# Patient Record
Sex: MALE | Race: WHITE | NOT HISPANIC OR LATINO | Employment: OTHER | URBAN - METROPOLITAN AREA
[De-identification: names, ages, dates, MRNs, and addresses within clinical notes are randomized per-mention and may not be internally consistent; named-entity substitution may affect disease eponyms.]

---

## 2018-03-26 ENCOUNTER — HISTORICAL (OUTPATIENT)
Dept: ADMINISTRATIVE | Facility: HOSPITAL | Age: 78
End: 2018-03-26

## 2018-04-03 ENCOUNTER — HISTORICAL (OUTPATIENT)
Dept: ADMINISTRATIVE | Facility: HOSPITAL | Age: 78
End: 2018-04-03

## 2018-04-09 ENCOUNTER — HISTORICAL (OUTPATIENT)
Dept: ADMINISTRATIVE | Facility: HOSPITAL | Age: 78
End: 2018-04-09

## 2018-06-01 ENCOUNTER — HISTORICAL (OUTPATIENT)
Dept: ADMINISTRATIVE | Facility: HOSPITAL | Age: 78
End: 2018-06-01

## 2018-07-16 ENCOUNTER — HISTORICAL (OUTPATIENT)
Dept: ADMINISTRATIVE | Facility: HOSPITAL | Age: 78
End: 2018-07-16

## 2018-11-07 ENCOUNTER — HOSPITAL ENCOUNTER (OUTPATIENT)
Dept: MEDSURG UNIT | Facility: HOSPITAL | Age: 78
End: 2018-11-08
Attending: SURGERY | Admitting: SURGERY

## 2018-12-16 ENCOUNTER — HOSPITAL ENCOUNTER (OUTPATIENT)
Dept: NUTRITION | Facility: HOSPITAL | Age: 78
End: 2018-12-18
Attending: HOSPITALIST | Admitting: HOSPITALIST

## 2019-12-27 ENCOUNTER — HISTORICAL (OUTPATIENT)
Dept: ADMINISTRATIVE | Facility: HOSPITAL | Age: 79
End: 2019-12-27

## 2019-12-27 LAB
ALBUMIN SERPL-MCNC: 3.6 GM/DL (ref 3.4–5)
ALP SERPL-CCNC: 97 UNIT/L (ref 50–136)
ALT SERPL-CCNC: 27 UNIT/L (ref 12–78)
AST SERPL-CCNC: 25 UNIT/L (ref 15–37)
BILIRUB SERPL-MCNC: 0.8 MG/DL (ref 0.2–1)
BILIRUBIN DIRECT+TOT PNL SERPL-MCNC: 0.1 MG/DL (ref 0–0.2)
BILIRUBIN DIRECT+TOT PNL SERPL-MCNC: 0.7 MG/DL (ref 0–0.8)
INR PPP: 1.1 (ref 0–1.3)
LIVER PROFILE INTERP: NORMAL
PROT SERPL-MCNC: 6.4 GM/DL (ref 6.4–8.2)
PROTHROMBIN TIME: 14 SECOND(S) (ref 12–14)

## 2021-03-23 ENCOUNTER — HISTORICAL (OUTPATIENT)
Dept: ADMINISTRATIVE | Facility: HOSPITAL | Age: 81
End: 2021-03-23

## 2021-03-23 LAB
ABS NEUT (OLG): 8.9 X10(3)/MCL (ref 2.1–9.2)
BUN SERPL-MCNC: 21 MG/DL (ref 7–18)
CALCIUM SERPL-MCNC: 9.6 MG/DL (ref 8.5–10.1)
CHLORIDE SERPL-SCNC: 104 MMOL/L (ref 98–107)
CO2 SERPL-SCNC: 27 MMOL/L (ref 21–32)
CREAT SERPL-MCNC: 1.26 MG/DL (ref 0.6–1.3)
CREAT/UREA NIT SERPL: 16.7
ERYTHROCYTE [DISTWIDTH] IN BLOOD BY AUTOMATED COUNT: 13.8 % (ref 11.5–17)
GLUCOSE SERPL-MCNC: 93 MG/DL (ref 74–106)
HCT VFR BLD AUTO: 45.1 % (ref 42–52)
HGB BLD-MCNC: 14.5 GM/DL (ref 14–18)
LYMPHOCYTES # BLD AUTO: 3.3 X10(3)/MCL (ref 0.6–3.4)
LYMPHOCYTES NFR BLD AUTO: 24.8 % (ref 13–40)
MCH RBC QN AUTO: 29.6 PG (ref 27–31.2)
MCHC RBC AUTO-ENTMCNC: 32 GM/DL (ref 32–36)
MCV RBC AUTO: 92 FL (ref 80–94)
MONOCYTES # BLD AUTO: 1 X10(3)/MCL (ref 0.1–1.3)
MONOCYTES NFR BLD AUTO: 7.7 % (ref 0.1–24)
NEUTROPHILS NFR BLD AUTO: 67.5 % (ref 47–80)
PLATELET # BLD AUTO: 168 X10(3)/MCL (ref 130–400)
PMV BLD AUTO: 8.6 FL (ref 9.4–12.4)
POTASSIUM SERPL-SCNC: 4.4 MMOL/L (ref 3.5–5.1)
RBC # BLD AUTO: 4.9 X10(6)/MCL (ref 4.7–6.1)
SODIUM SERPL-SCNC: 138 MMOL/L (ref 136–145)
WBC # SPEC AUTO: 13.2 X10(3)/MCL (ref 4.5–11.5)

## 2021-07-15 ENCOUNTER — HISTORICAL (OUTPATIENT)
Dept: ADMINISTRATIVE | Facility: HOSPITAL | Age: 81
End: 2021-07-15

## 2021-07-15 LAB
ABS NEUT (OLG): 5.3 X10(3)/MCL (ref 2.1–9.2)
ALBUMIN SERPL-MCNC: 4.3 GM/DL (ref 3.4–5)
ALBUMIN/GLOB SERPL: 2.05 {RATIO} (ref 1.5–2.5)
ALP SERPL-CCNC: 72 UNIT/L (ref 38–126)
ALT SERPL-CCNC: 14 UNIT/L (ref 7–52)
APPEARANCE, UA: CLEAR
AST SERPL-CCNC: 17 UNIT/L (ref 15–37)
BACTERIA #/AREA URNS AUTO: NORMAL /HPF
BILIRUB SERPL-MCNC: 0.6 MG/DL (ref 0.2–1)
BILIRUB UR QL STRIP: NEGATIVE MG/DL
BILIRUBIN DIRECT+TOT PNL SERPL-MCNC: 0.2 MG/DL (ref 0–0.5)
BILIRUBIN DIRECT+TOT PNL SERPL-MCNC: 0.4 MG/DL
BUN SERPL-MCNC: 21 MG/DL (ref 7–18)
CALCIUM SERPL-MCNC: 9.6 MG/DL (ref 8.5–10.1)
CHLORIDE SERPL-SCNC: 107 MMOL/L (ref 98–107)
CHOLEST SERPL-MCNC: 83 MG/DL (ref 0–200)
CHOLEST/HDLC SERPL: 2.8 {RATIO}
CO2 SERPL-SCNC: 27 MMOL/L (ref 21–32)
COLOR UR: YELLOW
CREAT SERPL-MCNC: 1.33 MG/DL (ref 0.6–1.3)
ERYTHROCYTE [DISTWIDTH] IN BLOOD BY AUTOMATED COUNT: 14.4 % (ref 11.5–17)
GLOBULIN SER-MCNC: 2.1 GM/DL (ref 1.2–3)
GLUCOSE (UA): NEGATIVE MG/DL
GLUCOSE SERPL-MCNC: 96 MG/DL (ref 74–106)
HCT VFR BLD AUTO: 45.2 % (ref 42–52)
HDLC SERPL-MCNC: 30 MG/DL (ref 35–60)
HGB BLD-MCNC: 14.6 GM/DL (ref 14–18)
HGB UR QL STRIP: NEGATIVE UNIT/L
KETONES UR QL STRIP: NORMAL MG/DL
LDLC SERPL CALC-MCNC: 41 MG/DL (ref 0–129)
LEUKOCYTE ESTERASE UR QL STRIP: NEGATIVE UNIT/L
LYMPHOCYTES # BLD AUTO: 3.1 X10(3)/MCL (ref 0.6–3.4)
LYMPHOCYTES NFR BLD AUTO: 33.6 % (ref 13–40)
MCH RBC QN AUTO: 29.3 PG (ref 27–31.2)
MCHC RBC AUTO-ENTMCNC: 32 GM/DL (ref 32–36)
MCV RBC AUTO: 91 FL (ref 80–94)
MONOCYTES # BLD AUTO: 0.9 X10(3)/MCL (ref 0.1–1.3)
MONOCYTES NFR BLD AUTO: 10.1 % (ref 0.1–24)
NEUTROPHILS NFR BLD AUTO: 56.3 % (ref 47–80)
NITRITE UR QL STRIP.AUTO: NEGATIVE
PH UR STRIP: 5.5 [PH]
PLATELET # BLD AUTO: 178 X10(3)/MCL (ref 130–400)
PMV BLD AUTO: 8.3 FL (ref 9.4–12.4)
POTASSIUM SERPL-SCNC: 4.6 MMOL/L (ref 3.5–5.1)
PROT SERPL-MCNC: 6.4 GM/DL (ref 6.4–8.2)
PROT UR QL STRIP: NEGATIVE MG/DL
PSA SERPL-MCNC: 3.02 NG/ML (ref 0–6.5)
RBC # BLD AUTO: 4.98 X10(6)/MCL (ref 4.7–6.1)
RBC #/AREA URNS HPF: NORMAL /HPF
SODIUM SERPL-SCNC: 143 MMOL/L (ref 136–145)
SP GR UR STRIP: 1.02
SQUAMOUS EPITHELIAL, UA: NORMAL /LPF
TRIGL SERPL-MCNC: 101 MG/DL (ref 30–150)
UROBILINOGEN UR STRIP-ACNC: 0.2 MG/DL
VLDLC SERPL CALC-MCNC: 20.2 MG/DL
WBC # SPEC AUTO: 9.3 X10(3)/MCL (ref 4.5–11.5)
WBC #/AREA URNS AUTO: NORMAL /[HPF]

## 2022-01-02 ENCOUNTER — HISTORICAL (OUTPATIENT)
Dept: ADMINISTRATIVE | Facility: HOSPITAL | Age: 82
End: 2022-01-02

## 2022-02-09 ENCOUNTER — HISTORICAL (OUTPATIENT)
Dept: CARDIOLOGY | Facility: HOSPITAL | Age: 82
End: 2022-02-09

## 2022-02-24 ENCOUNTER — HISTORICAL (OUTPATIENT)
Dept: ADMINISTRATIVE | Facility: HOSPITAL | Age: 82
End: 2022-02-24

## 2022-02-24 LAB — D DIMER PPP IA.FEU-MCNC: 0.42 (ref 0–0.5)

## 2022-04-07 ENCOUNTER — HISTORICAL (OUTPATIENT)
Dept: ADMINISTRATIVE | Facility: HOSPITAL | Age: 82
End: 2022-04-07
Payer: MEDICARE

## 2022-04-24 VITALS
DIASTOLIC BLOOD PRESSURE: 63 MMHG | BODY MASS INDEX: 27.03 KG/M2 | SYSTOLIC BLOOD PRESSURE: 104 MMHG | OXYGEN SATURATION: 90 % | HEIGHT: 74 IN | WEIGHT: 210.63 LBS

## 2022-04-28 NOTE — OP NOTE
DATE OF SURGERY:    11/07/2018    SURGEON:  Emigdio Guerrero MD    PREOPERATIVE DIAGNOSIS:  Acute appendicitis.    POSTOPERATIVE DIAGNOSIS:  Acute appendicitis.    PROCEDURE PERFORMED:  Laparoscopic appendectomy.    COMPLICATIONS:  None.    ESTIMATED BLOOD LOSS:  20 mL.    FINDINGS:  Acutely inflamed appendix with fibropurulent exudate.  No gross perforations or abscesses.    INDICATIONS FOR PROCEDURE:  Mr. Sharp is a 78-year-old male who began having abdominal pain yesterday associated with nausea and anorexia.  The pain and symptoms progressed and located mostly in the right lower quadrant.  He came to the emergency department and workup was consistent with acute appendicitis.  Risks and benefits of the procedure were explained to the patient.  He consented to surgery.    PROCEDURE IN DETAIL:  The patient was taken to the operating room, laid supine on the operating table.  Time-out was performed.  All were in agreement.  After appropriate anesthesia was induced, he was prepped and draped in the normal sterile fashion exposing the abdomen.  A supraumbilical incision was made and carried down into the peritoneal cavity.  Upon blunt palpation, there were no adhesions felt.  Zero Vicryl stay stitch was placed in a U fashion in the fascia and a Enrique trocar was inserted and secured and a pneumoperitoneum was established.  5 mm trocars were inserted under direct visualization in the suprapubic and the left lower quadrant region.  The appendix was identified in the right lower quadrant at the base of the cecum and it was noted to be inflamed with somewhat fibropurulent exudate, although there were no perforations or abscesses identified.  After a moderate amount of dissection, the appendix was able to be retracted free from the surrounding tissues.  A window was made at the base of the appendix at the cecum and a blue load Endo-MAURILIO stapling device was used to transect the appendix off the cecum.  Additional  mobilization further freed up the appendiceal mesentery and the mesoappendix was ligated with a white load Endo-MAURILIO stapling device.  The appendix was placed in an EndoCatch bag.  The right lower quadrant was copiously irrigated and suctioned.  The tissues were mildly oozy, but there was no discernible area of bleeding.  Again, it was irrigated free of all particulate matter.  Hemostasis was adequate and the appendix was taken from the umbilical trocar site.  Trocars were removed.  Pneumoperitoneum was evacuated.  The Enrique incision was closed with 0-Vicryl stay stitch.  The incisions were closed at the skin level with subcuticular 4-0 Vicryl.  The patient tolerated the procedure well.  There were no immediate     postoperative complications.  All needle and instrument counts were correct.  I was present and scrubbed for the entire duration of the procedure.        ______________________________  MD KRISTIN Powell IV/FABI  DD:  11/07/2018  Time:  01:46PM  DT:  11/07/2018  Time:  02:01PM  Job #:  [id number]

## 2022-04-28 NOTE — DISCHARGE SUMMARY
Patient:   Helder Sharp            MRN: [id number]            FIN: [id number]               Age:   78 years     Sex:  Male     :  1940   Associated Diagnoses:   None   Author:   Juliet TORRES, Jose Rafael      Discharge Information      Discharge Summary Information   Admit/Discharge Dates   Admit Date: 2018  Discharge Date: 2018   Physicians   Attending Physician - Tyson TORRES, Kali YBARRA  Attending Physician - Juliet TORRES, Jose Rafael    Primary Care Physician - Orlando TORRES, Tony TATE   Discharge Diagnosis   Chronic obstructive pulmonary disease with (acute) exacerbation (J44.1)   Acute bronchitis    Recent sinusitis/bronchitis    Reset laparoscopic appendectomy 18    CKD 2    P. Afib      Procedures   No procedures recorded for this visit.   Immunizations   No immunizations recorded for this visit.   Discharge Medications   Continue  Medrol dose pack  levaquin 750 mg po daily x 5 days   albuterol-ipratropium (COMBIVENT    AER ) 1puff, INH, QID, PRN shortness of breath or wheezing  albuterol-ipratropium (DuoNeb) 3 mL, NEB, q4hr Resp, PRN shortness of breath or wheezing  aspirin (aspirin 81 mg oral tablet) 81 mg, Oral, qPM  atorvastatin (atorvastatin 20 mg oral tablet) 20 mg, Oral, Daily  fluticasone-vilanterol (Breo Ellipta 100 mcg-25 mcg/inh inhalation powder) 1 puff, Daily  sertraline (Zoloft 100 mg oral tablet) 100 mg, Oral, qPM  Discontinue  Influenza Virus Vaccine, Inactivated, IM, Daily  LORAzepam (LORAZEPAM    TAB 0.5MG) 0.5 mg, Oral, qPM, PRN agitation  sertraline (sertraline 100 mg oral tablet) See Instructions      Education   Discharge - 18 7:41:00 CST, Home, Give all scheduled vaccinations prior to discharge.   Discharge Activity - Activity as Tolerated   Discharge Diet - Regular       Followup   Tony Perry, within 1 to 2 weeks  Kong Knox, within 1 to 2 weeks      Hospital Course   Hospital Course     78-year-old  male who has moderate COPD followed by   Abilio who reportedly went to urgent care this morning for worsening shortness of breath, chills, fever, non-productive cough starting this morning and was advised to present to the emergency department.  SaO2 levels were 88% on room air. Denies any pleuritic chest pain.   Of note he was recently admitted about a month ago for an episode of acute appendicitis and underwent lap appendectomy. He states his COPD is well controlled and hasn't had a hospitalization in many years.  The hospitalist group was asked to admit for further treatment of COPD exacerbation. Pt was started on iv steroids and bronchodilators. He also started to cough up brown sputum. Started on iv levaquin. Blood cultures done and was negative. CXR was clear. Pts lactic acid level was elevated and treated with IV fluids. His renal functions was also elevated. Improved with iv fluids.   Pts symptoms gradulally improved. He ambulated and his O2 sats dropped to < 88%. Home O2 has been set up. He was advised to avoid NSAIDs 2/2 CLD 2. During this stay he went into Afib but rate controlled. This lasted for <12 hrs and pts heart rate was back in NSR. He was placed on Metoprolol 25 mg po daily. Pt had mild leukocytosis and levated blood sugars 2/2 steroids.  Pt today is stable and asymptomatic. DC to home today. Explained in detail to patient and family about the discharge plan, medications and F/U visits. They understand agree with the treatment plan.   Diet: Regular  Meds: per dc med rec  F/U with PCP and Pulmonologist on 1 to 2 weeks   For furtehr questions contact hospitalist office  DC 31 mts    .        Physical Examination   General:  Alert and oriented, No acute distress.    Respiratory:  Lungs are clear to auscultation, Breath sounds are equal.    Cardiovascular:  Normal rate, Regular rhythm, No murmur.    Gastrointestinal:  Soft, Non-tender, Non-distended, Normal bowel sounds.    Neurologic:  Alert, Oriented, No focal deficits, Cranial Nerves II-XII  are grossly intact.       Discharge Plan   Education and Follow-up   Counseled: patient, regarding diagnosis, regarding treatment, regarding medications.

## 2022-04-28 NOTE — H&P
Patient:   Helder Sharp            MRN: [id number]            FIN: [id number]               Age:   78 years     Sex:  Male     :  1940   Associated Diagnoses:   None   Author:   Kali Mehta MD      Basic Information   Admit information:  ED admission .    Time Seen:  Date & Time 2018 15:50:00.    Source of history:  Self, Spouse.    Referral source:  Emergency department.    History limitation:  None.    Advance directive:  Full code.    Provider information/ cc:  PCP: Dr. Tony Perry  Pulmonologist: Dr. Knox.       Chief Complaint   Worsening shortness of breath, dyspnea      History of Present Illness   78-year-old  male who has moderate COPD followed by Dr. Knox who reportedly went to urgent care this morning for worsening shortness of breath, chills, fever, non-productive cough starting this morning and was advised to present to the emergency department.  SaO2 levels were 88% on room air.  The patient was seen by his PCP on 2018 and was prescribed a Z-Anderson and given a Celestone IM injection for sinusitis/bronchitis.  Denies any pleuritic chest pain.  States he completed antibiotics and had some relief but symptoms have returned.  Of note he was recently admitted about a month ago for an episode of acute appendicitis which resulted in a lap appy. He states his COPD is well controlled and hasn't had a hospitalization in many years.  He does not require O2 at home. The hospitalist group was asked to admit for further treatment of COPD exacerbation.       Review of Systems   All other systems.  Except as documented, all other systems reviewed and are negative      Health Status   Allergies:    Allergic Reactions (Selected)  Severity Not Documented  Flexeril- Unknown.,    Allergies (1) Active Reaction  Flexeril Unknown   Current medications:  (Selected)   Inpatient Medications  Ordered  DuoNeb: 3 mL, form: Soln, NEB, q4hr PRN for shortness of breath or wheezing, first dose  18 15:03:00 CST  NS (0.9% Sodium Chloride) Infusion 1,000 mL: 1,000 mL, 1,000 mL, IV, 500 mL/hr, start date 18 15:03:00 CST  levofloxacin IVPB ( Levaquin ): 750 mg, form: Infusion, IV Piggyback, Daily, Infuse over: 1.5 hr, first dose 18 15:04:00 CST, STAT  Prescriptions  Prescribed  atorvastatin 20 mg oral tablet: 20 mg = 1 tab(s), Oral, Daily, # 90 tab(s), 3 Refill(s), Pharmacy: [facility]/pharmacy #[id number]  sertraline 100 mg oral tablet: See Instructions, TAKE 1 TABLET BY MOUTH DAILY, # 90 tab(s), 3 Refill(s), Pharmacy: [facility]/pharmacy #[id number]  Documented Medications  Documented  Breo Ellipta 100 mcg-25 mcg/inh inhalation powder: 1 puff, Daily, 996 Refill(s)  COMBIVENT    AER : 1puff, INH, QID, PRN PRN shortness of breath or wheezing  DuoNeb: 3 mL, NEB, q4hr Resp, PRN PRN shortness of breath or wheezing, 0 Refill(s)  Influenza Virus Vaccine, Inactivated: IM, Daily, 0 Refill(s)  LORAZEPAM    TAB 0.5M.5 mg = 1 tab(s), Oral, Daily  aspirin 81 mg oral tablet: 81 mg = 1 tab(s), Oral, Daily, 0 Refill(s)      Histories     Past Medical History: Moderate COPD, anxiety  Past Surgical History: Laparoscopic appendectomy 18, left heart catheterization, vasectomy, tonsillectomy, colonoscopy, hernia repair  Family History: Reviewed and is noncontributory to this hospitalization  Social History:  Denies EtOH use, previous history of cigarette smoking did smoke x 40- 50+ years tobacco, quit smoking 9 years ago, denies  illicit drug use; , lives with his wife    Procedure history.Social History      Physical Examination      Vital Signs (last 24 hrs)_____  Last Charted___________  Temp Oral     37.9 DegC  (DEC 16 14:58)  Heart Rate Peripheral   92 bpm  (DEC 16 16:07)  Resp Rate         22 br/min  (DEC 16 16:07)  SBP      H 144mmHg  (DEC 16 16:)  DBP      61 mmHg  (DEC 16 16:)  SpO2      L 90%  (DEC 16 15:00)   Measurements from flowsheet : Measurements   2018 12:01 CST     Weight Dosing              101 kg                             Weight Measured and Calculated in Lbs     222.66 lb                             Weight Estimated          101 kg                             Height/Length Dosing      188 cm                             Height/Length Estimated   188 cm                             Body Mass Index Estimated 28.58 kg/m2     General:  Alert and oriented, No acute distress.    Cognition and Speech:  Oriented, Speech clear and coherent.    HENT:  Normocephalic, Normal hearing, Oral mucosa is moist.    Eye:  Pupils are equal, round and reactive to light, Normal conjunctiva.    Neck:  Supple, No jugular venous distention.    Respiratory:  Bilateral breath sounds with expiratory wheezing noted to upper lobes and right base anteriorly.    Cardiovascular:  RRR without murmur, S1, S2.    Gastrointestinal:  Soft, Non-tender, Non-distended, Normal bowel sounds.    Integumentary:  Warm, Dry, Intact.    Musculoskeletal:  Normal strength, No tenderness.    Neurologic:  Alert, Oriented, Normal sensory, No focal deficits.    Psychiatric:  Cooperative, Appropriate mood & affect, Normal judgment.       Health Maintenance      Review / Management   Results review   Laboratory Results   Today's Lab Results : PowerNote Discrete Results   12/16/2018 13:11 CST     POC BNP iSTAT             59 pg/mL                             POC Troponin              0.00 ng/mL    12/16/2018 13:03 CST     WBC                       19.1 x10(3)/mcL  HI                             RBC                       5.01 x10(6)/mcL                             Hgb                       15.0 gm/dL                             Hct                       47.5 %                             Platelet                  169 x10(3)/mcL                             MCV                       94.8 fL  HI                             MCH                       29.9 pg                             MCHC                      31.6 gm/dL  LOW                             RDW                        13.7 %                             MPV                       9.3 fL  LOW                             Abs Neut                  16.33 x10(3)/mcL  HI                             Neutro Auto               86 %  NA                             Lymph Auto                8 %  NA                             Mono Auto                 6 %  NA                             Eos Auto                  0 %  NA                             Abs Eos                   0.0 x10(3)/mcL                             Basophil Auto             0 %  NA                             Abs Neutro                16.33 x10(3)/mcL  HI                             Abs Lymph                 1.5 x10(3)/mcL                             Abs Mono                  1.1 x10(3)/mcL                             Abs Baso                  0.0 x10(3)/mcL                             Sodium Lvl                139 mmol/L                             Potassium Lvl             4.1 mmol/L                             Chloride                  108 mmol/L  HI                             CO2                       21.0 mmol/L                             Calcium Lvl               8.9 mg/dL                             Glucose Lvl               133 mg/dL  HI                             BUN                       26.0 mg/dL  HI                             Creatinine                1.39 mg/dL  HI                             eGFR-AA                   >60 mL/min/1.73 m2  NA                             eGFR-MILY                  53 mL/min/1.73 m2  NA                             Bili Total                1.0 mg/dL                             Bili Direct               0.30 mg/dL                             Bili Indirect             0.70 mg/dL                             AST                       17 unit/L                             ALT                       25 unit/L                             Alk Phos                  87 unit/L                             Total Protein              7.7 gm/dL                             Albumin Lvl               4.10 gm/dL                             Globulin                  3.60 gm/dL  HI                             A/G Ratio                 1.1 ratio                             Troponin-I                <0.02 ng/mL        Chest x-ray results   XR Chest 2 Views     REASON FOR STUDY: Chest Pain     Comparison: 11/7/2018     FINDINGS:     Lungs appear clear of active disease. There is no pleural effusion or  pneumothorax identified. There are small granulomas in the left upper  lobe. Heart size is normal. Mediastinum is not widened. No evidence of  pleural effusion.     Impression:     No acute cardiopulmonary findings.               Signature Line  Electronically Signed By: Asher TORRES, Moises YBARRA  Date/Time Signed: 12/16/2018 12:30         I, Terese Ludwig, NP have discussed this case with Dr. Mehta.      Impression and Plan   Impression:  COPD exacerbation with probable bronchitis  -Solu-Medrol IV  -DuoNeb treatments  -Levaquin IVPB daily  -wean O2 as tolerated    Recent sinusitis/bronchitis  -Treated with Z-Anderson and Celestone IM    Reset laparoscopic appendectomy 11/7/18    LUDWIN on CKD  - isotonic crystalloids      Code status: Full  DVT prophylaxis: Lovenox         Professional Services   For this patient encounter, I reviewed the NP/PA/resident documentation, treatment plan, and medical decision making; and I had face-to-face time with this patient.  Assumed patient care at 12/16/18 on 1700.    History:  Reviewed HPI, medical, surgical, family and social history as above    Physical: Agree with documentation above    Treatment Plan:   Admit for treatment of COPD exacerbation.  Empiric antibiotics, steroids and nebs as needed.  Wean O2 as tolerated

## 2022-04-28 NOTE — DISCHARGE SUMMARY
Patient:   Helder Sharp            MRN: [id number]            FIN: [id number]               Age:   78 years     Sex:  Male     :  1940   Associated Diagnoses:   Acute appendicitis   Author:   Alexis Yao      Results Review   General results   Today's results   2018 3:40 CST       WBC                       13.8 x10(3)/mcL  HI                             RBC                       4.65 x10(6)/mcL  LOW                             Hgb                       14.0 gm/dL                             Hct                       45.3 %                             Platelet                  153 x10(3)/mcL                             MCV                       97.4 fL  HI                             MCH                       30.1 pg                             MCHC                      30.9 gm/dL  LOW                             RDW                       13.2 %                             MPV                       9.2 fL  LOW                             Abs Neut                  11.15 x10(3)/mcL  HI                             Neutro Auto               81 %  NA                             Lymph Auto                13 %  NA                             Mono Auto                 5 %  NA                             Basophil Auto             0 %  NA                             Abs Neutro                11.15 x10(3)/mcL  HI                             Abs Lymph                 1.8 x10(3)/mcL                             Abs Mono                  0.7 x10(3)/mcL                             Abs Baso                  0.0 x10(3)/mcL                             Sodium Lvl                143 mmol/L                             Potassium Lvl             4.8 mmol/L                             Chloride                  108 mmol/L  HI                             CO2                       27.0 mmol/L                             Calcium Lvl               8.6 mg/dL                             Glucose Lvl               126  mg/dL  HI                             BUN                       23.0 mg/dL  HI                             Creatinine                1.43 mg/dL  HI                             eGFR-AA                   >60 mL/min/1.73 m2  NA                             eGFR-MILY                  51 mL/min/1.73 m2  NA                             Bili Total                0.7 mg/dL                             Bili Direct               0.30 mg/dL                             Bili Indirect             0.40 mg/dL                             AST                       10 unit/L  LOW                             ALT                       24 unit/L                             Alk Phos                  72 unit/L                             Total Protein             6.4 gm/dL                             Albumin Lvl               3.40 gm/dL                             Globulin                  3.00 gm/dL                             A/G Ratio                 1.1 ratio        Discharge Information      Discharge Summary Information   Admitted  11/7/2018   Discharged  11/8/2018   Admitting diagnosis   Discharge diagnosis     Acute appendicitis ([id number]-OM K35.80).        Physical Examination   General:  Alert and oriented, No acute distress.    Eye:  Pupils are equal, round and reactive to light, Extraocular movements are intact.    Neck:  Supple, Non-tender.    Respiratory:  Lungs are clear to auscultation, Respirations are non-labored, Breath sounds are equal, Symmetrical chest wall expansion.    Cardiovascular:  Normal rate, Regular rhythm, Good pulses equal in all extremities, Normal peripheral perfusion.    Gastrointestinal:  Soft, Non-distended, Normal bowel sounds, aTTP. Lap sites c/d/i. .       Vital Signs (last 24 hrs)_____  Last Charted___________  Temp Oral     36.6 DegC  (NOV 08 07:00)  Heart Rate Peripheral   76 bpm  (NOV 08 07:00)  Resp Rate         18 br/min  (NOV 08 07:00)  SBP      108 mmHg  (NOV 08 07:00)  DBP      L 58mmHg  (NOV 08  07:00)  SpO2      L 93%  (NOV 08 07:00)  Weight      100 kg  (NOV 07 17:19)  Height      187 cm  (NOV 07 17:19)  BMI      28.6  (NOV 07 17:19)   Musculoskeletal:  Normal range of motion, Normal strength, No deformity.    Integumentary:  Warm, Dry.    Neurologic:  Alert, Oriented.    Psychiatric:  Cooperative, Appropriate mood & affect.       Hospital Course   78-year-old white male admitted with acute appendicitis.  Taken to the operating room for uncomplicated laparoscopic appendectomy.  Postoperatively he tolerated a regular diet his pain was well controlled.  This morning he ambulated and had not taking any pain medication at least 8 hours.  He feels well.  His laparoscopic sites are clean dry and intact.  He has appropriate tenderness to palpation over his abdomen.  His wife is at the bedside.  He will be discharged home.  He understands that he should call for any increasing pain or fever.  He is given standard laparoscopic appendectomy instructions.  He will follow-up in our clinic as scheduled in 1-2 weeks.  No lifting over 10 pounds for 4 weeks.  He can have a regular diet.  We will likely able to return to work within the next 7 days.  He will be sent home on pain medication.  No indication for antibiotics.      Discharge Plan   Discharge Summary Plan   Discharge Status: improved.     Discharge instructions given: to patient, to family member spouse.     Discharge disposition: discharge to home (into the care of family member, with home health care, self care).     Prescriptions: continue same medications, reviewed (with patient, with spouse), written and given to patient.     Course   Improving.     Education and Follow-up   Counseled: patient, family.

## 2022-04-28 NOTE — ED PROVIDER NOTES
"   Patient:   Helder Sharp            MRN: [id number]            FIN: [id number]               Age:   78 years     Sex:  Male     :  1940   Associated Diagnoses:   Acute appendicitis   Author:   Michael DE SOUZA MD, Stephane MALIN      Basic Information   Time seen: Date & time 2018 01:00:00.   History source: Patient.   History limitation: None.   Additional information: Chief Complaint from Nursing Triage Note : Chief Complaint   2018 0:54 CST       Chief Complaint           reports w/ abd pain started at 1700 and n/v - poitns to midline lower abd pain, hx hernia sx and COPD, last bm monday night  .      History of Present Illness   The patient presents with   79 y/o C male with sx hx of hernia sx presents to the ED via EMS, c/o abd pain onset 18 at 1700. Pt states he started having suprapubic abd pain at 1700, along with N/V and headache. Pt denies any pain radiation or dysuria. .  The onset was 2018 17:00:00 .  The course/duration of symptoms is constant.  The character of symptoms is   "pain".  The degree at onset was moderate.  The Location of pain at onset was lower and abdominal.  The degree at present is moderate.  The Location of pain at present is lower and abdominal.  Radiating pain: none. The exacerbating factor is none.  The relieving factor is none.  Therapy today: none.  Risk factors consist of none.  Associated symptoms: nausea and vomiting.        Review of Systems   Constitutional symptoms:  Fever, chills, no sweats, no weakness.    Skin symptoms:  No rash,    Eye symptoms:  No recent vision problems,    ENMT symptoms:  No ear pain,    Respiratory symptoms:  No shortness of breath, no orthopnea.    Cardiovascular symptoms:  No chest pain, no palpitations.    Gastrointestinal symptoms:  Abdominal pain, moderate, suprapubic, pain, nausea, vomiting, No diarrhea,    Genitourinary symptoms:  No dysuria, no hematuria.    Musculoskeletal symptoms:  No back pain, no Muscle pain.  "   Psychiatric symptoms:  No anxiety, no depression.    Allergy/immunologic symptoms:  No seasonal allergies, no food allergies.              Additional review of systems information: All other systems reviewed and otherwise negative.      Health Status   Allergies:    Allergic Reactions (Selected)  Severity Not Documented  Flexeril- Unknown..   Medications:  (Selected)   Inpatient Medications  Ordered  IVF Normal Saline NS Infusion 1,000 mL: 1,000 mL, 1,000 mL, IV, 1,000 mL/hr, start date 11/07/18 1:05:00 CST  Toradol 30 mg for IV Push: 30 mg, form: Injection, IV Push, Once, first dose 11/07/18 1:05:00 CST, stop date 11/07/18 1:05:00 CST, STAT  Zofran INJ.  (IV Push / IM)  2 mg/mL: 4 mg, form: Injection, IV Push, Once, first dose 11/07/18 1:05:00 CST, stop date 11/07/18 1:05:00 CST, STAT  Prescriptions  Prescribed  LORazepam 0.5 mg oral tablet: 0.5 mg = 1 tab(s), Oral, Daily, PRN PRN as needed for anxiety, # 12 tab(s), 1 Refill(s), Pharmacy: [facility]/pharmacy #[id number]  atorvastatin 20 mg oral tablet: 20 mg = 1 tab(s), Oral, Daily, # 90 tab(s), 3 Refill(s), Pharmacy: [facility]/pharmacy #[id number]  sertraline 100 mg oral tablet: See Instructions, TAKE 1 TABLET BY MOUTH DAILY, # 90 tab(s), 3 Refill(s), Pharmacy: [facility]/pharmacy #[id number]  Documented Medications  Documented  Breo Ellipta 100 mcg-25 mcg/inh inhalation powder: 1 puff, Daily, 996 Refill(s)  COMBIVENT    AER :   aspirin 81 mg oral tablet: 81 mg = 1 tab(s), Oral, Daily, 0 Refill(s).   Immunizations: Up to date.      Past Medical/ Family/ Social History   Medical history:    Active  COPD (chronic obstructive pulmonary disease) ([id number])  Resolved  Sinus infection ([id number]):  Resolved.  Pneumonia ([id number]):  Resolved.  Comments:  2/13/2012 CST 12:33 Ban Gomez LPN  about a year ago  Emphysema ([id number]):  Resolved.  Comments:  2/13/2012 CST 12:34 Ban Gomez LPN  A[facility] mcbride , sees Dr Knox yearly no inhalers or neb treatments  Colon polyps ([id number]):  Resolved.  Comments:  2/13/2012 CST 12:34 CST - Ban Farmer LPN  removed  Inguinal hernia ([id number]):  Resolved.  Comments:  2/13/2012 CST 12:35 CST - Ban Farmer LPN  right.   Surgical history:    Left Heart Cath w/COR Angio Ventriculography (None) performed by Larry Gomes MD on 1/28/2016 at 75 Years.  Comments:  1/28/2016 07:30 - Ruddy Panchal RN  auto-populated from documented surgical case  Vasectomy (SNOMED CT 09219089).  Tonsillectomy (SNOMED CT 970739451).  Colonoscopy (SNOMED CT 605821719).  Hernia repair (SNOMED CT 63432057)..   Family history: Not significant.   Social history: Alcohol use: Occasionally, Tobacco use: Denies, Drug use: Denies, Occupation: Retired.      Physical Examination               Vital Signs   Vital Signs   11/7/2018 0:54 CST       Temperature Oral          37.3 DegC                             Temperature Oral (calculated)             99.14 DegF                             Peripheral Pulse Rate     83 bpm                             Respiratory Rate          18 br/min                             Systolic Blood Pressure   151 mmHg  HI                             Diastolic Blood Pressure  79 mmHg  .      Vital Signs (last 24 hrs)_____  Last Charted___________  Temp Oral     37.3 DegC  (NOV 07 00:54)  Heart Rate Peripheral   83 bpm  (NOV 07 00:54)  Resp Rate         18 br/min  (NOV 07 00:54)  SBP      H 151mmHg  (NOV 07 00:54)  DBP      79 mmHg  (NOV 07 00:54).               Per nurse's notes.   Measurements   11/7/2018 0:54 CST       Weight Dosing             100 kg                             Weight Measured and Calculated in Lbs     220.46 lb                             Weight Estimated          100 kg                             Height/Length Dosing      187 cm                             Height/Length Estimated   187 cm                              Body Mass Index Estimated 28.6 kg/m2  .   Oxygen saturation.   General:  Alert, no acute distress.    Skin:  Warm, pink, intact, moist, no rash.    Head:  Normocephalic, atraumatic.    Neck:  Supple.   Cardiovascular:  Regular rate and rhythm, No murmur, Normal peripheral perfusion, No edema.    Respiratory:  Lungs are clear to auscultation, respirations are non-labored, breath sounds are equal, Symmetrical chest wall expansion.    Gastrointestinal:  Soft, Non distended, Normal bowel sounds, Tenderness: Moderate, generalized, Guarding: Moderate, Rebound: Positive, right lower quadrant.    Musculoskeletal:  Normal ROM, normal strength.    Neurological:  Alert and oriented to person, place, time, and situation, No focal neurological deficit observed, CN II-XII intact, normal sensory observed, normal motor observed, normal speech observed.    Lymphatics:  No lymphadenopathy.   Psychiatric:  Cooperative, appropriate mood & affect, normal judgment.       Medical Decision Making   Documents reviewed:  Emergency department nurses' notes, emergency department records.    Orders    Laboratory    CBC w/ Auto Diff, Stephane Rodriguez III, MD, 11/07/18, 01:05, Ordered    CMP, Stephane Rodriguez III, MD, 11/07/18, 01:05, Ordered    Urinalysis Complete a reflex to culture, Stephane Rodriguez III, MD, 11/07/18, 01:05, Ordered    Lipase Level, Stephane Rodriguez III, MD, 11/07/18, 01:05, Ordered    Amylase Level, Stephane oRdriguez III, MD, 11/07/18, 01:05, Ordered  Xray    XR Chest 1 View, Stephane Rodriguez III, MD, 11/07/18, 01:05, Ordered .   Results review:  Lab results : Lab View   11/7/2018 1:34 CST       Lactic Acid Lvl           1.0 mmol/L    11/7/2018 1:14 CST       POC Sodium                141 mmol/L                             POC Potassium             4.4 mmol/L                             POC Chloride              103 mmol/L                             POC Ion Calcium           1.18 mmol/L                              POC Glucose               112 mg/dL  HI                             POC BUN                   32.0 mg/dL  HI                             POC Creatinine            1.3 mg/dL                             POC AGAP                  16.0  NA                             POC Hb                    16.0 mg/dL                             POC Hct                   47.0 %                             POC TCO2                  27.0 mmol/L    11/7/2018 1:09 CST       Sodium Lvl                140 mmol/L                             Potassium Lvl             4.4 mmol/L                             Chloride                  106 mmol/L                             CO2                       28.0 mmol/L                             Calcium Lvl               8.8 mg/dL                             Glucose Lvl               112 mg/dL  HI                             BUN                       31.0 mg/dL  HI                             Creatinine                1.41 mg/dL  HI                             eGFR-AA                   >60 mL/min/1.73 m2  NA                             eGFR-MILY                  52 mL/min/1.73 m2  NA                             Amylase Lvl               82 unit/L                             Bili Total                0.5 mg/dL                             Bili Direct               0.20 mg/dL                             Bili Indirect             0.30 mg/dL                             AST                       15 unit/L                             ALT                       31 unit/L                             Alk Phos                  87 unit/L                             Total Protein             7.0 gm/dL                             Albumin Lvl               3.80 gm/dL                             Globulin                  3.20 gm/dL                             A/G Ratio                 1.2  NA                             Lipase Lvl                159 unit/L                             WBC                        16.7 x10(3)/mcL  HI                             RBC                       5.05 x10(6)/mcL                             Hgb                       15.2 gm/dL                             Hct                       48.3 %                             Platelet                  165 x10(3)/mcL                             MCV                       95.6 fL  HI                             MCH                       30.1 pg                             MCHC                      31.5 gm/dL  LOW                             RDW                       13.1 %                             MPV                       9.2 fL  LOW                             Abs Neut                  12.39 x10(3)/mcL  HI                             Neutro Auto               74 %  NA                             Lymph Auto                16 %  NA                             Mono Auto                 8 %  NA                             Eos Auto                  0 %  NA                             Abs Eos                   0.1 x10(3)/mcL                             Basophil Auto             0 %  NA                             Abs Neutro                12.39 x10(3)/mcL  HI                             Abs Lymph                 2.7 x10(3)/mcL                             Abs Mono                  1.3 x10(3)/mcL                             Abs Baso                  0.1 x10(3)/mcL  ,    No qualifying data available.    Radiology results:  Computed tomography, CT of the abdomen for 12.6 mm appendix with periappendiceal laboratory changes.       Reexamination/ Reevaluation   Time: 11/7/2018 02:29:00 .   Interventions: Patient declined narcotic pain medicine will give ofiremv  and Toradol and admit to surgical hospitalist.      Impression and Plan   Diagnosis   Acute appendicitis (YSV80-CQ K35.80)      Calls-Consults   -  herritt-admit.   Plan   Condition: Improved, Stable.    Disposition: Admit time  11/7/2018 02:30:00, Admit to Inpatient Unit.    Counseled:  Patient, Family, Regarding diagnosis, Regarding diagnostic results, Regarding treatment plan, Regarding prescription, Patient indicated understanding of instructions.    Notes: I, Antony Krause, acted solely as a scribe for and in the presence of Dr. Rodriguez who performed the service.,       This scribes note accurately reflects the work done by me I have reviewed the note and personally performed a history and physical and agree with all the documentation and findings.

## 2022-05-01 NOTE — HISTORICAL [facility] CERNER
This is a historical note converted from Justin. Formatting and pictures may have been removed.  Please reference Justin for original formatting and attached multimedia. Chief Complaint  reports w/ abd pain started at 1700 and n/v - poitns to midline lower abd pain, hx hernia sx and COPD, last bm monday night  History of Present Illness  79 yo M w/ PMH of COPD, depression, and hyperlipidemia who presents to the ED with a one day history of sudden onset abdominal pain.? He reports that at approximately 1700 yesterday, he experienced periumbilical and RLQ pain that was unresponsive to over the counter pain medications.? Over the same period of time he developed chills, and nausea/vomiting.? He denies anorexia.? He has never had similar symptoms.? Afebrile on presentation, VSS.? Leukocytosis of 16.7.? CT demonstrates a dilated appendix, some fat stranding visualized in the RLQ.? Patient reports symptomatic improvement following administration of IV antibiotics, and pain medication in the ED.  Review of Systems  Negative unless otherwise specified in HPI  Physical Exam  Vitals & Measurements  T:?37.3? ?C (Oral)? HR:?85(Peripheral)? RR:?16? BP:?105/86? SpO2:?90%? WT:?100?kg? WT:?100?kg?  Gen: NAD, AAOx3, interacting appropriately  Neuro: following commands, no focal deficits  HEENT: no scleral icterus, no JVD, no LAD  CV: RRR, distal pulses palpable  Resp: normal work of breathing, equal/bilateral chest rise  Ext: grossly unremarkable  Assessment/Plan  79 yo w/ abdominal pain, leukocytosis, and a dilated appendix on CT  ?  -surgical hospitalist to admit  -will consent/post for lap appy this AM  -IV zosyn  -NPO  -mIVF  ?   Problem List/Past Medical History  Ongoing  Anxiety  Carotid bruit Lt  COPD (chronic obstructive pulmonary disease)  Ex-smoker  Hyperglycemia  Nodules present- Pulmonary  Plantar fasciitis Lt  Polyp colon  Historical  Colon polyps  Emphysema  Inguinal hernia  Pneumonia  Sinus  infection  Procedure/Surgical History  Left Heart Cath w/COR Angio Ventriculography (None) (01/28/2016)  Colonoscopy  Hernia repair  Tonsillectomy  Vasectomy   Medications  Inpatient  IVF Normal Saline NS Infusion 1,000 mL, 1000 mL, IV  Zosyn 3.375 gm (for IVPB)  Home  aspirin 81 mg oral tablet, 81 mg= 1 tab(s), Oral, Daily  atorvastatin 20 mg oral tablet, 20 mg= 1 tab(s), Oral, Daily, 3 refills  Breo Ellipta 100 mcg-25 mcg/inh inhalation powder, 1 puff, Daily  COMBIVENT AER , 1puff, INH, QID, PRN  sertraline 100 mg oral tablet, See Instructions, 3 refills  Allergies  Flexeril?(Unknown)  Social History  Alcohol - Denies Alcohol Use, 02/13/2012  Current, 1-2 times per month, 02/24/2018  Employment/School  Retired, 02/24/2018  Home/Environment  Lives with Spouse., 02/24/2018  Tobacco  Never smoker, N/A, 11/07/2018  Former smoker, No, 10/29/2018  Past, 02/13/2012  Immunizations  Vaccine Date Status   influenza virus vaccine, inactivated 11/08/2017 Recorded   pneumococcal 13-valent conjugate vaccine 09/29/2014 Recorded   pneumococcal 23-polyvalent vaccine 02/12/2007 Recorded   Lab Results  Labs Last 24 Hours?  ?Chemistry? Hematology/Coagulation? Blood Gases?   Sodium Lvl: 140 mmol/L (11/07/18 01:09:00) WBC:?16.7 x10(3)/mcL?High (11/07/18 01:09:00) POC TCO2: 27 mmol/L (11/07/18 01:14:00)   POC Sodium: 141 mmol/L (11/07/18 01:14:00) RBC: 5.05 x10(6)/mcL (11/07/18 01:09:00)    Potassium Lvl: 4.4 mmol/L (11/07/18 01:09:00) Hgb: 15.2 gm/dL (11/07/18 01:09:00)    POC Potassium: 4.4 mmol/L (11/07/18 01:14:00) POC Hb: 16 mg/dL (11/07/18 01:14:00)    Chloride: 106 mmol/L (11/07/18 01:09:00) Hct: 48.3 % (11/07/18 01:09:00)    POC Chloride: 103 mmol/L (11/07/18 01:14:00) POC Hct: 47 % (11/07/18 01:14:00)    CO2: 28 mmol/L (11/07/18 01:09:00) Platelet: 165 x10(3)/mcL (11/07/18 01:09:00)    Calcium Lvl: 8.8 mg/dL (11/07/18 01:09:00) MCV:?95.6 fL?High (11/07/18 01:09:00)    POC Ion Calcium: 1.18 mmol/L (11/07/18 01:14:00) MCH:  30.1 pg (11/07/18 01:09:00)    Glucose Lvl:?112 mg/dL?High (11/07/18 01:09:00) MCHC:?31.5 gm/dL?Low (11/07/18 01:09:00)    POC Glucose:?112 mg/dL?High (11/07/18 01:14:00) RDW: 13.1 % (11/07/18 01:09:00)    BUN:?31 mg/dL?High (11/07/18 01:09:00) MPV:?9.2 fL?Low (11/07/18 01:09:00)    POC BUN:?32 mg/dL?High (11/07/18 01:14:00) Abs Neut:?12.39 x10(3)/mcL?High (11/07/18 01:09:00)    Creatinine:?1.41 mg/dL?High (11/07/18 01:09:00) Neutro Auto: 74 % (11/07/18 01:09:00)    POC Creatinine: 1.3 mg/dL (11/07/18 01:14:00) Lymph Auto: 16 % (11/07/18 01:09:00)    eGFR-AA: >60 (11/07/18 01:09:00) Mono Auto: 8 % (11/07/18 01:09:00)    eGFR-MILY: 52 mL/min/1.73 m2 (11/07/18 01:09:00) Eos Auto: 0 % (11/07/18 01:09:00)    Amylase Lvl: 82 unit/L (11/07/18 01:09:00) Abs Eos: 0.1 x10(3)/mcL (11/07/18 01:09:00)    Bili Total: 0.5 mg/dL (11/07/18 01:09:00) Basophil Auto: 0 % (11/07/18 01:09:00)    Bili Direct: 0.2 mg/dL (11/07/18 01:09:00) Abs Neutro:?12.39 x10(3)/mcL?High (11/07/18 01:09:00)    Bili Indirect: 0.3 mg/dL (11/07/18 01:09:00) Abs Lymph: 2.7 x10(3)/mcL (11/07/18 01:09:00)    AST: 15 unit/L (11/07/18 01:09:00) Abs Mono: 1.3 x10(3)/mcL (11/07/18 01:09:00)    ALT: 31 unit/L (11/07/18 01:09:00) Abs Baso: 0.1 x10(3)/mcL (11/07/18 01:09:00)    Alk Phos: 87 unit/L (11/07/18 01:09:00)     Total Protein: 7 gm/dL (11/07/18 01:09:00)     Albumin Lvl: 3.8 gm/dL (11/07/18 01:09:00)     Globulin: 3.2 gm/dL (11/07/18 01:09:00)     A/G Ratio: 1.2 (11/07/18 01:09:00)     Lactic Acid Lvl: 1 mmol/L (11/07/18 01:34:00)     Lipase Lvl: 159 unit/L (11/07/18 01:09:00)     POC AGAP: 16 (11/07/18 01:14:00)     ?  ?  Diagnostic Results  As specified in HPI      Abdominal exam: soft, RLQ TTP, +rebound, -Rovsing, -psoas, no guarding, no peritoneal signs   agree with above assessment and plan

## 2022-05-01 NOTE — HISTORICAL [facility] CERNER
This is a historical note converted from Justin. Formatting and pictures may have been removed.  Please reference Justin for original formatting and attached multimedia. Chief Complaint  Hosp follow up SOB  History of Present Illness  The patient is an 80-year-old white male presents?as a hospital?follow-up?for presentation to the ER on March 10, 2021 for?worsening shortness of breath and a productive cough producing yellow and blood-tinged sputum?along with generalized weakness.? He was noted to have?an increased white blood cell count of 16.1, prerenal azotemia with BUN of 26.4 and creatinine of 1.34.? Chest x-ray showed no acute disease.? Patient was admitted to the hospital?and was monitored for COPD exacerbation/possible community-acquired pneumonia.? The antibiotic/Levaquin and steroids were initiated along with duo nebs and inhaler. ?He was discharged secondary to?increased?stability?and resolving acute shortness of breath.? Today he reports that he feels near baseline with no increased shortness of breath above his baseline. ?No fever or chills. ?He has completed both his antibiotics and oral steroids. ?He continues his home?O2?at night at 2 L per nasal cannula.  Review of Systems  Constitutional_no fever chills,?no unintentional weight loss  Eye_  ENMT_  Respiratory_no shortness of breath or cough-as per HPI  Cardiovascular_no chest pain?or shortness of breath  Gastrointestinal_  Genitourinary_  Hema/Lymph_  Endocrine_  Immunologic_  Musculoskeletal_  Integumentary_  Neurologic_  All Other ROS_negative  Physical Exam  Vitals & Measurements  HR:?73(Peripheral)? BP:?134/64? SpO2:?89%?  HT:?190.00?cm? WT:?95.600?kg? BMI:?26.48?  VITAL SIGNS:? Reviewed.? ?  GENERAL:? In?no apparent distress.? Alert and Oriented x3  CHEST:? Chest with clear breath sounds bilaterally.??+wheezes?mild on expiration bilateral, rales, or rhonchi. Good air movement. ?No increased work to breathe. ?No dullness to percussion. ?No  increased vocal fremitus.  CARDIAC:??Regular rate and rhythm.? S1 and S2,?without murmurs, gallops, or rubs.  ABDOMINAL: Normal active BS X all 4 quadrants. Nontender. Nondistended.  NEUROLOGIC EXAM:? Alert and oriented x 3.? No focal sensory or strength deficits.? ?Speech normal.? Follows commands.  MUSCULOSKELATAL: Full range of motion.? 5 out of 5 strength throughout.  SKIN: No rash. ?No lesion.  PSYCHIATRIC:? Mood normal.  ?  ?  Assessment/Plan  1.?Hospital discharge follow-up?Z09  ?-All components of ER visit and admit were reviewed at length including office visit notes and labs  -Patient is stable?within clinic today but ER precautions were given for any new or worsening symptoms  Ordered:  Clinic Follow-up PRN, 03/23/21 11:37:00 CDT, [facility], Future Order  Office/Outpatient Visit Level 3 Established 42039 , Hospital discharge follow-up  COPD (chronic obstructive pulmonary disease), [facility], 03/23/21 11:37:00 CDT  ?  2.?COPD (chronic obstructive pulmonary disease)?J44.9  ?-Continue Trelegy?as prescribed-patient has enough currently  -Continue use of rescue inhaler as needed-patient has enough currently  -Continue follow-up with pulmonology?as scheduled or sooner if clinically indicated  -ER precautions for new or worsening symptoms  -Continue smoking cessation  Ordered:  Clinic Follow-up PRN, 03/23/21 11:37:00 CDT, [facility], Future Order  Office/Outpatient Visit Level 3 Established 88827 , Hospital discharge follow-up  COPD (chronic obstructive pulmonary disease), [facility], 03/23/21 11:37:00 CDT  ?  3.?Leukocytosis?D72.829  ?-Possibly from?acute infection?versus steroid use  -Recheck CBC today  -Clinically stable?with normal exam  Ordered:  Basic Metabolic Panel, Routine collect, 03/23/21 11:47:00 CDT, Blood, Stop date 03/23/21 11:47:00 CDT, Lab Collect, Leukocytosis  Prerenal azotemia, 03/23/21 11:47:00 CDT  ?  4.?Prerenal azotemia?R79.89  ?-Likely due to dehydration  state  -Recheck BMP today  -Increase water intake advised today  Ordered:  Basic Metabolic Panel, Routine collect, 03/23/21 11:47:00 CDT, Blood, Stop date 03/23/21 11:47:00 CDT, Lab Collect, Leukocytosis  Prerenal azotemia, 03/23/21 11:47:00 CDT  ?  Referrals  Clinic Follow-up PRN, 03/23/21 11:37:00 CDT, [facility], Future Order   Problem List/Past Medical History  Ongoing  Acute bronchitis  Anxiety  Carotid bruit Lt  COPD (chronic obstructive pulmonary disease)  COVID-19  Ex-smoker  Hyperlipidemia  Morbid obesity  Nodules present- Pulmonary  Polyp colon  Historical  Colon polyps  Emphysema  Hyperglycemia  Inguinal hernia  Plantar fasciitis Lt  Pneumonia  Sinus infection  Procedure/Surgical History  Introduction of Remdesivir Anti-infective into Peripheral Vein, Percutaneous Approach, [employer] (12/28/2020)  Appendectomy Laparoscopic (None) (11/07/2018)  Laparoscopy, surgical, appendectomy (11/07/2018)  Resection of Appendix, Percutaneous Endoscopic Approach (11/07/2018)  Catheter placement in coronary artery(s) for coronary angiography, including intraprocedural injection(s) for coronary angiography, imaging supervision and interpretation; with left heart catheterization including intraprocedural injection(s) for left adonay (01/28/2016)  Fluoroscopy of Left Heart using Low Osmolar Contrast (01/28/2016)  Fluoroscopy of Multiple Coronary Arteries using Low Osmolar Contrast (01/28/2016)  Left Heart Cath w/COR Angio Ventriculography (None) (01/28/2016)  Measurement of Cardiac Sampling and Pressure, Left Heart, Percutaneous Approach (01/28/2016)  Laparoscopic repair of direct inguinal hernia with graft or prosthesis (02/15/2012)  Laparoscopy, surgical; repair initial inguinal hernia. (02/15/2012)  Colonoscopy (07/22/2011)  Hernia repair  Tonsillectomy  Vasectomy   Medications  atorvastatin 20 mg oral tablet, 20 mg= 1 tab(s), Oral, Daily, 3 refills  ProAir HFA 90 mcg/inh inhalation aerosol with adapter,  180 mcg= 2 puff(s), INH, q6hr, PRN  sertraline 100 mg oral tablet, See Instructions, 3 refills  Trelegy Ellipta 200 mcg-62.5 mcg-25 mcg/inh inhalation powder  Vitamin C 500 mg oral tablet, 500 mg= 1 tab(s), Oral, BID  Vitamin D3 10,000 intl units (250 mcg) oral capsule, 90870 IntUnit= 1 cap(s), Oral, qWeek  Allergies  Flexeril?(Unknown)  Social History  Abuse/Neglect  No, 03/11/2021  No, 01/19/2021  No, 12/28/2020  No, 03/09/2020  No, 03/09/2020  No, 01/22/2020  Alcohol - Denies Alcohol Use, 02/13/2012  Past, 12/27/2018  Past, 12/16/2018  Employment/School  Employed, Previous employment/school: [employer] 12/16/2018  Retired, 02/24/2018  Home/Environment  Lives with Spouse., 02/24/2018  Substance Use  Never, 10/29/2019  Tobacco  Former smoker, quit more than 30 days ago, N/A, 03/11/2021  Former smoker, quit more than 30 days ago, No, 01/19/2021  Former smoker, quit more than 30 days ago, N/A, 12/28/2020  Former smoker, quit more than 30 days ago, No, 03/09/2020  Family History  Cancer: Father.  Immunizations  Vaccine Date Status Comments   COVID-19 MRNA, LNP-S, PF- Pfizer 01/30/2021 Given    COVID-19 MRNA, LNP-S, PF- Pfizer 01/09/2021 Given    influenza virus vaccine, inactivated - Not Given Patient Refuses     influenza virus vaccine, inactivated 11/08/2017 Recorded    pneumococcal 13-valent conjugate vaccine 09/29/2014 Recorded    pneumococcal 23-polyvalent vaccine 02/12/2007 Recorded    Health Maintenance  Health Maintenance  ???Pending?(in the next year)  ??? ??OverDue  ??? ? ? ?ADL Screening due??11/07/19??and every 1??year(s)  ??? ? ? ?Influenza Vaccine due??10/01/20??and every 1??day(s)  ??? ? ? ?Cognitive Screening due??01/02/21??and every 1??year(s)  ??? ? ? ?Fall Risk Assessment due??01/02/21??and every 1??year(s)  ??? ??Due?  ??? ? ? ?COPD Maintenance-Spirometry due??03/23/21??Unknown Frequency  ??? ? ? ?Depression Screening due??03/23/21??Unknown Frequency  ??? ? ? ?Medicare Annual Wellness  Exam due??03/23/21??and every 1??year(s)  ??? ? ? ?Tetanus Vaccine due??03/23/21??and every 10??year(s)  ??? ? ? ?Zoster Vaccine due??03/23/21??Unknown Frequency  ??? ??Due In Future?  ??? ? ? ?Obesity Screening not due until??01/01/22??and every 1??year(s)  ??? ? ? ?Advance Directive not due until??01/02/22??and every 1??year(s)  ??? ? ? ?Functional Assessment not due until??01/02/22??and every 1??year(s)  ??? ? ? ?Hypertension Management-BMP not due until??03/13/22??and every 1??year(s)  ???Satisfied?(in the past 1 year)  ??? ??Satisfied?  ??? ? ? ?Advance Directive on??03/11/21.??Satisfied by Shane German  ??? ? ? ?Blood Pressure Screening on??03/23/21.??Satisfied by Tamika Merlos MA  ??? ? ? ?Body Mass Index Check on??03/23/21.??Satisfied by Tamika Merlos MA  ??? ? ? ?Coronary Artery Disease Maintenance-Lipid Lowering Therapy on??03/12/21.??Satisfied by Jessica Valenzuela  ??? ? ? ?Diabetes Screening on??03/13/21.??Satisfied by Hellen Triana  ??? ? ? ?Fall Risk Assessment on??12/28/20.??Satisfied by Earl Sutton LPN  ??? ? ? ?Functional Assessment on??03/11/21.??Satisfied by Yony Glover RN  ??? ? ? ?Hypertension Management-Blood Pressure on??03/23/21.??Satisfied by Tamika Merlos MA  ??? ? ? ?Influenza Vaccine on??12/28/20.??Satisfied by Misa Hayes  ??? ? ? ?Obesity Screening on??03/23/21.??Satisfied by Tamika Merlos MA  ?

## 2022-05-01 NOTE — HISTORICAL [facility]
This is a historical note converted from Justin. Formatting and pictures may have been removed.  Please reference Justin for original formatting and attached multimedia. S: Patient doing very well since surgery. Eating drinking well. Having BM. He denies having any pain at all. Ambulating. Back to work  ?  O: 97.4 77 177/53  ?  NAD  RR  Nl effort  soft nt nd. incisions c/d/i healing well  well perfused  ?  A: Mr mccarthy is a 77 y/o M who is 2 weeks post op from a laparoscopic appendectomy with inflamed appendix with no rupture.  ?  P:  Steri strips removed in clinic  PRN surgery follow up  No heavy lifting 2 more weeks   I agree with resident documentation. I was physically present, supervised resident, ?and discussed plan of care.  follow up prn

## 2022-06-03 DIAGNOSIS — J44.1 CHRONIC OBSTRUCTIVE PULMONARY DISEASE WITH (ACUTE) EXACERBATION: ICD-10-CM

## 2022-06-03 DIAGNOSIS — J30.9 ALLERGIC RHINITIS, UNSPECIFIED: ICD-10-CM

## 2022-06-03 RX ORDER — MONTELUKAST SODIUM 10 MG/1
TABLET ORAL
Qty: 90 TABLET | Refills: 1 | Status: SHIPPED | OUTPATIENT
Start: 2022-06-03 | End: 2022-06-06

## 2022-06-03 NOTE — TELEPHONE ENCOUNTER
Pt takes Singulair and Flonase daily. He is wondering he these are just seasonal medications or if he needs to continue to take them daily?

## 2022-07-08 ENCOUNTER — OFFICE VISIT (OUTPATIENT)
Dept: URGENT CARE | Facility: CLINIC | Age: 82
End: 2022-07-08
Payer: MEDICARE

## 2022-07-08 VITALS
DIASTOLIC BLOOD PRESSURE: 63 MMHG | TEMPERATURE: 100 F | SYSTOLIC BLOOD PRESSURE: 120 MMHG | BODY MASS INDEX: 26.31 KG/M2 | WEIGHT: 205 LBS | OXYGEN SATURATION: 90 % | HEIGHT: 74 IN | HEART RATE: 87 BPM | RESPIRATION RATE: 17 BRPM

## 2022-07-08 DIAGNOSIS — J44.1 COPD EXACERBATION: Primary | ICD-10-CM

## 2022-07-08 DIAGNOSIS — R05.9 COUGH: ICD-10-CM

## 2022-07-08 LAB
CTP QC/QA: YES
SARS-COV-2 RDRP RESP QL NAA+PROBE: NEGATIVE

## 2022-07-08 PROCEDURE — 99213 OFFICE O/P EST LOW 20 MIN: CPT | Mod: ,,, | Performed by: FAMILY MEDICINE

## 2022-07-08 PROCEDURE — 99213 PR OFFICE/OUTPT VISIT, EST, LEVL III, 20-29 MIN: ICD-10-PCS | Mod: ,,, | Performed by: FAMILY MEDICINE

## 2022-07-08 PROCEDURE — U0002 COVID-19 LAB TEST NON-CDC: HCPCS | Mod: QW,CR,, | Performed by: FAMILY MEDICINE

## 2022-07-08 PROCEDURE — U0002: ICD-10-PCS | Mod: QW,CR,, | Performed by: FAMILY MEDICINE

## 2022-07-08 RX ORDER — AZITHROMYCIN 250 MG/1
TABLET, FILM COATED ORAL
Qty: 6 TABLET | Refills: 0 | Status: SHIPPED | OUTPATIENT
Start: 2022-07-08 | End: 2022-09-27 | Stop reason: ALTCHOICE

## 2022-07-08 RX ORDER — FLUTICASONE PROPIONATE 50 MCG
1 SPRAY, SUSPENSION (ML) NASAL DAILY
Status: ON HOLD | COMMUNITY
End: 2023-01-01 | Stop reason: SDUPTHER

## 2022-07-08 RX ORDER — ROFLUMILAST 500 UG/1
0.5 TABLET ORAL DAILY
Status: ON HOLD | COMMUNITY
Start: 2022-06-23 | End: 2023-01-01 | Stop reason: HOSPADM

## 2022-07-08 RX ORDER — RANOLAZINE 500 MG/1
500 TABLET, EXTENDED RELEASE ORAL 2 TIMES DAILY
Status: ON HOLD | COMMUNITY
End: 2023-01-01 | Stop reason: HOSPADM

## 2022-07-08 RX ORDER — SERTRALINE HYDROCHLORIDE 100 MG/1
100 TABLET, FILM COATED ORAL DAILY
COMMUNITY
End: 2022-09-20

## 2022-07-08 RX ORDER — IPRATROPIUM BROMIDE AND ALBUTEROL SULFATE 2.5; .5 MG/3ML; MG/3ML
3 SOLUTION RESPIRATORY (INHALATION) EVERY 6 HOURS PRN
Status: ON HOLD | COMMUNITY
End: 2023-01-01 | Stop reason: HOSPADM

## 2022-07-08 RX ORDER — ALBUTEROL SULFATE 0.63 MG/3ML
0.63 SOLUTION RESPIRATORY (INHALATION) EVERY 6 HOURS PRN
Status: ON HOLD | COMMUNITY
End: 2023-01-01 | Stop reason: HOSPADM

## 2022-07-08 RX ORDER — FLUTICASONE FUROATE, UMECLIDINIUM BROMIDE AND VILANTEROL TRIFENATATE 200; 62.5; 25 UG/1; UG/1; UG/1
1 POWDER RESPIRATORY (INHALATION) DAILY
COMMUNITY
End: 2022-01-01

## 2022-07-08 RX ORDER — ATORVASTATIN CALCIUM 10 MG/1
10 TABLET, FILM COATED ORAL DAILY
COMMUNITY
End: 2023-01-01

## 2022-07-08 RX ORDER — PREDNISONE 20 MG/1
20 TABLET ORAL 2 TIMES DAILY
Qty: 10 TABLET | Refills: 0 | Status: SHIPPED | OUTPATIENT
Start: 2022-07-08 | End: 2022-07-13

## 2022-07-08 NOTE — PROGRESS NOTES
"Subjective:       Patient ID: Helder Sharp Jr. is a 81 y.o. male.    Vitals:  height is 6' 2" (1.88 m) and weight is 93 kg (205 lb). His temperature is 99.5 °F (37.5 °C). His blood pressure is 120/63 and his pulse is 87. His respiration is 17 and oxygen saturation is 90% (abnormal).     Chief Complaint: Sinus Problem    Cough, headache, weakness x yesterday-   02 in the Low 80s  X yesterday- usually in the 90s     [place]:  Patient is seen and evaluated in an limited status for concern for COVID-19. In order to decrease the risk of exposure to the hospital and health care workers, only a limited exam was done.   81-year-old with known history of COPD, anxiety depression currently on medications.  Follows up with primary MD Dr. Love  Present to clinic with concerns of headache, coughing and congestion associated with feeling weak and tired since yesterday.  No measured fever at home.  No concerns of positive exposure to infections.  Vaccinated for COVID-19.  States in the past prednisone and antibiotic help with aches aspiration.  Accompanied by spouse today.  Who states patient had COVID twice in the past.  O2 sats in the clinic 90% which is normal for patient on home O2 2 L. normally uses home oxygen mainly at bedtime.  States today while in the [employer] for 2-3 hours felt short of breath and need for oxygen.  Defers x-rays today.  No fever.      Provider Precautions  N95 mask  Gloves  Eye protection- Face Shield    Covid Screening  No confirmation close contact  No travel to high risk area  No recent testing done    Review of Systems  Constitutional _No fever, body aches, headache  ENMT_No sore throat, nasal congestion and postnasal drip +  Respiratory_ as per HPI  Cardiovascular_no chest pain, no palpitations  Gastrointestinal:  No nausea vomiting or abdominal pain  Integumentary_negative for rash    Objective:      Physical Exam    General : Alert and Oriented, No apparent distress, afebrile, appears comfortable " sitting on exam table with home O2 with nasal cannula.  Talking, joking, clear speech and appropriate communication  Neck - supple  HENT : Oropharynx no redness or swelling.  Respiratory : Bilateral equal breath sounds, nonlabored respirations, bilateral coarse breath sounds  Cardiovascular : Rate, rhythm regular, normal volume pulse, no murmur  Integumentary : Warm, Dry and no rash    Assessment:       1. COPD exacerbation    2. Cough          Plan:     cool mist vaporizer to keep there was moist and help draining sinuses.  Monitor the symptoms and cough drops as needed.  Discussed in detail on oxygenation.  ER precautions with any acute change in symptoms.  Prednisone for symptom relief as needed.    Antibiotics for worsening symptoms and signs of infection discussed in detail voiced understanding    COPD exacerbation  -     predniSONE (DELTASONE) 20 MG tablet; Take 1 tablet (20 mg total) by mouth 2 (two) times daily. for 5 days  Dispense: 10 tablet; Refill: 0  -     azithromycin (Z-IBAN) 250 MG tablet; Take 2 tablets by mouth on day 1; Take 1 tablet by mouth on days 2-5  Dispense: 6 tablet; Refill: 0    Cough  -     POCT COVID-19 Rapid Screening       COVID-19 test negative

## 2022-09-20 RX ORDER — SERTRALINE HYDROCHLORIDE 100 MG/1
TABLET, FILM COATED ORAL
Qty: 90 TABLET | Refills: 0 | Status: SHIPPED | OUTPATIENT
Start: 2022-09-20 | End: 2022-09-27 | Stop reason: SDUPTHER

## 2022-09-20 NOTE — TELEPHONE ENCOUNTER
This patient has no follow-up scheduled within our office-please reach out to this patient and make sure the schedules the appropriate follow-up either wellness visit or comorbidity visit whichever is due next

## 2022-09-27 PROBLEM — Z87.891 FORMER SMOKER: Status: ACTIVE | Noted: 2022-09-27

## 2022-09-27 PROBLEM — R09.89 CAROTID BRUIT: Status: ACTIVE | Noted: 2022-09-27

## 2022-09-27 PROBLEM — E78.5 HYPERLIPIDEMIA: Status: ACTIVE | Noted: 2022-09-27

## 2022-09-27 PROBLEM — F41.1 GENERALIZED ANXIETY DISORDER: Status: ACTIVE | Noted: 2022-09-27

## 2022-09-27 PROBLEM — M72.2 PLANTAR FASCIITIS: Status: ACTIVE | Noted: 2022-09-27

## 2022-09-27 PROBLEM — J44.9 CHRONIC OBSTRUCTIVE PULMONARY DISEASE: Status: ACTIVE | Noted: 2022-09-27

## 2022-09-27 PROBLEM — K40.90 INGUINAL HERNIA: Status: ACTIVE | Noted: 2022-09-27

## 2022-09-27 PROBLEM — R91.1 PULMONARY NODULE: Status: ACTIVE | Noted: 2022-09-27

## 2022-09-27 PROBLEM — K63.5 POLYP OF COLON: Status: ACTIVE | Noted: 2022-09-27

## 2023-01-01 ENCOUNTER — HOSPITAL ENCOUNTER (INPATIENT)
Facility: HOSPITAL | Age: 83
LOS: 15 days | Discharge: NURSING, REHAB OR LONG-TERM CARE | DRG: 871 | End: 2023-10-13
Attending: EMERGENCY MEDICINE | Admitting: INTERNAL MEDICINE
Payer: MEDICARE

## 2023-01-01 ENCOUNTER — ANESTHESIA EVENT (OUTPATIENT)
Dept: ENDOSCOPY | Facility: HOSPITAL | Age: 83
DRG: 871 | End: 2023-01-01
Payer: MEDICARE

## 2023-01-01 ENCOUNTER — HOSPITAL ENCOUNTER (INPATIENT)
Facility: HOSPITAL | Age: 83
LOS: 13 days | Discharge: HOME HEALTH | DRG: 189 | End: 2023-10-26
Attending: INTERNAL MEDICINE | Admitting: INTERNAL MEDICINE
Payer: MEDICARE

## 2023-01-01 ENCOUNTER — HOSPITAL ENCOUNTER (INPATIENT)
Facility: HOSPITAL | Age: 83
LOS: 7 days | Discharge: HOME HEALTH | DRG: 871 | End: 2023-01-11
Attending: EMERGENCY MEDICINE | Admitting: INTERNAL MEDICINE
Payer: MEDICARE

## 2023-01-01 ENCOUNTER — ANESTHESIA (OUTPATIENT)
Dept: ENDOSCOPY | Facility: HOSPITAL | Age: 83
DRG: 871 | End: 2023-01-01
Payer: MEDICARE

## 2023-01-01 ENCOUNTER — PATIENT OUTREACH (OUTPATIENT)
Dept: ADMINISTRATIVE | Facility: CLINIC | Age: 83
End: 2023-01-01
Payer: MEDICARE

## 2023-01-01 VITALS
SYSTOLIC BLOOD PRESSURE: 99 MMHG | HEART RATE: 86 BPM | TEMPERATURE: 98 F | WEIGHT: 184.13 LBS | BODY MASS INDEX: 23.63 KG/M2 | RESPIRATION RATE: 18 BRPM | HEIGHT: 74 IN | OXYGEN SATURATION: 89 % | DIASTOLIC BLOOD PRESSURE: 63 MMHG

## 2023-01-01 VITALS
HEART RATE: 76 BPM | SYSTOLIC BLOOD PRESSURE: 107 MMHG | DIASTOLIC BLOOD PRESSURE: 52 MMHG | OXYGEN SATURATION: 92 % | RESPIRATION RATE: 19 BRPM

## 2023-01-01 VITALS
HEIGHT: 74 IN | TEMPERATURE: 98 F | SYSTOLIC BLOOD PRESSURE: 128 MMHG | HEART RATE: 71 BPM | OXYGEN SATURATION: 93 % | BODY MASS INDEX: 25.52 KG/M2 | DIASTOLIC BLOOD PRESSURE: 59 MMHG | WEIGHT: 198.88 LBS | RESPIRATION RATE: 18 BRPM

## 2023-01-01 VITALS
WEIGHT: 168 LBS | DIASTOLIC BLOOD PRESSURE: 58 MMHG | TEMPERATURE: 98 F | RESPIRATION RATE: 20 BRPM | SYSTOLIC BLOOD PRESSURE: 100 MMHG | HEART RATE: 81 BPM | OXYGEN SATURATION: 94 % | BODY MASS INDEX: 21.56 KG/M2 | HEIGHT: 74 IN

## 2023-01-01 DIAGNOSIS — J18.9 PNEUMONIA OF BOTH LUNGS DUE TO INFECTIOUS ORGANISM, UNSPECIFIED PART OF LUNG: ICD-10-CM

## 2023-01-01 DIAGNOSIS — R07.9 CHEST PAIN: ICD-10-CM

## 2023-01-01 DIAGNOSIS — R06.02 SOB (SHORTNESS OF BREATH): ICD-10-CM

## 2023-01-01 DIAGNOSIS — J44.1 COPD EXACERBATION: ICD-10-CM

## 2023-01-01 DIAGNOSIS — R06.02 SOB (SHORTNESS OF BREATH): Primary | ICD-10-CM

## 2023-01-01 DIAGNOSIS — Z87.891 FORMER SMOKER: ICD-10-CM

## 2023-01-01 DIAGNOSIS — F41.1 GENERALIZED ANXIETY DISORDER: ICD-10-CM

## 2023-01-01 DIAGNOSIS — J96.90 RESPIRATORY FAILURE: ICD-10-CM

## 2023-01-01 DIAGNOSIS — R06.02 SHORTNESS OF BREATH: Primary | ICD-10-CM

## 2023-01-01 DIAGNOSIS — J44.1 CHRONIC OBSTRUCTIVE PULMONARY DISEASE WITH (ACUTE) EXACERBATION: ICD-10-CM

## 2023-01-01 DIAGNOSIS — J96.11 CHRONIC RESPIRATORY FAILURE WITH HYPOXIA: ICD-10-CM

## 2023-01-01 DIAGNOSIS — R06.03 ACUTE RESPIRATORY DISTRESS: ICD-10-CM

## 2023-01-01 DIAGNOSIS — J30.9 ALLERGIC RHINITIS, UNSPECIFIED: ICD-10-CM

## 2023-01-01 LAB
ABS NEUT (OLG): 28.92 X10(3)/MCL (ref 2.1–9.2)
ALBUMIN SERPL-MCNC: 2.8 G/DL (ref 3.4–4.8)
ALBUMIN SERPL-MCNC: 2.8 G/DL (ref 3.4–4.8)
ALBUMIN SERPL-MCNC: 2.9 G/DL (ref 3.4–4.8)
ALBUMIN SERPL-MCNC: 3 G/DL (ref 3.4–4.8)
ALBUMIN SERPL-MCNC: 3.1 G/DL (ref 3.4–4.8)
ALBUMIN SERPL-MCNC: 3.1 G/DL (ref 3.4–4.8)
ALBUMIN SERPL-MCNC: 3.2 G/DL (ref 3.4–4.8)
ALBUMIN SERPL-MCNC: 3.4 G/DL (ref 3.4–4.8)
ALBUMIN SERPL-MCNC: 4.1 G/DL (ref 3.4–4.8)
ALBUMIN/GLOB SERPL: 0.8 RATIO (ref 1.1–2)
ALBUMIN/GLOB SERPL: 0.8 RATIO (ref 1.1–2)
ALBUMIN/GLOB SERPL: 0.9 RATIO (ref 1.1–2)
ALBUMIN/GLOB SERPL: 1 RATIO (ref 1.1–2)
ALBUMIN/GLOB SERPL: 1.1 RATIO (ref 1.1–2)
ALBUMIN/GLOB SERPL: 1.5 RATIO (ref 1.1–2)
ALBUMIN/GLOB SERPL: 1.5 RATIO (ref 1.1–2)
ALLENS TEST BLOOD GAS (OHS): YES
ALP SERPL-CCNC: 55 UNIT/L (ref 40–150)
ALP SERPL-CCNC: 60 UNIT/L (ref 40–150)
ALP SERPL-CCNC: 61 UNIT/L (ref 40–150)
ALP SERPL-CCNC: 62 UNIT/L (ref 40–150)
ALP SERPL-CCNC: 64 UNIT/L (ref 40–150)
ALP SERPL-CCNC: 65 UNIT/L (ref 40–150)
ALP SERPL-CCNC: 74 UNIT/L (ref 40–150)
ALP SERPL-CCNC: 77 UNIT/L (ref 40–150)
ALP SERPL-CCNC: 88 UNIT/L (ref 40–150)
ALP SERPL-CCNC: 93 UNIT/L (ref 40–150)
ALT SERPL-CCNC: 11 UNIT/L (ref 0–55)
ALT SERPL-CCNC: 12 UNIT/L (ref 0–55)
ALT SERPL-CCNC: 14 UNIT/L (ref 0–55)
ALT SERPL-CCNC: 16 UNIT/L (ref 0–55)
ALT SERPL-CCNC: 16 UNIT/L (ref 0–55)
ALT SERPL-CCNC: 18 UNIT/L (ref 0–55)
ALT SERPL-CCNC: 18 UNIT/L (ref 0–55)
ALT SERPL-CCNC: 19 UNIT/L (ref 0–55)
ALT SERPL-CCNC: 22 UNIT/L (ref 0–55)
ALT SERPL-CCNC: 24 UNIT/L (ref 0–55)
ANION GAP SERPL CALC-SCNC: 10 MEQ/L
ANION GAP SERPL CALC-SCNC: 10 MEQ/L
ANION GAP SERPL CALC-SCNC: 12 MEQ/L
ANION GAP SERPL CALC-SCNC: 7 MEQ/L
ANION GAP SERPL CALC-SCNC: 9 MEQ/L
ANION GAP SERPL CALC-SCNC: 9 MEQ/L
APPEARANCE UR: ABNORMAL
APTT PPP: 29.1 SECONDS (ref 23.2–33.7)
AST SERPL-CCNC: 11 UNIT/L (ref 5–34)
AST SERPL-CCNC: 11 UNIT/L (ref 5–34)
AST SERPL-CCNC: 12 UNIT/L (ref 5–34)
AST SERPL-CCNC: 13 UNIT/L (ref 5–34)
AST SERPL-CCNC: 15 UNIT/L (ref 5–34)
AST SERPL-CCNC: 15 UNIT/L (ref 5–34)
AST SERPL-CCNC: 16 UNIT/L (ref 5–34)
AST SERPL-CCNC: 17 UNIT/L (ref 5–34)
AST SERPL-CCNC: 18 UNIT/L (ref 5–34)
AST SERPL-CCNC: 21 UNIT/L (ref 5–34)
AV INDEX (PROSTH): 0.8
AV MEAN GRADIENT: 2 MMHG
AV PEAK GRADIENT: 4 MMHG
AV VALVE AREA BY VELOCITY RATIO: 2.72 CM²
AV VALVE AREA: 3.05 CM²
AV VELOCITY RATIO: 0.72
B PERT.PT PRMT NPH QL NAA+NON-PROBE: NOT DETECTED
B PERT.PT PRMT NPH QL NAA+NON-PROBE: NOT DETECTED
BACTERIA #/AREA URNS AUTO: ABNORMAL /HPF
BACTERIA BLD CULT: NORMAL
BACTERIA SPEC CULT: ABNORMAL
BACTERIA SPEC CULT: NORMAL
BASE EXCESS BLD CALC-SCNC: -8.3 MMOL/L (ref -2–2)
BASE EXCESS BLD CALC-SCNC: 0.5 MMOL/L (ref -2–2)
BASE EXCESS BLD CALC-SCNC: 3.2 MMOL/L (ref -2–2)
BASE EXCESS BLD CALC-SCNC: 4.8 MMOL/L (ref -2–2)
BASOPHILS # BLD AUTO: 0.01 X10(3)/MCL
BASOPHILS # BLD AUTO: 0.01 X10(3)/MCL (ref 0–0.2)
BASOPHILS # BLD AUTO: 0.01 X10(3)/MCL (ref 0–0.2)
BASOPHILS # BLD AUTO: 0.02 X10(3)/MCL
BASOPHILS # BLD AUTO: 0.03 X10(3)/MCL
BASOPHILS # BLD AUTO: 0.04 X10(3)/MCL
BASOPHILS # BLD AUTO: 0.05 X10(3)/MCL
BASOPHILS # BLD AUTO: 0.07 X10(3)/MCL
BASOPHILS NFR BLD AUTO: 0.1 %
BASOPHILS NFR BLD AUTO: 0.2 %
BASOPHILS NFR BLD AUTO: 0.3 %
BASOPHILS NFR BLD AUTO: 0.4 %
BASOPHILS NFR BLD AUTO: 0.6 %
BASOPHILS NFR BLD AUTO: 0.7 %
BASOPHILS NFR BLD AUTO: 0.7 %
BILIRUB SERPL-MCNC: 0.4 MG/DL
BILIRUB SERPL-MCNC: 0.5 MG/DL
BILIRUB SERPL-MCNC: 0.5 MG/DL
BILIRUB SERPL-MCNC: 0.6 MG/DL
BILIRUB SERPL-MCNC: 0.6 MG/DL
BILIRUB SERPL-MCNC: 0.7 MG/DL
BILIRUB SERPL-MCNC: 0.7 MG/DL
BILIRUB SERPL-MCNC: 0.8 MG/DL
BILIRUB SERPL-MCNC: 0.9 MG/DL
BILIRUB UR QL STRIP.AUTO: ABNORMAL
BILIRUBIN DIRECT+TOT PNL SERPL-MCNC: 1.9 MG/DL
BLOOD GAS SAMPLE TYPE (OHS): ABNORMAL
BNP BLD-MCNC: 22 PG/ML
BNP BLD-MCNC: 41.1 PG/ML
BSA FOR ECHO PROCEDURE: 2.09 M2
BUN SERPL-MCNC: 19.4 MG/DL (ref 8.4–25.7)
BUN SERPL-MCNC: 20.9 MG/DL (ref 8.4–25.7)
BUN SERPL-MCNC: 22.3 MG/DL (ref 8.4–25.7)
BUN SERPL-MCNC: 28.1 MG/DL (ref 8.4–25.7)
BUN SERPL-MCNC: 29.2 MG/DL (ref 8.4–25.7)
BUN SERPL-MCNC: 29.8 MG/DL (ref 8.4–25.7)
BUN SERPL-MCNC: 30.6 MG/DL (ref 8.4–25.7)
BUN SERPL-MCNC: 31.2 MG/DL (ref 8.4–25.7)
BUN SERPL-MCNC: 32.2 MG/DL (ref 8.4–25.7)
BUN SERPL-MCNC: 32.3 MG/DL (ref 8.4–25.7)
BUN SERPL-MCNC: 32.6 MG/DL (ref 8.4–25.7)
BUN SERPL-MCNC: 33.4 MG/DL (ref 8.4–25.7)
BUN SERPL-MCNC: 33.4 MG/DL (ref 8.4–25.7)
BUN SERPL-MCNC: 33.9 MG/DL (ref 8.4–25.7)
BUN SERPL-MCNC: 34.1 MG/DL (ref 8.4–25.7)
BUN SERPL-MCNC: 38.2 MG/DL (ref 8.4–25.7)
BUN SERPL-MCNC: 41.6 MG/DL (ref 8.4–25.7)
BURR CELLS (OLG): ABNORMAL
C PNEUM DNA NPH QL NAA+NON-PROBE: NOT DETECTED
C PNEUM DNA NPH QL NAA+NON-PROBE: NOT DETECTED
CA-I BLD-SCNC: 1.2 MMOL/L (ref 1.12–1.23)
CA-I BLD-SCNC: 1.2 MMOL/L (ref 1.12–1.23)
CA-I BLD-SCNC: 1.23 MMOL/L (ref 1.12–1.23)
CA-I BLD-SCNC: 1.27 MMOL/L (ref 1.12–1.23)
CALCIUM SERPL-MCNC: 10 MG/DL (ref 8.8–10)
CALCIUM SERPL-MCNC: 8.5 MG/DL (ref 8.8–10)
CALCIUM SERPL-MCNC: 8.6 MG/DL (ref 8.8–10)
CALCIUM SERPL-MCNC: 8.8 MG/DL (ref 8.8–10)
CALCIUM SERPL-MCNC: 8.9 MG/DL (ref 8.8–10)
CALCIUM SERPL-MCNC: 8.9 MG/DL (ref 8.8–10)
CALCIUM SERPL-MCNC: 9 MG/DL (ref 8.8–10)
CALCIUM SERPL-MCNC: 9.1 MG/DL (ref 8.8–10)
CALCIUM SERPL-MCNC: 9.2 MG/DL (ref 8.8–10)
CALCIUM SERPL-MCNC: 9.3 MG/DL (ref 8.8–10)
CALCIUM SERPL-MCNC: 9.3 MG/DL (ref 8.8–10)
CALCIUM SERPL-MCNC: 9.5 MG/DL (ref 8.8–10)
CALCIUM SERPL-MCNC: 9.7 MG/DL (ref 8.8–10)
CHLORIDE SERPL-SCNC: 102 MMOL/L (ref 98–107)
CHLORIDE SERPL-SCNC: 102 MMOL/L (ref 98–107)
CHLORIDE SERPL-SCNC: 104 MMOL/L (ref 98–107)
CHLORIDE SERPL-SCNC: 105 MMOL/L (ref 98–107)
CHLORIDE SERPL-SCNC: 106 MMOL/L (ref 98–107)
CHLORIDE SERPL-SCNC: 107 MMOL/L (ref 98–107)
CHLORIDE SERPL-SCNC: 109 MMOL/L (ref 98–107)
CHLORIDE SERPL-SCNC: 109 MMOL/L (ref 98–107)
CHLORIDE SERPL-SCNC: 112 MMOL/L (ref 98–107)
CHLORIDE SERPL-SCNC: 113 MMOL/L (ref 98–107)
CO2 BLDA-SCNC: 18.1 MMOL/L
CO2 BLDA-SCNC: 25.6 MMOL/L
CO2 BLDA-SCNC: 28 MMOL/L
CO2 BLDA-SCNC: 29.2 MMOL/L
CO2 SERPL-SCNC: 17 MMOL/L (ref 23–31)
CO2 SERPL-SCNC: 18 MMOL/L (ref 23–31)
CO2 SERPL-SCNC: 19 MMOL/L (ref 23–31)
CO2 SERPL-SCNC: 20 MMOL/L (ref 23–31)
CO2 SERPL-SCNC: 23 MMOL/L (ref 23–31)
CO2 SERPL-SCNC: 23 MMOL/L (ref 23–31)
CO2 SERPL-SCNC: 25 MMOL/L (ref 23–31)
CO2 SERPL-SCNC: 26 MMOL/L (ref 23–31)
CO2 SERPL-SCNC: 27 MMOL/L (ref 23–31)
CO2 SERPL-SCNC: 27 MMOL/L (ref 23–31)
CO2 SERPL-SCNC: 28 MMOL/L (ref 23–31)
COHGB MFR BLDA: 1.1 % (ref 0.5–1.5)
COHGB MFR BLDA: 1.3 % (ref 0.5–1.5)
COHGB MFR BLDA: 1.4 % (ref 0.5–1.5)
COHGB MFR BLDA: 1.5 % (ref 0.5–1.5)
COLOR UR AUTO: ABNORMAL
CREAT SERPL-MCNC: 0.81 MG/DL (ref 0.73–1.18)
CREAT SERPL-MCNC: 0.84 MG/DL (ref 0.73–1.18)
CREAT SERPL-MCNC: 0.84 MG/DL (ref 0.73–1.18)
CREAT SERPL-MCNC: 0.87 MG/DL (ref 0.73–1.18)
CREAT SERPL-MCNC: 0.88 MG/DL (ref 0.73–1.18)
CREAT SERPL-MCNC: 0.9 MG/DL (ref 0.73–1.18)
CREAT SERPL-MCNC: 0.9 MG/DL (ref 0.73–1.18)
CREAT SERPL-MCNC: 0.91 MG/DL (ref 0.73–1.18)
CREAT SERPL-MCNC: 0.93 MG/DL (ref 0.73–1.18)
CREAT SERPL-MCNC: 0.93 MG/DL (ref 0.73–1.18)
CREAT SERPL-MCNC: 1.02 MG/DL (ref 0.73–1.18)
CREAT SERPL-MCNC: 1.06 MG/DL (ref 0.73–1.18)
CREAT SERPL-MCNC: 1.18 MG/DL (ref 0.73–1.18)
CREAT SERPL-MCNC: 1.22 MG/DL (ref 0.73–1.18)
CREAT SERPL-MCNC: 1.23 MG/DL (ref 0.73–1.18)
CREAT SERPL-MCNC: 1.23 MG/DL (ref 0.73–1.18)
CREAT SERPL-MCNC: 1.33 MG/DL (ref 0.73–1.18)
CREAT UR-MCNC: 30.6 MG/DL (ref 63–166)
CREAT/UREA NIT SERPL: 23
CREAT/UREA NIT SERPL: 28
CREAT/UREA NIT SERPL: 33
CREAT/UREA NIT SERPL: 34
CREAT/UREA NIT SERPL: 35
CREAT/UREA NIT SERPL: 39
CV ECHO LV RWT: 0.45 CM
DOP CALC AO PEAK VEL: 1.06 M/S
DOP CALC AO VTI: 19.7 CM
DOP CALC LVOT AREA: 3.8 CM2
DOP CALC LVOT DIAMETER: 2.2 CM
DOP CALC LVOT PEAK VEL: 0.76 M/S
DOP CALC LVOT STROKE VOLUME: 60.03 CM3
DOP CALC MV VTI: 26.1 CM
DOP CALCLVOT PEAK VEL VTI: 15.8 CM
DRAWN BY BLOOD GAS (OHS): ABNORMAL
E WAVE DECELERATION TIME: 217 MSEC
E/A RATIO: 0.81
E/E' RATIO: 12.55 M/S
ECHO LV POSTERIOR WALL: 1 CM (ref 0.6–1.1)
EOSINOPHIL # BLD AUTO: 0 X10(3)/MCL (ref 0–0.9)
EOSINOPHIL # BLD AUTO: 0.02 X10(3)/MCL (ref 0–0.9)
EOSINOPHIL # BLD AUTO: 0.03 X10(3)/MCL (ref 0–0.9)
EOSINOPHIL # BLD AUTO: 0.05 X10(3)/MCL (ref 0–0.9)
EOSINOPHIL # BLD AUTO: 0.07 X10(3)/MCL (ref 0–0.9)
EOSINOPHIL # BLD AUTO: 0.07 X10(3)/MCL (ref 0–0.9)
EOSINOPHIL # BLD AUTO: 0.08 X10(3)/MCL (ref 0–0.9)
EOSINOPHIL # BLD AUTO: 0.09 X10(3)/MCL (ref 0–0.9)
EOSINOPHIL # BLD AUTO: 0.1 X10(3)/MCL (ref 0–0.9)
EOSINOPHIL # BLD AUTO: 0.1 X10(3)/MCL (ref 0–0.9)
EOSINOPHIL # BLD AUTO: 0.17 X10(3)/MCL (ref 0–0.9)
EOSINOPHIL # BLD AUTO: 0.19 X10(3)/MCL (ref 0–0.9)
EOSINOPHIL NFR BLD AUTO: 0 %
EOSINOPHIL NFR BLD AUTO: 0.1 %
EOSINOPHIL NFR BLD AUTO: 0.2 %
EOSINOPHIL NFR BLD AUTO: 0.4 %
EOSINOPHIL NFR BLD AUTO: 0.5 %
EOSINOPHIL NFR BLD AUTO: 0.5 %
EOSINOPHIL NFR BLD AUTO: 0.7 %
EOSINOPHIL NFR BLD AUTO: 0.9 %
EOSINOPHIL NFR BLD AUTO: 0.9 %
EOSINOPHIL NFR BLD AUTO: 1.5 %
EOSINOPHIL NFR BLD AUTO: 2.3 %
EOSINOPHIL NFR BLD AUTO: 2.4 %
ERYTHROCYTE [DISTWIDTH] IN BLOOD BY AUTOMATED COUNT: 13 % (ref 11.6–14.4)
ERYTHROCYTE [DISTWIDTH] IN BLOOD BY AUTOMATED COUNT: 13.9 % (ref 11.6–14.4)
ERYTHROCYTE [DISTWIDTH] IN BLOOD BY AUTOMATED COUNT: 14.1 % (ref 11.6–14.4)
ERYTHROCYTE [DISTWIDTH] IN BLOOD BY AUTOMATED COUNT: 14.1 % (ref 11.6–14.4)
ERYTHROCYTE [DISTWIDTH] IN BLOOD BY AUTOMATED COUNT: 14.2 % (ref 11.5–17)
ERYTHROCYTE [DISTWIDTH] IN BLOOD BY AUTOMATED COUNT: 14.4 % (ref 11.5–17)
ERYTHROCYTE [DISTWIDTH] IN BLOOD BY AUTOMATED COUNT: 14.5 % (ref 11.5–17)
ERYTHROCYTE [DISTWIDTH] IN BLOOD BY AUTOMATED COUNT: 14.5 % (ref 11.5–17)
ERYTHROCYTE [DISTWIDTH] IN BLOOD BY AUTOMATED COUNT: 14.6 % (ref 11.5–17)
ERYTHROCYTE [DISTWIDTH] IN BLOOD BY AUTOMATED COUNT: 14.8 % (ref 11.5–17)
ERYTHROCYTE [DISTWIDTH] IN BLOOD BY AUTOMATED COUNT: 14.9 % (ref 11.5–17)
ERYTHROCYTE [DISTWIDTH] IN BLOOD BY AUTOMATED COUNT: 15.3 % (ref 11.5–17)
ERYTHROCYTE [DISTWIDTH] IN BLOOD BY AUTOMATED COUNT: 15.4 % (ref 11.5–17)
ERYTHROCYTE [DISTWIDTH] IN BLOOD BY AUTOMATED COUNT: 15.6 % (ref 11.5–17)
ERYTHROCYTE [DISTWIDTH] IN BLOOD BY AUTOMATED COUNT: 15.6 % (ref 11.5–17)
FERRITIN SERPL-MCNC: 207.02 NG/ML (ref 21.81–274.66)
FIO2 BLOOD GAS (OHS): 40 %
FIO2 BLOOD GAS (OHS): 40 %
FIO2 BLOOD GAS (OHS): 50 %
FLUAV AG UPPER RESP QL IA.RAPID: NOT DETECTED
FLUAV AG UPPER RESP QL IA.RAPID: NOT DETECTED
FLUBV AG UPPER RESP QL IA.RAPID: NOT DETECTED
FLUBV AG UPPER RESP QL IA.RAPID: NOT DETECTED
FRACTIONAL SHORTENING: 9 % (ref 28–44)
GFR SERPLBLD CREATININE-BSD FMLA CKD-EPI: 53 MLS/MIN/1.73/M2
GFR SERPLBLD CREATININE-BSD FMLA CKD-EPI: 58 MLS/MIN/1.73/M2
GFR SERPLBLD CREATININE-BSD FMLA CKD-EPI: 59 MLS/MIN/1.73/M2
GFR SERPLBLD CREATININE-BSD FMLA CKD-EPI: 59 MLS/MIN/1.73/M2
GFR SERPLBLD CREATININE-BSD FMLA CKD-EPI: 70 MLS/MIN/1.73/M2
GFR SERPLBLD CREATININE-BSD FMLA CKD-EPI: 82 MLS/MIN/1.73/M2
GFR SERPLBLD CREATININE-BSD FMLA CKD-EPI: >60 MLS/MIN/1.73/M2
GLOBULIN SER-MCNC: 2.1 GM/DL (ref 2.4–3.5)
GLOBULIN SER-MCNC: 2.6 GM/DL (ref 2.4–3.5)
GLOBULIN SER-MCNC: 2.7 GM/DL (ref 2.4–3.5)
GLOBULIN SER-MCNC: 2.7 GM/DL (ref 2.4–3.5)
GLOBULIN SER-MCNC: 2.8 GM/DL (ref 2.4–3.5)
GLOBULIN SER-MCNC: 3.2 GM/DL (ref 2.4–3.5)
GLOBULIN SER-MCNC: 3.4 GM/DL (ref 2.4–3.5)
GLOBULIN SER-MCNC: 3.4 GM/DL (ref 2.4–3.5)
GLOBULIN SER-MCNC: 3.8 GM/DL (ref 2.4–3.5)
GLOBULIN SER-MCNC: 3.9 GM/DL (ref 2.4–3.5)
GLUCOSE SERPL-MCNC: 101 MG/DL (ref 82–115)
GLUCOSE SERPL-MCNC: 103 MG/DL (ref 82–115)
GLUCOSE SERPL-MCNC: 117 MG/DL (ref 82–115)
GLUCOSE SERPL-MCNC: 119 MG/DL (ref 82–115)
GLUCOSE SERPL-MCNC: 119 MG/DL (ref 82–115)
GLUCOSE SERPL-MCNC: 124 MG/DL (ref 82–115)
GLUCOSE SERPL-MCNC: 130 MG/DL (ref 82–115)
GLUCOSE SERPL-MCNC: 133 MG/DL (ref 82–115)
GLUCOSE SERPL-MCNC: 135 MG/DL (ref 82–115)
GLUCOSE SERPL-MCNC: 138 MG/DL (ref 82–115)
GLUCOSE SERPL-MCNC: 149 MG/DL (ref 82–115)
GLUCOSE SERPL-MCNC: 153 MG/DL (ref 82–115)
GLUCOSE SERPL-MCNC: 167 MG/DL (ref 82–115)
GLUCOSE SERPL-MCNC: 177 MG/DL (ref 82–115)
GLUCOSE SERPL-MCNC: 183 MG/DL (ref 82–115)
GLUCOSE SERPL-MCNC: 190 MG/DL (ref 82–115)
GLUCOSE SERPL-MCNC: 257 MG/DL (ref 82–115)
GLUCOSE UR QL STRIP.AUTO: NEGATIVE
GRAM STN SPEC: ABNORMAL
GRAM STN SPEC: NORMAL
HADV DNA NPH QL NAA+NON-PROBE: NOT DETECTED
HADV DNA NPH QL NAA+NON-PROBE: NOT DETECTED
HCO3 BLDA-SCNC: 17.1 MMOL/L (ref 22–26)
HCO3 BLDA-SCNC: 24.5 MMOL/L (ref 22–26)
HCO3 BLDA-SCNC: 26.9 MMOL/L (ref 22–26)
HCO3 BLDA-SCNC: 28.1 MMOL/L (ref 22–26)
HCOV 229E RNA NPH QL NAA+NON-PROBE: NOT DETECTED
HCOV 229E RNA NPH QL NAA+NON-PROBE: NOT DETECTED
HCOV HKU1 RNA NPH QL NAA+NON-PROBE: NOT DETECTED
HCOV HKU1 RNA NPH QL NAA+NON-PROBE: NOT DETECTED
HCOV NL63 RNA NPH QL NAA+NON-PROBE: NOT DETECTED
HCOV NL63 RNA NPH QL NAA+NON-PROBE: NOT DETECTED
HCOV OC43 RNA NPH QL NAA+NON-PROBE: NOT DETECTED
HCOV OC43 RNA NPH QL NAA+NON-PROBE: NOT DETECTED
HCT VFR BLD AUTO: 32.4 % (ref 42–52)
HCT VFR BLD AUTO: 35.6 % (ref 42–52)
HCT VFR BLD AUTO: 37 % (ref 42–52)
HCT VFR BLD AUTO: 37.1 % (ref 42–52)
HCT VFR BLD AUTO: 37.3 % (ref 42–52)
HCT VFR BLD AUTO: 37.8 % (ref 42–52)
HCT VFR BLD AUTO: 38 % (ref 42–52)
HCT VFR BLD AUTO: 38.1 % (ref 42–52)
HCT VFR BLD AUTO: 38.5 % (ref 42–52)
HCT VFR BLD AUTO: 39.3 % (ref 42–52)
HCT VFR BLD AUTO: 39.5 % (ref 42–52)
HCT VFR BLD AUTO: 39.6 % (ref 42–52)
HCT VFR BLD AUTO: 39.7 % (ref 42–52)
HCT VFR BLD AUTO: 39.8 % (ref 42–52)
HCT VFR BLD AUTO: 39.9 % (ref 42–52)
HCT VFR BLD AUTO: 40.1 % (ref 42–52)
HCT VFR BLD AUTO: 44.7 % (ref 42–52)
HCT VFR BLD AUTO: 45.4 % (ref 42–52)
HGB BLD-MCNC: 10.4 G/DL (ref 14–18)
HGB BLD-MCNC: 11.6 G/DL (ref 14–18)
HGB BLD-MCNC: 11.7 G/DL (ref 14–18)
HGB BLD-MCNC: 12 GM/DL (ref 14–18)
HGB BLD-MCNC: 12.1 G/DL (ref 14–18)
HGB BLD-MCNC: 12.4 G/DL (ref 14–18)
HGB BLD-MCNC: 12.4 G/DL (ref 14–18)
HGB BLD-MCNC: 12.5 G/DL (ref 14–18)
HGB BLD-MCNC: 12.5 G/DL (ref 14–18)
HGB BLD-MCNC: 12.7 G/DL (ref 14–18)
HGB BLD-MCNC: 12.9 GM/DL (ref 14–18)
HGB BLD-MCNC: 13 GM/DL (ref 14–18)
HGB BLD-MCNC: 13.1 G/DL (ref 14–18)
HGB BLD-MCNC: 13.3 G/DL (ref 14–18)
HGB BLD-MCNC: 14.4 G/DL (ref 14–18)
HGB BLD-MCNC: 14.7 GM/DL (ref 14–18)
HMPV RNA NPH QL NAA+NON-PROBE: NOT DETECTED
HMPV RNA NPH QL NAA+NON-PROBE: NOT DETECTED
HPIV1 RNA NPH QL NAA+NON-PROBE: NOT DETECTED
HPIV1 RNA NPH QL NAA+NON-PROBE: NOT DETECTED
HPIV2 RNA NPH QL NAA+NON-PROBE: NOT DETECTED
HPIV2 RNA NPH QL NAA+NON-PROBE: NOT DETECTED
HPIV3 RNA NPH QL NAA+NON-PROBE: NOT DETECTED
HPIV3 RNA NPH QL NAA+NON-PROBE: NOT DETECTED
HPIV4 RNA NPH QL NAA+NON-PROBE: NOT DETECTED
HPIV4 RNA NPH QL NAA+NON-PROBE: NOT DETECTED
IMM GRANULOCYTES # BLD AUTO: 0.04 X10(3)/MCL (ref 0–0.04)
IMM GRANULOCYTES # BLD AUTO: 0.05 X10(3)/MCL (ref 0–0.04)
IMM GRANULOCYTES # BLD AUTO: 0.06 X10(3)/MCL (ref 0–0.04)
IMM GRANULOCYTES # BLD AUTO: 0.07 X10(3)/MCL (ref 0–0.04)
IMM GRANULOCYTES # BLD AUTO: 0.08 X10(3)/MCL (ref 0–0.04)
IMM GRANULOCYTES # BLD AUTO: 0.08 X10(3)/MCL (ref 0–0.04)
IMM GRANULOCYTES # BLD AUTO: 0.11 X10(3)/MCL (ref 0–0.04)
IMM GRANULOCYTES # BLD AUTO: 0.12 X10(3)/MCL (ref 0–0.04)
IMM GRANULOCYTES # BLD AUTO: 0.12 X10(3)/MCL (ref 0–0.04)
IMM GRANULOCYTES # BLD AUTO: 0.14 X10(3)/MCL (ref 0–0.04)
IMM GRANULOCYTES # BLD AUTO: 0.15 X10(3)/MCL (ref 0–0.04)
IMM GRANULOCYTES # BLD AUTO: 0.21 X10(3)/MCL (ref 0–0.04)
IMM GRANULOCYTES # BLD AUTO: 0.28 X10(3)/MCL (ref 0–0.04)
IMM GRANULOCYTES # BLD AUTO: 0.48 X10(3)/MCL (ref 0–0.04)
IMM GRANULOCYTES # BLD AUTO: 0.52 X10(3)/MCL (ref 0–0.04)
IMM GRANULOCYTES # BLD AUTO: 0.71 X10(3)/MCL (ref 0–0.04)
IMM GRANULOCYTES NFR BLD AUTO: 0.5 %
IMM GRANULOCYTES NFR BLD AUTO: 0.6 %
IMM GRANULOCYTES NFR BLD AUTO: 0.6 %
IMM GRANULOCYTES NFR BLD AUTO: 0.7 %
IMM GRANULOCYTES NFR BLD AUTO: 0.8 %
IMM GRANULOCYTES NFR BLD AUTO: 0.9 %
IMM GRANULOCYTES NFR BLD AUTO: 1.2 %
IMM GRANULOCYTES NFR BLD AUTO: 1.2 %
IMM GRANULOCYTES NFR BLD AUTO: 1.7 %
IMM GRANULOCYTES NFR BLD AUTO: 2.3 %
IMM GRANULOCYTES NFR BLD AUTO: 3.7 %
IMM GRANULOCYTES NFR BLD AUTO: 4.3 %
INR PPP: 1.1
INR PPP: 1.3
INSTRUMENT WBC (OLG): 31.1 X10(3)/MCL
INTERVENTRICULAR SEPTUM: 0.9 CM (ref 0.6–1.1)
IRON SATN MFR SERPL: 15 % (ref 20–50)
IRON SERPL-MCNC: 31 UG/DL (ref 65–175)
KETONES UR QL STRIP.AUTO: ABNORMAL
LACTATE SERPL-SCNC: 1.9 MMOL/L (ref 0.5–2.2)
LACTATE SERPL-SCNC: 3.6 MMOL/L (ref 0.5–2.2)
LACTATE SERPL-SCNC: 4.3 MMOL/L (ref 0.5–2.2)
LACTATE SERPL-SCNC: 4.5 MMOL/L (ref 0.5–2.2)
LEFT ATRIUM SIZE: 3.5 CM
LEFT INTERNAL DIMENSION IN SYSTOLE: 4 CM (ref 2.1–4)
LEFT VENTRICLE DIASTOLIC VOLUME INDEX: 41.76 ML/M2
LEFT VENTRICLE DIASTOLIC VOLUME: 87.7 ML
LEFT VENTRICLE MASS INDEX: 66 G/M2
LEFT VENTRICLE SYSTOLIC VOLUME INDEX: 33.3 ML/M2
LEFT VENTRICLE SYSTOLIC VOLUME: 70 ML
LEFT VENTRICULAR INTERNAL DIMENSION IN DIASTOLE: 4.4 CM (ref 3.5–6)
LEFT VENTRICULAR MASS: 137.77 G
LEGIONELLA AG UR QL: NEGATIVE
LEUKOCYTE ESTERASE UR QL STRIP.AUTO: ABNORMAL
LPM (OHS): 10
LV LATERAL E/E' RATIO: 9.86 M/S
LV SEPTAL E/E' RATIO: 17.25 M/S
LVOT MG: 1 MMHG
LVOT MV: 0.5 CM/S
LYMPHOCYTES # BLD AUTO: 0.49 X10(3)/MCL (ref 0.6–4.6)
LYMPHOCYTES # BLD AUTO: 0.66 X10(3)/MCL (ref 0.6–4.6)
LYMPHOCYTES # BLD AUTO: 0.86 X10(3)/MCL (ref 0.6–4.6)
LYMPHOCYTES # BLD AUTO: 0.86 X10(3)/MCL (ref 0.6–4.6)
LYMPHOCYTES # BLD AUTO: 1.49 X10(3)/MCL (ref 0.6–4.6)
LYMPHOCYTES # BLD AUTO: 1.68 X10(3)/MCL (ref 0.6–4.6)
LYMPHOCYTES # BLD AUTO: 1.77 X10(3)/MCL (ref 0.6–4.6)
LYMPHOCYTES # BLD AUTO: 1.8 X10(3)/MCL (ref 0.6–4.6)
LYMPHOCYTES # BLD AUTO: 1.94 X10(3)/MCL (ref 0.6–4.6)
LYMPHOCYTES # BLD AUTO: 2.07 X10(3)/MCL (ref 0.6–4.6)
LYMPHOCYTES # BLD AUTO: 2.12 X10(3)/MCL (ref 0.6–4.6)
LYMPHOCYTES # BLD AUTO: 2.2 X10(3)/MCL (ref 0.6–4.6)
LYMPHOCYTES # BLD AUTO: 2.32 X10(3)/MCL (ref 0.6–4.6)
LYMPHOCYTES # BLD AUTO: 2.35 X10(3)/MCL (ref 0.6–4.6)
LYMPHOCYTES # BLD AUTO: 3.13 X10(3)/MCL (ref 0.6–4.6)
LYMPHOCYTES NFR BLD AUTO: 10.1 %
LYMPHOCYTES NFR BLD AUTO: 12.5 %
LYMPHOCYTES NFR BLD AUTO: 14.7 %
LYMPHOCYTES NFR BLD AUTO: 14.8 %
LYMPHOCYTES NFR BLD AUTO: 19.3 %
LYMPHOCYTES NFR BLD AUTO: 20.5 %
LYMPHOCYTES NFR BLD AUTO: 21.2 %
LYMPHOCYTES NFR BLD AUTO: 24.5 %
LYMPHOCYTES NFR BLD AUTO: 28.1 %
LYMPHOCYTES NFR BLD AUTO: 3 %
LYMPHOCYTES NFR BLD AUTO: 30.9 %
LYMPHOCYTES NFR BLD AUTO: 4.9 %
LYMPHOCYTES NFR BLD AUTO: 6.4 %
LYMPHOCYTES NFR BLD AUTO: 6.4 %
LYMPHOCYTES NFR BLD AUTO: 9.9 %
LYMPHOCYTES NFR BLD MANUAL: 1.24 X10(3)/MCL
LYMPHOCYTES NFR BLD MANUAL: 4 %
M PNEUMO DNA NPH QL NAA+NON-PROBE: NOT DETECTED
M PNEUMO DNA NPH QL NAA+NON-PROBE: NOT DETECTED
MAGNESIUM SERPL-MCNC: 2.1 MG/DL (ref 1.6–2.6)
MAGNESIUM SERPL-MCNC: 2.2 MG/DL (ref 1.6–2.6)
MAGNESIUM SERPL-MCNC: 2.3 MG/DL (ref 1.6–2.6)
MAGNESIUM SERPL-MCNC: 2.3 MG/DL (ref 1.6–2.6)
MAGNESIUM SERPL-MCNC: 2.4 MG/DL (ref 1.6–2.6)
MCH RBC QN AUTO: 29.2 PG
MCH RBC QN AUTO: 29.2 PG (ref 27–31)
MCH RBC QN AUTO: 29.5 PG
MCH RBC QN AUTO: 29.6 PG
MCH RBC QN AUTO: 29.6 PG (ref 27–31)
MCH RBC QN AUTO: 29.7 PG
MCH RBC QN AUTO: 29.7 PG (ref 27–31)
MCH RBC QN AUTO: 29.8 PG (ref 27–31)
MCH RBC QN AUTO: 29.9 PG (ref 27–31)
MCH RBC QN AUTO: 29.9 PG (ref 27–31)
MCH RBC QN AUTO: 30.3 PG (ref 27–31)
MCH RBC QN AUTO: 30.5 PG (ref 27–31)
MCHC RBC AUTO-ENTMCNC: 31.4 G/DL (ref 33–36)
MCHC RBC AUTO-ENTMCNC: 31.6 G/DL (ref 33–36)
MCHC RBC AUTO-ENTMCNC: 31.6 G/DL (ref 33–36)
MCHC RBC AUTO-ENTMCNC: 31.7 MG/DL (ref 33–36)
MCHC RBC AUTO-ENTMCNC: 31.8 G/DL (ref 33–36)
MCHC RBC AUTO-ENTMCNC: 31.9 G/DL (ref 33–36)
MCHC RBC AUTO-ENTMCNC: 32.1 G/DL (ref 33–36)
MCHC RBC AUTO-ENTMCNC: 32.2 G/DL (ref 33–36)
MCHC RBC AUTO-ENTMCNC: 32.4 G/DL (ref 33–36)
MCHC RBC AUTO-ENTMCNC: 32.4 MG/DL (ref 33–36)
MCHC RBC AUTO-ENTMCNC: 32.5 G/DL (ref 33–36)
MCHC RBC AUTO-ENTMCNC: 32.5 G/DL (ref 33–36)
MCHC RBC AUTO-ENTMCNC: 32.6 G/DL (ref 33–36)
MCHC RBC AUTO-ENTMCNC: 32.6 MG/DL (ref 33–36)
MCHC RBC AUTO-ENTMCNC: 32.7 MG/DL (ref 33–36)
MCHC RBC AUTO-ENTMCNC: 32.8 G/DL (ref 33–36)
MCHC RBC AUTO-ENTMCNC: 32.9 G/DL (ref 33–36)
MCHC RBC AUTO-ENTMCNC: 33.2 G/DL (ref 33–36)
MCV RBC AUTO: 90.3 FL (ref 80–94)
MCV RBC AUTO: 90.6 FL (ref 80–94)
MCV RBC AUTO: 90.8 FL (ref 80–94)
MCV RBC AUTO: 91.2 FL (ref 80–94)
MCV RBC AUTO: 91.3 FL (ref 80–94)
MCV RBC AUTO: 91.4 FL (ref 80–94)
MCV RBC AUTO: 91.4 FL (ref 80–94)
MCV RBC AUTO: 91.5 FL (ref 80–94)
MCV RBC AUTO: 92 FL (ref 80–94)
MCV RBC AUTO: 92 FL (ref 80–94)
MCV RBC AUTO: 92.1 FL (ref 80–94)
MCV RBC AUTO: 92.5 FL (ref 80–94)
MCV RBC AUTO: 92.7 FL (ref 80–94)
MCV RBC AUTO: 92.8 FL (ref 80–94)
MCV RBC AUTO: 93.1 FL (ref 80–94)
MCV RBC AUTO: 93.6 FL (ref 80–94)
MCV RBC AUTO: 93.6 FL (ref 80–94)
MCV RBC AUTO: 94.6 FL (ref 80–94)
MECH RR (OHS): 20 B/MIN
MECH VT (OHS): 500 ML
METHGB MFR BLDA: 0.2 % (ref 0.4–1.5)
METHGB MFR BLDA: 0.5 % (ref 0.4–1.5)
METHGB MFR BLDA: 0.6 % (ref 0.4–1.5)
METHGB MFR BLDA: 0.7 % (ref 0.4–1.5)
MODE (OHS): AC
MONOCYTES # BLD AUTO: 0.38 X10(3)/MCL (ref 0.1–1.3)
MONOCYTES # BLD AUTO: 0.5 X10(3)/MCL (ref 0.1–1.3)
MONOCYTES # BLD AUTO: 0.56 X10(3)/MCL (ref 0.1–1.3)
MONOCYTES # BLD AUTO: 0.6 X10(3)/MCL (ref 0.1–1.3)
MONOCYTES # BLD AUTO: 0.61 X10(3)/MCL (ref 0.1–1.3)
MONOCYTES # BLD AUTO: 0.61 X10(3)/MCL (ref 0.1–1.3)
MONOCYTES # BLD AUTO: 0.71 X10(3)/MCL (ref 0.1–1.3)
MONOCYTES # BLD AUTO: 0.81 X10(3)/MCL (ref 0.1–1.3)
MONOCYTES # BLD AUTO: 0.89 X10(3)/MCL (ref 0.1–1.3)
MONOCYTES # BLD AUTO: 0.93 X10(3)/MCL (ref 0.1–1.3)
MONOCYTES # BLD AUTO: 0.97 X10(3)/MCL (ref 0.1–1.3)
MONOCYTES # BLD AUTO: 1 X10(3)/MCL (ref 0.1–1.3)
MONOCYTES # BLD AUTO: 1.06 X10(3)/MCL (ref 0.1–1.3)
MONOCYTES # BLD AUTO: 1.14 X10(3)/MCL (ref 0.1–1.3)
MONOCYTES # BLD AUTO: 1.25 X10(3)/MCL (ref 0.1–1.3)
MONOCYTES NFR BLD AUTO: 3.4 %
MONOCYTES NFR BLD AUTO: 3.7 %
MONOCYTES NFR BLD AUTO: 3.7 %
MONOCYTES NFR BLD AUTO: 3.8 %
MONOCYTES NFR BLD AUTO: 5.4 %
MONOCYTES NFR BLD AUTO: 5.5 %
MONOCYTES NFR BLD AUTO: 5.9 %
MONOCYTES NFR BLD AUTO: 7.2 %
MONOCYTES NFR BLD AUTO: 7.5 %
MONOCYTES NFR BLD AUTO: 7.6 %
MONOCYTES NFR BLD AUTO: 8.6 %
MONOCYTES NFR BLD AUTO: 8.9 %
MONOCYTES NFR BLD AUTO: 9 %
MONOCYTES NFR BLD AUTO: 9.4 %
MONOCYTES NFR BLD AUTO: 9.7 %
MONOCYTES NFR BLD MANUAL: 0.93 X10(3)/MCL (ref 0.1–1.3)
MONOCYTES NFR BLD MANUAL: 3 %
MRSA PCR SCRN (OHS): NOT DETECTED
MV MEAN GRADIENT: 2 MMHG
MV PEAK A VEL: 0.85 M/S
MV PEAK E VEL: 0.69 M/S
MV PEAK GRADIENT: 5 MMHG
MV STENOSIS PRESSURE HALF TIME: 60 MS
MV VALVE AREA BY CONTINUITY EQUATION: 2.3 CM2
MV VALVE AREA P 1/2 METHOD: 3.67 CM2
NEUTROPHILS # BLD AUTO: 10.33 X10(3)/MCL (ref 2.1–9.2)
NEUTROPHILS # BLD AUTO: 12.05 X10(3)/MCL (ref 2.1–9.2)
NEUTROPHILS # BLD AUTO: 12.14 X10(3)/MCL (ref 2.1–9.2)
NEUTROPHILS # BLD AUTO: 13.46 X10(3)/MCL (ref 2.1–9.2)
NEUTROPHILS # BLD AUTO: 14.81 X10(3)/MCL (ref 2.1–9.2)
NEUTROPHILS # BLD AUTO: 14.84 X10(3)/MCL (ref 2.1–9.2)
NEUTROPHILS # BLD AUTO: 16.11 X10(3)/MCL (ref 2.1–9.2)
NEUTROPHILS # BLD AUTO: 16.51 X10(3)/MCL (ref 2.1–9.2)
NEUTROPHILS # BLD AUTO: 4.21 X10(3)/MCL (ref 2.1–9.2)
NEUTROPHILS # BLD AUTO: 4.38 X10(3)/MCL (ref 2.1–9.2)
NEUTROPHILS # BLD AUTO: 4.76 X10(3)/MCL (ref 2.1–9.2)
NEUTROPHILS # BLD AUTO: 6.36 X10(3)/MCL (ref 2.1–9.2)
NEUTROPHILS # BLD AUTO: 6.54 X10(3)/MCL (ref 2.1–9.2)
NEUTROPHILS # BLD AUTO: 7.14 X10(3)/MCL (ref 2.1–9.2)
NEUTROPHILS # BLD AUTO: 9.09 X10(3)/MCL (ref 2.1–9.2)
NEUTROPHILS NFR BLD AUTO: 55.9 %
NEUTROPHILS NFR BLD AUTO: 60.8 %
NEUTROPHILS NFR BLD AUTO: 63.8 %
NEUTROPHILS NFR BLD AUTO: 64.4 %
NEUTROPHILS NFR BLD AUTO: 69.1 %
NEUTROPHILS NFR BLD AUTO: 69.2 %
NEUTROPHILS NFR BLD AUTO: 73.4 %
NEUTROPHILS NFR BLD AUTO: 78.2 %
NEUTROPHILS NFR BLD AUTO: 79.6 %
NEUTROPHILS NFR BLD AUTO: 81.1 %
NEUTROPHILS NFR BLD AUTO: 82.9 %
NEUTROPHILS NFR BLD AUTO: 88.6 %
NEUTROPHILS NFR BLD AUTO: 89.2 %
NEUTROPHILS NFR BLD AUTO: 90.8 %
NEUTROPHILS NFR BLD AUTO: 92.3 %
NEUTROPHILS NFR BLD MANUAL: 93 %
NITRITE UR QL STRIP.AUTO: NEGATIVE
NRBC BLD AUTO-RTO: 0 %
NRBC BLD AUTO-RTO: 0 % (ref 0–1)
NRBC BLD AUTO-RTO: 0.2 %
O2 HB BLOOD GAS (OHS): 89.8 % (ref 94–97)
O2 HB BLOOD GAS (OHS): 93.2 % (ref 94–97)
O2 HB BLOOD GAS (OHS): 93.2 % (ref 94–97)
O2 HB BLOOD GAS (OHS): 93.8 % (ref 94–97)
OHS LV EJECTION FRACTION SIMPSONS BIPLANE MOD: 60 %
OSMOLALITY UR: 292 MOSM/KG (ref 300–1300)
OXYGEN DEVICE BLOOD GAS (OHS): ABNORMAL
OXYHGB MFR BLDA: 10.5 G/DL (ref 12–16)
OXYHGB MFR BLDA: 11.3 G/DL (ref 12–16)
OXYHGB MFR BLDA: 11.7 G/DL (ref 12–16)
OXYHGB MFR BLDA: 12 G/DL (ref 12–16)
PCO2 BLDA: 34 MMHG (ref 35–45)
PCO2 BLDA: 36 MMHG (ref 35–45)
PCO2 BLDA: 36 MMHG (ref 35–45)
PCO2 BLDA: 37 MMHG (ref 35–45)
PEEP (OHS): 5 CMH2O
PH BLDA: 7.31 [PH] (ref 7.35–7.45)
PH BLDA: 7.44 [PH] (ref 7.35–7.45)
PH BLDA: 7.47 [PH] (ref 7.35–7.45)
PH BLDA: 7.5 [PH] (ref 7.35–7.45)
PH UR STRIP.AUTO: 5 [PH]
PHOSPHATE SERPL-MCNC: 2.5 MG/DL (ref 2.3–4.7)
PHOSPHATE SERPL-MCNC: 2.8 MG/DL (ref 2.3–4.7)
PHOSPHATE SERPL-MCNC: 2.8 MG/DL (ref 2.3–4.7)
PHOSPHATE SERPL-MCNC: 2.9 MG/DL (ref 2.3–4.7)
PHOSPHATE SERPL-MCNC: 3.3 MG/DL (ref 2.3–4.7)
PISA TR MAX VEL: 2.12 M/S
PLATELET # BLD AUTO: 110 X10(3)/MCL (ref 130–400)
PLATELET # BLD AUTO: 135 X10(3)/MCL (ref 130–400)
PLATELET # BLD AUTO: 168 X10(3)/MCL (ref 130–400)
PLATELET # BLD AUTO: 173 X10(3)/MCL (ref 140–371)
PLATELET # BLD AUTO: 182 X10(3)/MCL (ref 140–371)
PLATELET # BLD AUTO: 183 X10(3)/MCL (ref 130–400)
PLATELET # BLD AUTO: 184 X10(3)/MCL (ref 130–400)
PLATELET # BLD AUTO: 194 X10(3)/MCL (ref 140–371)
PLATELET # BLD AUTO: 195 X10(3)/MCL (ref 130–400)
PLATELET # BLD AUTO: 206 X10(3)/MCL (ref 140–371)
PLATELET # BLD AUTO: 207 X10(3)/MCL (ref 130–400)
PLATELET # BLD AUTO: 219 X10(3)/MCL (ref 130–400)
PLATELET # BLD AUTO: 226 X10(3)/MCL (ref 130–400)
PLATELET # BLD AUTO: 227 X10(3)/MCL (ref 130–400)
PLATELET # BLD AUTO: 263 X10(3)/MCL (ref 130–400)
PLATELET # BLD AUTO: 279 X10(3)/MCL (ref 130–400)
PLATELET # BLD AUTO: 289 X10(3)/MCL (ref 130–400)
PLATELET # BLD AUTO: 303 X10(3)/MCL (ref 130–400)
PLATELET # BLD EST: NORMAL 10*3/UL
PMV BLD AUTO: 8.7 FL (ref 7.4–10.4)
PMV BLD AUTO: 8.7 FL (ref 7.4–10.4)
PMV BLD AUTO: 8.8 FL (ref 7.4–10.4)
PMV BLD AUTO: 9 FL (ref 7.4–10.4)
PMV BLD AUTO: 9 FL (ref 7.4–10.4)
PMV BLD AUTO: 9.1 FL (ref 7.4–10.4)
PMV BLD AUTO: 9.2 FL (ref 9.4–12.4)
PMV BLD AUTO: 9.4 FL (ref 7.4–10.4)
PMV BLD AUTO: 9.4 FL (ref 7.4–10.4)
PMV BLD AUTO: 9.4 FL (ref 9.4–12.4)
PMV BLD AUTO: 9.5 FL (ref 7.4–10.4)
PMV BLD AUTO: 9.5 FL (ref 9.4–12.4)
PMV BLD AUTO: 9.6 FL (ref 7.4–10.4)
PMV BLD AUTO: 9.6 FL (ref 7.4–10.4)
PMV BLD AUTO: 9.7 FL (ref 7.4–10.4)
PMV BLD AUTO: 9.8 FL (ref 7.4–10.4)
PMV BLD AUTO: 9.8 FL (ref 7.4–10.4)
PMV BLD AUTO: 9.8 FL (ref 9.4–12.4)
PO2 BLDA: 61 MMHG (ref 80–100)
PO2 BLDA: 67 MMHG (ref 80–100)
PO2 BLDA: 73 MMHG (ref 80–100)
PO2 BLDA: 77 MMHG (ref 80–100)
POCT GLUCOSE: 105 MG/DL (ref 70–110)
POCT GLUCOSE: 96 MG/DL (ref 70–110)
POCT GLUCOSE: 96 MG/DL (ref 70–110)
POIKILOCYTOSIS BLD QL SMEAR: ABNORMAL
POTASSIUM BLOOD GAS (OHS): 3.2 MMOL/L (ref 3.5–5)
POTASSIUM BLOOD GAS (OHS): 3.3 MMOL/L (ref 3.5–5)
POTASSIUM BLOOD GAS (OHS): 3.7 MMOL/L (ref 3.5–5)
POTASSIUM BLOOD GAS (OHS): 4.2 MMOL/L (ref 3.5–5)
POTASSIUM SERPL-SCNC: 3.3 MMOL/L (ref 3.5–5.1)
POTASSIUM SERPL-SCNC: 3.5 MMOL/L (ref 3.5–5.1)
POTASSIUM SERPL-SCNC: 3.7 MMOL/L (ref 3.5–5.1)
POTASSIUM SERPL-SCNC: 3.7 MMOL/L (ref 3.5–5.1)
POTASSIUM SERPL-SCNC: 3.8 MMOL/L (ref 3.5–5.1)
POTASSIUM SERPL-SCNC: 3.9 MMOL/L (ref 3.5–5.1)
POTASSIUM SERPL-SCNC: 4 MMOL/L (ref 3.5–5.1)
POTASSIUM SERPL-SCNC: 4 MMOL/L (ref 3.5–5.1)
POTASSIUM SERPL-SCNC: 4.1 MMOL/L (ref 3.5–5.1)
POTASSIUM SERPL-SCNC: 4.2 MMOL/L (ref 3.5–5.1)
POTASSIUM SERPL-SCNC: 4.2 MMOL/L (ref 3.5–5.1)
POTASSIUM SERPL-SCNC: 4.3 MMOL/L (ref 3.5–5.1)
POTASSIUM SERPL-SCNC: 4.3 MMOL/L (ref 3.5–5.1)
POTASSIUM SERPL-SCNC: 4.5 MMOL/L (ref 3.5–5.1)
POTASSIUM SERPL-SCNC: 4.7 MMOL/L (ref 3.5–5.1)
PREALB SERPL-MCNC: 16 MG/DL (ref 16–42)
PREALB SERPL-MCNC: 18.6 MG/DL (ref 16–42)
PREALB SERPL-MCNC: 19.1 MG/DL (ref 16–42)
PROCALCITONIN SERPL-MCNC: 0.32 NG/ML
PROCALCITONIN SERPL-MCNC: 0.88 NG/ML
PROT SERPL-MCNC: 5.3 GM/DL (ref 5.8–7.6)
PROT SERPL-MCNC: 5.5 GM/DL (ref 5.8–7.6)
PROT SERPL-MCNC: 6.3 GM/DL (ref 5.8–7.6)
PROT SERPL-MCNC: 6.6 GM/DL (ref 5.8–7.6)
PROT SERPL-MCNC: 6.8 GM/DL (ref 5.8–7.6)
PROT SERPL-MCNC: 6.8 GM/DL (ref 5.8–7.6)
PROT SERPL-MCNC: 6.9 GM/DL (ref 5.8–7.6)
PROT SERPL-MCNC: 7.1 GM/DL (ref 5.8–7.6)
PROT UR QL STRIP.AUTO: ABNORMAL
PROTHROMBIN TIME: 14.2 SECONDS (ref 12.5–14.5)
PROTHROMBIN TIME: 15.6 SECONDS (ref 12.5–14.5)
PV PEAK GRADIENT: 3 MMHG
PV PEAK VELOCITY: 0.83 M/S
RBC # BLD AUTO: 3.48 X10(6)/MCL (ref 4.7–6.1)
RBC # BLD AUTO: 3.84 X10(6)/MCL (ref 4.7–6.1)
RBC # BLD AUTO: 3.91 X10(6)/MCL (ref 4.7–6.1)
RBC # BLD AUTO: 4.06 X10(6)/MCL (ref 4.7–6.1)
RBC # BLD AUTO: 4.07 X10(6)/MCL (ref 4.7–6.1)
RBC # BLD AUTO: 4.08 X10(6)/MCL (ref 4.7–6.1)
RBC # BLD AUTO: 4.11 X10(6)/MCL (ref 4.7–6.1)
RBC # BLD AUTO: 4.17 X10(6)/MCL (ref 4.7–6.1)
RBC # BLD AUTO: 4.18 X10(6)/MCL (ref 4.7–6.1)
RBC # BLD AUTO: 4.22 X10(6)/MCL (ref 4.7–6.1)
RBC # BLD AUTO: 4.25 X10(6)/MCL (ref 4.7–6.1)
RBC # BLD AUTO: 4.29 X10(6)/MCL (ref 4.7–6.1)
RBC # BLD AUTO: 4.37 X10(6)/MCL (ref 4.7–6.1)
RBC # BLD AUTO: 4.37 X10(6)/MCL (ref 4.7–6.1)
RBC # BLD AUTO: 4.39 X10(6)/MCL (ref 4.7–6.1)
RBC # BLD AUTO: 4.42 X10(6)/MCL (ref 4.7–6.1)
RBC # BLD AUTO: 4.86 X10(6)/MCL (ref 4.7–6.1)
RBC # BLD AUTO: 4.96 X10(6)/MCL (ref 4.7–6.1)
RBC #/AREA URNS AUTO: >100 /HPF
RBC MORPH BLD: ABNORMAL
RBC UR QL AUTO: ABNORMAL
RIGHT VENTRICULAR END-DIASTOLIC DIMENSION: 3.3 CM
RSV A 5' UTR RNA NPH QL NAA+PROBE: NOT DETECTED
RSV RNA NPH QL NAA+NON-PROBE: NOT DETECTED
RSV RNA NPH QL NAA+NON-PROBE: NOT DETECTED
RV+EV RNA NPH QL NAA+NON-PROBE: NOT DETECTED
RV+EV RNA NPH QL NAA+NON-PROBE: NOT DETECTED
SAMPLE SITE BLOOD GAS (OHS): ABNORMAL
SAO2 % BLDA: 93.2 %
SAO2 % BLDA: 93.7 %
SAO2 % BLDA: 93.9 %
SAO2 % BLDA: 95.4 %
SARS-COV-2 RNA RESP QL NAA+PROBE: NOT DETECTED
SARS-COV-2 RNA RESP QL NAA+PROBE: NOT DETECTED
SODIUM BLOOD GAS (OHS): 136 MMOL/L (ref 137–145)
SODIUM BLOOD GAS (OHS): 138 MMOL/L (ref 137–145)
SODIUM BLOOD GAS (OHS): 139 MMOL/L (ref 137–145)
SODIUM BLOOD GAS (OHS): 142 MMOL/L (ref 137–145)
SODIUM SERPL-SCNC: 136 MMOL/L (ref 136–145)
SODIUM SERPL-SCNC: 137 MMOL/L (ref 136–145)
SODIUM SERPL-SCNC: 137 MMOL/L (ref 136–145)
SODIUM SERPL-SCNC: 138 MMOL/L (ref 136–145)
SODIUM SERPL-SCNC: 139 MMOL/L (ref 136–145)
SODIUM SERPL-SCNC: 139 MMOL/L (ref 136–145)
SODIUM SERPL-SCNC: 140 MMOL/L (ref 136–145)
SODIUM SERPL-SCNC: 141 MMOL/L (ref 136–145)
SODIUM SERPL-SCNC: 142 MMOL/L (ref 136–145)
SODIUM SERPL-SCNC: 143 MMOL/L (ref 136–145)
SODIUM SERPL-SCNC: 143 MMOL/L (ref 136–145)
SODIUM UR-SCNC: 29 MMOL/L
SP GR UR STRIP.AUTO: 1.02 (ref 1–1.03)
SQUAMOUS #/AREA URNS AUTO: ABNORMAL /HPF
TDI LATERAL: 0.07 M/S
TDI SEPTAL: 0.04 M/S
TDI: 0.06 M/S
TIBC SERPL-MCNC: 180 UG/DL (ref 69–240)
TIBC SERPL-MCNC: 211 UG/DL (ref 250–450)
TR MAX PG: 18 MMHG
TRANSFERRIN SERPL-MCNC: 177 MG/DL
TRICUSPID ANNULAR PLANE SYSTOLIC EXCURSION: 1.98 CM
TROPONIN I SERPL-MCNC: 0.01 NG/ML (ref 0–0.04)
TROPONIN I SERPL-MCNC: 0.02 NG/ML (ref 0–0.04)
TSH SERPL-ACNC: 1.78 UIU/ML (ref 0.35–4.94)
UROBILINOGEN UR STRIP-ACNC: 0.2
WBC # SPEC AUTO: 10.3 X10(3)/MCL (ref 4.5–11.5)
WBC # SPEC AUTO: 11.09 X10(3)/MCL (ref 4.5–11.5)
WBC # SPEC AUTO: 11.17 X10(3)/MCL (ref 4.5–11.5)
WBC # SPEC AUTO: 13.5 X10(3)/MCL (ref 4.5–11.5)
WBC # SPEC AUTO: 14.07 X10(3)/MCL (ref 4.5–11.5)
WBC # SPEC AUTO: 14.98 X10(3)/MCL (ref 4.5–11.5)
WBC # SPEC AUTO: 16.09 X10(3)/MCL (ref 4.5–11.5)
WBC # SPEC AUTO: 16.91 X10(3)/MCL (ref 4.5–11.5)
WBC # SPEC AUTO: 17.7 X10(3)/MCL (ref 4.5–11.5)
WBC # SPEC AUTO: 17.86 X10(3)/MCL (ref 4.5–11.5)
WBC # SPEC AUTO: 21.13 X10(3)/MCL (ref 4.5–11.5)
WBC # SPEC AUTO: 23.1 X10(3)/MCL (ref 4.5–11.5)
WBC # SPEC AUTO: 31.1 X10(3)/MCL (ref 4.5–11.5)
WBC # SPEC AUTO: 6.86 X10(3)/MCL (ref 4.5–11.5)
WBC # SPEC AUTO: 7.52 X10(3)/MCL (ref 4.5–11.5)
WBC # SPEC AUTO: 7.83 X10(3)/MCL (ref 4.5–11.5)
WBC # SPEC AUTO: 9.19 X10(3)/MCL (ref 4.5–11.5)
WBC # SPEC AUTO: 9.47 X10(3)/MCL (ref 4.5–11.5)
WBC #/AREA URNS AUTO: ABNORMAL /HPF
Z-SCORE OF LEFT VENTRICULAR DIMENSION IN END DIASTOLE: -3.95
Z-SCORE OF LEFT VENTRICULAR DIMENSION IN END SYSTOLE: 0.02

## 2023-01-01 PROCEDURE — 94761 N-INVAS EAR/PLS OXIMETRY MLT: CPT

## 2023-01-01 PROCEDURE — 85027 COMPLETE CBC AUTOMATED: CPT | Performed by: INTERNAL MEDICINE

## 2023-01-01 PROCEDURE — 11800000 HC REHAB PRIVATE ROOM

## 2023-01-01 PROCEDURE — 80053 COMPREHEN METABOLIC PANEL: CPT | Performed by: STUDENT IN AN ORGANIZED HEALTH CARE EDUCATION/TRAINING PROGRAM

## 2023-01-01 PROCEDURE — 99900035 HC TECH TIME PER 15 MIN (STAT)

## 2023-01-01 PROCEDURE — 94640 AIRWAY INHALATION TREATMENT: CPT

## 2023-01-01 PROCEDURE — 25000003 PHARM REV CODE 250: Performed by: INTERNAL MEDICINE

## 2023-01-01 PROCEDURE — 25000003 PHARM REV CODE 250: Performed by: NURSE PRACTITIONER

## 2023-01-01 PROCEDURE — 92526 ORAL FUNCTION THERAPY: CPT

## 2023-01-01 PROCEDURE — 27000221 HC OXYGEN, UP TO 24 HOURS

## 2023-01-01 PROCEDURE — 63600175 PHARM REV CODE 636 W HCPCS: Performed by: INTERNAL MEDICINE

## 2023-01-01 PROCEDURE — 97530 THERAPEUTIC ACTIVITIES: CPT | Mod: CQ

## 2023-01-01 PROCEDURE — 94799 UNLISTED PULMONARY SVC/PX: CPT

## 2023-01-01 PROCEDURE — 25000242 PHARM REV CODE 250 ALT 637 W/ HCPCS: Performed by: NURSE PRACTITIONER

## 2023-01-01 PROCEDURE — 96365 THER/PROPH/DIAG IV INF INIT: CPT

## 2023-01-01 PROCEDURE — 80048 BASIC METABOLIC PNL TOTAL CA: CPT | Performed by: INTERNAL MEDICINE

## 2023-01-01 PROCEDURE — 97535 SELF CARE MNGMENT TRAINING: CPT

## 2023-01-01 PROCEDURE — 11000001 HC ACUTE MED/SURG PRIVATE ROOM

## 2023-01-01 PROCEDURE — 36415 COLL VENOUS BLD VENIPUNCTURE: CPT | Performed by: INTERNAL MEDICINE

## 2023-01-01 PROCEDURE — 27100171 HC OXYGEN HIGH FLOW UP TO 24 HOURS

## 2023-01-01 PROCEDURE — 25500020 PHARM REV CODE 255: Performed by: HOSPITALIST

## 2023-01-01 PROCEDURE — 85025 COMPLETE CBC W/AUTO DIFF WBC: CPT | Performed by: INTERNAL MEDICINE

## 2023-01-01 PROCEDURE — C1751 CATH, INF, PER/CENT/MIDLINE: HCPCS

## 2023-01-01 PROCEDURE — 84484 ASSAY OF TROPONIN QUANT: CPT | Performed by: PHYSICIAN ASSISTANT

## 2023-01-01 PROCEDURE — 63600175 PHARM REV CODE 636 W HCPCS: Performed by: NURSE PRACTITIONER

## 2023-01-01 PROCEDURE — 43246 EGD PLACE GASTROSTOMY TUBE: CPT | Performed by: INTERNAL MEDICINE

## 2023-01-01 PROCEDURE — 97110 THERAPEUTIC EXERCISES: CPT

## 2023-01-01 PROCEDURE — 87899 AGENT NOS ASSAY W/OPTIC: CPT | Performed by: INTERNAL MEDICINE

## 2023-01-01 PROCEDURE — 99233 SBSQ HOSP IP/OBS HIGH 50: CPT | Mod: ,,, | Performed by: NURSE PRACTITIONER

## 2023-01-01 PROCEDURE — 63600175 PHARM REV CODE 636 W HCPCS: Performed by: EMERGENCY MEDICINE

## 2023-01-01 PROCEDURE — 63600175 PHARM REV CODE 636 W HCPCS

## 2023-01-01 PROCEDURE — 80069 RENAL FUNCTION PANEL: CPT | Performed by: INTERNAL MEDICINE

## 2023-01-01 PROCEDURE — 83735 ASSAY OF MAGNESIUM: CPT | Performed by: NURSE PRACTITIONER

## 2023-01-01 PROCEDURE — 97116 GAIT TRAINING THERAPY: CPT | Mod: CQ

## 2023-01-01 PROCEDURE — 80053 COMPREHEN METABOLIC PANEL: CPT

## 2023-01-01 PROCEDURE — 25000242 PHARM REV CODE 250 ALT 637 W/ HCPCS: Performed by: INTERNAL MEDICINE

## 2023-01-01 PROCEDURE — 37000009 HC ANESTHESIA EA ADD 15 MINS: Performed by: INTERNAL MEDICINE

## 2023-01-01 PROCEDURE — 25000242 PHARM REV CODE 250 ALT 637 W/ HCPCS

## 2023-01-01 PROCEDURE — 99900031 HC PATIENT EDUCATION (STAT)

## 2023-01-01 PROCEDURE — 63700000 PHARM REV CODE 250 ALT 637 W/O HCPCS: Performed by: INTERNAL MEDICINE

## 2023-01-01 PROCEDURE — 99223 1ST HOSP IP/OBS HIGH 75: CPT | Mod: ,,, | Performed by: INTERNAL MEDICINE

## 2023-01-01 PROCEDURE — 25000003 PHARM REV CODE 250

## 2023-01-01 PROCEDURE — 84100 ASSAY OF PHOSPHORUS: CPT | Performed by: NURSE PRACTITIONER

## 2023-01-01 PROCEDURE — 84484 ASSAY OF TROPONIN QUANT: CPT | Performed by: EMERGENCY MEDICINE

## 2023-01-01 PROCEDURE — 51702 INSERT TEMP BLADDER CATH: CPT

## 2023-01-01 PROCEDURE — 36415 COLL VENOUS BLD VENIPUNCTURE: CPT | Performed by: HOSPITALIST

## 2023-01-01 PROCEDURE — 25000003 PHARM REV CODE 250: Performed by: HOSPITALIST

## 2023-01-01 PROCEDURE — 93005 ELECTROCARDIOGRAM TRACING: CPT

## 2023-01-01 PROCEDURE — 25000003 PHARM REV CODE 250: Performed by: EMERGENCY MEDICINE

## 2023-01-01 PROCEDURE — A4216 STERILE WATER/SALINE, 10 ML: HCPCS | Performed by: NURSE PRACTITIONER

## 2023-01-01 PROCEDURE — 83605 ASSAY OF LACTIC ACID: CPT

## 2023-01-01 PROCEDURE — 97162 PT EVAL MOD COMPLEX 30 MIN: CPT

## 2023-01-01 PROCEDURE — 84443 ASSAY THYROID STIM HORMONE: CPT | Performed by: EMERGENCY MEDICINE

## 2023-01-01 PROCEDURE — 99285 EMERGENCY DEPT VISIT HI MDM: CPT | Mod: 25

## 2023-01-01 PROCEDURE — 85027 COMPLETE CBC AUTOMATED: CPT | Performed by: PHYSICIAN ASSISTANT

## 2023-01-01 PROCEDURE — 99223 PR INITIAL HOSPITAL CARE,LEVL III: ICD-10-PCS | Mod: ,,, | Performed by: INTERNAL MEDICINE

## 2023-01-01 PROCEDURE — 83935 ASSAY OF URINE OSMOLALITY: CPT | Performed by: INTERNAL MEDICINE

## 2023-01-01 PROCEDURE — 84134 ASSAY OF PREALBUMIN: CPT | Performed by: NURSE PRACTITIONER

## 2023-01-01 PROCEDURE — 92610 EVALUATE SWALLOWING FUNCTION: CPT

## 2023-01-01 PROCEDURE — A9698 NON-RAD CONTRAST MATERIALNOC: HCPCS | Performed by: HOSPITALIST

## 2023-01-01 PROCEDURE — 94644 CONT INHLJ TX 1ST HOUR: CPT

## 2023-01-01 PROCEDURE — 93010 EKG 12-LEAD: ICD-10-PCS | Mod: ,,, | Performed by: INTERNAL MEDICINE

## 2023-01-01 PROCEDURE — 21400001 HC TELEMETRY ROOM

## 2023-01-01 PROCEDURE — C9113 INJ PANTOPRAZOLE SODIUM, VIA: HCPCS | Performed by: NURSE PRACTITIONER

## 2023-01-01 PROCEDURE — 99232 SBSQ HOSP IP/OBS MODERATE 35: CPT | Mod: ,,, | Performed by: INTERNAL MEDICINE

## 2023-01-01 PROCEDURE — 97116 GAIT TRAINING THERAPY: CPT

## 2023-01-01 PROCEDURE — 87070 CULTURE OTHR SPECIMN AEROBIC: CPT | Performed by: EMERGENCY MEDICINE

## 2023-01-01 PROCEDURE — D9220A PRA ANESTHESIA: Mod: CRNA,,,

## 2023-01-01 PROCEDURE — 99233 PR SUBSEQUENT HOSPITAL CARE,LEVL III: ICD-10-PCS | Mod: ,,, | Performed by: NURSE PRACTITIONER

## 2023-01-01 PROCEDURE — 63600175 PHARM REV CODE 636 W HCPCS: Performed by: HOSPITALIST

## 2023-01-01 PROCEDURE — 0202U NFCT DS 22 TRGT SARS-COV-2: CPT | Performed by: INTERNAL MEDICINE

## 2023-01-01 PROCEDURE — 82803 BLOOD GASES ANY COMBINATION: CPT

## 2023-01-01 PROCEDURE — 93010 ELECTROCARDIOGRAM REPORT: CPT | Mod: ,,, | Performed by: INTERNAL MEDICINE

## 2023-01-01 PROCEDURE — 97535 SELF CARE MNGMENT TRAINING: CPT | Mod: CO

## 2023-01-01 PROCEDURE — 97530 THERAPEUTIC ACTIVITIES: CPT

## 2023-01-01 PROCEDURE — 0241U COVID/RSV/FLU A&B PCR: CPT | Performed by: EMERGENCY MEDICINE

## 2023-01-01 PROCEDURE — 92611 MOTION FLUOROSCOPY/SWALLOW: CPT

## 2023-01-01 PROCEDURE — 96375 TX/PRO/DX INJ NEW DRUG ADDON: CPT

## 2023-01-01 PROCEDURE — 27200966 HC CLOSED SUCTION SYSTEM

## 2023-01-01 PROCEDURE — 94003 VENT MGMT INPAT SUBQ DAY: CPT

## 2023-01-01 PROCEDURE — 87641 MR-STAPH DNA AMP PROBE: CPT | Performed by: INTERNAL MEDICINE

## 2023-01-01 PROCEDURE — 63600175 PHARM REV CODE 636 W HCPCS: Performed by: STUDENT IN AN ORGANIZED HEALTH CARE EDUCATION/TRAINING PROGRAM

## 2023-01-01 PROCEDURE — 94660 CPAP INITIATION&MGMT: CPT

## 2023-01-01 PROCEDURE — 20000000 HC ICU ROOM

## 2023-01-01 PROCEDURE — 99900026 HC AIRWAY MAINTENANCE (STAT)

## 2023-01-01 PROCEDURE — 25500020 PHARM REV CODE 255: Performed by: INTERNAL MEDICINE

## 2023-01-01 PROCEDURE — 97168 OT RE-EVAL EST PLAN CARE: CPT

## 2023-01-01 PROCEDURE — 84145 PROCALCITONIN (PCT): CPT | Performed by: INTERNAL MEDICINE

## 2023-01-01 PROCEDURE — 27000190 HC CPAP FULL FACE MASK W/VALVE

## 2023-01-01 PROCEDURE — 80053 COMPREHEN METABOLIC PANEL: CPT | Performed by: NURSE PRACTITIONER

## 2023-01-01 PROCEDURE — 27000249 HC VAPOTHERM CIRCUIT

## 2023-01-01 PROCEDURE — 97110 THERAPEUTIC EXERCISES: CPT | Mod: CQ

## 2023-01-01 PROCEDURE — 36600 WITHDRAWAL OF ARTERIAL BLOOD: CPT

## 2023-01-01 PROCEDURE — 80053 COMPREHEN METABOLIC PANEL: CPT | Performed by: INTERNAL MEDICINE

## 2023-01-01 PROCEDURE — 25000242 PHARM REV CODE 250 ALT 637 W/ HCPCS: Performed by: EMERGENCY MEDICINE

## 2023-01-01 PROCEDURE — 85025 COMPLETE CBC W/AUTO DIFF WBC: CPT | Performed by: HOSPITALIST

## 2023-01-01 PROCEDURE — 99233 PR SUBSEQUENT HOSPITAL CARE,LEVL III: ICD-10-PCS | Mod: ,,, | Performed by: INTERNAL MEDICINE

## 2023-01-01 PROCEDURE — 97166 OT EVAL MOD COMPLEX 45 MIN: CPT

## 2023-01-01 PROCEDURE — 83605 ASSAY OF LACTIC ACID: CPT | Performed by: EMERGENCY MEDICINE

## 2023-01-01 PROCEDURE — 99223 PR INITIAL HOSPITAL CARE,LEVL III: ICD-10-PCS | Mod: ,,, | Performed by: NURSE PRACTITIONER

## 2023-01-01 PROCEDURE — A9698 NON-RAD CONTRAST MATERIALNOC: HCPCS | Performed by: INTERNAL MEDICINE

## 2023-01-01 PROCEDURE — 82728 ASSAY OF FERRITIN: CPT | Performed by: NURSE PRACTITIONER

## 2023-01-01 PROCEDURE — D9220A PRA ANESTHESIA: ICD-10-PCS | Mod: CRNA,,,

## 2023-01-01 PROCEDURE — 80048 BASIC METABOLIC PNL TOTAL CA: CPT | Performed by: HOSPITALIST

## 2023-01-01 PROCEDURE — 83880 ASSAY OF NATRIURETIC PEPTIDE: CPT | Performed by: EMERGENCY MEDICINE

## 2023-01-01 PROCEDURE — 0240U COVID/FLU A&B PCR: CPT | Performed by: PHYSICIAN ASSISTANT

## 2023-01-01 PROCEDURE — 97161 PT EVAL LOW COMPLEX 20 MIN: CPT

## 2023-01-01 PROCEDURE — 36410 VNPNXR 3YR/> PHY/QHP DX/THER: CPT

## 2023-01-01 PROCEDURE — 83880 ASSAY OF NATRIURETIC PEPTIDE: CPT | Performed by: PHYSICIAN ASSISTANT

## 2023-01-01 PROCEDURE — 83540 ASSAY OF IRON: CPT | Performed by: NURSE PRACTITIONER

## 2023-01-01 PROCEDURE — 97530 THERAPEUTIC ACTIVITIES: CPT | Mod: CO

## 2023-01-01 PROCEDURE — 82570 ASSAY OF URINE CREATININE: CPT | Performed by: INTERNAL MEDICINE

## 2023-01-01 PROCEDURE — 87040 BLOOD CULTURE FOR BACTERIA: CPT | Performed by: EMERGENCY MEDICINE

## 2023-01-01 PROCEDURE — 85025 COMPLETE CBC W/AUTO DIFF WBC: CPT

## 2023-01-01 PROCEDURE — 85025 COMPLETE CBC W/AUTO DIFF WBC: CPT | Performed by: EMERGENCY MEDICINE

## 2023-01-01 PROCEDURE — D9220A PRA ANESTHESIA: Mod: ANES,,, | Performed by: ANESTHESIOLOGY

## 2023-01-01 PROCEDURE — 85025 COMPLETE CBC W/AUTO DIFF WBC: CPT | Performed by: NURSE PRACTITIONER

## 2023-01-01 PROCEDURE — D9220A PRA ANESTHESIA: ICD-10-PCS | Mod: ANES,,, | Performed by: ANESTHESIOLOGY

## 2023-01-01 PROCEDURE — 87070 CULTURE OTHR SPECIMN AEROBIC: CPT | Performed by: INTERNAL MEDICINE

## 2023-01-01 PROCEDURE — 99233 SBSQ HOSP IP/OBS HIGH 50: CPT | Mod: ,,, | Performed by: INTERNAL MEDICINE

## 2023-01-01 PROCEDURE — 87798 DETECT AGENT NOS DNA AMP: CPT | Performed by: EMERGENCY MEDICINE

## 2023-01-01 PROCEDURE — 85730 THROMBOPLASTIN TIME PARTIAL: CPT | Performed by: EMERGENCY MEDICINE

## 2023-01-01 PROCEDURE — 97164 PT RE-EVAL EST PLAN CARE: CPT

## 2023-01-01 PROCEDURE — 37000008 HC ANESTHESIA 1ST 15 MINUTES: Performed by: INTERNAL MEDICINE

## 2023-01-01 PROCEDURE — 84100 ASSAY OF PHOSPHORUS: CPT | Performed by: INTERNAL MEDICINE

## 2023-01-01 PROCEDURE — 94002 VENT MGMT INPAT INIT DAY: CPT

## 2023-01-01 PROCEDURE — 97542 WHEELCHAIR MNGMENT TRAINING: CPT

## 2023-01-01 PROCEDURE — 87077 CULTURE AEROBIC IDENTIFY: CPT | Performed by: INTERNAL MEDICINE

## 2023-01-01 PROCEDURE — 83735 ASSAY OF MAGNESIUM: CPT | Performed by: INTERNAL MEDICINE

## 2023-01-01 PROCEDURE — 99223 1ST HOSP IP/OBS HIGH 75: CPT | Mod: ,,, | Performed by: NURSE PRACTITIONER

## 2023-01-01 PROCEDURE — 84300 ASSAY OF URINE SODIUM: CPT | Performed by: INTERNAL MEDICINE

## 2023-01-01 PROCEDURE — 85610 PROTHROMBIN TIME: CPT | Performed by: PHYSICIAN ASSISTANT

## 2023-01-01 PROCEDURE — 31500 INSERT EMERGENCY AIRWAY: CPT

## 2023-01-01 PROCEDURE — 81001 URINALYSIS AUTO W/SCOPE: CPT | Performed by: EMERGENCY MEDICINE

## 2023-01-01 PROCEDURE — 99291 CRITICAL CARE FIRST HOUR: CPT

## 2023-01-01 PROCEDURE — 85025 COMPLETE CBC W/AUTO DIFF WBC: CPT | Performed by: STUDENT IN AN ORGANIZED HEALTH CARE EDUCATION/TRAINING PROGRAM

## 2023-01-01 PROCEDURE — 51798 US URINE CAPACITY MEASURE: CPT

## 2023-01-01 PROCEDURE — 80053 COMPREHEN METABOLIC PANEL: CPT | Performed by: PHYSICIAN ASSISTANT

## 2023-01-01 PROCEDURE — 27100092 HC HIGH FLOW DELIVERY CANNULA

## 2023-01-01 PROCEDURE — 85610 PROTHROMBIN TIME: CPT | Performed by: EMERGENCY MEDICINE

## 2023-01-01 PROCEDURE — 80053 COMPREHEN METABOLIC PANEL: CPT | Performed by: EMERGENCY MEDICINE

## 2023-01-01 PROCEDURE — 84145 PROCALCITONIN (PCT): CPT | Performed by: EMERGENCY MEDICINE

## 2023-01-01 PROCEDURE — 99232 PR SUBSEQUENT HOSPITAL CARE,LEVL II: ICD-10-PCS | Mod: ,,, | Performed by: INTERNAL MEDICINE

## 2023-01-01 RX ORDER — HALOPERIDOL 5 MG/ML
5 INJECTION INTRAMUSCULAR ONCE
Status: COMPLETED | OUTPATIENT
Start: 2023-01-01 | End: 2023-01-01

## 2023-01-01 RX ORDER — ENOXAPARIN SODIUM 100 MG/ML
40 INJECTION SUBCUTANEOUS EVERY 24 HOURS
Status: DISCONTINUED | OUTPATIENT
Start: 2023-01-01 | End: 2023-01-01 | Stop reason: HOSPADM

## 2023-01-01 RX ORDER — ALBUTEROL SULFATE 90 UG/1
2 AEROSOL, METERED RESPIRATORY (INHALATION) EVERY 6 HOURS PRN
Qty: 8.5 G | Status: SHIPPED | OUTPATIENT
Start: 2023-01-01 | End: 2024-01-24

## 2023-01-01 RX ORDER — SERTRALINE HYDROCHLORIDE 50 MG/1
100 TABLET, FILM COATED ORAL DAILY
Status: CANCELLED | OUTPATIENT
Start: 2023-01-01

## 2023-01-01 RX ORDER — SODIUM CHLORIDE 9 MG/ML
INJECTION, SOLUTION INTRAVENOUS CONTINUOUS
Status: DISCONTINUED | OUTPATIENT
Start: 2023-01-01 | End: 2023-01-01

## 2023-01-01 RX ORDER — PROPOFOL 10 MG/ML
VIAL (ML) INTRAVENOUS
Status: DISCONTINUED | OUTPATIENT
Start: 2023-01-01 | End: 2023-01-01

## 2023-01-01 RX ORDER — PROPOFOL 10 MG/ML
INJECTION, EMULSION INTRAVENOUS
Status: DISPENSED
Start: 2023-01-01 | End: 2023-01-01

## 2023-01-01 RX ORDER — SERTRALINE HYDROCHLORIDE 50 MG/1
100 TABLET, FILM COATED ORAL DAILY
Status: DISCONTINUED | OUTPATIENT
Start: 2023-01-01 | End: 2023-01-01 | Stop reason: HOSPADM

## 2023-01-01 RX ORDER — CEFDINIR 300 MG/1
300 CAPSULE ORAL 2 TIMES DAILY
Qty: 8 CAPSULE | Refills: 0 | Status: SHIPPED | OUTPATIENT
Start: 2023-01-01 | End: 2023-01-01 | Stop reason: ALTCHOICE

## 2023-01-01 RX ORDER — IPRATROPIUM BROMIDE AND ALBUTEROL SULFATE 2.5; .5 MG/3ML; MG/3ML
3 SOLUTION RESPIRATORY (INHALATION) EVERY 6 HOURS PRN
Status: CANCELLED | OUTPATIENT
Start: 2023-01-01

## 2023-01-01 RX ORDER — LANOLIN ALCOHOL/MO/W.PET/CERES
1 CREAM (GRAM) TOPICAL DAILY
Status: DISCONTINUED | OUTPATIENT
Start: 2023-01-01 | End: 2023-01-01 | Stop reason: HOSPADM

## 2023-01-01 RX ORDER — BUSPIRONE HYDROCHLORIDE 10 MG/1
10 TABLET ORAL 2 TIMES DAILY
Status: DISCONTINUED | OUTPATIENT
Start: 2023-01-01 | End: 2023-01-01

## 2023-01-01 RX ORDER — ONDANSETRON 4 MG/1
4 TABLET, ORALLY DISINTEGRATING ORAL EVERY 6 HOURS PRN
Status: DISCONTINUED | OUTPATIENT
Start: 2023-01-01 | End: 2023-01-01 | Stop reason: HOSPADM

## 2023-01-01 RX ORDER — DEXMEDETOMIDINE HYDROCHLORIDE 4 UG/ML
0-1.4 INJECTION, SOLUTION INTRAVENOUS CONTINUOUS
Status: DISCONTINUED | OUTPATIENT
Start: 2023-01-01 | End: 2023-01-01

## 2023-01-01 RX ORDER — SUCCINYLCHOLINE CHLORIDE 20 MG/ML
INJECTION INTRAMUSCULAR; INTRAVENOUS CODE/TRAUMA/SEDATION MEDICATION
Status: COMPLETED | OUTPATIENT
Start: 2023-01-01 | End: 2023-01-01

## 2023-01-01 RX ORDER — POLYETHYLENE GLYCOL 3350 17 G/17G
17 POWDER, FOR SOLUTION ORAL DAILY
Status: DISCONTINUED | OUTPATIENT
Start: 2023-01-01 | End: 2023-01-01 | Stop reason: HOSPADM

## 2023-01-01 RX ORDER — ASPIRIN 81 MG/1
81 TABLET ORAL DAILY
Qty: 90 TABLET | Refills: 0 | Status: SHIPPED | OUTPATIENT
Start: 2023-01-01 | End: 2024-01-24

## 2023-01-01 RX ORDER — FLUTICASONE FUROATE, UMECLIDINIUM BROMIDE AND VILANTEROL TRIFENATATE 200; 62.5; 25 UG/1; UG/1; UG/1
1 POWDER RESPIRATORY (INHALATION) DAILY
Qty: 60 EACH | Refills: 1 | Status: SHIPPED | OUTPATIENT
Start: 2023-01-01

## 2023-01-01 RX ORDER — GUAIFENESIN 100 MG/5ML
200 SOLUTION ORAL EVERY 6 HOURS
Status: DISCONTINUED | OUTPATIENT
Start: 2023-01-01 | End: 2023-01-01 | Stop reason: HOSPADM

## 2023-01-01 RX ORDER — QUETIAPINE FUMARATE 25 MG/1
25 TABLET, FILM COATED ORAL NIGHTLY
Status: DISCONTINUED | OUTPATIENT
Start: 2023-01-01 | End: 2023-01-01

## 2023-01-01 RX ORDER — MONTELUKAST SODIUM 10 MG/1
10 TABLET ORAL DAILY
Qty: 90 TABLET | Refills: 0 | Status: SHIPPED | OUTPATIENT
Start: 2023-01-01 | End: 2024-01-24

## 2023-01-01 RX ORDER — PROCHLORPERAZINE EDISYLATE 5 MG/ML
5 INJECTION INTRAMUSCULAR; INTRAVENOUS EVERY 6 HOURS PRN
Status: DISCONTINUED | OUTPATIENT
Start: 2023-01-01 | End: 2023-01-01 | Stop reason: HOSPADM

## 2023-01-01 RX ORDER — MUPIROCIN 20 MG/G
OINTMENT TOPICAL 2 TIMES DAILY
Status: COMPLETED | OUTPATIENT
Start: 2023-01-01 | End: 2023-01-01

## 2023-01-01 RX ORDER — SODIUM CHLORIDE 0.9 % (FLUSH) 0.9 %
10 SYRINGE (ML) INJECTION
Status: DISCONTINUED | OUTPATIENT
Start: 2023-01-01 | End: 2023-01-01 | Stop reason: HOSPADM

## 2023-01-01 RX ORDER — MONTELUKAST SODIUM 10 MG/1
10 TABLET ORAL DAILY
Status: DISCONTINUED | OUTPATIENT
Start: 2023-01-01 | End: 2023-01-01 | Stop reason: HOSPADM

## 2023-01-01 RX ORDER — ZOLPIDEM TARTRATE 5 MG/1
5 TABLET ORAL NIGHTLY
Status: DISCONTINUED | OUTPATIENT
Start: 2023-01-01 | End: 2023-01-01 | Stop reason: HOSPADM

## 2023-01-01 RX ORDER — ROFLUMILAST 250 UG/1
250 TABLET ORAL DAILY
Status: DISCONTINUED | OUTPATIENT
Start: 2023-01-01 | End: 2023-01-01 | Stop reason: HOSPADM

## 2023-01-01 RX ORDER — METHYLPREDNISOLONE SOD SUCC 125 MG
125 VIAL (EA) INJECTION
Status: COMPLETED | OUTPATIENT
Start: 2023-01-01 | End: 2023-01-01

## 2023-01-01 RX ORDER — AZITHROMYCIN 250 MG/1
250 TABLET, FILM COATED ORAL DAILY
Status: COMPLETED | OUTPATIENT
Start: 2023-01-01 | End: 2023-01-01

## 2023-01-01 RX ORDER — FAMOTIDINE 20 MG/1
20 TABLET, FILM COATED ORAL 2 TIMES DAILY PRN
Status: DISCONTINUED | OUTPATIENT
Start: 2023-01-01 | End: 2023-01-01 | Stop reason: HOSPADM

## 2023-01-01 RX ORDER — QUETIAPINE FUMARATE 25 MG/1
25 TABLET, FILM COATED ORAL NIGHTLY
Status: DISCONTINUED | OUTPATIENT
Start: 2023-01-01 | End: 2023-01-01 | Stop reason: HOSPADM

## 2023-01-01 RX ORDER — METOPROLOL TARTRATE 1 MG/ML
10 INJECTION, SOLUTION INTRAVENOUS
Status: DISCONTINUED | OUTPATIENT
Start: 2023-01-01 | End: 2023-01-01 | Stop reason: HOSPADM

## 2023-01-01 RX ORDER — IPRATROPIUM BROMIDE AND ALBUTEROL SULFATE 2.5; .5 MG/3ML; MG/3ML
3 SOLUTION RESPIRATORY (INHALATION) EVERY 6 HOURS
Status: DISCONTINUED | OUTPATIENT
Start: 2023-01-01 | End: 2023-01-01

## 2023-01-01 RX ORDER — ATORVASTATIN CALCIUM 10 MG/1
10 TABLET, FILM COATED ORAL DAILY
Status: DISCONTINUED | OUTPATIENT
Start: 2023-01-01 | End: 2023-01-01 | Stop reason: HOSPADM

## 2023-01-01 RX ORDER — ATORVASTATIN CALCIUM 20 MG/1
20 TABLET, FILM COATED ORAL DAILY
Qty: 90 TABLET | Refills: 0 | Status: SHIPPED | OUTPATIENT
Start: 2023-01-01 | End: 2024-01-24

## 2023-01-01 RX ORDER — BUSPIRONE HYDROCHLORIDE 10 MG/1
10 TABLET ORAL 2 TIMES DAILY
Qty: 60 TABLET | Refills: 11 | Status: ON HOLD | OUTPATIENT
Start: 2023-01-01 | End: 2023-01-01 | Stop reason: HOSPADM

## 2023-01-01 RX ORDER — BUSPIRONE HYDROCHLORIDE 5 MG/1
10 TABLET ORAL 2 TIMES DAILY
Status: CANCELLED | OUTPATIENT
Start: 2023-01-01

## 2023-01-01 RX ORDER — CIPROFLOXACIN 2 MG/ML
400 INJECTION, SOLUTION INTRAVENOUS
Status: DISCONTINUED | OUTPATIENT
Start: 2023-01-01 | End: 2023-01-01

## 2023-01-01 RX ORDER — ZIPRASIDONE MESYLATE 20 MG/ML
20 INJECTION, POWDER, LYOPHILIZED, FOR SOLUTION INTRAMUSCULAR ONCE
Status: COMPLETED | OUTPATIENT
Start: 2023-01-01 | End: 2023-01-01

## 2023-01-01 RX ORDER — HYDROXYZINE PAMOATE 50 MG/1
50 CAPSULE ORAL NIGHTLY
Status: DISCONTINUED | OUTPATIENT
Start: 2023-01-01 | End: 2023-01-01

## 2023-01-01 RX ORDER — SODIUM CHLORIDE, SODIUM GLUCONATE, SODIUM ACETATE, POTASSIUM CHLORIDE AND MAGNESIUM CHLORIDE 30; 37; 368; 526; 502 MG/100ML; MG/100ML; MG/100ML; MG/100ML; MG/100ML
INJECTION, SOLUTION INTRAVENOUS CONTINUOUS
Status: DISCONTINUED | OUTPATIENT
Start: 2023-01-01 | End: 2023-01-01 | Stop reason: HOSPADM

## 2023-01-01 RX ORDER — CEFTRIAXONE 1 G/1
INJECTION, POWDER, FOR SOLUTION INTRAMUSCULAR; INTRAVENOUS
Status: DISPENSED
Start: 2023-01-01 | End: 2023-01-01

## 2023-01-01 RX ORDER — SIMETHICONE 80 MG
1 TABLET,CHEWABLE ORAL 4 TIMES DAILY PRN
Status: DISCONTINUED | OUTPATIENT
Start: 2023-01-01 | End: 2023-01-01 | Stop reason: HOSPADM

## 2023-01-01 RX ORDER — TALC
6 POWDER (GRAM) TOPICAL NIGHTLY
Status: DISCONTINUED | OUTPATIENT
Start: 2023-01-01 | End: 2023-01-01 | Stop reason: HOSPADM

## 2023-01-01 RX ORDER — ETOMIDATE 2 MG/ML
INJECTION INTRAVENOUS CODE/TRAUMA/SEDATION MEDICATION
Status: COMPLETED | OUTPATIENT
Start: 2023-01-01 | End: 2023-01-01

## 2023-01-01 RX ORDER — IPRATROPIUM BROMIDE AND ALBUTEROL SULFATE 2.5; .5 MG/3ML; MG/3ML
3 SOLUTION RESPIRATORY (INHALATION)
Status: CANCELLED | OUTPATIENT
Start: 2023-01-01

## 2023-01-01 RX ORDER — HYDRALAZINE HYDROCHLORIDE 20 MG/ML
10 INJECTION INTRAMUSCULAR; INTRAVENOUS EVERY 4 HOURS PRN
Status: DISCONTINUED | OUTPATIENT
Start: 2023-01-01 | End: 2023-01-01 | Stop reason: HOSPADM

## 2023-01-01 RX ORDER — ALBUTEROL SULFATE 0.83 MG/ML
15 SOLUTION RESPIRATORY (INHALATION)
Status: COMPLETED | OUTPATIENT
Start: 2023-01-01 | End: 2023-01-01

## 2023-01-01 RX ORDER — ASPIRIN 81 MG/1
81 TABLET ORAL DAILY
Status: DISCONTINUED | OUTPATIENT
Start: 2023-01-01 | End: 2023-01-01 | Stop reason: HOSPADM

## 2023-01-01 RX ORDER — MAGNESIUM SULFATE HEPTAHYDRATE 40 MG/ML
2 INJECTION, SOLUTION INTRAVENOUS
Status: COMPLETED | OUTPATIENT
Start: 2023-01-01 | End: 2023-01-01

## 2023-01-01 RX ORDER — DIPHENHYDRAMINE HYDROCHLORIDE 50 MG/ML
25 INJECTION INTRAMUSCULAR; INTRAVENOUS NIGHTLY PRN
Status: DISCONTINUED | OUTPATIENT
Start: 2023-01-01 | End: 2023-01-01 | Stop reason: HOSPADM

## 2023-01-01 RX ORDER — HYDRALAZINE HYDROCHLORIDE 20 MG/ML
10 INJECTION INTRAMUSCULAR; INTRAVENOUS EVERY 6 HOURS PRN
Status: DISCONTINUED | OUTPATIENT
Start: 2023-01-01 | End: 2023-01-01 | Stop reason: HOSPADM

## 2023-01-01 RX ORDER — ONDANSETRON 2 MG/ML
4 INJECTION INTRAMUSCULAR; INTRAVENOUS DAILY PRN
Status: DISCONTINUED | OUTPATIENT
Start: 2023-01-01 | End: 2023-01-01 | Stop reason: HOSPADM

## 2023-01-01 RX ORDER — BISACODYL 10 MG
10 SUPPOSITORY, RECTAL RECTAL ONCE
Status: DISCONTINUED | OUTPATIENT
Start: 2023-01-01 | End: 2023-01-01 | Stop reason: HOSPADM

## 2023-01-01 RX ORDER — ACETAMINOPHEN 650 MG/20.3ML
650 LIQUID ORAL EVERY 6 HOURS PRN
Status: DISCONTINUED | OUTPATIENT
Start: 2023-01-01 | End: 2023-01-01 | Stop reason: HOSPADM

## 2023-01-01 RX ORDER — PANTOPRAZOLE SODIUM 40 MG/10ML
40 INJECTION, POWDER, LYOPHILIZED, FOR SOLUTION INTRAVENOUS DAILY
Status: DISCONTINUED | OUTPATIENT
Start: 2023-01-01 | End: 2023-01-01

## 2023-01-01 RX ORDER — IPRATROPIUM BROMIDE 0.5 MG/2.5ML
1 SOLUTION RESPIRATORY (INHALATION)
Status: COMPLETED | OUTPATIENT
Start: 2023-01-01 | End: 2023-01-01

## 2023-01-01 RX ORDER — SODIUM CHLORIDE 0.9 % (FLUSH) 0.9 %
10 SYRINGE (ML) INJECTION EVERY 6 HOURS
Status: DISCONTINUED | OUTPATIENT
Start: 2023-01-01 | End: 2023-01-01 | Stop reason: HOSPADM

## 2023-01-01 RX ORDER — LABETALOL HCL 20 MG/4 ML
10 SYRINGE (ML) INTRAVENOUS EVERY 4 HOURS PRN
Status: DISCONTINUED | OUTPATIENT
Start: 2023-01-01 | End: 2023-01-01 | Stop reason: HOSPADM

## 2023-01-01 RX ORDER — NITROGLYCERIN 0.4 MG/1
0.4 TABLET SUBLINGUAL EVERY 5 MIN PRN
Status: DISCONTINUED | OUTPATIENT
Start: 2023-01-01 | End: 2023-01-01 | Stop reason: HOSPADM

## 2023-01-01 RX ORDER — ONDANSETRON 2 MG/ML
4 INJECTION INTRAMUSCULAR; INTRAVENOUS EVERY 4 HOURS PRN
Status: DISCONTINUED | OUTPATIENT
Start: 2023-01-01 | End: 2023-01-01 | Stop reason: HOSPADM

## 2023-01-01 RX ORDER — IPRATROPIUM BROMIDE AND ALBUTEROL SULFATE 2.5; .5 MG/3ML; MG/3ML
SOLUTION RESPIRATORY (INHALATION)
Status: COMPLETED
Start: 2023-01-01 | End: 2023-01-01

## 2023-01-01 RX ORDER — FLUTICASONE FUROATE AND VILANTEROL 200; 25 UG/1; UG/1
1 POWDER RESPIRATORY (INHALATION) DAILY
Status: CANCELLED | OUTPATIENT
Start: 2023-01-01

## 2023-01-01 RX ORDER — LIDOCAINE HYDROCHLORIDE 20 MG/ML
INJECTION, SOLUTION EPIDURAL; INFILTRATION; INTRACAUDAL; PERINEURAL
Status: DISCONTINUED | OUTPATIENT
Start: 2023-01-01 | End: 2023-01-01

## 2023-01-01 RX ORDER — PREDNISONE 20 MG/1
40 TABLET ORAL DAILY
Status: COMPLETED | OUTPATIENT
Start: 2023-01-01 | End: 2023-01-01

## 2023-01-01 RX ORDER — FLUTICASONE PROPIONATE 50 MCG
1 SPRAY, SUSPENSION (ML) NASAL DAILY
Qty: 16 G | Refills: 0 | Status: SHIPPED | OUTPATIENT
Start: 2023-01-01 | End: 2023-11-25

## 2023-01-01 RX ORDER — RANOLAZINE 500 MG/1
500 TABLET, EXTENDED RELEASE ORAL 2 TIMES DAILY
Status: DISCONTINUED | OUTPATIENT
Start: 2023-01-01 | End: 2023-01-01 | Stop reason: HOSPADM

## 2023-01-01 RX ORDER — ALBUTEROL SULFATE 0.83 MG/ML
SOLUTION RESPIRATORY (INHALATION)
Status: COMPLETED
Start: 2023-01-01 | End: 2023-01-01

## 2023-01-01 RX ORDER — FLUTICASONE FUROATE AND VILANTEROL 200; 25 UG/1; UG/1
1 POWDER RESPIRATORY (INHALATION) DAILY
Status: DISCONTINUED | OUTPATIENT
Start: 2023-01-01 | End: 2023-01-01 | Stop reason: HOSPADM

## 2023-01-01 RX ORDER — ZIPRASIDONE MESYLATE 20 MG/ML
20 INJECTION, POWDER, LYOPHILIZED, FOR SOLUTION INTRAMUSCULAR ONCE
Status: DISCONTINUED | OUTPATIENT
Start: 2023-01-01 | End: 2023-01-01

## 2023-01-01 RX ORDER — TALC
6 POWDER (GRAM) TOPICAL NIGHTLY PRN
Status: DISCONTINUED | OUTPATIENT
Start: 2023-01-01 | End: 2023-01-01

## 2023-01-01 RX ORDER — IPRATROPIUM BROMIDE AND ALBUTEROL SULFATE 2.5; .5 MG/3ML; MG/3ML
3 SOLUTION RESPIRATORY (INHALATION) EVERY 6 HOURS PRN
Status: DISCONTINUED | OUTPATIENT
Start: 2023-01-01 | End: 2023-01-01 | Stop reason: HOSPADM

## 2023-01-01 RX ORDER — IPRATROPIUM BROMIDE AND ALBUTEROL SULFATE 2.5; .5 MG/3ML; MG/3ML
3 SOLUTION RESPIRATORY (INHALATION) EVERY 4 HOURS
Status: DISCONTINUED | OUTPATIENT
Start: 2023-01-01 | End: 2023-01-01 | Stop reason: HOSPADM

## 2023-01-01 RX ORDER — CEPHRADINE 500 MG
10000 CAPSULE ORAL DAILY
Qty: 30 CAPSULE | Refills: 2 | Status: SHIPPED | OUTPATIENT
Start: 2023-01-01 | End: 2024-01-24

## 2023-01-01 RX ORDER — MIDAZOLAM HYDROCHLORIDE 1 MG/ML
4 INJECTION INTRAMUSCULAR; INTRAVENOUS ONCE
Status: DISCONTINUED | OUTPATIENT
Start: 2023-01-01 | End: 2023-01-01

## 2023-01-01 RX ORDER — IPRATROPIUM BROMIDE AND ALBUTEROL SULFATE 2.5; .5 MG/3ML; MG/3ML
3 SOLUTION RESPIRATORY (INHALATION)
Status: DISCONTINUED | OUTPATIENT
Start: 2023-01-01 | End: 2023-01-01 | Stop reason: HOSPADM

## 2023-01-01 RX ORDER — ACETAMINOPHEN 325 MG/1
650 TABLET ORAL EVERY 4 HOURS PRN
Status: DISCONTINUED | OUTPATIENT
Start: 2023-01-01 | End: 2023-01-01 | Stop reason: HOSPADM

## 2023-01-01 RX ORDER — BUSPIRONE HYDROCHLORIDE 5 MG/1
10 TABLET ORAL 2 TIMES DAILY
Status: DISCONTINUED | OUTPATIENT
Start: 2023-01-01 | End: 2023-01-01 | Stop reason: HOSPADM

## 2023-01-01 RX ORDER — CIPROFLOXACIN 500 MG/1
500 TABLET ORAL 2 TIMES DAILY
Qty: 8 TABLET | Refills: 0 | Status: SHIPPED | OUTPATIENT
Start: 2023-01-01 | End: 2023-01-01 | Stop reason: ALTCHOICE

## 2023-01-01 RX ORDER — POTASSIUM CHLORIDE 7.45 MG/ML
10 INJECTION INTRAVENOUS
Status: COMPLETED | OUTPATIENT
Start: 2023-01-01 | End: 2023-01-01

## 2023-01-01 RX ORDER — POLYETHYLENE GLYCOL 3350 17 G/17G
17 POWDER, FOR SOLUTION ORAL 2 TIMES DAILY PRN
Status: DISCONTINUED | OUTPATIENT
Start: 2023-01-01 | End: 2023-01-01 | Stop reason: HOSPADM

## 2023-01-01 RX ORDER — CIPROFLOXACIN 2 MG/ML
400 INJECTION, SOLUTION INTRAVENOUS
Status: DISCONTINUED | OUTPATIENT
Start: 2023-01-01 | End: 2023-01-01 | Stop reason: HOSPADM

## 2023-01-01 RX ORDER — QUETIAPINE FUMARATE 25 MG/1
25 TABLET, FILM COATED ORAL NIGHTLY
Status: CANCELLED | OUTPATIENT
Start: 2023-01-01

## 2023-01-01 RX ORDER — ZOLPIDEM TARTRATE 5 MG/1
5 TABLET ORAL NIGHTLY
Status: CANCELLED | OUTPATIENT
Start: 2023-01-01

## 2023-01-01 RX ORDER — FLUTICASONE FUROATE AND VILANTEROL 100; 25 UG/1; UG/1
1 POWDER RESPIRATORY (INHALATION) DAILY
Status: DISCONTINUED | OUTPATIENT
Start: 2023-01-01 | End: 2023-01-01 | Stop reason: HOSPADM

## 2023-01-01 RX ORDER — TALC
6 POWDER (GRAM) TOPICAL NIGHTLY PRN
Status: DISCONTINUED | OUTPATIENT
Start: 2023-01-01 | End: 2023-01-01 | Stop reason: HOSPADM

## 2023-01-01 RX ORDER — ALBUTEROL SULFATE 0.83 MG/ML
SOLUTION RESPIRATORY (INHALATION)
Status: DISPENSED
Start: 2023-01-01 | End: 2023-01-01

## 2023-01-01 RX ORDER — QUETIAPINE FUMARATE 25 MG/1
25 TABLET, FILM COATED ORAL NIGHTLY
Qty: 30 TABLET | Refills: 11 | Status: ON HOLD | OUTPATIENT
Start: 2023-01-01 | End: 2023-01-01 | Stop reason: HOSPADM

## 2023-01-01 RX ORDER — FAMOTIDINE 20 MG/1
20 TABLET, FILM COATED ORAL 2 TIMES DAILY
Status: DISCONTINUED | OUTPATIENT
Start: 2023-01-01 | End: 2023-01-01

## 2023-01-01 RX ORDER — MIDAZOLAM HYDROCHLORIDE 1 MG/ML
INJECTION INTRAMUSCULAR; INTRAVENOUS
Status: COMPLETED
Start: 2023-01-01 | End: 2023-01-01

## 2023-01-01 RX ORDER — ZOLPIDEM TARTRATE 5 MG/1
5 TABLET ORAL NIGHTLY
Status: DISCONTINUED | OUTPATIENT
Start: 2023-01-01 | End: 2023-01-01

## 2023-01-01 RX ORDER — HYDROXYZINE PAMOATE 50 MG/1
50 CAPSULE ORAL NIGHTLY
Status: DISCONTINUED | OUTPATIENT
Start: 2023-01-01 | End: 2023-01-01 | Stop reason: HOSPADM

## 2023-01-01 RX ORDER — SODIUM CHLORIDE, SODIUM LACTATE, POTASSIUM CHLORIDE, CALCIUM CHLORIDE 600; 310; 30; 20 MG/100ML; MG/100ML; MG/100ML; MG/100ML
INJECTION, SOLUTION INTRAVENOUS CONTINUOUS
Status: DISCONTINUED | OUTPATIENT
Start: 2023-01-01 | End: 2023-01-01

## 2023-01-01 RX ORDER — IPRATROPIUM BROMIDE AND ALBUTEROL SULFATE 2.5; .5 MG/3ML; MG/3ML
3 SOLUTION RESPIRATORY (INHALATION)
Status: COMPLETED | OUTPATIENT
Start: 2023-01-01 | End: 2023-01-01

## 2023-01-01 RX ORDER — IPRATROPIUM BROMIDE AND ALBUTEROL SULFATE 2.5; .5 MG/3ML; MG/3ML
3 SOLUTION RESPIRATORY (INHALATION) ONCE
Status: DISCONTINUED | OUTPATIENT
Start: 2023-01-01 | End: 2023-01-01 | Stop reason: HOSPADM

## 2023-01-01 RX ORDER — TALC
6 POWDER (GRAM) TOPICAL NIGHTLY
Status: CANCELLED | OUTPATIENT
Start: 2023-01-01

## 2023-01-01 RX ORDER — GUAIFENESIN/DEXTROMETHORPHAN 100-10MG/5
5 SYRUP ORAL EVERY 4 HOURS PRN
Status: DISCONTINUED | OUTPATIENT
Start: 2023-01-01 | End: 2023-01-01 | Stop reason: HOSPADM

## 2023-01-01 RX ORDER — ROFLUMILAST 500 UG/1
500 TABLET ORAL DAILY
Qty: 30 TABLET | Refills: 0 | Status: SHIPPED | OUTPATIENT
Start: 2023-01-01 | End: 2023-11-25

## 2023-01-01 RX ORDER — MORPHINE SULFATE 4 MG/ML
2 INJECTION, SOLUTION INTRAMUSCULAR; INTRAVENOUS EVERY 4 HOURS PRN
Status: DISCONTINUED | OUTPATIENT
Start: 2023-01-01 | End: 2023-01-01 | Stop reason: HOSPADM

## 2023-01-01 RX ORDER — SERTRALINE HYDROCHLORIDE 100 MG/1
100 TABLET, FILM COATED ORAL DAILY
Qty: 30 TABLET | Refills: 3 | Status: SHIPPED | OUTPATIENT
Start: 2023-01-01

## 2023-01-01 RX ORDER — BISACODYL 10 MG
10 SUPPOSITORY, RECTAL RECTAL DAILY PRN
Status: DISCONTINUED | OUTPATIENT
Start: 2023-01-01 | End: 2023-01-01 | Stop reason: HOSPADM

## 2023-01-01 RX ORDER — AMOXICILLIN 250 MG
2 CAPSULE ORAL 2 TIMES DAILY PRN
Status: DISCONTINUED | OUTPATIENT
Start: 2023-01-01 | End: 2023-01-01 | Stop reason: HOSPADM

## 2023-01-01 RX ORDER — PROPOFOL 10 MG/ML
0-50 INJECTION, EMULSION INTRAVENOUS CONTINUOUS
Status: DISCONTINUED | OUTPATIENT
Start: 2023-01-01 | End: 2023-01-01

## 2023-01-01 RX ORDER — ZIPRASIDONE MESYLATE 20 MG/ML
10 INJECTION, POWDER, LYOPHILIZED, FOR SOLUTION INTRAMUSCULAR NIGHTLY PRN
Status: DISCONTINUED | OUTPATIENT
Start: 2023-01-01 | End: 2023-01-01

## 2023-01-01 RX ORDER — BENZONATATE 100 MG/1
200 CAPSULE ORAL 3 TIMES DAILY PRN
Status: DISCONTINUED | OUTPATIENT
Start: 2023-01-01 | End: 2023-01-01 | Stop reason: HOSPADM

## 2023-01-01 RX ORDER — HYDROXYZINE PAMOATE 50 MG/1
50 CAPSULE ORAL NIGHTLY PRN
Status: DISCONTINUED | OUTPATIENT
Start: 2023-01-01 | End: 2023-01-01

## 2023-01-01 RX ORDER — MAG HYDROX/ALUMINUM HYD/SIMETH 200-200-20
30 SUSPENSION, ORAL (FINAL DOSE FORM) ORAL 4 TIMES DAILY PRN
Status: DISCONTINUED | OUTPATIENT
Start: 2023-01-01 | End: 2023-01-01 | Stop reason: HOSPADM

## 2023-01-01 RX ORDER — GUAIFENESIN 600 MG/1
600 TABLET, EXTENDED RELEASE ORAL 2 TIMES DAILY
Status: DISCONTINUED | OUTPATIENT
Start: 2023-01-01 | End: 2023-01-01 | Stop reason: HOSPADM

## 2023-01-01 RX ORDER — BUSPIRONE HYDROCHLORIDE 5 MG/1
10 TABLET ORAL NIGHTLY
Status: DISCONTINUED | OUTPATIENT
Start: 2023-01-01 | End: 2023-01-01

## 2023-01-01 RX ORDER — ACETAMINOPHEN 500 MG
1000 TABLET ORAL EVERY 6 HOURS PRN
Status: DISCONTINUED | OUTPATIENT
Start: 2023-01-01 | End: 2023-01-01 | Stop reason: HOSPADM

## 2023-01-01 RX ORDER — GUAIFENESIN 600 MG/1
1200 TABLET, EXTENDED RELEASE ORAL DAILY
Qty: 60 TABLET | Refills: 0 | Status: SHIPPED | OUTPATIENT
Start: 2023-01-01 | End: 2023-11-25

## 2023-01-01 RX ORDER — BENZONATATE 100 MG/1
100 CAPSULE ORAL 3 TIMES DAILY PRN
Status: DISCONTINUED | OUTPATIENT
Start: 2023-01-01 | End: 2023-01-01 | Stop reason: HOSPADM

## 2023-01-01 RX ORDER — ACETAMINOPHEN 650 MG/20.3ML
650 LIQUID ORAL EVERY 4 HOURS PRN
Status: DISCONTINUED | OUTPATIENT
Start: 2023-01-01 | End: 2023-01-01 | Stop reason: HOSPADM

## 2023-01-01 RX ORDER — CEFAZOLIN SODIUM 1 G/3ML
INJECTION, POWDER, FOR SOLUTION INTRAMUSCULAR; INTRAVENOUS
Status: DISCONTINUED | OUTPATIENT
Start: 2023-01-01 | End: 2023-01-01

## 2023-01-01 RX ADMIN — GUAIFENESIN 200 MG: 200 SOLUTION ORAL at 11:10

## 2023-01-01 RX ADMIN — IPRATROPIUM BROMIDE AND ALBUTEROL SULFATE 3 ML: 2.5; .5 SOLUTION RESPIRATORY (INHALATION) at 01:10

## 2023-01-01 RX ADMIN — FAMOTIDINE 20 MG: 20 TABLET, FILM COATED ORAL at 08:10

## 2023-01-01 RX ADMIN — MONTELUKAST 10 MG: 10 TABLET, FILM COATED ORAL at 09:01

## 2023-01-01 RX ADMIN — MUPIROCIN: 20 OINTMENT TOPICAL at 08:10

## 2023-01-01 RX ADMIN — IPRATROPIUM BROMIDE AND ALBUTEROL SULFATE 3 ML: 2.5; .5 SOLUTION RESPIRATORY (INHALATION) at 08:10

## 2023-01-01 RX ADMIN — GUAIFENESIN 200 MG: 200 SOLUTION ORAL at 12:10

## 2023-01-01 RX ADMIN — IPRATROPIUM BROMIDE AND ALBUTEROL SULFATE 3 ML: 2.5; .5 SOLUTION RESPIRATORY (INHALATION) at 02:10

## 2023-01-01 RX ADMIN — ENOXAPARIN SODIUM 40 MG: 40 INJECTION SUBCUTANEOUS at 05:10

## 2023-01-01 RX ADMIN — ACETAMINOPHEN 650 MG: 650 SOLUTION ORAL at 04:10

## 2023-01-01 RX ADMIN — SERTRALINE HYDROCHLORIDE 100 MG: 50 TABLET ORAL at 10:10

## 2023-01-01 RX ADMIN — METHYLPREDNISOLONE SODIUM SUCCINATE 40 MG: 40 INJECTION, POWDER, FOR SOLUTION INTRAMUSCULAR; INTRAVENOUS at 02:01

## 2023-01-01 RX ADMIN — PIPERACILLIN AND TAZOBACTAM 4.5 G: 4; .5 INJECTION, POWDER, LYOPHILIZED, FOR SOLUTION INTRAVENOUS; PARENTERAL at 08:10

## 2023-01-01 RX ADMIN — LEUCINE, PHENYLALANINE, LYSINE, METHIONINE, ISOLEUCINE, VALINE, HISTIDINE, THREONINE, TRYPTOPHAN, ALANINE, GLYCINE, ARGININE, PROLINE, SERINE, TYROSINE, DEXTROSE: 311; 238; 247; 170; 255; 247; 204; 179; 77; 880; 438; 489; 289; 213; 17; 5 INJECTION INTRAVENOUS at 05:01

## 2023-01-01 RX ADMIN — SERTRALINE 100 MG: 50 TABLET, FILM COATED ORAL at 08:01

## 2023-01-01 RX ADMIN — GUAIFENESIN 200 MG: 200 SOLUTION ORAL at 01:10

## 2023-01-01 RX ADMIN — PROPOFOL 5 MCG/KG/MIN: 10 INJECTION, EMULSION INTRAVENOUS at 07:09

## 2023-01-01 RX ADMIN — GUAIFENESIN 600 MG: 600 TABLET, EXTENDED RELEASE ORAL at 08:01

## 2023-01-01 RX ADMIN — IPRATROPIUM BROMIDE AND ALBUTEROL SULFATE 3 ML: 2.5; .5 SOLUTION RESPIRATORY (INHALATION) at 07:01

## 2023-01-01 RX ADMIN — GUAIFENESIN 200 MG: 200 SOLUTION ORAL at 05:10

## 2023-01-01 RX ADMIN — SODIUM CHLORIDE, PRESERVATIVE FREE 10 ML: 5 INJECTION INTRAVENOUS at 12:01

## 2023-01-01 RX ADMIN — RANOLAZINE 500 MG: 500 TABLET, EXTENDED RELEASE ORAL at 09:01

## 2023-01-01 RX ADMIN — IPRATROPIUM BROMIDE AND ALBUTEROL SULFATE 3 ML: 2.5; .5 SOLUTION RESPIRATORY (INHALATION) at 01:09

## 2023-01-01 RX ADMIN — IPRATROPIUM BROMIDE AND ALBUTEROL SULFATE 3 ML: .5; 3 SOLUTION RESPIRATORY (INHALATION) at 07:10

## 2023-01-01 RX ADMIN — GUAIFENESIN 200 MG: 200 SOLUTION ORAL at 04:10

## 2023-01-01 RX ADMIN — PIPERACILLIN AND TAZOBACTAM 4.5 G: 4; .5 INJECTION, POWDER, LYOPHILIZED, FOR SOLUTION INTRAVENOUS; PARENTERAL at 12:09

## 2023-01-01 RX ADMIN — SODIUM CHLORIDE, PRESERVATIVE FREE 10 ML: 5 INJECTION INTRAVENOUS at 01:01

## 2023-01-01 RX ADMIN — Medication 6 MG: at 09:10

## 2023-01-01 RX ADMIN — GUAIFENESIN 200 MG: 200 SOLUTION ORAL at 03:10

## 2023-01-01 RX ADMIN — CEFTRIAXONE 1 G: 1 INJECTION, POWDER, FOR SOLUTION INTRAMUSCULAR; INTRAVENOUS at 09:01

## 2023-01-01 RX ADMIN — GUAIFENESIN 200 MG: 200 SOLUTION ORAL at 06:10

## 2023-01-01 RX ADMIN — UMECLIDINIUM 62.5 MCG: 62.5 AEROSOL, POWDER ORAL at 09:01

## 2023-01-01 RX ADMIN — ASPIRIN 81 MG: 81 TABLET, COATED ORAL at 08:01

## 2023-01-01 RX ADMIN — IPRATROPIUM BROMIDE AND ALBUTEROL SULFATE 3 ML: 2.5; .5 SOLUTION RESPIRATORY (INHALATION) at 05:01

## 2023-01-01 RX ADMIN — MUPIROCIN: 20 OINTMENT TOPICAL at 09:09

## 2023-01-01 RX ADMIN — RANOLAZINE 500 MG: 500 TABLET, EXTENDED RELEASE ORAL at 02:01

## 2023-01-01 RX ADMIN — ENOXAPARIN SODIUM 40 MG: 40 INJECTION SUBCUTANEOUS at 05:01

## 2023-01-01 RX ADMIN — METHYLPREDNISOLONE SODIUM SUCCINATE 40 MG: 40 INJECTION, POWDER, FOR SOLUTION INTRAMUSCULAR; INTRAVENOUS at 08:01

## 2023-01-01 RX ADMIN — IPRATROPIUM BROMIDE AND ALBUTEROL SULFATE 3 ML: .5; 3 SOLUTION RESPIRATORY (INHALATION) at 02:10

## 2023-01-01 RX ADMIN — ACETAMINOPHEN 650 MG: 160 SOLUTION ORAL at 07:10

## 2023-01-01 RX ADMIN — IPRATROPIUM BROMIDE AND ALBUTEROL SULFATE 3 ML: 2.5; .5 SOLUTION RESPIRATORY (INHALATION) at 03:01

## 2023-01-01 RX ADMIN — IPRATROPIUM BROMIDE AND ALBUTEROL SULFATE 3 ML: .5; 3 SOLUTION RESPIRATORY (INHALATION) at 12:10

## 2023-01-01 RX ADMIN — BUSPIRONE HYDROCHLORIDE 10 MG: 5 TABLET ORAL at 08:10

## 2023-01-01 RX ADMIN — BUSPIRONE HYDROCHLORIDE 10 MG: 5 TABLET ORAL at 09:10

## 2023-01-01 RX ADMIN — POLYETHYLENE GLYCOL 3350 17 G: 17 POWDER, FOR SOLUTION ORAL at 07:10

## 2023-01-01 RX ADMIN — IPRATROPIUM BROMIDE AND ALBUTEROL SULFATE 3 ML: .5; 3 SOLUTION RESPIRATORY (INHALATION) at 04:10

## 2023-01-01 RX ADMIN — FERROUS SULFATE TAB 325 MG (65 MG ELEMENTAL FE) 1 EACH: 325 (65 FE) TAB at 07:10

## 2023-01-01 RX ADMIN — ROFLUMILAST 250 MCG: 250 TABLET ORAL at 09:01

## 2023-01-01 RX ADMIN — FLUTICASONE FUROATE AND VILANTEROL TRIFENATATE 1 PUFF: 200; 25 POWDER RESPIRATORY (INHALATION) at 09:10

## 2023-01-01 RX ADMIN — ACETAMINOPHEN 650 MG: 650 SOLUTION ORAL at 10:10

## 2023-01-01 RX ADMIN — SODIUM CHLORIDE, POTASSIUM CHLORIDE, SODIUM LACTATE AND CALCIUM CHLORIDE 2367 ML: 600; 310; 30; 20 INJECTION, SOLUTION INTRAVENOUS at 08:09

## 2023-01-01 RX ADMIN — CIPROFLOXACIN 400 MG: 2 INJECTION, SOLUTION INTRAVENOUS at 01:01

## 2023-01-01 RX ADMIN — PIPERACILLIN AND TAZOBACTAM 4.5 G: 4; .5 INJECTION, POWDER, LYOPHILIZED, FOR SOLUTION INTRAVENOUS; PARENTERAL at 05:10

## 2023-01-01 RX ADMIN — IPRATROPIUM BROMIDE AND ALBUTEROL SULFATE 3 ML: 2.5; .5 SOLUTION RESPIRATORY (INHALATION) at 11:01

## 2023-01-01 RX ADMIN — ENOXAPARIN SODIUM 40 MG: 40 INJECTION SUBCUTANEOUS at 04:10

## 2023-01-01 RX ADMIN — IPRATROPIUM BROMIDE AND ALBUTEROL SULFATE 3 ML: 2.5; .5 SOLUTION RESPIRATORY (INHALATION) at 07:10

## 2023-01-01 RX ADMIN — ACETAMINOPHEN 650 MG: 160 SOLUTION ORAL at 04:10

## 2023-01-01 RX ADMIN — IPRATROPIUM BROMIDE 1 MG: 0.5 SOLUTION RESPIRATORY (INHALATION) at 06:09

## 2023-01-01 RX ADMIN — SODIUM CHLORIDE, POTASSIUM CHLORIDE, SODIUM LACTATE AND CALCIUM CHLORIDE: 600; 310; 30; 20 INJECTION, SOLUTION INTRAVENOUS at 01:09

## 2023-01-01 RX ADMIN — SODIUM CHLORIDE, POTASSIUM CHLORIDE, SODIUM LACTATE AND CALCIUM CHLORIDE: 600; 310; 30; 20 INJECTION, SOLUTION INTRAVENOUS at 08:10

## 2023-01-01 RX ADMIN — METHYLPREDNISOLONE SODIUM SUCCINATE 40 MG: 40 INJECTION, POWDER, FOR SOLUTION INTRAMUSCULAR; INTRAVENOUS at 05:01

## 2023-01-01 RX ADMIN — FLUTICASONE FUROATE AND VILANTEROL TRIFENATATE 1 PUFF: 200; 25 POWDER RESPIRATORY (INHALATION) at 07:10

## 2023-01-01 RX ADMIN — METHYLPREDNISOLONE SODIUM SUCCINATE 60 MG: 40 INJECTION, POWDER, FOR SOLUTION INTRAMUSCULAR; INTRAVENOUS at 08:09

## 2023-01-01 RX ADMIN — TIOTROPIUM BROMIDE INHALATION SPRAY 2 PUFF: 3.12 SPRAY, METERED RESPIRATORY (INHALATION) at 12:10

## 2023-01-01 RX ADMIN — SODIUM CHLORIDE, PRESERVATIVE FREE 10 ML: 5 INJECTION INTRAVENOUS at 06:01

## 2023-01-01 RX ADMIN — Medication 6 MG: at 08:10

## 2023-01-01 RX ADMIN — FLUTICASONE FUROATE AND VILANTEROL TRIFENATATE 1 PUFF: 100; 25 POWDER RESPIRATORY (INHALATION) at 08:01

## 2023-01-01 RX ADMIN — PIPERACILLIN AND TAZOBACTAM 4.5 G: 4; .5 INJECTION, POWDER, LYOPHILIZED, FOR SOLUTION INTRAVENOUS; PARENTERAL at 02:10

## 2023-01-01 RX ADMIN — METHYLPREDNISOLONE SODIUM SUCCINATE 40 MG: 40 INJECTION, POWDER, FOR SOLUTION INTRAMUSCULAR; INTRAVENOUS at 09:01

## 2023-01-01 RX ADMIN — ACETAMINOPHEN 650 MG: 650 SOLUTION ORAL at 11:10

## 2023-01-01 RX ADMIN — SODIUM CHLORIDE 1000 ML: 9 INJECTION, SOLUTION INTRAVENOUS at 06:09

## 2023-01-01 RX ADMIN — POTASSIUM CHLORIDE 10 MEQ: 7.46 INJECTION, SOLUTION INTRAVENOUS at 07:09

## 2023-01-01 RX ADMIN — ALBUTEROL SULFATE 15 MG: 2.5 SOLUTION RESPIRATORY (INHALATION) at 06:09

## 2023-01-01 RX ADMIN — DEXMEDETOMIDINE HYDROCHLORIDE 0.2 MCG/KG/HR: 400 INJECTION INTRAVENOUS at 09:10

## 2023-01-01 RX ADMIN — FERROUS SULFATE TAB 325 MG (65 MG ELEMENTAL FE) 1 EACH: 325 (65 FE) TAB at 09:10

## 2023-01-01 RX ADMIN — PIPERACILLIN AND TAZOBACTAM 4.5 G: 4; .5 INJECTION, POWDER, LYOPHILIZED, FOR SOLUTION INTRAVENOUS; PARENTERAL at 04:10

## 2023-01-01 RX ADMIN — PIPERACILLIN AND TAZOBACTAM 4.5 G: 4; .5 INJECTION, POWDER, LYOPHILIZED, FOR SOLUTION INTRAVENOUS; PARENTERAL at 11:10

## 2023-01-01 RX ADMIN — METHYLPREDNISOLONE SODIUM SUCCINATE 40 MG: 40 INJECTION, POWDER, FOR SOLUTION INTRAMUSCULAR; INTRAVENOUS at 01:01

## 2023-01-01 RX ADMIN — METHYLPREDNISOLONE SODIUM SUCCINATE 40 MG: 40 INJECTION, POWDER, FOR SOLUTION INTRAMUSCULAR; INTRAVENOUS at 06:01

## 2023-01-01 RX ADMIN — GUAIFENESIN 600 MG: 600 TABLET, EXTENDED RELEASE ORAL at 09:01

## 2023-01-01 RX ADMIN — GUAIFENESIN 200 MG: 200 SOLUTION ORAL at 12:09

## 2023-01-01 RX ADMIN — FLUTICASONE FUROATE AND VILANTEROL TRIFENATATE 1 PUFF: 200; 25 POWDER RESPIRATORY (INHALATION) at 08:10

## 2023-01-01 RX ADMIN — IPRATROPIUM BROMIDE AND ALBUTEROL SULFATE 3 ML: 2.5; .5 SOLUTION RESPIRATORY (INHALATION) at 04:01

## 2023-01-01 RX ADMIN — PREDNISONE 40 MG: 20 TABLET ORAL at 08:10

## 2023-01-01 RX ADMIN — IPRATROPIUM BROMIDE AND ALBUTEROL SULFATE 3 ML: .5; 3 SOLUTION RESPIRATORY (INHALATION) at 03:10

## 2023-01-01 RX ADMIN — IPRATROPIUM BROMIDE AND ALBUTEROL SULFATE 3 ML: 2.5; .5 SOLUTION RESPIRATORY (INHALATION) at 08:01

## 2023-01-01 RX ADMIN — PIPERACILLIN AND TAZOBACTAM 4.5 G: 4; .5 INJECTION, POWDER, LYOPHILIZED, FOR SOLUTION INTRAVENOUS; PARENTERAL at 04:09

## 2023-01-01 RX ADMIN — METHYLPREDNISOLONE SODIUM SUCCINATE 40 MG: 40 INJECTION, POWDER, FOR SOLUTION INTRAMUSCULAR; INTRAVENOUS at 12:01

## 2023-01-01 RX ADMIN — IPRATROPIUM BROMIDE AND ALBUTEROL SULFATE 3 ML: 2.5; .5 SOLUTION RESPIRATORY (INHALATION) at 12:10

## 2023-01-01 RX ADMIN — PREDNISONE 40 MG: 20 TABLET ORAL at 11:09

## 2023-01-01 RX ADMIN — SODIUM CHLORIDE, PRESERVATIVE FREE 10 ML: 5 INJECTION INTRAVENOUS at 02:01

## 2023-01-01 RX ADMIN — IPRATROPIUM BROMIDE AND ALBUTEROL SULFATE 3 ML: .5; 3 SOLUTION RESPIRATORY (INHALATION) at 11:10

## 2023-01-01 RX ADMIN — ACETAMINOPHEN 650 MG: 650 SOLUTION ORAL at 08:10

## 2023-01-01 RX ADMIN — LEUCINE, PHENYLALANINE, LYSINE, METHIONINE, ISOLEUCINE, VALINE, HISTIDINE, THREONINE, TRYPTOPHAN, ALANINE, GLYCINE, ARGININE, PROLINE, SERINE, TYROSINE, SODIUM ACETATE, DIBASIC POTASSIUM PHOSPHATE, MAGNESIUM CHLORIDE, SODIUM CHLORIDE, CALCIUM CHLORIDE, DEXTROSE
311; 238; 247; 170; 255; 247; 204; 179; 77; 880; 438; 489; 289; 213; 17; 297; 261; 51; 77; 33; 5 INJECTION INTRAVENOUS at 03:01

## 2023-01-01 RX ADMIN — IPRATROPIUM BROMIDE AND ALBUTEROL SULFATE 3 ML: 2.5; .5 SOLUTION RESPIRATORY (INHALATION) at 04:10

## 2023-01-01 RX ADMIN — FERROUS SULFATE TAB 325 MG (65 MG ELEMENTAL FE) 1 EACH: 325 (65 FE) TAB at 08:10

## 2023-01-01 RX ADMIN — SERTRALINE HYDROCHLORIDE 100 MG: 50 TABLET ORAL at 07:10

## 2023-01-01 RX ADMIN — PIPERACILLIN AND TAZOBACTAM 4.5 G: 4; .5 INJECTION, POWDER, LYOPHILIZED, FOR SOLUTION INTRAVENOUS; PARENTERAL at 08:09

## 2023-01-01 RX ADMIN — SODIUM CHLORIDE: 9 INJECTION, SOLUTION INTRAVENOUS at 04:09

## 2023-01-01 RX ADMIN — PIPERACILLIN AND TAZOBACTAM 4.5 G: 4; .5 INJECTION, POWDER, LYOPHILIZED, FOR SOLUTION INTRAVENOUS; PARENTERAL at 12:10

## 2023-01-01 RX ADMIN — ENOXAPARIN SODIUM 40 MG: 40 INJECTION SUBCUTANEOUS at 06:10

## 2023-01-01 RX ADMIN — PROPOFOL 40 MCG/KG/MIN: 10 INJECTION, EMULSION INTRAVENOUS at 04:09

## 2023-01-01 RX ADMIN — LIDOCAINE HYDROCHLORIDE 60 MG: 20 INJECTION, SOLUTION INTRAVENOUS at 12:10

## 2023-01-01 RX ADMIN — AZITHROMYCIN MONOHYDRATE 250 MG: 250 TABLET ORAL at 08:01

## 2023-01-01 RX ADMIN — IPRATROPIUM BROMIDE AND ALBUTEROL SULFATE 3 ML: .5; 3 SOLUTION RESPIRATORY (INHALATION) at 09:10

## 2023-01-01 RX ADMIN — PANTOPRAZOLE SODIUM 40 MG: 40 INJECTION, POWDER, LYOPHILIZED, FOR SOLUTION INTRAVENOUS at 08:10

## 2023-01-01 RX ADMIN — IPRATROPIUM BROMIDE AND ALBUTEROL SULFATE 3 ML: .5; 3 SOLUTION RESPIRATORY (INHALATION) at 09:01

## 2023-01-01 RX ADMIN — CIPROFLOXACIN 400 MG: 2 INJECTION, SOLUTION INTRAVENOUS at 04:01

## 2023-01-01 RX ADMIN — CEFTRIAXONE SODIUM 2 G: 2 INJECTION, POWDER, FOR SOLUTION INTRAMUSCULAR; INTRAVENOUS at 12:10

## 2023-01-01 RX ADMIN — ALBUTEROL SULFATE: 2.5 SOLUTION RESPIRATORY (INHALATION) at 04:01

## 2023-01-01 RX ADMIN — POTASSIUM CHLORIDE 10 MEQ: 7.46 INJECTION, SOLUTION INTRAVENOUS at 06:09

## 2023-01-01 RX ADMIN — CIPROFLOXACIN 400 MG: 2 INJECTION, SOLUTION INTRAVENOUS at 09:01

## 2023-01-01 RX ADMIN — TIOTROPIUM BROMIDE INHALATION SPRAY 2 PUFF: 3.12 SPRAY, METERED RESPIRATORY (INHALATION) at 09:10

## 2023-01-01 RX ADMIN — CEFTRIAXONE SODIUM 2 G: 2 INJECTION, POWDER, FOR SOLUTION INTRAMUSCULAR; INTRAVENOUS at 01:10

## 2023-01-01 RX ADMIN — CEFTRIAXONE 1 G: 1 INJECTION, POWDER, FOR SOLUTION INTRAMUSCULAR; INTRAVENOUS at 08:01

## 2023-01-01 RX ADMIN — IPRATROPIUM BROMIDE AND ALBUTEROL SULFATE 3 ML: 2.5; .5 SOLUTION RESPIRATORY (INHALATION) at 12:01

## 2023-01-01 RX ADMIN — ZIPRASIDONE MESYLATE 10 MG: 20 INJECTION, POWDER, LYOPHILIZED, FOR SOLUTION INTRAMUSCULAR at 01:10

## 2023-01-01 RX ADMIN — BUSPIRONE HYDROCHLORIDE 10 MG: 5 TABLET ORAL at 10:10

## 2023-01-01 RX ADMIN — TIOTROPIUM BROMIDE INHALATION SPRAY 2 PUFF: 3.12 SPRAY, METERED RESPIRATORY (INHALATION) at 08:10

## 2023-01-01 RX ADMIN — IPRATROPIUM BROMIDE AND ALBUTEROL SULFATE 3 ML: 2.5; .5 SOLUTION RESPIRATORY (INHALATION) at 07:09

## 2023-01-01 RX ADMIN — ACETAMINOPHEN 650 MG: 160 SOLUTION ORAL at 08:10

## 2023-01-01 RX ADMIN — SERTRALINE HYDROCHLORIDE 100 MG: 50 TABLET ORAL at 08:10

## 2023-01-01 RX ADMIN — RANOLAZINE 500 MG: 500 TABLET, EXTENDED RELEASE ORAL at 08:01

## 2023-01-01 RX ADMIN — PROPOFOL 50 MG: 10 INJECTION, EMULSION INTRAVENOUS at 12:10

## 2023-01-01 RX ADMIN — ATORVASTATIN CALCIUM 10 MG: 10 TABLET, FILM COATED ORAL at 08:01

## 2023-01-01 RX ADMIN — SERTRALINE HYDROCHLORIDE 100 MG: 50 TABLET ORAL at 09:10

## 2023-01-01 RX ADMIN — SODIUM CHLORIDE, PRESERVATIVE FREE 10 ML: 5 INJECTION INTRAVENOUS at 05:01

## 2023-01-01 RX ADMIN — POLYETHYLENE GLYCOL 3350 17 G: 17 POWDER, FOR SOLUTION ORAL at 09:10

## 2023-01-01 RX ADMIN — ASPIRIN 81 MG: 81 TABLET, COATED ORAL at 09:01

## 2023-01-01 RX ADMIN — IPRATROPIUM BROMIDE AND ALBUTEROL SULFATE 3 ML: .5; 3 SOLUTION RESPIRATORY (INHALATION) at 08:10

## 2023-01-01 RX ADMIN — ENOXAPARIN SODIUM 40 MG: 40 INJECTION SUBCUTANEOUS at 04:09

## 2023-01-01 RX ADMIN — ACETAMINOPHEN 650 MG: 160 SOLUTION ORAL at 09:10

## 2023-01-01 RX ADMIN — ETOMIDATE 20 MG: 2 INJECTION INTRAVENOUS at 09:09

## 2023-01-01 RX ADMIN — PERFLUTREN 3 ML: 6.52 INJECTION, SUSPENSION INTRAVENOUS at 09:09

## 2023-01-01 RX ADMIN — AZITHROMYCIN MONOHYDRATE 250 MG: 250 TABLET ORAL at 02:01

## 2023-01-01 RX ADMIN — LEUCINE, PHENYLALANINE, LYSINE, METHIONINE, ISOLEUCINE, VALINE, HISTIDINE, THREONINE, TRYPTOPHAN, ALANINE, GLYCINE, ARGININE, PROLINE, SERINE, TYROSINE, SODIUM ACETATE, DIBASIC POTASSIUM PHOSPHATE, MAGNESIUM CHLORIDE, SODIUM CHLORIDE, CALCIUM CHLORIDE, DEXTROSE
311; 238; 247; 170; 255; 247; 204; 179; 77; 880; 438; 489; 289; 213; 17; 297; 261; 51; 77; 33; 5 INJECTION INTRAVENOUS at 05:01

## 2023-01-01 RX ADMIN — GUAIFENESIN 600 MG: 600 TABLET, EXTENDED RELEASE ORAL at 12:01

## 2023-01-01 RX ADMIN — IPRATROPIUM BROMIDE AND ALBUTEROL SULFATE 3 ML: 2.5; .5 SOLUTION RESPIRATORY (INHALATION) at 12:09

## 2023-01-01 RX ADMIN — SODIUM CHLORIDE, PRESERVATIVE FREE 10 ML: 5 INJECTION INTRAVENOUS at 11:01

## 2023-01-01 RX ADMIN — MUPIROCIN: 20 OINTMENT TOPICAL at 11:09

## 2023-01-01 RX ADMIN — GUAIFENESIN 600 MG: 600 TABLET, EXTENDED RELEASE ORAL at 02:01

## 2023-01-01 RX ADMIN — ZIPRASIDONE MESYLATE 20 MG: 20 INJECTION, POWDER, LYOPHILIZED, FOR SOLUTION INTRAMUSCULAR at 11:10

## 2023-01-01 RX ADMIN — METHYLPREDNISOLONE SODIUM SUCCINATE 125 MG: 125 INJECTION, POWDER, FOR SOLUTION INTRAMUSCULAR; INTRAVENOUS at 06:09

## 2023-01-01 RX ADMIN — ATORVASTATIN CALCIUM 10 MG: 10 TABLET, FILM COATED ORAL at 02:01

## 2023-01-01 RX ADMIN — PROPOFOL 40 MCG/KG/MIN: 10 INJECTION, EMULSION INTRAVENOUS at 12:10

## 2023-01-01 RX ADMIN — CIPROFLOXACIN 400 MG: 2 INJECTION, SOLUTION INTRAVENOUS at 10:01

## 2023-01-01 RX ADMIN — MIDAZOLAM HYDROCHLORIDE 4 MG: 1 INJECTION, SOLUTION INTRAMUSCULAR; INTRAVENOUS at 08:09

## 2023-01-01 RX ADMIN — POTASSIUM CHLORIDE 10 MEQ: 7.46 INJECTION, SOLUTION INTRAVENOUS at 04:09

## 2023-01-01 RX ADMIN — SERTRALINE 100 MG: 50 TABLET, FILM COATED ORAL at 09:01

## 2023-01-01 RX ADMIN — PROPOFOL 40 MCG/KG/MIN: 10 INJECTION, EMULSION INTRAVENOUS at 08:09

## 2023-01-01 RX ADMIN — ASPIRIN 81 MG: 81 TABLET, COATED ORAL at 02:01

## 2023-01-01 RX ADMIN — LEUCINE, PHENYLALANINE, LYSINE, METHIONINE, ISOLEUCINE, VALINE, HISTIDINE, THREONINE, TRYPTOPHAN, ALANINE, GLYCINE, ARGININE, PROLINE, SERINE, TYROSINE, DEXTROSE: 311; 238; 247; 170; 255; 247; 204; 179; 77; 880; 438; 489; 289; 213; 17; 5 INJECTION INTRAVENOUS at 06:01

## 2023-01-01 RX ADMIN — HALOPERIDOL LACTATE 5 MG: 5 INJECTION, SOLUTION INTRAMUSCULAR at 03:10

## 2023-01-01 RX ADMIN — ACETAMINOPHEN 650 MG: 160 SOLUTION ORAL at 05:10

## 2023-01-01 RX ADMIN — POLYETHYLENE GLYCOL 3350 17 G: 17 POWDER, FOR SOLUTION ORAL at 08:10

## 2023-01-01 RX ADMIN — AZITHROMYCIN MONOHYDRATE 500 MG: 500 INJECTION, POWDER, LYOPHILIZED, FOR SOLUTION INTRAVENOUS at 10:01

## 2023-01-01 RX ADMIN — ACETAMINOPHEN 650 MG: 160 SOLUTION ORAL at 10:10

## 2023-01-01 RX ADMIN — ACETAMINOPHEN 650 MG: 160 SOLUTION ORAL at 03:10

## 2023-01-01 RX ADMIN — SODIUM CHLORIDE: 9 INJECTION, SOLUTION INTRAVENOUS at 08:10

## 2023-01-01 RX ADMIN — PROPOFOL 40 MCG/KG/MIN: 10 INJECTION, EMULSION INTRAVENOUS at 12:09

## 2023-01-01 RX ADMIN — SODIUM CHLORIDE 2376 ML: 9 INJECTION, SOLUTION INTRAVENOUS at 07:09

## 2023-01-01 RX ADMIN — CEFAZOLIN 1 G: 330 INJECTION, POWDER, FOR SOLUTION INTRAMUSCULAR; INTRAVENOUS at 01:10

## 2023-01-01 RX ADMIN — IPRATROPIUM BROMIDE AND ALBUTEROL SULFATE 3 ML: 2.5; .5 SOLUTION RESPIRATORY (INHALATION) at 02:09

## 2023-01-01 RX ADMIN — GUAIFENESIN 200 MG: 200 SOLUTION ORAL at 05:09

## 2023-01-01 RX ADMIN — ACETAMINOPHEN 650 MG: 650 SOLUTION ORAL at 05:10

## 2023-01-01 RX ADMIN — ACETAMINOPHEN 650 MG: 650 SOLUTION ORAL at 09:10

## 2023-01-01 RX ADMIN — ENOXAPARIN SODIUM 40 MG: 40 INJECTION SUBCUTANEOUS at 04:01

## 2023-01-01 RX ADMIN — PROPOFOL 40 MCG/KG/MIN: 10 INJECTION, EMULSION INTRAVENOUS at 01:09

## 2023-01-01 RX ADMIN — BARIUM SULFATE 10 ML: 0.81 POWDER, FOR SUSPENSION ORAL at 02:10

## 2023-01-01 RX ADMIN — PROPOFOL 40 MCG/KG/MIN: 10 INJECTION, EMULSION INTRAVENOUS at 11:09

## 2023-01-01 RX ADMIN — ROFLUMILAST 250 MCG: 250 TABLET ORAL at 02:01

## 2023-01-01 RX ADMIN — SODIUM CHLORIDE, POTASSIUM CHLORIDE, SODIUM LACTATE AND CALCIUM CHLORIDE: 600; 310; 30; 20 INJECTION, SOLUTION INTRAVENOUS at 10:09

## 2023-01-01 RX ADMIN — ETOMIDATE 20 MG: 2 INJECTION INTRAVENOUS at 07:09

## 2023-01-01 RX ADMIN — PROPOFOL 35 MCG/KG/MIN: 10 INJECTION, EMULSION INTRAVENOUS at 07:09

## 2023-01-01 RX ADMIN — LEUCINE, PHENYLALANINE, LYSINE, METHIONINE, ISOLEUCINE, VALINE, HISTIDINE, THREONINE, TRYPTOPHAN, ALANINE, GLYCINE, ARGININE, PROLINE, SERINE, TYROSINE, DEXTROSE: 311; 238; 247; 170; 255; 247; 204; 179; 77; 880; 438; 489; 289; 213; 17; 5 INJECTION INTRAVENOUS at 02:01

## 2023-01-01 RX ADMIN — AZITHROMYCIN MONOHYDRATE 500 MG: 500 INJECTION, POWDER, LYOPHILIZED, FOR SOLUTION INTRAVENOUS at 08:09

## 2023-01-01 RX ADMIN — IPRATROPIUM BROMIDE AND ALBUTEROL SULFATE 3 ML: 2.5; .5 SOLUTION RESPIRATORY (INHALATION) at 08:09

## 2023-01-01 RX ADMIN — CIPROFLOXACIN 400 MG: 2 INJECTION, SOLUTION INTRAVENOUS at 12:01

## 2023-01-01 RX ADMIN — POLYETHYLENE GLYCOL 3350 17 G: 17 POWDER, FOR SOLUTION ORAL at 10:10

## 2023-01-01 RX ADMIN — MAGNESIUM SULFATE HEPTAHYDRATE 2 G: 40 INJECTION, SOLUTION INTRAVENOUS at 06:09

## 2023-01-01 RX ADMIN — FERROUS SULFATE TAB 325 MG (65 MG ELEMENTAL FE) 1 EACH: 325 (65 FE) TAB at 10:10

## 2023-01-01 RX ADMIN — SERTRALINE 100 MG: 50 TABLET, FILM COATED ORAL at 02:01

## 2023-01-01 RX ADMIN — TIOTROPIUM BROMIDE INHALATION SPRAY 2 PUFF: 3.12 SPRAY, METERED RESPIRATORY (INHALATION) at 11:10

## 2023-01-01 RX ADMIN — ACETAMINOPHEN 650 MG: 650 SOLUTION ORAL at 06:10

## 2023-01-01 RX ADMIN — PIPERACILLIN AND TAZOBACTAM 4.5 G: 4; .5 INJECTION, POWDER, FOR SOLUTION INTRAVENOUS at 08:09

## 2023-01-01 RX ADMIN — BISACODYL 10 MG: 10 SUPPOSITORY RECTAL at 08:10

## 2023-01-01 RX ADMIN — IPRATROPIUM BROMIDE AND ALBUTEROL SULFATE 3 ML: 2.5; .5 SOLUTION RESPIRATORY (INHALATION) at 02:01

## 2023-01-01 RX ADMIN — FLUTICASONE FUROATE AND VILANTEROL TRIFENATATE 1 PUFF: 200; 25 POWDER RESPIRATORY (INHALATION) at 12:10

## 2023-01-01 RX ADMIN — BARIUM SULFATE 10 ML: 0.81 POWDER, FOR SUSPENSION ORAL at 10:01

## 2023-01-01 RX ADMIN — PANTOPRAZOLE SODIUM 40 MG: 40 INJECTION, POWDER, LYOPHILIZED, FOR SOLUTION INTRAVENOUS at 11:09

## 2023-01-01 RX ADMIN — FERROUS SULFATE TAB 325 MG (65 MG ELEMENTAL FE) 1 EACH: 325 (65 FE) TAB at 12:10

## 2023-01-01 RX ADMIN — FLUTICASONE FUROATE AND VILANTEROL TRIFENATATE 1 PUFF: 200; 25 POWDER RESPIRATORY (INHALATION) at 11:10

## 2023-01-01 RX ADMIN — METHYLPREDNISOLONE SODIUM SUCCINATE 40 MG: 40 INJECTION, POWDER, FOR SOLUTION INTRAMUSCULAR; INTRAVENOUS at 03:01

## 2023-01-01 RX ADMIN — SODIUM CHLORIDE, POTASSIUM CHLORIDE, SODIUM LACTATE AND CALCIUM CHLORIDE 1000 ML: 600; 310; 30; 20 INJECTION, SOLUTION INTRAVENOUS at 12:01

## 2023-01-01 RX ADMIN — ZOLPIDEM TARTRATE 5 MG: 5 TABLET ORAL at 08:10

## 2023-01-01 RX ADMIN — CIPROFLOXACIN 400 MG: 2 INJECTION, SOLUTION INTRAVENOUS at 05:01

## 2023-01-01 RX ADMIN — POTASSIUM CHLORIDE 10 MEQ: 7.46 INJECTION, SOLUTION INTRAVENOUS at 05:09

## 2023-01-01 RX ADMIN — ACETAMINOPHEN 650 MG: 650 SOLUTION ORAL at 01:10

## 2023-01-01 RX ADMIN — ATORVASTATIN CALCIUM 10 MG: 10 TABLET, FILM COATED ORAL at 09:01

## 2023-01-01 RX ADMIN — FAMOTIDINE 20 MG: 20 TABLET, FILM COATED ORAL at 09:10

## 2023-01-01 RX ADMIN — PROPOFOL 40 MCG/KG/MIN: 10 INJECTION, EMULSION INTRAVENOUS at 03:10

## 2023-01-01 RX ADMIN — QUETIAPINE FUMARATE 25 MG: 25 TABLET ORAL at 08:10

## 2023-01-01 RX ADMIN — IOPAMIDOL 100 ML: 755 INJECTION, SOLUTION INTRAVENOUS at 12:09

## 2023-01-01 RX ADMIN — CEFTRIAXONE SODIUM 1 G: 1 INJECTION, POWDER, FOR SOLUTION INTRAMUSCULAR; INTRAVENOUS at 11:01

## 2023-01-01 RX ADMIN — PROPOFOL 40 MCG/KG/MIN: 10 INJECTION, EMULSION INTRAVENOUS at 08:10

## 2023-01-01 RX ADMIN — GUAIFENESIN 200 MG: 200 SOLUTION ORAL at 11:09

## 2023-01-01 RX ADMIN — SODIUM CHLORIDE, SODIUM GLUCONATE, SODIUM ACETATE, POTASSIUM CHLORIDE AND MAGNESIUM CHLORIDE: 526; 502; 368; 37; 30 INJECTION, SOLUTION INTRAVENOUS at 12:10

## 2023-01-01 RX ADMIN — ZOLPIDEM TARTRATE 5 MG: 5 TABLET ORAL at 09:10

## 2023-01-01 RX ADMIN — SUCCINYLCHOLINE CHLORIDE 100 MG: 20 INJECTION, SOLUTION INTRAMUSCULAR; INTRAVENOUS at 09:09

## 2023-01-05 PROBLEM — J44.1 COPD EXACERBATION: Status: ACTIVE | Noted: 2022-09-27

## 2023-01-05 NOTE — ED PROVIDER NOTES
Encounter Date: 1/4/2023    SCRIBE #1 NOTE: I, Sunny Greene, am scribing for, and in the presence of,  Dr. Ruelas. I have scribed the following portions of the note - the EKG reading. Other sections scribed: HPI, ROS, Physical Exam, MDM, Attending.     History     Chief Complaint   Patient presents with    Shortness of Breath     Pt c/o shortness of breath started last night.  Pt on 2L only at night - increased to 3L all day.  Hx copd.  Cataract surgery yesterday     81 y/o CM with history of HLD, CAD, COPD presents to ED for SOB onset last night.  Pt had cataract surgery yesterday, and he says the SOB began last night, then continued this afternoon.  He is typically on O2 at night PRN, but he has been requiring it all day today.  Pt also complains of cough with excessive mucusy sputum production, along with leg swelling that is typical for him when he is short of breath.  Pt denies fever, chest pain, nausea or vomiting.  His PCP is Dr. Love, and his pulmonologist is Dr. Root.    The history is provided by the patient and the spouse.   Shortness of Breath  This is a new problem. The problem occurs continuously.Episode onset: last night. The problem has not changed since onset.Associated symptoms include cough, sputum production and leg swelling. Pertinent negatives include no fever, no chest pain and no vomiting. Associated medical issues include COPD, CAD and recent surgery.   Review of patient's allergies indicates:   Allergen Reactions    Flexeril [cyclobenzaprine]      Past Medical History:   Diagnosis Date    Acute bronchitis     Anxiety disorder, unspecified     COPD (chronic obstructive pulmonary disease)     Coronary artery disease     Hyperlipidemia     Left carotid bruit     Pulmonary nodules     Unspecified cataract      Past Surgical History:   Procedure Laterality Date    APPENDECTOMY      CATARACT EXTRACTION Left 01/03/2023    HERNIA REPAIR      PHACOEMULSIFICATION, CATARACT, WITH IOL INSERTION Left  1/3/2023    Procedure: PHACOEMULSIFICATION, CATARACT, WITH IOL INSERTION- OS;  Surgeon: Louis Abdi MD;  Location: [facility];  Service: Ophthalmology;  Laterality: Left;    TONSILLECTOMY       Family History   Problem Relation Age of Onset    Asthma Mother     Cancer Father     No Known Problems Sister     No Known Problems Brother      Social History     Tobacco Use    Smoking status: Former     Types: Cigarettes     Quit date:      Years since quittin.0    Smokeless tobacco: Never   Substance Use Topics    Alcohol use: Not Currently    Drug use: Never     Review of Systems   Constitutional:  Negative for fever.   Respiratory:  Positive for cough, sputum production and shortness of breath.    Cardiovascular:  Positive for leg swelling. Negative for chest pain.   Gastrointestinal:  Negative for nausea and vomiting.     Physical Exam     Initial Vitals [23 1530]   BP Pulse Resp Temp SpO2   (!) 128/58 95 18 98.8 °F (37.1 °C) (!) 88 %      MAP       --         Physical Exam    Nursing note and vitals reviewed.  Constitutional: He appears well-developed and well-nourished. He is not diaphoretic. He appears distressed.   Ill appearing     HENT:   Head: Normocephalic and atraumatic.   Right Ear: External ear normal.   Left Ear: External ear normal.   Eyes: Conjunctivae are normal.   Neck: Neck supple.   Normal range of motion.  Cardiovascular:  Normal rate, regular rhythm and normal heart sounds.           Pulmonary/Chest: He is in respiratory distress (mild). He has rhonchi (bilateral lower lobes).   Tachypneic    Abdominal: He exhibits no distension.   Musculoskeletal:         General: No edema. Normal range of motion.      Cervical back: Normal range of motion and neck supple.     Neurological: He is alert and oriented to person, place, and time. He has normal strength. GCS score is 15. GCS eye subscore is 4. GCS verbal subscore is 5. GCS motor subscore is 6.   Skin: Skin is warm and dry.   Psychiatric:  He has a normal mood and affect.       ED Course   Procedures  Labs Reviewed   COMPREHENSIVE METABOLIC PANEL - Abnormal; Notable for the following components:       Result Value    Carbon Dioxide 20 (*)     Glucose Level 138 (*)     Blood Urea Nitrogen 29.8 (*)     Creatinine 1.33 (*)     Albumin/Globulin Ratio 1.0 (*)     Bilirubin Total 1.9 (*)     All other components within normal limits   CBC WITH DIFFERENTIAL - Abnormal; Notable for the following components:    WBC 31.1 (*)     MCHC 32.4 (*)     MPV 9.2 (*)     IG# 0.71 (*)     All other components within normal limits   MANUAL DIFFERENTIAL - Abnormal; Notable for the following components:    Abs Neut 28.923 (*)     RBC Morph Abnormal (*)     Poik 2+ (*)     [place] Cells 2+ (*)     All other components within normal limits   B-TYPE NATRIURETIC PEPTIDE - Normal   COVID/FLU A&B PCR - Normal    Narrative:     The Xpert Xpress SARS-CoV-2/FLU/RSV plus is a rapid, multiplexed real-time PCR test intended for the simultaneous qualitative detection and differentiation of SARS-CoV-2, Influenza A, Influenza B, and respiratory syncytial virus (RSV) viral RNA in either nasopharyngeal swab or nasal swab specimens.         TROPONIN I - Normal   LACTIC ACID, PLASMA - Normal   BLOOD CULTURE [facility]   BLOOD CULTURE [facility]   RESPIRATORY CULTURE ([facility])   CBC W/ AUTO DIFFERENTIAL    Narrative:     The following orders were created for panel order CBC auto differential.  Procedure                               Abnormality         Status                     ---------                               -----------         ------                     CBC with Differential[[id number]]        Abnormal            Final result               Manual Differential[[id number]]          Abnormal            Final result                 Please view results for these tests on the individual orders.   PROCALCITONIN (PCT)   RESPIRATORY PANEL   CBC WITHOUT DIFFERENTIAL   BASIC METABOLIC PANEL   MAGNESIUM   PHOSPHORUS      EKG Readings: (Independently Interpreted)   Initial Reading: No STEMI. Rhythm: Normal Sinus Rhythm. Heart Rate: 94. Ectopy: PVCs (few). Conduction: RBBB. ST Segments: Normal ST Segments. T Waves: Normal. Axis: Normal.   1532   ECG Results              EKG 12-lead (Final result)  Result time 01/04/23 16:41:31      Final result by Interface, Lab In [facility] (01/04/23 16:41:31)                   Narrative:    Test Reason : R06.02,    Vent. Rate : 094 BPM     Atrial Rate : 094 BPM     P-R Int : 158 ms          QRS Dur : 138 ms      QT Int : 382 ms       P-R-T Axes : -01 178 043 degrees     QTc Int : 477 ms    Sinus rhythm with occasional Premature ventricular complexes  Right bundle branch block  Left posterior fascicular block   Bifascicular block   Abnormal ECG  No previous ECGs available  Confirmed by Kayden Harrington MD ([id number]) on 1/4/2023 4:41:23 PM    Referred By:             Confirmed By:Kayden Harrington MD                                  Imaging Results              CT Chest Without Contrast (Preliminary result)  Result time 01/05/23 01:50:29      Preliminary result by Lm Velez MD (01/05/23 01:50:29)                   Narrative:    START OF REPORT:  Technique: CT Scan of the chest was performed without intravenous contrast with direct axial as well as sagittal and, coronal, reconstruction images.    Dosage Information: Automated Exposure Control was utilized.    Comparison: Comparison is with study [id number] 11:08:49.    Clinical History: Sob, copd exacerbation.    Findings:  Mediastinum: Multiple scattered subcentimeter mediastinal lymph nodes are seen. An enlarged subcarinal lymph node is seen, which measures 16 mm in short axis (series 2 image 73). This is not significantly changed in size as compared with the prior examination.  Heart: The heart size is within normal limits. Severe coronary artery calcification is seen.  Aorta: Moderate aortic calcification is seen in the thoracic  aorta.  Pulmonary Arteries: Unremarkable.  Lungs: Again noted are severe emphysematous changes in the lungs bilaterally. There is interval development of opacities in predominantly dependent bilateral lower lobes, right greater than left. Again noted is a subcentimeter calcified nodule at the left lung apex (series 10 image 19).  Pleura: No effusions or pneumothorax are identified.  Bony Structures: The bones are osteopenic. Mild spondylotic changes are seen in the spine.  Abdomen: The visualized upper abdominal organs appear unremarkable.      Impression:  1. Again noted are severe emphysematous changes in the lungs bilaterally. There is interval development of opacities in predominantly dependent bilateral lower lobes, right greater than left. This likely reflects a superimposed infectious process. Correlate with clinical and laboratory findings as regards additional evaluation and follow-up to full resolution.  2. Details and other findings as discussed above.                          Preliminary result by Interface, Rad Results In (01/05/23 01:50:29)                   Narrative:    START OF REPORT:  Technique: CT Scan of the chest was performed without intravenous contrast with direct axial as well as sagittal and, coronal, reconstruction images.    Dosage Information: Automated Exposure Control was utilized.    Comparison: Comparison is with study [id number] 11:08:49.    Clinical History: Sob, copd exacerbation.    Findings:  Mediastinum: Multiple scattered subcentimeter mediastinal lymph nodes are seen. An enlarged subcarinal lymph node is seen, which measures 16 mm in short axis (series 2 image 73). This is not significantly changed in size as compared with the prior examination.  Heart: The heart size is within normal limits. Severe coronary artery calcification is seen.  Aorta: Moderate aortic calcification is seen in the thoracic aorta.  Pulmonary Arteries: Unremarkable.  Lungs: Again noted are severe  emphysematous changes in the lungs bilaterally. There is interval development of opacities in predominantly dependent bilateral lower lobes, right greater than left. Again noted is a subcentimeter calcified nodule at the left lung apex (series 10 image 19).  Pleura: No effusions or pneumothorax are identified.  Bony Structures: The bones are osteopenic. Mild spondylotic changes are seen in the spine.  Abdomen: The visualized upper abdominal organs appear unremarkable.      Impression:  1. Again noted are severe emphysematous changes in the lungs bilaterally. There is interval development of opacities in predominantly dependent bilateral lower lobes, right greater than left. This likely reflects a superimposed infectious process. Correlate with clinical and laboratory findings as regards additional evaluation and follow-up to full resolution.  2. Details and other findings as discussed above.                                         X-Ray Chest AP Portable (Final result)  Result time 01/04/23 16:23:26      Final result by Ronny Hamilton MD (01/04/23 16:23:26)                   Impression:      No acute cardiopulmonary process identified.      Electronically signed by: Ronny Hamilton  Date:    01/04/2023  Time:    16:23               Narrative:    EXAMINATION:  XR CHEST AP PORTABLE    CLINICAL HISTORY:  sob;    TECHNIQUE:  One view    COMPARISON:  February 25, 2020.    FINDINGS:  Cardiopericardial silhouette is within normal limits.  Emphysematous changes mostly involve the upper lung lobes.  No acute dense focal or segmental consolidation, congestion, pleural effusion or pneumothorax.                                    X-Rays:   Independently Interpreted Readings:   Chest X-Ray: Normal heart size. There is an infiltrate in the RLL and LLL.   Medications      azithromycin 500 mg in dextrose 5 % 250 mL IVPB (ready to mix system) (0 mg Intravenous Stopped 1/4/23 9538)   cefTRIAXone (ROCEPHIN) 1 g in dextrose 5 % in water  (D5W) 5 % 50 mL IVPB (MB+) (0 g Intravenous Stopped 1/4/23 2220)   albuterol-ipratropium 2.5 mg-0.5 mg/3 mL nebulizer solution 3 mL (3 mLs Nebulization Given 1/4/23 2156)     Medical Decision Making:   History:   I obtained history from: someone other than patient.  Old Medical Records: I decided to obtain old medical records.  Old Records Summarized: records from clinic visits.       <> Summary of Records: Reviewed pulmonary office visit from 11/29/2022- patient did not want to be evaluated for hospice.  He is to use noninvasive volume ventilation at night and daytime as needed.  Oxygen as needed  Initial Assessment:   Mild respiratory distress, has rhonchi in both lung bases  Differential Diagnosis:   Pneumonia, COPD exacerbation, viral syndrome, pleural effusion, ACS, pulmonary embolism  Independently Interpreted Test(s):   I have ordered and independently interpreted X-rays - see prior notes.  I have ordered and independently interpreted EKG Reading(s) - see prior notes  Clinical Tests:   Lab Tests: Ordered and Reviewed  The following lab test(s) were unremarkable: Troponin and Lactate  Radiological Study: Ordered and Reviewed  Medical Tests: Ordered and Reviewed  ED Management:  IV fluids, IV azithromycin and IV Rocephin for pneumonia   Spoke with hospitalist for admission  Other:   I have discussed this case with another health care provider.        Scribe Attestation:   Scribe #1: I performed the above scribed service and the documentation accurately describes the services I performed. I attest to the accuracy of the note.    Attending Attestation:           Physician Attestation for Scribe:  Physician Attestation Statement for Scribe #1: I, reviewed documentation, as scribed by Sunny Greene in my presence, and it is both accurate and complete.           ED Course as of 01/05/23 0121   Wed Jan 04, 2023 2032 WBC(!): 31.1 [KM]   2032 BUN(!): 29.8 [KM]   2032 Creatinine(!): 1.33 [KM]   2032 Lab work from today  is notable for a white count of 30, BUN/creatinine ratio is elevated.  Given IV fluids for dehydration.  Obtaining procal, lactic acid and blood cultures.  Starting IV azithromycin and IV Rocephin for pneumonia [KM]      ED Course User Index  [KM] Emely Ruelas MD                 Clinical Impression:   Final diagnoses:  [R06.03] Acute respiratory distress  [J18.9] Pneumonia of both lungs due to infectious organism, unspecified part of lung        ED Disposition Condition    Admit Stable                Emely Ruelas MD  01/05/23 0235

## 2023-01-05 NOTE — H&P
[facility] Hospital Medicine - H&P Note    Patient Name: Helder Sharp Jr.  : 1940  MRN: [id number]  PCP: Kimo Love MD  Admitting Physician: Boubacar Fair MD  Admission Class: IP- Inpatient   Length of Stay: 1  Face-to-Face encounter date: 2023  Code status: Full    Chief Complaint   Shortness of Breath (Pt c/o shortness of breath started last night.  Pt on 2L only at night - increased to 3L all day.  Hx copd.  Cataract surgery yesterday)      History of Present Illness   This is an 82-year-old male with medical history of GOLD stage IV COPD on 2 L nasal cannula, present to the ED with complaint of worsening shortness of breath for 1 day.  Patient underwent cataract surgery on 2023 and report same day when he returned home he started having worsening cough productive of purulent sputum, wheezing and shortness of breath at rest.  Denies chest pain, fever or chills.  Report his oxygen saturation started to drop below 89 requiring him to increase to 3 L.    On arrival to ED, he was afebrile, normotensive and saturating between 88-92% on 3 L nasal cannula.  Labs notable for WBC 31, hemoglobin 14.7, platelets 206, creatinine 1.33, BUN 29, BNP 41, and troponin 0.016, CT chest without contrast show severe emphysematous changes in the lung bilaterally and interval development of opacities predominantly in the bilateral lower lobes right greater than left which may reflect superimposed  infectious process.  COVID-19 and flu negative.    He was given bronchodilators, ceftriaxone and azithromycin and referred to hospital medicine service for further evaluation and management.      ROS   Except as documented, all other systems reviewed and negative     Past Medical History   GOLD stage 4 COPD on 2 L nasal cannula and plan to start Triolgy at night, follow with pulmonology Dr Root  Chronic hypoxemic respiratory failure  Lung nodules  Anxiety  HLD  CKD3A  Nonobstructive  CAD    Past Surgical History     Past Surgical History:   Procedure Laterality Date    APPENDECTOMY      CATARACT EXTRACTION Left 2023    HERNIA REPAIR      PHACOEMULSIFICATION, CATARACT, WITH IOL INSERTION Left 1/3/2023    Procedure: PHACOEMULSIFICATION, CATARACT, WITH IOL INSERTION- OS;  Surgeon: Louis Abdi MD;  Location: [facility];  Service: Ophthalmology;  Laterality: Left;    TONSILLECTOMY         Social History     Social History     Tobacco Use    Smoking status: Former     Types: Cigarettes     Quit date:      Years since quittin.0    Smokeless tobacco: Never   Substance Use Topics    Alcohol use: Not Currently        Family History   Reviewed and negative    Allergies   Flexeril [cyclobenzaprine]    Home Medications     Prior to Admission medications    Medication Sig Start Date End Date Taking? Authorizing Provider   albuterol (ACCUNEB) 0.63 mg/3 mL Nebu Take 0.63 mg by nebulization every 6 (six) hours as needed. Rescue    Historical Provider   albuterol (PROVENTIL/VENTOLIN HFA) 90 mcg/actuation inhaler Inhale 2 puffs into the lungs every 6 (six) hours as needed (wheezing). Rescue 1/3/23   Kimo Love MD   albuterol-ipratropium (DUO-NEB) 2.5 mg-0.5 mg/3 mL nebulizer solution Take 3 mLs by nebulization every 6 (six) hours as needed for Wheezing. Rescue    Historical Provider   ascorbic acid, vitamin C, (VITAMIN C) 500 MG tablet Take 500 mg by mouth 2 (two) times daily.    Historical Provider   aspirin (ECOTRIN) 81 MG EC tablet Take 81 mg by mouth. 22   Historical Provider   atorvastatin (LIPITOR) 10 MG tablet Take 10 mg by mouth once daily.    Historical Provider   DALIRESP 500 mcg Tab Take 0.5 tablets by mouth once daily. 22   Historical Provider   fluticasone propionate (FLONASE) 50 mcg/actuation nasal spray 1 spray by Each Nostril route once daily.    Historical Provider   fluticasone-umeclidin-vilanter (TRELEGY ELLIPTA) 100-62.5-25 mcg DsDv Inhale 1 puff into the lungs as  needed. 2/9/22   Historical Provider   montelukast (SINGULAIR) 10 mg tablet Take 1 tablet (10 mg total) by mouth once daily. 12/12/22   Kimo Love MD   predniSONE (DELTASONE) 20 MG tablet Take 2 tablets by mouth each day for 3 days, then take 1 tablet each day for 4 days, then take 1/2 tablet for 4 days 11/28/22   Kimo Love MD   predniSONE (DELTASONE) 20 MG tablet Take 2 tablets daily for 3 days, then 1 tablet daily for 4 days, then 1/2 tablet for 4 days 12/23/22   Kimo Love MD   ranolazine (RANEXA) 500 MG Tb12 Take 500 mg by mouth 2 (two) times daily.    Historical Provider   sertraline (ZOLOFT) 100 MG tablet Take 1 tablet (100 mg total) by mouth once daily. 12/12/22   Kimo Love MD        Physical Exam   Vital Signs  Temp:  [98.8 °F (37.1 °C)]   Pulse:  [76-95]   Resp:  [18-23]   BP: (105-136)/(44-58)   SpO2:  [88 %-93 %]    General: Appears comfortable on nasal cannula  HEENT: NC/AT  Neck:  No JVD  Chest:  Bilateral rhonchi and expiratory wheezing  CVS: Regular rhythm. Normal S1/S2.  No pedal edema  Abdomen: nondistended, normoactive BS, soft and non-tender.  MSK: No obvious deformity or joint swelling  Skin: Warm and dry  Neuro: AAOx3, no focal neurological deficit  Psych: Cooperative    Labs     Recent Labs     01/04/23  1623   WBC 31.1*   RBC 4.96   HGB 14.7   HCT 45.4   MCV 91.5   MCH 29.6   MCHC 32.4*   RDW 14.1      ABSNEUT 28.923*      Recent Labs     01/04/23  1623      K 4.1   CHLORIDE 105   CO2 20*   BUN 29.8*   CREATININE 1.33*   EGFRNORACEVR 53   GLUCOSE 138*   CALCIUM 9.5   ALBUMIN 3.4   GLOBULIN 3.4   ALKPHOS 88   ALT 18   AST 12   BILITOT 1.9*   BNP 41.1     Recent Labs     01/04/23  2058   LACTIC 1.9     Recent Labs     01/04/23  1623   TROPONINI 0.016        Microbiology Results (last 7 days)       Procedure Component Value Units Date/Time    Respiratory Culture [[id number]]     Order Status: Sent Specimen: Sputum, Expectorated     Blood Culture #1 **CANNOT  BE ORDERED STAT** [[id number]] Collected: 01/04/23 2058    Order Status: Resulted Specimen: Blood Updated: 01/04/23 2127    Blood Culture #2 **CANNOT BE ORDERED STAT** [[id number]] Collected: 01/04/23 2058    Order Status: Resulted Specimen: Blood Updated: 01/04/23 2127           Imaging     X-Ray Chest AP Portable   Final Result      No acute cardiopulmonary process identified.         Electronically signed by: Ronny Hamilton   Date:    01/04/2023   Time:    16:23      CT Chest Without Contrast    (Results Pending)     Assessment & Plan   Acute on chronic hypoxemic respiratory failure  Community-acquired pneumonia  COPD exacerbation    HX HLD, CKD3A, nonobstructive CAD, anxiety    Plan:    Continue supplemental oxygen by nasal cannula for goal SpO2 89-92%  Blood cultures x2, sputum culture, respiratory PCR panel  Continue ceftriaxone/azithromycin  Solu-Medrol 40 mg IV q.8 hours  DuoNeb q.4 hours  Continue LABA/LAMA/ICS inhaler, montelukast and roflumilast  Remaining home meds reviewed and resumed  VTE Prophylaxis:  Enoxaparin 40 mg subQ daily     Critical care time:  35 minutes  Critical care diagnosis:  Acute on chronic hypoxemic respiratory failure    Boubacar Fair MD  Internal Medicine

## 2023-01-06 NOTE — CONSULTS
Inpatient consult to Palliative Care  Consult performed by: Kwasi Phipps MD  Consult ordered by: Buck Guajardo MD    Patient Name: Helder Sharp Jr.   MRN: [id number]   Admission Date: 1/4/2023   Hospital Length of Stay: 2   Attending Provider: Boubacar Fair MD   Consulting Provider: Kwasi Phipps M.D.  Reason for Consult: Goals of Care  Primary Care Physician: Kimo Loev MD     Principal Problem: COPD exacerbation     Patient information was obtained from patient, spouse/SO, past medical records, ER records, and primary team.      Final diagnoses:  [R06.03] Acute respiratory distress  [J18.9] Pneumonia of both lungs due to infectious organism, unspecified part of lung     Assessment/Plan:     I reviewed the patient and family's understanding of the seriousness of the illness and its expected prognosis. We discussed the patient's goals of care and treatment preferences. We discussed the difference between palliative and curative medicine. I explained the differences between home health, palliative and hospice care. I clarified current code status. I identified the surrogate decision maker or health care POA. I explained the difference between a living will (advanced directive) and LaPost. We discussed the patient's chosen code status as listed above and the contents of the LaPOST. I answered all questions and we formulated a plan including recommendations for symptom management and how to best achieve goals of care.     I reviewed the patient's current clinical status with the nurse, speech therapist and hospitalist. I reviewed clinical documentation, labs and imaging.     Symptom management review: He denies dyspnea beyond usual difficulties. He denies pain, nausea, fever, increased anxiety or other symptoms.     Advance Care Planning     Date: 01/06/2023    Power of   I initiated the process of advance care planning today and explained the importance of this process to the patient.  I  introduced the concept of advance directives to the patient, as well. Then the patient received detailed information about the importance of designating a Health Care Power of  (HCPOA). He was also instructed to communicate with this person about their wishes for future healthcare, should he become sick and lose decision-making capacity. The patient has previously appointed a HCPOA.  Legal wife , health care agent:  Maryjo Aron       [facility]  I engaged the patient and family in a conversation about advance care planning and we specifically addressed what the goals of care would be moving forward, in light of the patient's change in clinical status, specifically Advanced COPD, newly diagnosed bilateral pneumonia, likely aspiration type and failed MBS with speech therapy.  We did specifically address the patient's likely prognosis, which is poor.  We explored the patient's values and preferences for future care.  The patient, family, and healthcare power of   endorses that what is most important right now is to focus on  is undecided as of now. Education was provided about options. Patient and family wish to think it over.    Regarding failed MBS, The patient states that he has had no cough related to po intake. I told him that this may indicate that he is having silent aspiration.  I explained to the patient and his wife that there are essentially 3 options to consider.     1) elect placement of PEG for long-term enteral feedings and avoid further po intake.   2) elected a modified diet despite the high risk of recurrent aspiration pneumonia (recommended discharge home with accompanied hospice program and may also have speech therapy).   3. IV or NG nutrition temporarily inpatient and repeat MBS next week (per speech therapy recs).     I mentioned this to his admitting doctor as well. The patient has not yet decided on a course of action. He would prefer a modified diet while remaining inpatient and  "repeat MBS. I told him that speech therapy will not clear him for this as is appropriate. We will await his decision after he talks with speech therapy and the decision of Dr. Guajardo.       I reviewed goals regarding intubation/ mechanical ventilation in the event of respiratory failure. He understands that there is a high risk that he will not be able to wean from a mechanical ventilator. I discussed this risk and that his wishes would be important to ascertain. He and his wife stated that they have previously discussed this topic in a living will. The patient would not wish to live on a ventilator. Therefore, long-term use with tracheostomy would not be his choice. Concerning intubation, however, he is undecided at this time.     I further discussed his wishes in the event of a cardiopulmonary arrest. I discussed the risks of related chest wall trauma with CPR, that of anoxic encephalopathy with survival from such an event and that of likely persistent long-term ventilator need. The patient stated that he was becoming very anxious from this discussion and stated, "my heart rate is 118 due to this discussion." I apologized and told him that he does not have to make any decisions at this time, but that by default, we would provide full resuscitative measures unless he elects a different choice. He will remain a full code for now.    Accordingly, we have decided that the best plan to meet the patient's goals includes continuing with treatment    I did explain the role for hospice care at this stage of the patient's illness, including its ability to help the patient live with the best quality of life possible.  We will not be making a hospice referral as he is not yet ready to accept this plan of care.        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 He failed a MBS with speech therapy. We were consulted to review goals of care with patient and family.      Active Ambulatory Problems     Diagnosis Date Noted    Generalized anxiety disorder 09/27/2022    COPD exacerbation 09/27/2022    Former smoker 09/27/2022    Pulmonary nodule 09/27/2022    Carotid bruit 09/27/2022    Hyperlipidemia 09/27/2022    Inguinal hernia 09/27/2022    Plantar fasciitis 09/27/2022    Polyp of colon 09/27/2022     Resolved Ambulatory Problems     Diagnosis Date Noted    No Resolved Ambulatory Problems     Past Medical History:   Diagnosis Date    Acute bronchitis     Anxiety disorder, unspecified     COPD (chronic obstructive pulmonary disease)     Coronary artery disease     Left carotid bruit     Pulmonary nodules     Unspecified cataract         Past Surgical History:   Procedure Laterality Date    APPENDECTOMY      CATARACT EXTRACTION Left 01/03/2023    HERNIA REPAIR      PHACOEMULSIFICATION, CATARACT, WITH IOL INSERTION Left 1/3/2023    Procedure: PHACOEMULSIFICATION, CATARACT, WITH IOL INSERTION- OS;  Surgeon: Louis Abdi MD;  Location: [facility];  Service: Ophthalmology;  Laterality: Left;    TONSILLECTOMY          Review of patient's allergies indicates:   Allergen Reactions    Flexeril [cyclobenzaprine]           Current Facility-Administered Medications:     acetaminophen tablet 1,000 mg, 1,000 mg, Oral, Q6H PRN, Boubacar Fair MD    acetaminophen tablet 650 mg, 650 mg, Oral, Q4H PRN, Boubacar Fair MD    albuterol-ipratropium 2.5 mg-0.5 mg/3 mL nebulizer solution 3 mL, 3 mL, Nebulization, Q4H, Boubacar Fair MD, 3 mL at 01/06/23 1139    aluminum-magnesium hydroxide-simethicone 200-200-20 mg/5 mL suspension 30 mL, 30 mL, Oral, QID PRN, Boubacar Fair MD    aspirin EC tablet 81 mg, 81 mg, Oral, Daily, Boubacar Fair MD, 81 mg at 01/06/23 0857    atorvastatin tablet 10 mg, 10 mg, Oral, Daily, Boubacar Fair MD, 10 mg at 01/06/23 0858    azithromycin tablet 250 mg, 250 mg, Oral,  Daily, Boubacar Fair MD, 250 mg at 01/06/23 0857    benzonatate capsule 200 mg, 200 mg, Oral, TID PRN, Boubacar Fair MD    cefTRIAXone (ROCEPHIN) 1 g in dextrose 5 % in water (D5W) 5 % 50 mL IVPB (MB+), 1 g, Intravenous, Q24H, Boubacar Fair MD, Stopped at 01/06/23 0932    dextromethorphan-guaiFENesin  mg/5 ml liquid 5 mL, 5 mL, Oral, Q4H PRN, Boubacar Fair MD    enoxaparin injection 40 mg, 40 mg, Subcutaneous, Daily, Boubacar Fair MD    fluticasone furoate-vilanteroL 100-25 mcg/dose diskus inhaler 1 puff, 1 puff, Inhalation, Daily **AND** umeclidinium 62.5 mcg/actuation inhalation capsule 62.5 mcg, 1 capsule, Inhalation, Daily, Boubacar Fair MD    guaiFENesin 12 hr tablet 600 mg, 600 mg, Oral, BID, Boubacar Fair MD, 600 mg at 01/06/23 0857    melatonin tablet 6 mg, 6 mg, Oral, Nightly PRN, Boubacar Fair MD    methylPREDNISolone sodium succinate injection 40 mg, 40 mg, Intravenous, Q8H, Boubacar Fair MD, 40 mg at 01/06/23 0558    montelukast tablet 10 mg, 10 mg, Oral, Daily, Boubacar Fair MD    ondansetron injection 4 mg, 4 mg, Intravenous, Q4H PRN, Boubacar Fair MD    polyethylene glycol packet 17 g, 17 g, Oral, BID PRN, Boubacar Fair MD    prochlorperazine injection Soln 5 mg, 5 mg, Intravenous, Q6H PRN, Boubacar Fair MD    ranolazine 12 hr tablet 500 mg, 500 mg, Oral, BID, Boubacar Fair MD, 500 mg at 01/06/23 0857    roflumilast Tab 250 mcg, 250 mcg, Oral, Daily, Boubacar Fair MD    senna-docusate 8.6-50 mg per tablet 2 tablet, 2 tablet, Oral, BID PRN, Boubacar Fair MD    sertraline tablet 100 mg, 100 mg, Oral, Daily, Boubacar Fair MD, 100 mg at 01/06/23 0857    simethicone chewable tablet 80 mg, 1 tablet, Oral, QID PRN, Boubacar Fair MD    sodium chloride 0.9% flush 10 mL, 10 mL, Intravenous, PRN, Boubacar Fair MD     acetaminophen, acetaminophen, aluminum-magnesium hydroxide-simethicone, benzonatate, dextromethorphan-guaiFENesin  mg/5 ml, melatonin, ondansetron, polyethylene glycol, prochlorperazine, senna-docusate  "8.6-50 mg, simethicone, sodium chloride 0.9%     Family History   Problem Relation Age of Onset    Asthma Mother     Cancer Father     No Known Problems Sister     No Known Problems Brother         Review of Systems   Constitutional:  Negative for unexpected weight change.   HENT:  Positive for sore throat. Negative for trouble swallowing and voice change.    Respiratory:  Positive for cough and shortness of breath.    Gastrointestinal:  Negative for abdominal distention, abdominal pain, constipation and nausea.   Musculoskeletal:  Negative for arthralgias and back pain.          Objective:   BP (!) 125/53   Pulse 108   Temp 97.7 °F (36.5 °C) (Oral)   Resp 18   Ht 6' 2" (1.88 m)   Wt 90.3 kg (199 lb)   SpO2 (!) 94%   BMI 25.55 kg/m²      Physical Exam  Vitals reviewed.   Constitutional:       General: He is not in acute distress.     Appearance: He is normal weight. He is ill-appearing. He is not toxic-appearing.   HENT:      Head: Normocephalic.      Right Ear: External ear normal.      Left Ear: External ear normal.      Nose: Nose normal.      Mouth/Throat:      Mouth: Mucous membranes are moist.      Pharynx: Oropharynx is clear.   Eyes:      Extraocular Movements: Extraocular movements intact.      Conjunctiva/sclera: Conjunctivae normal.      Pupils: Pupils are equal, round, and reactive to light.   Cardiovascular:      Rate and Rhythm: Regular rhythm. Tachycardia present.      Pulses: Normal pulses.      Heart sounds: Normal heart sounds.   Pulmonary:      Breath sounds: Rhonchi and rales present.      Comments: Diminished breath sounds bilateral, no tachypnea  Abdominal:      General: Abdomen is flat. Bowel sounds are normal. There is no distension.      Palpations: Abdomen is soft.      Tenderness: There is no abdominal tenderness.   Musculoskeletal:      Right lower leg: No edema.      Left lower leg: No edema.   Skin:     General: Skin is warm.   Neurological:      General: No focal deficit " present.      Mental Status: He is alert and oriented to person, place, and time.   Psychiatric:         Mood and Affect: Mood normal.         Behavior: Behavior normal.         Thought Content: Thought content normal.         Judgment: Judgment normal.          Review of Symptoms  Review of Symptoms      Symptom Assessment (ESAS 0-10 Scale)  Pain:  0  Dyspnea:  0  Anxiety:  0  Nausea:  0  Depression:  0  Anorexia:  0  Fatigue:  0  Insomnia:  0  Restlessness:  0  Agitation:  0     CAM / Delirium:  Negative  Constipation:  Negative  Diarrhea:  Negative    Constipation:  No constipation    Bowel Management Plan (BMP):  Yes      Pain Assessment:  OME in 24 hours:  0  Location(s):      Modified Jose Scale:  0    Performance Status:  60    Living Arrangements:  Lives in home and Lives with spouse    Psychosocial/Cultural:   See Palliative Psychosocial Note: Yes  Pt lives with his legal wife of 5 years. He is a retired [employer] . He attends pulmonary rehab for 5 months, 3 days per week. He feels that this has increased his stamina. He has 2 children of his own from 2 prior marriages.   **Primary  to Follow**  Palliative Care  Consult: No    Spiritual:  F - Betsey and Belief:  Anand  I - Importance:  Very important to the patient.  C - Community:  Attends services     Time-Based Charting:  Yes    Total Time Spent: 0 minutes    Advance Care Planning   Advance Directives:   Living Will: Yes        Copy on chart: No    LaPOST: No    Do Not Resuscitate Status: No    Medical Power of : Yes    Agent's Name:  Wife-Maryjo Sharp   Agent's Contact Number:  [phone]    Decision Making:  Patient answered questions and Family answered questions  Goals of Care: The patient, family, and healthcare power of   endorses that what is most important right now is to focus on not yet decided.          Caregiver burden formerly assessed: yes        > 50% of 80 min of encounter was spent in  chart review, face to face discussion of goals of care, symptom assessment, coordination of care and emotional support.     Kwasi Phipps M.D.  Palliative Medicine  [facility]

## 2023-01-06 NOTE — PROGRESS NOTES
"Inpatient Nutrition Evaluation    Admit Date: 1/4/2023   Total duration of encounter: 2 days    Nutrition Recommendation/Prescription     Oral diet as per SLP    Nutrition Assessment     Chart Review    Reason Seen: continuous nutrition monitoring    Malnutrition Screening Tool Results   Have you recently lost weight without trying?: Yes: 2-13 lbs  Have you been eating poorly because of a decreased appetite?: No   MST Score: 1     Diagnosis:  Acute on chronic hypoxemic respiratory failure  Community-acquired pneumonia  COPD exacerbation    Relevant Medical History: HLD, CKD3A, nonobstructive CAD, anxiety    Nutrition-Related Medications: azithromycin, rocephin, solumedrol    Nutrition-Related Labs:  1/5: WBC-23.1, H/H-13.0/39.7, Bun-28.1, Crea-1.23, Gluc-183    Diet Order: No diet orders on file  Oral Supplement Order: none  Appetite/Oral Intake: NPO/not applicable  Factors Affecting Nutritional Intake: difficulty/impaired swallowing  Food/Buddhist/Cultural Preferences: none reported  Food Allergies: no known food allergies    Skin Integrity: bruised (ecchymotic)  Wound(s):   noted    Comments    1/6: SLP evaluating pt for possible aspiration pneumonia. Will have MBS today. Await results for further recommendations.     Anthropometrics    Height: 6' 2" (188 cm)    Last Weight: 90.3 kg (199 lb) (01/04/23 1530)    BMI (Calculated): 25.5  BMI Classification: overweight (BMI 25-29.9)        Ideal Body Weight (IBW), Male: 190 lb     % Ideal Body Weight, Male (lb): 104.74 %                          Usual Weight Provided By: EMR weight history    Wt Readings from Last 3 Encounters:   01/04/23 1530 90.3 kg (199 lb)   01/03/23 0607 85.7 kg (189 lb)   12/15/22 1031 89 kg (196 lb 3.4 oz)   09/27/22 1412 89 kg (196 lb 3.2 oz)      Weight Change(s) Since Admission:  Admit Weight: 90.3 kg (199 lb) (01/04/23 1530)  1/6: 90.3kg    Patient Education    Not applicable.    Monitoring & Evaluation     Dietitian will monitor food and " beverage intake.  Nutrition Risk/Follow-Up: low (follow-up in 5-7 days)  Patients assigned 'low nutrition risk' status do not qualify for a full nutritional assessment but will be monitored and re-evaluated in a 5-7 day time period. Please consult if re-evaluation needed sooner.

## 2023-01-06 NOTE — PLAN OF CARE
Problem: SLP  Goal: SLP Goal  Description:   LTG: Tolerate least restrictive PO diet with no clinical signs/sx aspiration  ST.  Tolerate 2oz of ice chips with no clinical signs/sx aspiration  2.  Complete base of tongue and laryngeal strengthening exercises with min cues  3.  Tolerate thermal stimulation to the anterior faucial pillars with 100% effortful swallow responses and delay less than 2 seconds.      Outcome: Ongoing, Progressing

## 2023-01-06 NOTE — PT/OT/SLP PROGRESS
Speech Language Pathology Department  Dysphagia Therapy Progress Note    Patient Name:  Helder Sharp Jr.   MRN:  [id number]  Admitting Diagnosis: COPD exacerbation    Recommendations:     General recommendations:  dysphagia therapy  Diet recommendations:  NPO, Liquid Diet Level: NPO     Barriers to safe discharge:  acuity of illness and severity of impairment    Subjective     Patient awake, alert, and hostile.    Pain/Comfort: Pain Rating 1: 0/10    Spiritual/Cultural/Hoahaoism Beliefs/Practices that affect care: no    Respiratory Status: nasal cannula    Patient Education:     Patient and spouse provided with additional verbal education regarding SLP POC, MBS results/recommendations, swallow function, risks of aspiration/recurrent PNA and quality of life.  Understanding was verbalized, however additional teaching warranted.    Plan:     Will continue to follow and tx as appropriate.    SLP Follow-Up:  Yes   Patient to be seen:  daily   Plan of Care expires:  01/13/23  Plan of Care reviewed with:  patient, spouse       Time Tracking:     SLP Treatment Date:   01/06/23  Speech Start Time:  1400  Speech Stop Time:  1420     Speech Total Time (min):  20 min    Billable minutes:  Self Care/Home Management, 20 minutes        01/06/2023

## 2023-01-06 NOTE — PROGRESS NOTES
[facility]  Hospital Medicine Progress Note        Chief Complaint: Inpatient follow-up on aspiration pneumonia and respiratory failure    HPI:   This is an 82-year-old white male with medical history of GOLD stage IV COPD on on supplemental oxygen at 2 L per minute via nasal cannula, who presented to the ED with complaints of worsening shortness of breath for 1 day.  Patient underwent cataract surgery on 01/03/2023 and reports that the same day when he returned home he started having worsening cough productive of purulent sputum, wheezing, and shortness of breath at rest.  Denies chest pain, fever or chills.  Report his oxygen saturation started to drop below 89% requiring him to increase his supplemental oxygen to 3 liters/minute.     On arrival to ED, he was afebrile, normotensive and saturating between 88-92% on 3 L nasal cannula.  Labs notable for WBC 31, hemoglobin 14.7, platelets 206, creatinine 1.33, BUN 29, BNP 41, and troponin 0.016, CT chest without contrast revealed severe emphysematous changes in the lung bilaterally and interval development of opacities predominantly in the bilateral lower lobes right greater than left which may reflect superimposed infectious process.  COVID-19 and influenza swabs were negative.     He was given nebulized bronchodilators, ceftriaxone and azithromycin and very promptly admitted to the hospital medicine service for further evaluation and management.    Interval Hx:   Patient just returned from having modified barium swallow performed by speech language pathology.  He and his wife were informed of the findings of silent aspiration as well as recommendations for nothing by mouth status.  They both seemed very incredulous and questioning the diagnosis and recommendations.  Despite all this patient reports that he is coughing up dark brown, almost black, sputum.  Patient is currently afebrile and hemodynamically stable.  He is on supplemental  oxygen at 3 liters/minute via nasal cannula.  He is on home oxygen as well as some sort of outpatient pulmonary rehab program.  He is followed by Dr. Richardson Root, a pulmonologist at [facility].    Objective/physical exam:  General:  Elderly white male in mild respiratory distress  HENT: normocephalic, atraumatic, poor dentition  Eye: PERRL, EOMI, clear conjunctiva, corrective lenses  Neck: full ROM, no thyromegaly, no JVD  Respiratory: diminished breath sounds with prolonged expiratory phase  Cardiovascular: regular rate and rhythm  Gastrointestinal: non-distended, positive bowel sounds, non-tender  Musculoskeletal: no gross deformity  Integumentary: warm, dry, intact, no rashes  Neurological: cranial nerves grossly intact, no focal neurological deficit  Psychiatric:  Very anxious      VITAL SIGNS: 24 HRS MIN & MAX LAST   Temp  Min: 97.3 °F (36.3 °C)  Max: 97.9 °F (36.6 °C) 97.7 °F (36.5 °C)   BP  Min: 104/51  Max: 147/76 (!) 125/53     Pulse  Min: 78  Max: 108  108   Resp  Min: 16  Max: 22 18   SpO2  Min: 88 %  Max: 95 % (!) 94 %         Recent Labs   Lab 01/04/23  1623 01/05/23  0417   WBC 31.1* 23.1*   RBC 4.96 4.37*   HGB 14.7 13.0*   HCT 45.4 39.7*   MCV 91.5 90.8   MCH 29.6 29.7   MCHC 32.4* 32.7*   RDW 14.1 14.1    182   MPV 9.2* 9.8       Recent Labs   Lab 01/04/23 1623 01/05/23 0417 01/05/23  0917     --  137   K 4.1  --  3.9   CO2 20*  --  23   BUN 29.8*  --  28.1*   CREATININE 1.33*  --  1.23*   CALCIUM 9.5  --  9.3   MG  --  2.10  --    ALBUMIN 3.4  --   --    ALKPHOS 88  --   --    ALT 18  --   --    AST 12  --   --    BILITOT 1.9*  --   --           Microbiology Results (last 7 days)       Procedure Component Value Units Date/Time    Respiratory Culture [[id number]]  (Abnormal) Collected: 01/05/23 0057    Order Status: Completed Specimen: Sputum, Expectorated Updated: 01/06/23 0805     Respiratory Culture Moderate Gram-negative Rods     Comment: with normal respiratory nasra        GRAM STAIN  Quality 3+      Many Gram positive cocci in pairs and chains      Moderate Gram Negative Rods    Blood Culture #1 **CANNOT BE ORDERED STAT** [[id number]]  (Normal) Collected: 01/04/23 2058    Order Status: Completed Specimen: Blood Updated: 01/05/23 2201     CULTURE, BLOOD ([facility]) No Growth At 24 Hours    Blood Culture #2 **CANNOT BE ORDERED STAT** [[id number]]  (Normal) Collected: 01/04/23 2058    Order Status: Completed Specimen: Blood Updated: 01/05/23 2201     CULTURE, BLOOD ([facility]) No Growth At 24 Hours             See below for Radiology    Scheduled Med:   albuterol-ipratropium  3 mL Nebulization Q4H    aspirin  81 mg Oral Daily    atorvastatin  10 mg Oral Daily    azithromycin  250 mg Oral Daily    cefTRIAXone (ROCEPHIN) IVPB  1 g Intravenous Q24H    enoxaparin  40 mg Subcutaneous Daily    fluticasone furoate-vilanteroL  1 puff Inhalation Daily    And    umeclidinium  1 capsule Inhalation Daily    guaiFENesin  600 mg Oral BID    methylPREDNISolone sodium succinate injection  40 mg Intravenous Q8H    montelukast  10 mg Oral Daily    ranolazine  500 mg Oral BID    roflumilast  250 mcg Oral Daily    sertraline  100 mg Oral Daily        Continuous Infusions:   amino acid 4.25% - dextrose 5% solution 100 mL/hr at 01/06/23 1443        PRN Meds:  acetaminophen, acetaminophen, aluminum-magnesium hydroxide-simethicone, benzonatate, dextromethorphan-guaiFENesin  mg/5 ml, melatonin, ondansetron, polyethylene glycol, prochlorperazine, senna-docusate 8.6-50 mg, simethicone, sodium chloride 0.9%       Assessment/Plan:  Sepsis  Acute on chronic hypoxic respiratory failure  Aspiration pneumonia  End-stage chronic obstructive pulmonary disease with exacerbation  Oropharyngeal dysphagia  Nonobstructive coronary artery disease   Stage IIIA chronic kidney disease   Anxiety disorder      Plan:  Continue supplemental oxygen, nebulized bronchodilators, intravenous corticosteroids, empiric antibiotic therapy and follow-up on  cultures  Case discussed with speech language pathologist who recommends nothing by mouth status  She reports patient with poor airway protection with all consistencies and SILENT aspiration.  Unable to clear vallecular residue which explains why he said he coughs up food particles  Continue daily speech therapy  Initiate Clinimix for hydration and nutritional support   Resume appropriate home medications  Request palliative medicine evaluation  I recommended home hospice to the patient and his wife  Patient and wife are in extreme denial about his severe aspiration and are questioning his diagnosis and treatment.  I will request a second opinion from one of my hospitalist colleagues tomorrow.        Critical Care Diagnosis: Acute hypoxic respiratory failure requiring high flow supplemental oxygen; sepsis requiring the use of broad-spectrum intravenous antibiotic therapy  Critical Care Interventions: Hands-on evaluation, review of labs, radiographs, medical records, and discussion with the patient and medical staff in order to assess and manage the high probability of imminent or life-threatening deterioration of cardio-respiratory status requiring vasopressor support and/or intubation and mechanical ventilation.  Critical Care Time Spent: 35 minutes        VTE prophylaxis:  Lovenox    Patient condition:  Stable    Anticipated discharge and Disposition:     TBD    All diagnosis and differential diagnosis have been reviewed; assessment and plan has been documented; I have personally reviewed the labs and test results that are presently available; I have reviewed the patients medication list; I have reviewed the consulting providers response and recommendations. I have reviewed or attempted to review medical records based upon their availability    All of the patient's questions have been  addressed and answered. Patient's is agreeable to the above stated plan. I will continue to monitor closely and make adjustments  to medical management as needed.  _____________________________________________________________________      Radiology:  Fl Modified Barium Swallow Speech  See procedure notes from Speech Pathologist.    This procedure was auto-finalized.      Buck Guajardo MD   01/06/2023

## 2023-01-06 NOTE — NURSING
Nurses Note -- 4 Eyes      1/6/2023   3:49 PM      Skin assessed during: Admit      [x] No Pressure Injuries Present    []Prevention Measures Documented      [] Yes- Altered Skin Integrity Present or Discovered   [] LDA Added if Not in Epic (Describe Wound)   [] New Altered Skin Integrity was Present on Admit and Documented in LDA   [] Wound Image Taken    Wound Care Consulted? No    Attending Nurse:  Belkys Clifford LPN     Second RN/Staff Member:  Hellen Jane LPN

## 2023-01-06 NOTE — PLAN OF CARE
01/06/23 1019   Discharge Assessment   Assessment Type Discharge Planning Assessment   Confirmed/corrected address, phone number and insurance Yes   Confirmed Demographics Correct on Facesheet   Source of Information patient   People in Home spouse   Do you expect to return to your current living situation? Yes   Do you have help at home or someone to help you manage your care at home? Yes   Prior to hospitilization cognitive status: Alert/Oriented   Current cognitive status: Alert/Oriented   Equipment Currently Used at Home oxygen;CPAP;nebulizer   Readmission within 30 days? No   Patient currently being followed by outpatient case management? No   Do you currently have service(s) that help you manage your care at home? No   Do you take prescription medications? Yes   Do you have prescription coverage? Yes   Do you have any problems affording any of your prescribed medications? No   Is the patient taking medications as prescribed? yes   How do you get to doctors appointments? car, drives self   Are you on dialysis? No   Do you take coumadin? No   Discharge Plan A Home with family   Discharge Plan B Home Health   DME Needed Upon Discharge  none   Discharge Plan discussed with: Patient   Discharge Barriers Identified None     Assessment done with patient. Patient lives in a single story home with spouse. Patient reports being independent prior to admission. Patient goes to pulmonary rehab 3x per week in [place]. DME in home: oxygen via [employer] 2L at night, CPAP, and nebulizer. PCP: Dr. Love, [facility]acy: [facility]

## 2023-01-06 NOTE — PROCEDURES
Speech Language Pathology Department  Modified Barium Swallow Study    Patient Name:  Helder Sharp Jr.   MRN:  [id number]  Diagnosis: COPD exacerbation       Recommendations:     General recommendations:  dysphagia therapy and consider additional imaging of the soft tissue of the head and neck to rule out pathology  Repeat MBS study: 5-7 days  Diet recommendations:  NPO, Liquid Diet Level: NPO   General precautions: Standard, aspiration    Reason for Referral:     A Modified Barium Swallow Study was completed to assess the efficiency of his/her swallow function, rule out aspiration and make recommendations regarding safe dietary consistencies, effective compensatory strategies, and safe eating environment.     History:     Past Medical History:   Diagnosis Date    Acute bronchitis     Anxiety disorder, unspecified     COPD (chronic obstructive pulmonary disease)     Coronary artery disease     Hyperlipidemia     Left carotid bruit     Pulmonary nodules     Unspecified cataract      Past Surgical History:   Procedure Laterality Date    APPENDECTOMY      CATARACT EXTRACTION Left 01/03/2023    HERNIA REPAIR      PHACOEMULSIFICATION, CATARACT, WITH IOL INSERTION Left 1/3/2023    Procedure: PHACOEMULSIFICATION, CATARACT, WITH IOL INSERTION- OS;  Surgeon: Louis Abdi MD;  [facility];  Service: Ophthalmology;  Laterality: Left;    TONSILLECTOMY         Home Diet: Regular and thin liquids  Current Method of Nutrition: PO diet (regular with thin)    Patient complaint: occasional coughing with meals and bringing up food particles    Subjective:     Patient awake, alert, and cooperative.    Spiritual/Cultural/Confucianist Beliefs/Practices that affect care: no    Pain/Comfort: Pain Rating 1: 0/10    Respiratory Status: nasal cannula    Fluoroscopic Results:     Oral Musculature Evaluation:  Oral Musculature: WFL  Dentition: present and adequate  Secretion Management: adequate  Mucosal Quality: good  Oral Labial Strength  and Mobility: WFL  Lingual Strength and Mobility: WFL  Voice Prior to PO Intake: clear    Visualization  Patient was seen in the lateral view  Soft tissue protrusion at the posterior pharyngeal wall    Oral Phase:   Adequate lip closure  Adequate bolus formation  Adequate anterior-posterior transport  Reduced bolus control with liquids    Pharyngeal Phase:   Swallow delay with spill to the vallecular  Reduced base of tongue retraction  Reduced epiglottic deflection  Reduced hyolaryngeal excursion  Poor airway protection  Severe vallecular residue with puree did NOT clear despite 4 additional swallows and a liquid wash  Consistency Laryngeal Penetration Aspiration   Mildly thick liquid by cup During the swallow  Did NOT clear None   Puree None None   Thin liquid by cup During the swallow  Did NOT clear After the swallow  SILENT  Cued cough NOT productive   Moderately thick liquid by cup After the swallow  Did NOT clear  (Of pyriform sinus residue) None     Cervical Esophageal Phase:   UES appeared to accommodate all bolus types without stasis or retrograde movement visualized    Assessment:     Pt exhibited severe oropharyngeal dysphagia with SILENT aspiration of thin liquids, laryngeal penetration of mildly/moderately thick liquids and severe vallecular residue with puree which did NOT clear despite multiple additional swallow.    Goals:   Multidisciplinary Problems       SLP Goals          Problem: SLP    Goal Priority Disciplines Outcome   SLP Goal     SLP Ongoing, Progressing   Description:   LTG: Tolerate least restrictive PO diet with no clinical signs/sx aspiration  ST.  Tolerate 2oz of ice chips with no clinical signs/sx aspiration  2.  Complete base of tongue and laryngeal strengthening exercises with min cues  3.  Tolerate thermal stimulation to the anterior faucial pillars with 100% effortful swallow responses and delay less than 2 seconds.                         Patient Education:     Patient  provided with verbal and video education regarding MBS results, recommendations, risks of aspiration/recurrent PNA, and SLP POC.  Understanding was verbalized.    Plan:     SLP Follow-Up:  Yes    Patient to be seen:  daily   Plan of Care expires:  01/13/23  Plan of Care reviewed with:  patient     Time Tracking:     SLP Treatment Date:   01/06/23  Speech Start Time:  1045  Speech Stop Time:  1105     Speech Total Time (min):  20 min    Billable minutes:   Motion Fluoroscopic Evaluation, Video Recording, 20 minutes      01/06/2023

## 2023-01-06 NOTE — PT/OT/SLP PROGRESS
SLP orders received, chart reviewed, and nursing/pt/md consulted.  CT of the chest suspicious for aspiration.  Pt reports occasional coughing with meals and bringing up food particles.  Rec MBS study for comprehensive assessment of swallow function.  Pt in agreement with POC.

## 2023-01-07 NOTE — PROGRESS NOTES
[facility]  Hospital Medicine Progress Note        Chief Complaint: Inpatient Follow-up for aspiration pneumonia and respiratory failure     HPI:   This is an 82-year-old white male with medical history of GOLD stage IV COPD on on supplemental oxygen at 2 L per minute via nasal cannula, who presented to the ED with complaints of worsening shortness of breath for 1 day.  Patient underwent cataract surgery on 01/03/2023 and reports that the same day when he returned home he started having worsening cough productive of purulent sputum, wheezing, and shortness of breath at rest.  Denies chest pain, fever or chills.  Report his oxygen saturation started to drop below 89% requiring him to increase his supplemental oxygen to 3 liters/minute.     On arrival to ED, he was afebrile, normotensive and saturating between 88-92% on 3 L nasal cannula.  Labs notable for WBC 31, hemoglobin 14.7, platelets 206, creatinine 1.33, BUN 29, BNP 41, and troponin 0.016, CT chest without contrast revealed severe emphysematous changes in the lung bilaterally and interval development of opacities predominantly in the bilateral lower lobes right greater than left which may reflect superimposed infectious process.  COVID-19 and influenza swabs were negative.     He was given nebulized bronchodilators, ceftriaxone and azithromycin and very promptly admitted to the hospital medicine service for further evaluation and management.     Interval Hx:    had an extended conversation with the patient and his wife at bedside.  Reviewed his previous findings and previous physician's recommendations.  They were upset yesterday as a felt like they were not getting the entire picture and scope of his disease process.  I feel at the end of our conversation they have a much greater understanding of what is going on.  We discussed him remaining NPO for now that we get him some IV nutrition.  He will continue work with speech therapy  over the coming days.  He does remain full code at this time.  He reports that if speech therapy is unable to clear him he would be interested in a PEG tube placement but this would be a last resort.  He states that he still has a lot of light to live and he does not think that he is ready for hospice care though he also is not interested in being on a ventilator long-term.    Will continue working with him and continue his IV antibiotics and taking his course of day at a time.  He is much more willing to work with therapy and to follow instructions now that he understands his disease process and prognosis    Objective/physical exam:  General:  Elderly white male in mild respiratory distress  HENT: normocephalic, atraumatic, poor dentition  Eye: PERRL, EOMI, clear conjunctiva, corrective lenses  Neck: full ROM, no thyromegaly, no JVD  Respiratory: diminished breath sounds with prolonged expiratory phase  Cardiovascular: regular rate and rhythm  Gastrointestinal: non-distended, positive bowel sounds, non-tender  Musculoskeletal: no gross deformity  Integumentary: warm, dry, intact, no rashes  Neurological: cranial nerves grossly intact, no focal neurological deficit  Psychiatric:  Very anxious  VITAL SIGNS: 24 HRS MIN & MAX LAST   Temp  Min: 97.7 °F (36.5 °C)  Max: 98.1 °F (36.7 °C) 97.8 °F (36.6 °C)   BP  Min: 104/51  Max: 156/69 (!) 152/74     Pulse  Min: 85  Max: 108  87   Resp  Min: 16  Max: 22 18   SpO2  Min: 91 %  Max: 94 % (!) 93 %         Recent Labs   Lab 01/04/23  1623 01/05/23  0417 01/07/23  0452   WBC 31.1* 23.1* 17.7*   RBC 4.96 4.37* 4.11*   HGB 14.7 13.0* 12.0*   HCT 45.4 39.7* 37.8*   MCV 91.5 90.8 92.0   MCH 29.6 29.7 29.2   MCHC 32.4* 32.7* 31.7*   RDW 14.1 14.1 13.9    182 194   MPV 9.2* 9.8 9.4       Recent Labs   Lab 01/04/23  1623 01/05/23  0417 01/05/23  0917 01/07/23  0452     --  137 142   K 4.1  --  3.9 4.2   CO2 20*  --  23 26   BUN 29.8*  --  28.1* 29.2*   CREATININE 1.33*   --  1.23* 1.06   CALCIUM 9.5  --  9.3 9.2   MG  --  2.10  --   --    ALBUMIN 3.4  --   --   --    ALKPHOS 88  --   --   --    ALT 18  --   --   --    AST 12  --   --   --    BILITOT 1.9*  --   --   --           Microbiology Results (last 7 days)       Procedure Component Value Units Date/Time    Blood Culture #1 **CANNOT BE ORDERED STAT** [[id number]]  (Normal) Collected: 01/04/23 2058    Order Status: Completed Specimen: Blood Updated: 01/06/23 2201     CULTURE, BLOOD ([facility]) No Growth At 48 Hours    Blood Culture #2 **CANNOT BE ORDERED STAT** [[id number]]  (Normal) Collected: 01/04/23 2058    Order Status: Completed Specimen: Blood Updated: 01/06/23 2201     CULTURE, BLOOD ([facility]) No Growth At 48 Hours    Respiratory Culture [[id number]]  (Abnormal) Collected: 01/05/23 0057    Order Status: Completed Specimen: Sputum, Expectorated Updated: 01/06/23 0805     Respiratory Culture Moderate Gram-negative Rods     Comment: with normal respiratory nasra        GRAM STAIN Quality 3+      Many Gram positive cocci in pairs and chains      Moderate Gram Negative Rods             See below for Radiology    Scheduled Med:   albuterol-ipratropium  3 mL Nebulization Q4H    aspirin  81 mg Oral Daily    atorvastatin  10 mg Oral Daily    azithromycin  250 mg Oral Daily    cefTRIAXone (ROCEPHIN) IVPB  1 g Intravenous Q24H    enoxaparin  40 mg Subcutaneous Daily    fluticasone furoate-vilanteroL  1 puff Inhalation Daily    And    umeclidinium  1 capsule Inhalation Daily    guaiFENesin  600 mg Oral BID    methylPREDNISolone sodium succinate injection  40 mg Intravenous Q8H    montelukast  10 mg Oral Daily    ranolazine  500 mg Oral BID    roflumilast  250 mcg Oral Daily    sertraline  100 mg Oral Daily    sodium chloride 0.9%  10 mL Intravenous Q6H        Continuous Infusions:   amino acid 4.25% - dextrose 5% solution 100 mL/hr at 01/06/23 1443        PRN Meds:  acetaminophen, acetaminophen, aluminum-magnesium hydroxide-simethicone,  benzonatate, dextromethorphan-guaiFENesin  mg/5 ml, melatonin, ondansetron, polyethylene glycol, prochlorperazine, senna-docusate 8.6-50 mg, simethicone, sodium chloride 0.9%, Flushing PICC Protocol **AND** sodium chloride 0.9% **AND** sodium chloride 0.9%       Assessment/Plan:   Severe Sepsis  Acute on chronic hypoxic respiratory failure  Aspiration pneumonia  End-stage chronic obstructive pulmonary disease with exacerbation  Oropharyngeal dysphagia  Nonobstructive coronary artery disease   Stage IIIA chronic kidney disease   Anxiety disorder    Continue with empiric Rocephin and azithromycin for now.  His leukocytosis continues to improve.  Will continue to wean his O2 as tolerated.  He is currently on 4 L via nasal cannula.    Speech therapy is following along.  He remains NPO for now.  Plan to repeat his MBS next week.  After discussions with the patient we have decided to start some IV nutrition.  He is willing to try an NG tube with tube feeds if he does not make any progress with his swallowing and would consider a PEG tube in the future if absolutely necessary.    Palliative Care continues to follow along in his meeting with the patient and his wife.  He remains full code at this time  I will also reach out to his PCP to inform him of our conversations.    His electrolytes are stable this morning.  Renal function seems to be back at baseline.      Critical care diagnosis: acute hypoxemic respiratory failure requiring high-flow oxygen  Critical care interventions: hands on evaluation, review of labs/radiographs/records and discussions with family  Critical care time spent: >32 minutes    Advanced Directives:  I spent 30 mins of face to face discussion regarding advanced directives and end of life planning which included the patient and patients family, who concluded, that they would like to remain FULL CODE. The patient understands the seriousness of his/her condition and would like to make his/her end  of life decision known.          Patient condition:  guarded    Anticipated discharge and Disposition: TBD      All diagnosis and differential diagnosis have been reviewed; assessment and plan has been documented; I have personally reviewed the labs and test results that are presently available; I have reviewed the patients medication list; I have reviewed the consulting providers response and recommendations. I have reviewed or attempted to review medical records based upon their availability    All of the patient's questions have been  addressed and answered. Patient's is agreeable to the above stated plan. I will continue to monitor closely and make adjustments to medical management as needed.  _____________________________________________________________________      Radiology:  Fl Modified Barium Swallow Speech  See procedure notes from Speech Pathologist.    This procedure was auto-finalized.      Kali Mehta MD   01/07/2023

## 2023-01-07 NOTE — PT/OT/SLP EVAL
Physical Therapy Evaluation and Discharge Note    Patient Name:  Helder Sharp Jr.   MRN:  [id number]    Recommendations:     Discharge Recommendations: home with home health  Discharge Equipment Recommendations:     Barriers to discharge: None    Assessment:     Helder Sharp Jr. is a 82 y.o. male admitted with a medical diagnosis of COPD exacerbation. .  At this time, patient is functioning at their prior level of function and does not require further acute PT services.     Recent Surgery: * No surgery found *      Plan:     During this hospitalization, patient does not require further acute PT services.  Please re-consult if situation changes.      Subjective     Chief Complaint: difficulty breathing  Patient/Family Comments/goals: to have a good quality life  Pain/Comfort:  Pain Rating 1: 0/10    Patients cultural, spiritual, Faith conflicts given the current situation: no    Living Environment:  Lives at home with wife  Prior to admission, patients level of function was independent in ADL.  Equipment used at home: none.  DME owned (not currently used): rolling walker.  Upon discharge, patient will have assistance from family and with C.    Objective:     Communicated with nursing prior to session.  Patient found HOB elevated with peripheral IV, oxygen upon PT entry to room.    General Precautions: Standard,         Respiratory Status: Nasal cannula, flow 2 L/min    Exams:  RUE ROM: WNL  RUE Strength: WNL  LUE ROM: WNL  LUE Strength: WNL  RLE ROM: WNL  RLE Strength: WFL  LLE ROM: WNL  LLE Strength: WFL    Functional Mobility:  Bed Mobility:     Supine to Sit: independence  Sit to Supine: independence  Transfers:     Sit to Stand:  modified independence with no AD  Bed to Chair: modified independence with  no AD  using  Step Transfer  Gait: ambulate in modified ind x 80ft w/o using AD    AM-PAC 6 CLICK MOBILITY  Total Score:21       AM-PAC 6 CLICK MOBILITY  Total Score:21     Patient left HOB elevated  with all lines intact and call button in reach.    GOALS:   Multidisciplinary Problems       Physical Therapy Goals       Not on file                    History:     Past Medical History:   Diagnosis Date    Acute bronchitis     Anxiety disorder, unspecified     COPD (chronic obstructive pulmonary disease)     Coronary artery disease     Hyperlipidemia     Left carotid bruit     Pulmonary nodules     Unspecified cataract        Past Surgical History:   Procedure Laterality Date    APPENDECTOMY      CATARACT EXTRACTION Left 01/03/2023    HERNIA REPAIR      PHACOEMULSIFICATION, CATARACT, WITH IOL INSERTION Left 1/3/2023    Procedure: PHACOEMULSIFICATION, CATARACT, WITH IOL INSERTION- OS;  Surgeon: Louis Abdi MD;  Location: [facility];  Service: Ophthalmology;  Laterality: Left;    TONSILLECTOMY         Time Tracking:     PT Received On:    PT Start Time: 1058     PT Stop Time: 1123  PT Total Time (min): 25 min     Billable Minutes: Evaluation Low complexity      01/07/2023

## 2023-01-07 NOTE — PLAN OF CARE
Problem: Adult Inpatient Plan of Care  Goal: Plan of Care Review  Outcome: Ongoing, Progressing  Goal: Patient-Specific Goal (Individualized)  Outcome: Ongoing, Progressing  Goal: Absence of Hospital-Acquired Illness or Injury  Outcome: Ongoing, Progressing  Goal: Optimal Comfort and Wellbeing  Outcome: Ongoing, Progressing  Goal: Readiness for Transition of Care  Outcome: Ongoing, Progressing     Problem: Coping Ineffective  Goal: Effective Coping  Outcome: Ongoing, Progressing

## 2023-01-07 NOTE — PT/OT/SLP PROGRESS
Speech Language Pathology Department  Dysphagia Therapy Progress Note    Patient Name:  Helder Sharp Jr.   MRN:  [id number]  Admitting Diagnosis: COPD exacerbation    Recommendations:     General recommendations:  dysphagia therapy  Diet recommendations:  NPO, Liquid Diet Level: NPO   Aspiration precautions:  NPO  Discharge recommendations:  home health speech therapy   Barriers to safe discharge:  acuity of illness and severity of impairment    Subjective     Patient awake, alert, and cooperative.    Pain/Comfort: Pain Rating 1: 0/10    Spiritual/Cultural/Samaritan Beliefs/Practices that affect care: no    Respiratory Status: nasal canula     Objective:     Oral Musculature Evaluation:  Oral Musculature: WFL  Dentition: present and adequate  Secretion Management: adequate  Mucosal Quality: good  Oral Labial Strength and Mobility: WFL  Lingual Strength and Mobility: WFL  Voice Prior to PO Intake: clear    Therapeutic Activities:  Pt completed base of tongue and laryngeal x100 with minimal-moderate cues.  Pt tolerated thermal stimulation to the anterior faucial pillars x10 with 100% swallow responses.  Laryngeal excursion fair.  Delay varied 2-5 seconds.    Assessment:     Pt continues to present with dysphagia and remains unsafe for PO intake.     Goals:   Multidisciplinary Problems       SLP Goals          Problem: SLP    Goal Priority Disciplines Outcome   SLP Goal     SLP Ongoing, Progressing   Description:   LTG: Tolerate least restrictive PO diet with no clinical signs/sx aspiration  ST.  Tolerate 2oz of ice chips with no clinical signs/sx aspiration  2.  Complete base of tongue and laryngeal strengthening exercises with min cues  3.  Tolerate thermal stimulation to the anterior faucial pillars with 100% effortful swallow responses and delay less than 2 seconds.                         Patient Education:     Patient provided with verbal education regarding short-term goals and purpose of each goal in  regards to swallow fx.  Understanding was verbalized.    Plan:     Will continue to follow and tx as appropriate.    SLP Follow-Up:  Yes   Patient to be seen:  daily   Plan of Care expires:  01/13/23  Plan of Care reviewed with:  patient       Time Tracking:     SLP Treatment Date:   01/07/23  Speech Start Time:  0850  Speech Stop Time:  0910     Speech Total Time (min):  20 min    Billable minutes:  Treatment of Swallow Dysfunction, 20 minutes        01/07/2023

## 2023-01-07 NOTE — PROCEDURES
"Helder Sharp Jr. is a 82 y.o. male patient.    Temp: 97.7 °F (36.5 °C) (01/07/23 0442)  Pulse: 89 (01/07/23 0444)  Resp: 20 (01/07/23 0444)  BP: 125/64 (01/07/23 0442)  SpO2: (!) 93 % (01/07/23 0444)  Weight: 90.3 kg (199 lb) (01/04/23 1530)  Height: 6' 2" (188 cm) (01/04/23 1530)    PICC  Date/Time: 1/7/2023 5:44 AM  Performed by: Shiloh Munoz RN  Consent Done: Yes  Time out: Immediately prior to procedure a time out was called to verify the correct patient, procedure, equipment, support staff and site/side marked as required  Indications: vascular access  Anesthesia: local infiltration  Local anesthetic: lidocaine 1% without epinephrine  Anesthetic Total (mL): 5  Preparation: skin prepped with ChloraPrep  Skin prep agent dried: skin prep agent completely dried prior to procedure  Sterile barriers: all five maximum sterile barriers used - cap, mask, sterile gown, sterile gloves, and large sterile sheet  Hand hygiene: hand hygiene performed prior to central venous catheter insertion  Location details: left basilic  Catheter type: single lumen  Catheter size: 4 Fr  Catheter Length: 13cm    Ultrasound guidance: yes  Vessel Caliber: medium and patent, compressibility normal  Needle advanced into vessel with real time Ultrasound guidance.  Guidewire confirmed in vessel.  Sterile sheath used.  Number of attempts: 1  Post-procedure: blood return through all ports, sterile dressing applied and chlorhexidine patch    Complications: none  Comments: Arm circumference 26cm. Clinimix/rocephin.        Name Shiloh Munoz RN  1/7/2023    "

## 2023-01-08 NOTE — PROGRESS NOTES
[facility] Hospital Medicine Progress Note        Chief Complaint: Inpatient Follow-up for  aspiration pneumonia and respiratory failure     HPI:   This is an 82-year-old white male with medical history of GOLD stage IV COPD on on supplemental oxygen at 2 L per minute via nasal cannula, who presented to the ED with complaints of worsening shortness of breath for 1 day.  Patient underwent cataract surgery on 01/03/2023 and reports that the same day when he returned home he started having worsening cough productive of purulent sputum, wheezing, and shortness of breath at rest.  Denies chest pain, fever or chills.  Report his oxygen saturation started to drop below 89% requiring him to increase his supplemental oxygen to 3 liters/minute.     On arrival to ED, he was afebrile, normotensive and saturating between 88-92% on 3 L nasal cannula.  Labs notable for WBC 31, hemoglobin 14.7, platelets 206, creatinine 1.33, BUN 29, BNP 41, and troponin 0.016, CT chest without contrast revealed severe emphysematous changes in the lung bilaterally and interval development of opacities predominantly in the bilateral lower lobes right greater than left which may reflect superimposed infectious process.  COVID-19 and influenza swabs were negative.     He was given nebulized bronchodilators, ceftriaxone and azithromycin and very promptly admitted to the hospital medicine service for further evaluation and management.     Interval Hx:   No changes overnight.  He is feeling well sitting up in the bed.  Walked with therapy yesterday.  States that his legs feel stronger just limited by dyspnea.  I discussed the case with speech therapy this morning.  States that he is doing much better with them.  Hopefully can repeat MBS on Tuesday or Wednesday.  He does seem to be getting stronger.  He is currently on 4.5 L nasal cannula and appears comfortable.  Oxygen saturations does dip into the upper 80s after speaking  with me.  He quickly recovers with a break.    Objective/physical exam:  General:  Elderly white male in mild respiratory distress  HENT: normocephalic, atraumatic, poor dentition  Eye: PERRL, EOMI, clear conjunctiva, corrective lenses  Neck: full ROM, no thyromegaly, no JVD  Respiratory: diminished breath sounds with prolonged expiratory phase  Cardiovascular: regular rate and rhythm  Gastrointestinal: non-distended, positive bowel sounds, non-tender  Musculoskeletal: no gross deformity  Integumentary: warm, dry, intact, no rashes  Neurological: cranial nerves grossly intact, no focal neurological deficit  Psychiatric:  Very anxious    VITAL SIGNS: 24 HRS MIN & MAX LAST   Temp  Min: 97.5 °F (36.4 °C)  Max: 98.1 °F (36.7 °C) 97.9 °F (36.6 °C)   BP  Min: 132/56  Max: 161/65 (!) 141/67     Pulse  Min: 76  Max: 95  84   Resp  Min: 17  Max: 20 18   SpO2  Min: 90 %  Max: 98 % (!) 94 %         Recent Labs   Lab 01/04/23 1623 01/05/23 0417 01/07/23  0452   WBC 31.1* 23.1* 17.7*   RBC 4.96 4.37* 4.11*   HGB 14.7 13.0* 12.0*   HCT 45.4 39.7* 37.8*   MCV 91.5 90.8 92.0   MCH 29.6 29.7 29.2   MCHC 32.4* 32.7* 31.7*   RDW 14.1 14.1 13.9    182 194   MPV 9.2* 9.8 9.4       Recent Labs   Lab 01/04/23 1623 01/05/23 0417 01/05/23  0917 01/07/23  0452     --  137 142   K 4.1  --  3.9 4.2   CO2 20*  --  23 26   BUN 29.8*  --  28.1* 29.2*   CREATININE 1.33*  --  1.23* 1.06   CALCIUM 9.5  --  9.3 9.2   MG  --  2.10  --   --    ALBUMIN 3.4  --   --   --    ALKPHOS 88  --   --   --    ALT 18  --   --   --    AST 12  --   --   --    BILITOT 1.9*  --   --   --           Microbiology Results (last 7 days)       Procedure Component Value Units Date/Time    Blood Culture #1 **CANNOT BE ORDERED STAT** [[id number]]  (Normal) Collected: 01/04/23 2058    Order Status: Completed Specimen: Blood Updated: 01/07/23 2200     CULTURE, BLOOD ([facility]) No Growth At 72 Hours    Blood Culture #2 **CANNOT BE ORDERED STAT** [[id number]]  (Normal)  Collected: 01/04/23 2058    Order Status: Completed Specimen: Blood Updated: 01/07/23 2200     CULTURE, BLOOD ([facility]) No Growth At 72 Hours    Respiratory Culture [[id number]]  (Abnormal)  (Susceptibility) Collected: 01/05/23 0057    Order Status: Completed Specimen: Sputum, Expectorated Updated: 01/07/23 1332     Respiratory Culture Moderate Pseudomonas aeruginosa     Comment: with normal respiratory nasra         Many Streptococcus pneumoniae     GRAM STAIN Quality 3+      Many Gram positive cocci in pairs and chains      Moderate Gram Negative Rods             See below for Radiology    Scheduled Med:   albuterol-ipratropium  3 mL Nebulization Q4H    aspirin  81 mg Oral Daily    atorvastatin  10 mg Oral Daily    cefTRIAXone (ROCEPHIN) IVPB  1 g Intravenous Q24H    enoxaparin  40 mg Subcutaneous Daily    fluticasone furoate-vilanteroL  1 puff Inhalation Daily    And    umeclidinium  1 capsule Inhalation Daily    guaiFENesin  600 mg Oral BID    methylPREDNISolone sodium succinate injection  40 mg Intravenous Q8H    montelukast  10 mg Oral Daily    ranolazine  500 mg Oral BID    roflumilast  250 mcg Oral Daily    sertraline  100 mg Oral Daily    sodium chloride 0.9%  10 mL Intravenous Q6H        Continuous Infusions:   amino acid 4.25% - dextrose 5% solution 100 mL/hr at 01/08/23 0647        PRN Meds:  acetaminophen, acetaminophen, aluminum-magnesium hydroxide-simethicone, benzonatate, dextromethorphan-guaiFENesin  mg/5 ml, melatonin, ondansetron, polyethylene glycol, prochlorperazine, senna-docusate 8.6-50 mg, simethicone, sodium chloride 0.9%, Flushing PICC Protocol **AND** sodium chloride 0.9% **AND** sodium chloride 0.9%       Assessment/Plan:   Severe Sepsis  Acute on chronic hypoxic respiratory failure  Aspiration pneumonia  End-stage chronic obstructive pulmonary disease with exacerbation  Oropharyngeal dysphagia  Nonobstructive coronary artery disease   Stage IIIA chronic kidney disease   Anxiety  disorder    Respiratory culture ended up growing both Pseudomonas which was pansensitive and strep pneumoniae.  He is currently on Rocephin alone.  Will add some ciprofloxacin to expand for pseudomonal coverage.    Overall he does feel like he is getting stronger.  His leukocytosis continues to trend down.  He is working with therapy services.    He remains NPO at this time.  Hopefully can repeat MBS later this week and get him started on a diet.  We did have conversation regarding plan if he is not able to pass his MBS.  He would be open to PEG tube placement if needed.    Otherwise his vital signs are stable.  He is getting parenteral nutrition currently.  Renal function has returned to baseline and electrolytes are stable.    Critical care diagnosis: acute hypoxemic respiratory failure requiring high-flow oxygen  Critical care interventions: hands on evaluation, review of labs/radiographs/records and discussions with family  Critical care time spent: >32 minutes      Patient condition:  Stable    Anticipated discharge and Disposition: TBD      All diagnosis and differential diagnosis have been reviewed; assessment and plan has been documented; I have personally reviewed the labs and test results that are presently available; I have reviewed the patients medication list; I have reviewed the consulting providers response and recommendations. I have reviewed or attempted to review medical records based upon their availability    All of the patient's questions have been  addressed and answered. Patient's is agreeable to the above stated plan. I will continue to monitor closely and make adjustments to medical management as needed.  _____________________________________________________________________      Radiology:  Fl Modified Barium Swallow Speech  See procedure notes from Speech Pathologist.    This procedure was auto-finalized.      Kali Mehta MD   01/08/2023

## 2023-01-08 NOTE — PLAN OF CARE
Problem: Adult Inpatient Plan of Care  Goal: Plan of Care Review  Outcome: Ongoing, Progressing  Goal: Patient-Specific Goal (Individualized)  Outcome: Ongoing, Progressing  Goal: Absence of Hospital-Acquired Illness or Injury  Outcome: Ongoing, Progressing  Goal: Optimal Comfort and Wellbeing  Outcome: Ongoing, Progressing  Goal: Readiness for Transition of Care  Outcome: Ongoing, Progressing     Problem: Coping Ineffective  Goal: Effective Coping  Outcome: Ongoing, Progressing     Problem: Infection  Goal: Absence of Infection Signs and Symptoms  Outcome: Ongoing, Progressing

## 2023-01-08 NOTE — PT/OT/SLP PROGRESS
Speech Language Pathology Department  Dysphagia Therapy Progress Note    Patient Name:  Helder Sharp Jr.   MRN:  [id number]  Admitting Diagnosis: COPD exacerbation    Recommendations:     General recommendations:  dysphagia therapy  Diet recommendations:  NPO, Liquid Diet Level: NPO   Aspiration precautions:  NPO    Discharge recommendations:  home with home health   Barriers to safe discharge:  acuity of illness and severity of impairment    Subjective     Patient awake, alert, and cooperative.    Pain/Comfort: Pain Rating 1: 0/10    Spiritual/Cultural/Jainism Beliefs/Practices that affect care: no    Respiratory Status: nasal canula     Objective:     Oral Musculature Evaluation:  Oral Musculature: WFL  Dentition: present and adequate  Secretion Management: adequate  Mucosal Quality: good  Oral Labial Strength and Mobility: WFL  Lingual Strength and Mobility: WFL  Voice Prior to PO Intake: clear    Therapeutic Activities:  Pt completed base of tongue and laryngeal x100 with minimal cues.  Pt completed andrea maneuver x10 with minimal cues.  Pt tolerated thermal stimulation to the anterior faucial pillars x10 with 100% swallow responses.  Laryngeal excursion fair.  Delay varied 2-4 seconds.    Assessment:     Pt continues to present with dysphagia and remains unsafe for PO intake. Pt required increased breaks during this session, however remained motivated to participate and complete therapeutic activities.     Goals:   Multidisciplinary Problems       SLP Goals          Problem: SLP    Goal Priority Disciplines Outcome   SLP Goal     SLP Ongoing, Progressing   Description:   LTG: Tolerate least restrictive PO diet with no clinical signs/sx aspiration  ST.  Tolerate 2oz of ice chips with no clinical signs/sx aspiration  2.  Complete base of tongue and laryngeal strengthening exercises with min cues  3.  Tolerate thermal stimulation to the anterior faucial pillars with 100% effortful swallow responses and  delay less than 2 seconds.                         Patient Education:     Patient provided with verbal education regarding plan of care.  Understanding was verbalized.    Plan:     Will continue to follow and tx as appropriate.    SLP Follow-Up:  Yes   Patient to be seen:  daily   Plan of Care expires:  01/13/23  Plan of Care reviewed with:  patient       Time Tracking:     SLP Treatment Date:   01/08/23  Speech Start Time:  1000  Speech Stop Time:  1020     Speech Total Time (min):  20 min    Billable minutes:  Treatment of Swallow Dysfunction, 20 minutes        01/08/2023

## 2023-01-09 NOTE — PT/OT/SLP PROGRESS
Speech Language Pathology Department  Dysphagia Therapy Progress Note    Patient Name:  Helder Sharp Jr.   MRN:  [id number]  Admitting Diagnosis: COPD exacerbation    Recommendations:     General recommendations:  dysphagia therapy  Diet recommendations:  NPO, Liquid Diet Level: NPO   Discharge recommendations:  home with home health   Barriers to safe discharge:  severity of impairment    Subjective     Patient awake and alert.    Pain/Comfort: Pain Rating 1: 0/10    Spiritual/Cultural/Islam Beliefs/Practices that affect care: no      Objective:     Oral Musculature Evaluation:  Oral Musculature: WFL  Dentition: present and adequate  Secretion Management: adequate  Mucosal Quality: good  Oral Labial Strength and Mobility: WFL  Lingual Strength and Mobility: WFL  Voice Prior to PO Intake: clear    Therapeutic Activities:  Pt completed laryngeal elevation x30 with minimal cues.  Pt tolerated thermal stimulation to the anterior faucial pillars x40 with 100% swallow responses.  Delay 3-4 seconds.    Assessment:     Pt continues to present with severe oropharyngeal dysphagia and remains unsafe for PO intake.  Skilled SLP services continue to be warranted to tx dysphagia.    Goals:   Multidisciplinary Problems       SLP Goals          Problem: SLP    Goal Priority Disciplines Outcome   SLP Goal     SLP Ongoing, Progressing   Description:   LTG: Tolerate least restrictive PO diet with no clinical signs/sx aspiration  ST.  Tolerate 2oz of ice chips with no clinical signs/sx aspiration  2.  Complete base of tongue and laryngeal strengthening exercises with min cues  3.  Tolerate thermal stimulation to the anterior faucial pillars with 100% effortful swallow responses and delay less than 2 seconds.                         Patient Education:     Patient provided with verbal education regarding POC.  Understanding was verbalized.    Plan:     Will continue to follow and tx as appropriate.    SLP Follow-Up:  Yes    Patient to be seen:  daily   Plan of Care expires:  01/13/23  Plan of Care reviewed with:  patient       Time Tracking:     SLP Treatment Date:   01/09/23  Speech Start Time:  1210  Speech Stop Time:  1230     Speech Total Time (min):  20 min    Billable minutes:  Treatment of Swallow Dysfunction, 20 min        01/09/2023

## 2023-01-09 NOTE — PROGRESS NOTES
[facility] Hospital Medicine Progress Note        Chief Complaint: Inpatient Follow-up for dysphagia    HPI:   82-year-old white male with medical history of GOLD stage IV COPD on on supplemental oxygen at 2 L per minute via nasal cannula, who presented to the ED with complaints of worsening shortness of breath for 1 day.  Patient underwent cataract surgery on 01/03/2023 and reports that the same day when he returned home he started having worsening cough productive of purulent sputum, wheezing, and shortness of breath at rest.  Denies chest pain, fever or chills.  Report his oxygen saturation started to drop below 89% requiring him to increase his supplemental oxygen to 3 liters/minute.     On arrival to ED, he was afebrile, normotensive and saturating between 88-92% on 3 L nasal cannula.  Labs notable for WBC 31, hemoglobin 14.7, platelets 206, creatinine 1.33, BUN 29, BNP 41, and troponin 0.016, CT chest without contrast revealed severe emphysematous changes in the lung bilaterally and interval development of opacities predominantly in the bilateral lower lobes right greater than left which may reflect superimposed infectious process.  COVID-19 and influenza swabs were negative.     He was given nebulized bronchodilators, ceftriaxone and azithromycin and very promptly admitted to the hospital medicine service for further evaluation and management.    Interval Hx:  Patient doing well this morning.  His wife is at the bedside.  He states that he was able to ambulate without any difficulty.  Still with some shortness of breath with exertion but feeling stronger.  Vital stable overnight.  He is currently on 3 L nasal cannula with oxygen saturation in the mid 90s.  At a conversation with the speech therapist yesterday who states that he is getting stronger and doing well with therapy.  Plan to repeat MBS mid week    Objective/physical exam:  General: In no acute distress,  afebrile  Chest: Clear to auscultation bilaterally  Heart: RRR, +S1, S2, no appreciable murmur  Abdomen: Soft, nontender, BS +  MSK: Warm, no lower extremity edema, no clubbing or cyanosis  Neurologic: Alert and oriented x4, Cranial nerve II-XII intact, Strength 5/5 in all 4 extremities    VITAL SIGNS: 24 HRS MIN & MAX LAST   Temp  Min: 97.5 °F (36.4 °C)  Max: 98.2 °F (36.8 °C) 97.5 °F (36.4 °C)   BP  Min: 129/66  Max: 155/73 129/66   Pulse  Min: 69  Max: 84  74   Resp  Min: 17  Max: 24 18   SpO2  Min: 90 %  Max: 96 % (!) 94 %         Recent Labs   Lab 01/05/23 0417 01/07/23 0452 01/09/23 0347   WBC 23.1* 17.7* 10.3   RBC 4.37* 4.11* 4.37*   HGB 13.0* 12.0* 12.9*   HCT 39.7* 37.8* 39.6*   MCV 90.8 92.0 90.6   MCH 29.7 29.2 29.5   MCHC 32.7* 31.7* 32.6*   RDW 14.1 13.9 13.0    194 173   MPV 9.8 9.4 9.5       Recent Labs   Lab 01/04/23  1623 01/05/23 0417 01/05/23  0917 01/07/23 0452 01/09/23  0347     --  137 142 139   K 4.1  --  3.9 4.2 4.0   CO2 20*  --  23 26 23   BUN 29.8*  --  28.1* 29.2* 32.2*   CREATININE 1.33*  --  1.23* 1.06 0.93   CALCIUM 9.5  --  9.3 9.2 9.0   MG  --  2.10  --   --   --    ALBUMIN 3.4  --   --   --   --    ALKPHOS 88  --   --   --   --    ALT 18  --   --   --   --    AST 12  --   --   --   --    BILITOT 1.9*  --   --   --   --           Microbiology Results (last 7 days)       Procedure Component Value Units Date/Time    Blood Culture #1 **CANNOT BE ORDERED STAT** [[id number]]  (Normal) Collected: 01/04/23 2058    Order Status: Completed Specimen: Blood Updated: 01/08/23 2201     CULTURE, BLOOD ([facility]) No Growth At 96 Hours    Blood Culture #2 **CANNOT BE ORDERED STAT** [[id number]]  (Normal) Collected: 01/04/23 2058    Order Status: Completed Specimen: Blood Updated: 01/08/23 2201     CULTURE, BLOOD ([facility]) No Growth At 96 Hours    Respiratory Culture [[id number]]  (Abnormal)  (Susceptibility) Collected: 01/05/23 0057    Order Status: Completed Specimen: Sputum, Expectorated  Updated: 01/08/23 0916     Respiratory Culture Moderate Pseudomonas aeruginosa     Comment: with normal respiratory nasra         Many Streptococcus pneumoniae     GRAM STAIN Quality 3+      Many Gram positive cocci in pairs and chains      Moderate Gram Negative Rods             See below for Radiology    Scheduled Med:   albuterol-ipratropium  3 mL Nebulization Q4H    aspirin  81 mg Oral Daily    atorvastatin  10 mg Oral Daily    cefTRIAXone (ROCEPHIN) IVPB  1 g Intravenous Q24H    ciprofloxacin  400 mg Intravenous Q8H    enoxaparin  40 mg Subcutaneous Daily    fluticasone furoate-vilanteroL  1 puff Inhalation Daily    And    umeclidinium  1 capsule Inhalation Daily    guaiFENesin  600 mg Oral BID    methylPREDNISolone sodium succinate injection  40 mg Intravenous Q8H    montelukast  10 mg Oral Daily    ranolazine  500 mg Oral BID    roflumilast  250 mcg Oral Daily    sertraline  100 mg Oral Daily    sodium chloride 0.9%  10 mL Intravenous Q6H        Continuous Infusions:   amino acid 4.25% - dextrose 5% solution 100 mL/hr at 01/09/23 0548        PRN Meds:  acetaminophen, acetaminophen, aluminum-magnesium hydroxide-simethicone, benzonatate, dextromethorphan-guaiFENesin  mg/5 ml, melatonin, ondansetron, polyethylene glycol, prochlorperazine, senna-docusate 8.6-50 mg, simethicone, sodium chloride 0.9%, Flushing PICC Protocol **AND** sodium chloride 0.9% **AND** sodium chloride 0.9%       Assessment/Plan:   Severe Sepsis  Acute on chronic hypoxic respiratory failure  Aspiration pneumonia  End-stage chronic obstructive pulmonary disease with exacerbation  Oropharyngeal dysphagia  Nonobstructive coronary artery disease   Stage IIIA chronic kidney disease   Anxiety disorder     Continue with Rocephin and ciprofloxacin.  His leukocytosis has come down to normal.  Would like him to at least get 7 days of Pseudomonas coverage.  These can likely be changed over to oral once he is cleared for a enteral intake.    He  remains NPO and working with speech therapy.  Plan to repeat MBS later this week.    As discussed in previous notes he would be open to a PEG tube if needed but is hoping that he will be able to swallow.  Speech reported yesterday that he is doing well with his exercises.    Renal function has returned to baseline electrolytes are stable.  Otherwise his vitals are good.    His oxygen saturation is improving.  He is down to 3 L and saturating in the mid 90s.  This can likely be weaned further.  Went to avoid over oxygenation as he does have a history of chronic COPD.  Goal to keep his oxygen saturation between 88 and 94%.    Patient condition:  Stable    Anticipated discharge and Disposition: TBD      All diagnosis and differential diagnosis have been reviewed; assessment and plan has been documented; I have personally reviewed the labs and test results that are presently available; I have reviewed the patients medication list; I have reviewed the consulting providers response and recommendations. I have reviewed or attempted to review medical records based upon their availability    All of the patient's questions have been  addressed and answered. Patient's is agreeable to the above stated plan. I will continue to monitor closely and make adjustments to medical management as needed.  _____________________________________________________________________      Radiology:  Fl Modified Barium Swallow Speech  See procedure notes from Speech Pathologist.    This procedure was auto-finalized.      Kali Mehta MD   01/09/2023

## 2023-01-09 NOTE — CONSULTS
"ConsultsPatient Name: Helder Sharp Jr.   MRN: [id number]   Admission Date: 1/4/2023   Hospital Length of Stay: 5   Attending Provider: Kali Mehta MD   Consulting Provider: Kwasi Phipps M.D.  Reason for Consult: Goals of Care  Primary Care Physician: Kimo Love MD     Principal Problem: COPD exacerbation     Patient information was obtained from patient, spouse/SO, past medical records, ER records, and primary team.      Final diagnoses:  [R06.03] Acute respiratory distress  [J18.9] Pneumonia of both lungs due to infectious organism, unspecified part of lung     Assessment/Plan:     I reviewed the patient's current clinical status with the nurse, speech therapist and hospitalist. I reviewed clinical documentation, labs and imaging. I reviewed goals of care again with the patient including code status, choice for or against future intubation and PEG option. The patient has not changed his decisions. He stated, "well you know we have a living will." I explained to him that this indicates that he would elect to discontinue tube/ ventilator if he was unable to safely wean to extubation. I explained that my question is whether or not he would wish to be intubated and placed on mechanical ventilation in a cardiopulmonary arrest or non-cardiopulmonary arrest event. He said that in this scenario he would wish to be intubated and placed on mechanical ventilator. He also specified that in a cardiopulmonary arrest event, he would wish to have full resuscitative measures taken.     I reviewed the fact that he has continued to demonstrate no improvement in his swallow ability per speech therapy. He will have a repeat MBS in the next couple of days. I asked if he were unable to pass the test, would he elect PEG. He said no he would not agree to PEG placement. I told him that the alternative would be to elect a modified diet and discharge home but that he would remain with high risk of repeat aspiration. He said, "I " "don't yet know what I will do, I will cross that bridge when I come to it. I suspect I will just go home." I asked further about the option of hospice at home. He said. "I'm not really ready for that but I may give them a chance any way."    I will continue to follow-up patient's goals prior to discharge.    History of Present Illness:     83 y/o WM h/o advanced COPD stage IV on home O2 and currently undergoing pulmonary rehab, currently admitted with acute on chronic respiratory failure and bilateral aspiration pneumonia. He failed a MBS with speech therapy. We were consulted to review goals of care with patient and family.      Active Ambulatory Problems     Diagnosis Date Noted    Generalized anxiety disorder 09/27/2022    COPD exacerbation 09/27/2022    Former smoker 09/27/2022    Pulmonary nodule 09/27/2022    Carotid bruit 09/27/2022    Hyperlipidemia 09/27/2022    Inguinal hernia 09/27/2022    Plantar fasciitis 09/27/2022    Polyp of colon 09/27/2022     Resolved Ambulatory Problems     Diagnosis Date Noted    No Resolved Ambulatory Problems     Past Medical History:   Diagnosis Date    Acute bronchitis     Anxiety disorder, unspecified     COPD (chronic obstructive pulmonary disease)     Coronary artery disease     Left carotid bruit     Pulmonary nodules     Unspecified cataract         Past Surgical History:   Procedure Laterality Date    APPENDECTOMY      CATARACT EXTRACTION Left 01/03/2023    HERNIA REPAIR      PHACOEMULSIFICATION, CATARACT, WITH IOL INSERTION Left 1/3/2023    Procedure: PHACOEMULSIFICATION, CATARACT, WITH IOL INSERTION- OS;  Surgeon: Louis Abdi MD;  Location: [facility];  Service: Ophthalmology;  Laterality: Left;    TONSILLECTOMY          Review of patient's allergies indicates:   Allergen Reactions    Flexeril [cyclobenzaprine]           Current Facility-Administered Medications:     acetaminophen tablet 1,000 mg, 1,000 mg, Oral, Q6H PRN, Boubacar Fair MD    acetaminophen tablet 650 " mg, 650 mg, Oral, Q4H PRN, Boubacar Fair MD    albuterol-ipratropium 2.5 mg-0.5 mg/3 mL nebulizer solution 3 mL, 3 mL, Nebulization, Q4H, Boubacar Fair MD, 3 mL at 01/09/23 0440    aluminum-magnesium hydroxide-simethicone 200-200-20 mg/5 mL suspension 30 mL, 30 mL, Oral, QID PRN, Boubacar Fair MD    aspirin EC tablet 81 mg, 81 mg, Oral, Daily, Boubacar Fair MD, 81 mg at 01/09/23 0803    atorvastatin tablet 10 mg, 10 mg, Oral, Daily, Boubacar Fair MD, 10 mg at 01/09/23 0803    benzonatate capsule 200 mg, 200 mg, Oral, TID PRN, Boubacar Fair MD    ciprofloxacin (CIPRO)400mg/200ml D5W IVPB 400 mg, 400 mg, Intravenous, Q8H, Kali Mehta MD, Stopped at 01/09/23 1029    dextromethorphan-guaiFENesin  mg/5 ml liquid 5 mL, 5 mL, Oral, Q4H PRN, Boubacar Fair MD    enoxaparin injection 40 mg, 40 mg, Subcutaneous, Daily, Boubacar Fair MD, 40 mg at 01/08/23 1648    fluticasone furoate-vilanteroL 100-25 mcg/dose diskus inhaler 1 puff, 1 puff, Inhalation, Daily **AND** umeclidinium 62.5 mcg/actuation inhalation capsule 62.5 mcg, 1 capsule, Inhalation, Daily, Boubacar Fair MD, 62.5 mcg at 01/09/23 0900    guaiFENesin 12 hr tablet 600 mg, 600 mg, Oral, BID, Boubacar Fair MD, 600 mg at 01/09/23 0803    melatonin tablet 6 mg, 6 mg, Oral, Nightly PRN, Boubacar Fair MD    methylPREDNISolone sodium succinate injection 40 mg, 40 mg, Intravenous, Q8H, Boubacar Fair MD, 40 mg at 01/09/23 1317    montelukast tablet 10 mg, 10 mg, Oral, Daily, Boubacar Fair MD, 10 mg at 01/08/23 2106    ondansetron injection 4 mg, 4 mg, Intravenous, Q4H PRN, Boubacar Fair MD    polyethylene glycol packet 17 g, 17 g, Oral, BID PRN, Boubacar Fair MD    prochlorperazine injection Soln 5 mg, 5 mg, Intravenous, Q6H PRN, Boubacar Fair MD    ranolazine 12 hr tablet 500 mg, 500 mg, Oral, BID, Boubacar Fair MD, 500 mg at 01/09/23 0803    roflumilast Tab 250 mcg, 250 mcg, Oral, Daily, Boubacar Fair MD, 250 mcg at 01/09/23 0900    senna-docusate 8.6-50 mg per tablet 2 tablet, 2  "tablet, Oral, BID PRN, Boubacar Fair MD    sertraline tablet 100 mg, 100 mg, Oral, Daily, Boubacar Fair MD, 100 mg at 01/09/23 0803    simethicone chewable tablet 80 mg, 1 tablet, Oral, QID PRN, Boubacar Fair MD    sodium chloride 0.9% flush 10 mL, 10 mL, Intravenous, PRN, Boubacar Fair MD    Flushing PICC Protocol, , , Until Discontinued **AND** sodium chloride 0.9% flush 10 mL, 10 mL, Intravenous, Q6H, 10 mL at 01/09/23 1200 **AND** sodium chloride 0.9% flush 10 mL, 10 mL, Intravenous, PRN, Jacquelin Long, VINCENZO-BC     acetaminophen, acetaminophen, aluminum-magnesium hydroxide-simethicone, benzonatate, dextromethorphan-guaiFENesin  mg/5 ml, melatonin, ondansetron, polyethylene glycol, prochlorperazine, senna-docusate 8.6-50 mg, simethicone, sodium chloride 0.9%, Flushing PICC Protocol **AND** sodium chloride 0.9% **AND** sodium chloride 0.9%     Family History   Problem Relation Age of Onset    Asthma Mother     Cancer Father     No Known Problems Sister     No Known Problems Brother         Review of Systems   Constitutional:  Negative for unexpected weight change.   HENT:  Negative for sore throat, trouble swallowing and voice change.    Respiratory:  Positive for shortness of breath. Negative for cough.    Gastrointestinal:  Negative for abdominal distention, abdominal pain, constipation and nausea.   Musculoskeletal:  Negative for arthralgias and back pain.          Objective:   /71   Pulse 71   Temp 97.6 °F (36.4 °C) (Oral)   Resp 18   Ht 6' 2" (1.88 m)   Wt 90.3 kg (199 lb)   SpO2 (!) 92%   BMI 25.55 kg/m²      Physical Exam  Vitals reviewed.   Constitutional:       General: He is not in acute distress.     Appearance: He is normal weight. He is not ill-appearing or toxic-appearing.   HENT:      Head: Normocephalic.      Right Ear: External ear normal.      Left Ear: External ear normal.      Nose: Nose normal.      Mouth/Throat:      Mouth: Mucous membranes are moist.      Pharynx: " Oropharynx is clear.   Eyes:      Extraocular Movements: Extraocular movements intact.      Conjunctiva/sclera: Conjunctivae normal.      Pupils: Pupils are equal, round, and reactive to light.   Cardiovascular:      Rate and Rhythm: Regular rhythm. Tachycardia present.      Pulses: Normal pulses.      Heart sounds: Normal heart sounds.   Pulmonary:      Breath sounds: Rhonchi and rales present.      Comments: Diminished breath sounds bilateral, no tachypnea  Abdominal:      General: Abdomen is flat. Bowel sounds are normal. There is no distension.      Palpations: Abdomen is soft.      Tenderness: There is no abdominal tenderness.   Musculoskeletal:      Right lower leg: No edema.      Left lower leg: No edema.   Skin:     General: Skin is warm.   Neurological:      General: No focal deficit present.      Mental Status: He is alert and oriented to person, place, and time.   Psychiatric:         Mood and Affect: Mood normal.         Behavior: Behavior normal.         Thought Content: Thought content normal.         Judgment: Judgment normal.          Review of Symptoms  Review of Symptoms      Symptom Assessment (ESAS 0-10 Scale)  Pain:  0  Dyspnea:  0  Anxiety:  0  Nausea:  0  Depression:  0  Anorexia:  0  Fatigue:  0  Insomnia:  0  Restlessness:  0  Agitation:  0     CAM / Delirium:  Negative  Constipation:  Negative  Diarrhea:  Negative    Constipation:  No constipation    Bowel Management Plan (BMP):  Yes      Pain Assessment:  OME in 24 hours:  0  Location(s):      Modified Jose Scale:  0    Performance Status:  60    Living Arrangements:  Lives in home and Lives with spouse    Psychosocial/Cultural:   See Palliative Psychosocial Note: Yes  Pt lives with his legal wife of 5 years. He is a retired [employer] . He attends pulmonary rehab for 5 months, 3 days per week. He feels that this has increased his stamina. He has 2 children of his own from 2 prior marriages.   **Primary  to  Follow**  Palliative Care  Consult: No    Spiritual:  F - Betsey and Belief:  nAand  I - Importance:  Very important to the patient.  C - Community:  Attends services     Time-Based Charting:  Yes    Total Time Spent: 0 minutes    Advance Care Planning   Advance Directives:   Living Will: Yes        Copy on chart: No    [place]: No    Do Not Resuscitate Status: No    Medical Power of : Yes    Agent's Name:  Maryjo Sharp   Agent's Contact Number:  [phone]    Decision Making:  Patient answered questions  Goals of Care: The patient, family, and healthcare power of   endorses that what is most important right now is to focus on not yet decided.          Caregiver burden formerly assessed: yes        > 50% of 35 min of encounter was spent in chart review, face to face discussion of goals of care, symptom assessment, coordination of care and emotional support.     Kwasi Phipps M.D.  Palliative Medicine  [facility]

## 2023-01-09 NOTE — PT/OT/SLP PROGRESS
Speech Language Pathology Department  Dysphagia Therapy Progress Note    Patient Name:  Helder Sharp Jr.   MRN:  [id number]  Admitting Diagnosis: COPD exacerbation    Recommendations:     General recommendations:  dysphagia therapy  Diet recommendations:  NPO, Liquid Diet Level: NPO   Discharge recommendations:  home with home health   Barriers to safe discharge:  severity of impairment    Subjective     Patient awake and alert.    Pain/Comfort: Pain Rating 1: 0/10    Spiritual/Cultural/Samaritan Beliefs/Practices that affect care: no      Objective:     Oral Musculature Evaluation:  Oral Musculature: WFL  Dentition: present and adequate  Secretion Management: adequate  Mucosal Quality: good  Oral Labial Strength and Mobility: WFL  Lingual Strength and Mobility: WFL  Voice Prior to PO Intake: clear    Therapeutic Activities:  Pt completed laryngeal elevation x30 with minimal cues.  Pt tolerated thermal stimulation to the anterior faucial pillars x50 with 100% swallow responses.  Delay 3-4 seconds.    Assessment:     Pt participated well in BID session.  Pt continues to present with severe oropharyngeal dysphagia and remains unsafe for PO intake.  Skilled SLP services continue to be warranted to tx dysphagia.    Goals:   Multidisciplinary Problems       SLP Goals          Problem: SLP    Goal Priority Disciplines Outcome   SLP Goal     SLP Ongoing, Progressing   Description:   LTG: Tolerate least restrictive PO diet with no clinical signs/sx aspiration  ST.  Tolerate 2oz of ice chips with no clinical signs/sx aspiration  2.  Complete base of tongue and laryngeal strengthening exercises with min cues  3.  Tolerate thermal stimulation to the anterior faucial pillars with 100% effortful swallow responses and delay less than 2 seconds.                         Patient Education:     Patient and spouse provided with verbal education regarding POC and completing independent exercises.  Understanding was  verbalized.    Plan:     Will continue to follow and tx as appropriate.    SLP Follow-Up:  Yes   Patient to be seen:  daily   Plan of Care expires:  01/13/23  Plan of Care reviewed with:  patient, spouse       Time Tracking:     SLP Treatment Date:   01/09/23  Speech Start Time:  1510  Speech Stop Time:  1530     Speech Total Time (min):  20 min    Billable minutes:  Treatment of Swallow Dysfunction, 20 min        01/09/2023

## 2023-01-10 NOTE — PROGRESS NOTES
"Inpatient Nutrition Evaluation    Admit Date: 1/4/2023   Total duration of encounter: 6 days    Nutrition Recommendation/Prescription     Oral diet as per SLP  Continue Clinimix until able to begin oral diet    Nutrition Assessment     Chart Review    Reason Seen: follow-up    Malnutrition Screening Tool Results   Have you recently lost weight without trying?: Yes: 2-13 lbs  Have you been eating poorly because of a decreased appetite?: No   MST Score: 1     Diagnosis:  Acute on chronic hypoxemic respiratory failure  Community-acquired pneumonia  COPD exacerbation    Relevant Medical History: HLD, CKD3A, nonobstructive CAD, anxiety    Nutrition-Related Medications: rocephin, solumedrol    Nutrition-Related Labs:  1/5: WBC-23.1, H/H-13.0/39.7, Bun-28.1, Crea-1.23, Gluc-183  1/9: H/H-12.9/39.6, Bun-32.2, Gluc-167    Diet Order: Amino acid 4.25% - dextrose 5% (CLINIMIX-E) solution (1L provides 42.5 gm AA, 50 gm CHO (170 kcal/L dextrose), Na 35, K 30, Mg 5, Ca 4.5, Acetate 70, Cl 39, Phos 15)  Amino acid 4.25% - dextrose 5% (CLINIMIX-E) solution (1L provides 42.5 gm AA, 50 gm CHO (170 kcal/L dextrose), Na 35, K 30, Mg 5, Ca 4.5, Acetate 70, Cl 39, Phos 15)  Diet NPO  Oral Supplement Order: none  Appetite/Oral Intake: NPO/not applicable  Factors Affecting Nutritional Intake: difficulty/impaired swallowing  Food/Orthodoxy/Cultural Preferences: none reported  Food Allergies: no known food allergies    Skin Integrity: bruised (ecchymotic)  Wound(s):   noted    Comments    1/6: SLP evaluating pt for possible aspiration pneumonia. Will have MBS today. Await results for further recommendations.     1/10: pt remains NPO. Per SLP, will repeat MBS tomorrow. Clinimix has been started.     Anthropometrics    Height: 6' 2" (188 cm)    Last Weight: 90.2 kg (198 lb 13.7 oz) (01/09/23 1100) Weight Method: Bed Scale  BMI (Calculated): 25.5  BMI Classification: overweight (BMI 25-29.9)        Ideal Body Weight (IBW), Male: 190 lb     % " Ideal Body Weight, Male (lb): 104.66 %                          Usual Weight Provided By: EMR weight history    Wt Readings from Last 3 Encounters:   01/09/23 1100 90.2 kg (198 lb 13.7 oz)   01/04/23 1530 90.3 kg (199 lb)   01/03/23 0607 85.7 kg (189 lb)   12/15/22 1031 89 kg (196 lb 3.4 oz)   09/27/22 1412 89 kg (196 lb 3.2 oz)      Weight Change(s) Since Admission:  Admit Weight: 90.3 kg (199 lb) (01/04/23 1530)  1/6: 90.3kg  1/10: 90.2kg      Estimated/Assessed Needs     Weight Used For Calorie Calculations: 90.2kg  Energy Calorie Requirements (kcal): 2175 kcal (MSJxSF1.3)  Protein Requirements: 117 gm pro (kgx1.3)  Weight Used For Protein Calculations: 90.2kg  Fluid Requirements (mL): 2175mL (1mL/kcal)      Standard Formula: Clinimix E 4.25/5  Custom Formula: not applicable  Additives: none  Rate/Volume: 75mL/hr  Lipids: none  Total Nutrition Provided by Parenteral Nutrition:  Calories Provided  612 kcal/d, 28% needs   Protein Provided  77 g/d, 66% needs   Dextrose Provided  90 g/d, GIR 0.7 mg CHO/kg/min   Fluid Provided  1800 ml/d, 83% needs        Patient Education    Not applicable.    Monitoring & Evaluation     Dietitian will monitor food and beverage intake.  Nutrition Risk/Follow-Up: low (follow-up in 5-7 days)  Patients assigned 'low nutrition risk' status do not qualify for a full nutritional assessment but will be monitored and re-evaluated in a 5-7 day time period. Please consult if re-evaluation needed sooner.

## 2023-01-10 NOTE — PT/OT/SLP PROGRESS
Speech Language Pathology Department  Dysphagia Therapy Progress Note    Patient Name:  Helder Sharp Jr.   MRN:  [id number]  Admitting Diagnosis: COPD exacerbation    Recommendations:     General recommendations:  dysphagia therapy  Diet recommendations:  NPO, Liquid Diet Level: NPO     Discharge recommendations:  home with home health   Barriers to safe discharge:  acuity of illness and severity of impairment    Subjective     Patient awake, alert, and cooperative.    Pain/Comfort: Pain Rating 1: 0/10    Spiritual/Cultural/Jew Beliefs/Practices that affect care: no    Respiratory Status: nasal cannula    Objective:     Oral Musculature Evaluation:  Oral Musculature: WFL  Dentition: present and adequate  Secretion Management: adequate  Mucosal Quality: good  Oral Labial Strength and Mobility: WFL  Lingual Strength and Mobility: WFL  Voice: wet    Therapeutic Activities:  Pt completed base of tongue and laryngeal x80 with occasional cues.  Pt completed the Radha Maneuver x5  Pt tolerated thermal stimulation to the anterior faucial pillars x20 with 100% swallow responses.  Laryngeal excursion improved with each presentation.  Delay varied 2-3 seconds.    Assessment:     Pt continues to present with dysphagia and remains unsafe for PO intake.    Goals:   Multidisciplinary Problems       SLP Goals          Problem: SLP    Goal Priority Disciplines Outcome   SLP Goal     SLP Ongoing, Progressing   Description:   LTG: Tolerate least restrictive PO diet with no clinical signs/sx aspiration  ST.  Tolerate 2oz of ice chips with no clinical signs/sx aspiration  2.  Complete base of tongue and laryngeal strengthening exercises with min cues  3.  Tolerate thermal stimulation to the anterior faucial pillars with 100% effortful swallow responses and delay less than 2 seconds.                         Patient Education:     Patient provided with verbal education regarding POC and independent exercise program.   Understanding was verbalized.    Plan:     Will continue to follow and tx as appropriate.    SLP Follow-Up:  Yes   Patient to be seen:  daily   Plan of Care expires:  01/13/23  Plan of Care reviewed with:  patient       Time Tracking:     SLP Treatment Date:   01/10/23  Speech Start Time:  1105  Speech Stop Time:  1125     Speech Total Time (min):  20 min    Billable minutes:  Treatment of Swallow Dysfunction, 20 minutes        01/10/2023

## 2023-01-10 NOTE — PROGRESS NOTES
[facility] Hospital Medicine Progress Note        Chief Complaint: Inpatient Follow-up for  dysphagia     HPI:   82-year-old white male with medical history of GOLD stage IV COPD on on supplemental oxygen at 2 L per minute via nasal cannula, who presented to the ED with complaints of worsening shortness of breath for 1 day.  Patient underwent cataract surgery on 01/03/2023 and reports that the same day when he returned home he started having worsening cough productive of purulent sputum, wheezing, and shortness of breath at rest.  Denies chest pain, fever or chills.  Report his oxygen saturation started to drop below 89% requiring him to increase his supplemental oxygen to 3 liters/minute.     On arrival to ED, he was afebrile, normotensive and saturating between 88-92% on 3 L nasal cannula.  Labs notable for WBC 31, hemoglobin 14.7, platelets 206, creatinine 1.33, BUN 29, BNP 41, and troponin 0.016, CT chest without contrast revealed severe emphysematous changes in the lung bilaterally and interval development of opacities predominantly in the bilateral lower lobes right greater than left which may reflect superimposed infectious process.  COVID-19 and influenza swabs were negative.     He was given nebulized bronchodilators, ceftriaxone and azithromycin and very promptly admitted to the hospital medicine service for further evaluation and management.     Interval Hx:  No new issues or changes     Objective/physical exam:  General: In no acute distress, afebrile  Chest: Clear to auscultation bilaterally  Heart: RRR, +S1, S2, no appreciable murmur  Abdomen: Soft, nontender, BS +  MSK: Warm, no lower extremity edema, no clubbing or cyanosis  Neurologic: Alert and oriented x4, Cranial nerve II-XII intact, Strength 5/5 in all 4 extremities    VITAL SIGNS: 24 HRS MIN & MAX LAST   Temp  Min: 97.5 °F (36.4 °C)  Max: 98.4 °F (36.9 °C) 97.5 °F (36.4 °C)   BP  Min: 125/56  Max: 154/64 (!)  125/56     Pulse  Min: 71  Max: 83  75   Resp  Min: 17  Max: 20 18   SpO2  Min: 90 %  Max: 96 % (!) 93 %         Recent Labs   Lab 01/05/23 0417 01/07/23 0452 01/09/23 0347   WBC 23.1* 17.7* 10.3   RBC 4.37* 4.11* 4.37*   HGB 13.0* 12.0* 12.9*   HCT 39.7* 37.8* 39.6*   MCV 90.8 92.0 90.6   MCH 29.7 29.2 29.5   MCHC 32.7* 31.7* 32.6*   RDW 14.1 13.9 13.0    194 173   MPV 9.8 9.4 9.5       Recent Labs   Lab 01/04/23 1623 01/05/23 0417 01/05/23 0917 01/07/23 0452 01/09/23 0347     --  137 142 139   K 4.1  --  3.9 4.2 4.0   CO2 20*  --  23 26 23   BUN 29.8*  --  28.1* 29.2* 32.2*   CREATININE 1.33*  --  1.23* 1.06 0.93   CALCIUM 9.5  --  9.3 9.2 9.0   MG  --  2.10  --   --   --    ALBUMIN 3.4  --   --   --   --    ALKPHOS 88  --   --   --   --    ALT 18  --   --   --   --    AST 12  --   --   --   --    BILITOT 1.9*  --   --   --   --           Microbiology Results (last 7 days)       Procedure Component Value Units Date/Time    Blood Culture #1 **CANNOT BE ORDERED STAT** [[id number]]  (Normal) Collected: 01/04/23 2058    Order Status: Completed Specimen: Blood Updated: 01/09/23 2201     CULTURE, BLOOD ([facility]) No Growth at 5 days    Blood Culture #2 **CANNOT BE ORDERED STAT** [[id number]]  (Normal) Collected: 01/04/23 2058    Order Status: Completed Specimen: Blood Updated: 01/09/23 2201     CULTURE, BLOOD ([facility]) No Growth at 5 days    Respiratory Culture [[id number]]  (Abnormal)  (Susceptibility) Collected: 01/05/23 0057    Order Status: Completed Specimen: Sputum, Expectorated Updated: 01/08/23 0916     Respiratory Culture Moderate Pseudomonas aeruginosa     Comment: with normal respiratory nasra         Many Streptococcus pneumoniae     GRAM STAIN Quality 3+      Many Gram positive cocci in pairs and chains      Moderate Gram Negative Rods             See below for Radiology    Scheduled Med:   albuterol        albuterol-ipratropium  3 mL Nebulization Q4H    aspirin  81 mg Oral Daily    atorvastatin   10 mg Oral Daily    ciprofloxacin  400 mg Intravenous Q8H    enoxaparin  40 mg Subcutaneous Daily    fluticasone furoate-vilanteroL  1 puff Inhalation Daily    And    umeclidinium  1 capsule Inhalation Daily    guaiFENesin  600 mg Oral BID    methylPREDNISolone sodium succinate injection  40 mg Intravenous Q8H    montelukast  10 mg Oral Daily    ranolazine  500 mg Oral BID    roflumilast  250 mcg Oral Daily    sertraline  100 mg Oral Daily    sodium chloride 0.9%  10 mL Intravenous Q6H        Continuous Infusions:   Amino acid 4.25% - dextrose 5% (CLINIMIX-E) solution (1L provides 42.5 gm AA, 50 gm CHO (170 kcal/L dextrose), Na 35, K 30, Mg 5, Ca 4.5, Acetate 70, Cl 39, Phos 15) 75 mL/hr at 01/10/23 0325    Amino acid 4.25% - dextrose 5% (CLINIMIX-E) solution (1L provides 42.5 gm AA, 50 gm CHO (170 kcal/L dextrose), Na 35, K 30, Mg 5, Ca 4.5, Acetate 70, Cl 39, Phos 15)          PRN Meds:  acetaminophen, acetaminophen, aluminum-magnesium hydroxide-simethicone, benzonatate, dextromethorphan-guaiFENesin  mg/5 ml, melatonin, ondansetron, polyethylene glycol, prochlorperazine, senna-docusate 8.6-50 mg, simethicone, sodium chloride 0.9%, Flushing PICC Protocol **AND** sodium chloride 0.9% **AND** sodium chloride 0.9%       Assessment/Plan:   Severe Sepsis  Acute on chronic hypoxic respiratory failure  Aspiration pneumonia  End-stage chronic obstructive pulmonary disease with exacerbation  Oropharyngeal dysphagia  Nonobstructive coronary artery disease   Stage IIIA chronic kidney disease   Anxiety disorder     Continue with Rocephin and ciprofloxacin.  Would like him to at least get 7 days of Pseudomonas coverage, day 3 today.  These can likely be changed over to oral once he is cleared for a enteral intake.    He remains NPO and working with speech therapy.  Plan to repeat MBS later this week.    Patient told palliative care yesterday that he would not be interested in a PEG tube.  Different from our conversation  but will continue to follow-up.  Hopefully does okay with MBS and we can cleared for a diet and not have to worry about that.  Per speech is notes and are conversations he does seem to be making some improvement in getting stronger.  His oxygen saturation is improving.  He is down to 3 L and saturating in the mid 90s.  This can likely be weaned further.  Went to avoid over oxygenation as he does have a history of chronic COPD.  Goal to keep his oxygen saturation between 88 and 94%.    Patient condition:  Stable    Anticipated discharge and Disposition: TBD      All diagnosis and differential diagnosis have been reviewed; assessment and plan has been documented; I have personally reviewed the labs and test results that are presently available; I have reviewed the patients medication list; I have reviewed the consulting providers response and recommendations. I have reviewed or attempted to review medical records based upon their availability    All of the patient's questions have been  addressed and answered. Patient's is agreeable to the above stated plan. I will continue to monitor closely and make adjustments to medical management as needed.  _____________________________________________________________________      Radiology:  Fl Modified Barium Swallow Speech  See procedure notes from Speech Pathologist.    This procedure was auto-finalized.      Kali Mehta MD   01/10/2023

## 2023-01-10 NOTE — PT/OT/SLP PROGRESS
Speech Language Pathology Department  Dysphagia Therapy Progress Note    Patient Name:  Helder Sharp Jr.   MRN:  [id number]  Admitting Diagnosis: COPD exacerbation    Recommendations:     General recommendations:  dysphagia therapy  Diet recommendations:  NPO, Liquid Diet Level: NPO     Discharge recommendations:  home with home health   Barriers to safe discharge:  acuity of illness and severity of impairment    Subjective     Patient awake, alert, and cooperative.    Pain/Comfort: Pain Rating 1: 0/10    Spiritual/Cultural/Roman Catholic Beliefs/Practices that affect care: no    Respiratory Status: nasal cannula    Objective:     Oral Musculature Evaluation:  Oral Musculature: WFL  Dentition: present and adequate  Secretion Management: adequate  Mucosal Quality: good  Oral Labial Strength and Mobility: WFL  Lingual Strength and Mobility: WFL  Voice Prior to PO Intake: hoarse  Oral Musculature Comments: occassional throat clear noted    Therapeutic Activities:  Pt completed base of tongue and laryngeal x80 with minimal cues.  Pt completed the Radha maneuver x3  Pt tolerated thermal stimulation to the anterior faucial pillars x20 with 100% swallow responses.  Laryngeal excursion improving.  Delay varied 2-3 seconds.    Assessment:     Pt continues to present with dysphagia and remains unsafe for PO intake.    Goals:   Multidisciplinary Problems       SLP Goals          Problem: SLP    Goal Priority Disciplines Outcome   SLP Goal     SLP Ongoing, Progressing   Description:   LTG: Tolerate least restrictive PO diet with no clinical signs/sx aspiration  ST.  Tolerate 2oz of ice chips with no clinical signs/sx aspiration  2.  Complete base of tongue and laryngeal strengthening exercises with min cues  3.  Tolerate thermal stimulation to the anterior faucial pillars with 100% effortful swallow responses and delay less than 2 seconds.                         Patient Education:     Patient and spouse provided with verbal  education regarding SLP POC.  Understanding was verbalized.    Plan:     Will continue to follow and tx as appropriate.    SLP Follow-Up:  Yes   Patient to be seen:  daily   Plan of Care expires:  01/13/23  Plan of Care reviewed with:  patient       Time Tracking:     SLP Treatment Date:   01/10/23  Speech Start Time:  1515  Speech Stop Time:  1530     Speech Total Time (min):  15 min    Billable minutes:  Treatment of Swallow Dysfunction, 15 minutes        01/10/2023

## 2023-01-11 NOTE — PLAN OF CARE
01/11/23 0914   Final Note   Assessment Type Final Discharge Note   Anticipated Discharge Disposition Home-Health   Hospital Resources/Appts/Education Provided Post-Acute resouces added to AVS   Post-Acute Status   Post-Acute Authorization Home Health   Home Health Status Set-up Complete/Auth obtained  (FOC NSI)

## 2023-01-11 NOTE — PLAN OF CARE
Problem: SLP  Goal: SLP Goal  Description:   LTG: Tolerate least restrictive PO diet with no clinical signs/sx aspiration - progressing  ST.  Tolerate 2oz of ice chips with no clinical signs/sx aspiration - discontinue  2.  Complete base of tongue and laryngeal strengthening exercises with min cues - goal met  3.  Tolerate thermal stimulation to the anterior faucial pillars with 100% effortful swallow responses and delay less than 2 seconds. - progressing      Outcome: Adequate for Care Transition

## 2023-01-11 NOTE — PROCEDURES
Speech Language Pathology Department  Modified Barium Swallow Study    Patient Name:  Helder Sharp Jr.   MRN:  [id number]  Diagnosis: COPD exacerbation     Recommendations:     General recommendations:  dysphagia therapy  Repeat MBS study: as clinically indicated  Diet recommendations:  Soft & Bite Sized Diet - IDDSI Level 6, Liquid Diet Level: Thin   Swallow strategies/precautions: small bites/sips, slow rate, additional swallow per bite/sip, alternate solids/liquids, NO straws, and medications per patient preference  General precautions: Standard, aspiration    Reason for Referral:     A Modified Barium Swallow Study was repeated to assess the efficiency of his/her swallow function, rule out aspiration and make recommendations regarding safe dietary consistencies, effective compensatory strategies, and safe eating environment.     History:     Past Medical History:   Diagnosis Date    Acute bronchitis     Anxiety disorder, unspecified     COPD (chronic obstructive pulmonary disease)     Coronary artery disease     Hyperlipidemia     Left carotid bruit     Pulmonary nodules     Unspecified cataract      Past Surgical History:   Procedure Laterality Date    APPENDECTOMY      CATARACT EXTRACTION Left 01/03/2023    HERNIA REPAIR      PHACOEMULSIFICATION, CATARACT, WITH IOL INSERTION Left 1/3/2023    Procedure: PHACOEMULSIFICATION, CATARACT, WITH IOL INSERTION- OS;  Surgeon: Louis Abdi MD;  Location: [facility];  Service: Ophthalmology;  Laterality: Left;    TONSILLECTOMY         Home Diet: Regular and thin liquids  Current Method of Nutrition: NPO with PPN    Subjective:     Patient awake, alert, and oriented x4 .    Spiritual/Cultural/Orthodox Beliefs/Practices that affect care: no    Pain/Comfort: Pain Rating 1: 0/10    Respiratory Status: nasal cannula    Fluoroscopic Results:     Oral Musculature Evaluation:  Oral Musculature: WFL  Dentition: present and adequate  Secretion Management: adequate  Mucosal Quality:  good  Oral Labial Strength and Mobility: WFL  Lingual Strength and Mobility: WFL  Voice Prior to PO Intake: hoarse  Oral Musculature Comments: occassional throat clear noted    Visualization  Patient was seen in the lateral view    Oral Phase:   Adequate lip closure  Adequate bolus formation  Adequate anterior-posterior transport  Adequate mastication    Pharyngeal Phase:   Timely swallow reflex  Reduced base of tongue retraction  Reduced epiglottic deflection  Reduced hyolaryngeal excursion  Adequate airway protection  Trace pharyngeal residue cleared with an additional swallow.  Increased residue noted with straw sips  Consistency Laryngeal Penetration Aspiration   Thin liquid by cup None None   Puree None None   Chewable solid None None   Thin liquid by straw None None     Cervical Esophageal Phase:   UES appeared to accommodate all bolus types without stasis or retrograde movement visualized    Assessment:     Pt exhibited mild oropharyngeal dysphagia with no laryngeal penetration or aspiration visualized during this study.    Goals:   Multidisciplinary Problems       SLP Goals          Problem: SLP    Goal Priority Disciplines Outcome   SLP Goal     SLP Adequate for Care Transition   Description:   LTG: Tolerate least restrictive PO diet with no clinical signs/sx aspiration - progressing  ST.  Tolerate 2oz of ice chips with no clinical signs/sx aspiration - discontinue  2.  Complete base of tongue and laryngeal strengthening exercises with min cues - goal met  3.  Tolerate thermal stimulation to the anterior faucial pillars with 100% effortful swallow responses and delay less than 2 seconds. - progressing                         Patient Education:     Patient and spouse provided with verbal, written, and video education regarding MBS results/recommendations, swallow function, diet modification, safe swallow strategies, and home exercise program.  Understanding was verbalized.  Pt encouraged to call with  any additional questions or concerns.    Plan:     SLP Follow-Up:  Yes    Patient to be seen:  5 x/week   Plan of Care expires:  01/13/23  Plan of Care reviewed with:  patient, spouse     Time Tracking:     SLP Treatment Date:   01/11/23  Speech Start Time:  0955  Speech Stop Time:  1025     Speech Total Time (min):  30 min    Billable minutes:   Motion Fluoroscopic Evaluation, Video Recording, 20 minutes  Self Care/Home Management, 10 minutes      01/11/2023

## 2023-01-11 NOTE — DISCHARGE SUMMARY
[facility] Hospital Medicine Discharge Summary    Admit Date: 1/4/2023  Discharge Date and Time: 1/11/20237:26 AM  Admitting Physician: Hospitalist team   Discharging Physician: Kali Mehta MD.  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  Patient underwent cataract surgery on 01/03/2023 and reports that the same day when he returned home he started having worsening cough productive of purulent sputum, wheezing, and shortness of breath at rest.  Denies chest pain, fever or chills.  Report his oxygen saturation started to drop below 89% requiring him to increase his supplemental oxygen to 3 liters/minute.     On arrival to ED, he was afebrile, normotensive and saturating between 88-92% on 3 L nasal cannula.  Labs notable for WBC 31, hemoglobin 14.7, platelets 206, creatinine 1.33, BUN 29, BNP 41, and troponin 0.016, CT chest without contrast revealed severe emphysematous changes in the lung bilaterally and interval development of opacities predominantly in the bilateral lower lobes right greater than left which may reflect superimposed infectious process.  COVID-19 and influenza swabs were negative.     He was given nebulized bronchodilators, ceftriaxone and azithromycin and very promptly admitted to the hospital medicine service for further evaluation and management.    From respiratory standpoint the patient continued to make improvement.  Is back on his home  "levels of supplemental O2.  His sputum culture did grow Pseudomonas as well as strep pneumoniae.  Ciprofloxacin was added to his Rocephin.  He was noted to have some severe oropharyngeal dysphagia.  Continues to work with speech therapy but remain NPO for the duration of his stay.  He failed his MBS last week is post repeat later today.  We have had multiple conversations with the patient on dangers of oral intake.  Given him options of PEG tube or hospice care.  Patient states that he is not interested in a PEG tube at this time.  He will be leaving today after his MBS.  He will take whatever advise he get from the speech therapy team but he will be continuing an oral diet at discharge.  I discussed with him in detail the risk of aspiration and concerned that with his baseline respiratory dysfunction that he may end up on a ventilator or eventual death with continued aspiration.  States he is not interested in hospice but will be following up with his primary care for further input.  He will consider continuing outpatient speech therapy but for now he just wants to go home.  I sent prescriptions for completion of his antibiotics to his pharmacy of choice.  Will update his PCP as well so we can check on him in a couple days.    Vitals:  Blood pressure (!) 152/77, pulse 63, temperature 97.7 °F (36.5 °C), temperature source Oral, resp. rate 18, height 6' 2.02" (1.88 m), weight 90.2 kg (198 lb 13.7 oz), SpO2 (!) 94 %..    Physical Exam:  Awake, Alert, Oriented x 3, No new focal Neurologic deficit, Normal Affect  NC/AT, PERRLA, EOMI  Supple neck, no JVD, No cervical lymphadenopathy  Symmetrical chest, Good air entry bilaterally. No rhonchi, wheezes, crackles appreciated  RRR, No gallop, rub or murmur  +ve Bowel sounds x4, Abd soft and non tender, no rebound, guarding or rigidity  No Cyanosis, cludding or edema, No new rash or bruises    Procedures Performed: No admission procedures for hospital encounter. "     Significant Diagnostic Studies: See Full reports for all details  Admission on 01/04/2023   Component Date Value    Sodium Level 01/04/2023 136     Potassium Level 01/04/2023 4.1     Chloride 01/04/2023 105     Carbon Dioxide 01/04/2023 20 (L)     Glucose Level 01/04/2023 138 (H)     Blood Urea Nitrogen 01/04/2023 29.8 (H)     Creatinine 01/04/2023 1.33 (H)     Calcium Level Total 01/04/2023 9.5     Protein Total 01/04/2023 6.8     Albumin Level 01/04/2023 3.4     Globulin 01/04/2023 3.4     Albumin/Globulin Ratio 01/04/2023 1.0 (L)     Bilirubin Total 01/04/2023 1.9 (H)     Alkaline Phosphatase 01/04/2023 88     Alanine Aminotransferase 01/04/2023 18     Aspartate Aminotransfera* 01/04/2023 12     eGFR 01/04/2023 53     Natriuretic Peptide 01/04/2023 41.1     Influenza A PCR 01/04/2023 Not Detected     Influenza B PCR 01/04/2023 Not Detected     SARS-CoV-2 PCR 01/04/2023 Not Detected     Troponin-I 01/04/2023 0.016     WBC 01/04/2023 31.1 (H)     RBC 01/04/2023 4.96     Hgb 01/04/2023 14.7     Hct 01/04/2023 45.4     MCV 01/04/2023 91.5     MCH 01/04/2023 29.6     MCHC 01/04/2023 32.4 (L)     RDW 01/04/2023 14.1     Platelet 01/04/2023 206     MPV 01/04/2023 9.2 (L)     IG# 01/04/2023 0.71 (H)     IG% 01/04/2023 2.3     NRBC% 01/04/2023 0.0     Neut Man 01/04/2023 93     Lymph Man 01/04/2023 4     Monocyte Man 01/04/2023 3     Instr WBC 01/04/2023 31.1     Abs Mono 01/04/2023 0.933     Abs Lymp 01/04/2023 1.244     Abs Neut 01/04/2023 28.923 (H)     RBC Morph 01/04/2023 Abnormal (A)     Poik 01/04/2023 2+ (A)     Malden Cells 01/04/2023 2+ (A)     Platelet Est 01/04/2023 Normal     Procalcitonin 01/04/2023 0.88     Lactic Acid Level 01/04/2023 1.9     CULTURE, BLOOD ([facility]) 01/04/2023 No Growth at 5 days     CULTURE, BLOOD ([facility]) 01/04/2023 No Growth at 5 days     Respiratory Culture 01/05/2023 Moderate Pseudomonas aeruginosa (A)     Respiratory Culture 01/05/2023 Many Streptococcus pneumoniae (A)     GRAM STAIN  01/05/2023 Quality 3+     GRAM STAIN 01/05/2023 Many Gram positive cocci in pairs and chains     GRAM STAIN 01/05/2023 Moderate Gram Negative Rods     Adenovirus 01/05/2023 Not Detected     Coronavirus 229E 01/05/2023 Not Detected     Coronavirus HKU1 01/05/2023 Not Detected     Coronavirus NL63 01/05/2023 Not Detected     Coronavirus OC43 PCR, Co* 01/05/2023 Not Detected     Human Metapneumovirus 01/05/2023 Not Detected     Parainfluenza Virus 1 01/05/2023 Not Detected     Parainfluenza Virus 2 01/05/2023 Not Detected     Parainfluenza Virus 3 01/05/2023 Not Detected     Parainfluenza Virus 4 01/05/2023 Not Detected     Bordetella pertussis (pt* 01/05/2023 Not Detected     Chlamydia pneumoniae 01/05/2023 Not Detected     Mycoplasma pneumoniae 01/05/2023 Not Detected     Human Rhinovirus/Enterov* 01/05/2023 Not Detected     Bordetella parapertussis* 01/05/2023 Not Detected     WBC 01/05/2023 23.1 (H)     RBC 01/05/2023 4.37 (L)     Hgb 01/05/2023 13.0 (L)     Hct 01/05/2023 39.7 (L)     Platelet 01/05/2023 182     MCV 01/05/2023 90.8     MCH 01/05/2023 29.7     MCHC 01/05/2023 32.7 (L)     RDW 01/05/2023 14.1     MPV 01/05/2023 9.8     NRBC% 01/05/2023 0.0     Sodium Level 01/05/2023 137     Potassium Level 01/05/2023 3.9     Chloride 01/05/2023 105     Carbon Dioxide 01/05/2023 23     Glucose Level 01/05/2023 183 (H)     Blood Urea Nitrogen 01/05/2023 28.1 (H)     Creatinine 01/05/2023 1.23 (H)     BUN/Creatinine Ratio 01/05/2023 23     Calcium Level Total 01/05/2023 9.3     Anion Gap 01/05/2023 9.0     eGFR 01/05/2023 59     Magnesium Level 01/05/2023 2.10     Phosphorus Level 01/05/2023 2.5     Sodium Level 01/07/2023 142     Potassium Level 01/07/2023 4.2     Chloride 01/07/2023 109 (H)     Carbon Dioxide 01/07/2023 26     Glucose Level 01/07/2023 153 (H)     Blood Urea Nitrogen 01/07/2023 29.2 (H)     Creatinine 01/07/2023 1.06     BUN/Creatinine Ratio 01/07/2023 28     Calcium Level Total 01/07/2023 9.2      Anion Gap 01/07/2023 7.0     eGFR 01/07/2023 70     WBC 01/07/2023 17.7 (H)     RBC 01/07/2023 4.11 (L)     Hgb 01/07/2023 12.0 (L)     Hct 01/07/2023 37.8 (L)     MCV 01/07/2023 92.0     MCH 01/07/2023 29.2     MCHC 01/07/2023 31.7 (L)     RDW 01/07/2023 13.9     Platelet 01/07/2023 194     MPV 01/07/2023 9.4     Neut % 01/07/2023 90.8     Lymph % 01/07/2023 4.9     Mono % 01/07/2023 3.4     Eos % 01/07/2023 0.0     Basophil % 01/07/2023 0.1     Lymph # 01/07/2023 0.86     Neut # 01/07/2023 16.11 (H)     Mono # 01/07/2023 0.60     Eos # 01/07/2023 0.00     Baso # 01/07/2023 0.01     IG# 01/07/2023 0.14 (H)     IG% 01/07/2023 0.8     NRBC% 01/07/2023 0.0     Sodium Level 01/09/2023 139     Potassium Level 01/09/2023 4.0     Chloride 01/09/2023 106     Carbon Dioxide 01/09/2023 23     Glucose Level 01/09/2023 167 (H)     Blood Urea Nitrogen 01/09/2023 32.2 (H)     Creatinine 01/09/2023 0.93     BUN/Creatinine Ratio 01/09/2023 35     Calcium Level Total 01/09/2023 9.0     Anion Gap 01/09/2023 10.0     eGFR 01/09/2023 82     WBC 01/09/2023 10.3     RBC 01/09/2023 4.37 (L)     Hgb 01/09/2023 12.9 (L)     Hct 01/09/2023 39.6 (L)     MCV 01/09/2023 90.6     MCH 01/09/2023 29.5     MCHC 01/09/2023 32.6 (L)     RDW 01/09/2023 13.0     Platelet 01/09/2023 173     MPV 01/09/2023 9.5     Neut % 01/09/2023 88.6     Lymph % 01/09/2023 6.4     Mono % 01/09/2023 3.7     Eos % 01/09/2023 0.0     Basophil % 01/09/2023 0.1     Lymph # 01/09/2023 0.66     Neut # 01/09/2023 9.09     Mono # 01/09/2023 0.38     Eos # 01/09/2023 0.00     Baso # 01/09/2023 0.01     IG# 01/09/2023 0.12 (H)     IG% 01/09/2023 1.2     NRBC% 01/09/2023 0.0         Microbiology Results (last 7 days)       Procedure Component Value Units Date/Time    Blood Culture #1 **CANNOT BE ORDERED STAT** [[id number]]  (Normal) Collected: 01/04/23 2058    Order Status: Completed Specimen: Blood Updated: 01/09/23 2201     CULTURE, BLOOD ([facility]) No Growth at 5 days    Blood  Culture #2 **CANNOT BE ORDERED STAT** [[id number]]  (Normal) Collected: 01/04/23 2058    Order Status: Completed Specimen: Blood Updated: 01/09/23 2201     CULTURE, BLOOD ([facility]) No Growth at 5 days    Respiratory Culture [[id number]]  (Abnormal)  (Susceptibility) Collected: 01/05/23 0057    Order Status: Completed Specimen: Sputum, Expectorated Updated: 01/08/23 0916     Respiratory Culture Moderate Pseudomonas aeruginosa     Comment: with normal respiratory nasra         Many Streptococcus pneumoniae     GRAM STAIN Quality 3+      Many Gram positive cocci in pairs and chains      Moderate Gram Negative Rods             CT Chest Without Contrast    Result Date: 1/5/2023  EXAMINATION: CT CHEST WITHOUT CONTRAST CLINICAL HISTORY: Respiratory illness, nondiagnostic xray; TECHNIQUE: Helically acquired axial images, sagittal and coronal reformations were obtained from the thoracic inlet to the lung bases without the IV administration of contrast. Automated tube current modulation, weight-based exposure dosing, and/or iterative reconstruction technique utilized to reach lowest reasonably achievable exposure rate. DLP: 169 mGy*cm COMPARISON: CT chest 03/13/2020 FINDINGS: BASE OF NECK: No significant abnormality. AORTA: Aortic atherosclerosis. HEART: Normal size. No effusion. There are coronary artery calcifications. ANDRE/MEDIASTINUM: Mildly prominent subcarinal lymph node measures 1.6 cm short axis.  Evaluation of hilar lymphadenopathy is limited without intravenous contrast. AIRWAYS: Patent. LUNGS: Severe pulmonary emphysema.  Nodule posterior medially in the superior segment of the right lower lobe is similar to prior exam measuring 1 cm (2, 72).  There are patchy consolidative opacities at the lung bases, right greater than left superimposed on background emphysematous change. PLEURA: No pleural effusion or pneumothorax. UPPER ABDOMEN: No abnormality of the partially imaged upper abdomen. THORACIC SOFT TISSUES:  Unremarkable. BONES: No acute fracture. No suspicious lytic or sclerotic lesions.     Dependent, basilar opacities superimposed on background severe pulmonary emphysema.  Consider pneumonia including potential for aspiration pneumonia depending on clinical findings. The preliminary and final reports are concordant. Electronically signed by: Kimberli Cisse Date:    01/05/2023 Time:    09:33    Fl Modified Barium Swallow Speech    Result Date: 1/6/2023  See procedure notes from Speech Pathologist. This procedure was auto-finalized.    X-Ray Chest AP Portable    Result Date: 1/4/2023  EXAMINATION: XR CHEST AP PORTABLE CLINICAL HISTORY: sob; TECHNIQUE: One view COMPARISON: February 25, 2020. FINDINGS: Cardiopericardial silhouette is within normal limits.  Emphysematous changes mostly involve the upper lung lobes.  No acute dense focal or segmental consolidation, congestion, pleural effusion or pneumothorax.     No acute cardiopulmonary process identified. Electronically signed by: Ronny Hamilton Date:    01/04/2023 Time:    16:23  - pulls last radiology orders        Medication List        START taking these medications      cefdinir 300 MG capsule  Commonly known as: OMNICEF  Take 1 capsule (300 mg total) by mouth 2 (two) times daily. for 4 days     ciprofloxacin HCl 500 MG tablet  Commonly known as: CIPRO  Take 1 tablet (500 mg total) by mouth 2 (two) times daily. for 4 days            CONTINUE taking these medications      * albuterol 90 mcg/actuation inhaler  Commonly known as: PROVENTIL/VENTOLIN HFA  Inhale 2 puffs into the lungs every 6 (six) hours as needed (wheezing). Rescue     * albuterol 0.63 mg/3 mL Nebu  Commonly known as: ACCUNEB     albuterol-ipratropium 2.5 mg-0.5 mg/3 mL nebulizer solution  Commonly known as: DUO-NEB     ascorbic acid (vitamin C) 500 MG tablet  Commonly known as: VITAMIN C     aspirin 81 MG EC tablet  Commonly known as: ECOTRIN     atorvastatin 10 MG tablet  Commonly known as: LIPITOR      DALIRESP 500 mcg Tab  Generic drug: roflumilast     fluticasone propionate 50 mcg/actuation nasal spray  Commonly known as: FLONASE     montelukast 10 mg tablet  Commonly known as: SINGULAIR  Take 1 tablet (10 mg total) by mouth once daily.     * predniSONE 20 MG tablet  Commonly known as: DELTASONE  Take 2 tablets by mouth each day for 3 days, then take 1 tablet each day for 4 days, then take 1/2 tablet for 4 days     * predniSONE 20 MG tablet  Commonly known as: DELTASONE  Take 2 tablets daily for 3 days, then 1 tablet daily for 4 days, then 1/2 tablet for 4 days     ranolazine 500 MG Tb12  Commonly known as: RANEXA     sertraline 100 MG tablet  Commonly known as: ZOLOFT  Take 1 tablet (100 mg total) by mouth once daily.     TRELEGY ELLIPTA 100-62.5-25 mcg Dsdv  Generic drug: fluticasone-umeclidin-vilanter           * This list has 4 medication(s) that are the same as other medications prescribed for you. Read the directions carefully, and ask your doctor or other care provider to review them with you.                   Where to Get Your Medications        These medications were sent to [id number] - [place] - [address] AT [facility] [address]  [address], [place] [address]      Phone: [phone]   cefdinir 300 MG capsule  ciprofloxacin HCl 500 MG tablet          Explained in detail to the patient about the discharge plan, medications, and follow-up visits. Pt understands and agrees with the treatment plan  Discharged Condition: stable  Diet: dysphagia/modified  Disposition: home with home health    Medications Per [facility] med rec  Activities as tolerated  Follow up with your PCP in 2 wks   For further questions contact hospitalist office    Discharge time 33 minutes    For worsening symptoms, chest pain, shortness of breath, increased abdominal pain, high grade fever, stroke or stroke like symptoms, immediately go to the nearest Emergency Room or  call 911 as soon as possible.      Kali Walsh M.D, on 1/11/2023. at 7:26 AM.

## 2023-01-12 NOTE — PROGRESS NOTES
C3 nurse spoke with Helder Sharp Jr. for a TCC post hospital discharge follow up call. The patient has a scheduled HOSFU appointment with Kimo Love MD (Family Medicine) on 1/18/2023 @ 2:00p.

## 2023-01-18 PROBLEM — J44.9 END STAGE COPD: Status: ACTIVE | Noted: 2023-01-01

## 2023-08-08 PROBLEM — F41.9 ANXIETY: Status: ACTIVE | Noted: 2023-01-01

## 2023-08-08 PROBLEM — J96.11 CHRONIC RESPIRATORY FAILURE WITH HYPOXIA: Status: ACTIVE | Noted: 2018-12-27

## 2023-09-28 NOTE — ED PROVIDER NOTES
"Encounter Date: 9/28/2023    SCRIBE #1 NOTE: I, Milli Dumont, am scribing for, and in the presence of,  Stephane Rodriguez III, MD. I have scribed the following portions of the note - the EKG reading. Other sections scribed: HPI, ROS, PE.       History     Chief Complaint   Patient presents with    Shortness of Breath     Presents via EMS with SOB since 1200 today. Hx of COPD, on 2L/min NC at home. Currently 88% on 6L/min NC. Wife reports increased weakness, cough, and "chocolate" colored urine.      83 year old male with a history of acute bronchitis, COPD, CAD, HLD, left carotid bruit, and pulmonary nodules presents to ED with spouse for shortness of breath and weakness after lunch today. Spouse reports nonproductive cough that has been worsening in the past few days and leg swelling. Also reports "chocolate" colored urine acute onset approximately 15:30 today. Denies fever, blood thinners, and sick contacts. States pt is chronically on 2L O2 at home, but increased to 3L today due to trouble with breathing. Reportedly, pt quit smoking in 2009, denies alcohol consumption. Pt received Albuterol Neb treatment en route.     The history is provided by the spouse. No  was used.     Review of patient's allergies indicates:   Allergen Reactions    Flexeril [cyclobenzaprine]      Past Medical History:   Diagnosis Date    Acute bronchitis     Anxiety disorder, unspecified     COPD (chronic obstructive pulmonary disease)     Coronary artery disease     Hyperlipidemia     Left carotid bruit     Pulmonary nodules     Unspecified cataract      Past Surgical History:   Procedure Laterality Date    APPENDECTOMY      CATARACT EXTRACTION Left 01/03/2023    HERNIA REPAIR      PHACOEMULSIFICATION, CATARACT, WITH IOL INSERTION Left 1/3/2023    Procedure: PHACOEMULSIFICATION, CATARACT, WITH IOL INSERTION- OS;  Surgeon: Louis Abdi MD;  Location: [facility] OR;  Service: Ophthalmology;  Laterality: Left;    TONSILLECTOMY   "     Family History   Problem Relation Age of Onset    Asthma Mother     Cancer Father     No Known Problems Sister     No Known Problems Brother      Social History     Tobacco Use    Smoking status: Former     Current packs/day: 0.00     Types: Cigarettes     Quit date:      Years since quittin.7    Smokeless tobacco: Never   Substance Use Topics    Alcohol use: Not Currently    Drug use: Never     Review of Systems   Constitutional:  Negative for fatigue, fever and unexpected weight change.   HENT:  Negative for congestion and rhinorrhea.    Eyes:  Negative for pain.   Respiratory:  Positive for shortness of breath. Negative for chest tightness and wheezing.    Cardiovascular:  Positive for leg swelling. Negative for chest pain.   Gastrointestinal:  Negative for abdominal pain, constipation, diarrhea, nausea and vomiting.   Genitourinary:  Negative for dysuria.        Dark colored urine   Musculoskeletal:  Negative for back pain and neck pain.   Skin:  Negative for rash.   Allergic/Immunologic: Negative for environmental allergies, food allergies and immunocompromised state.   Neurological:  Negative for dizziness and speech difficulty.   Hematological:  Does not bruise/bleed easily.   Psychiatric/Behavioral:  Negative for sleep disturbance and suicidal ideas.        Physical Exam     Initial Vitals [23 1742]   BP Pulse Resp Temp SpO2   (!) 146/79 (!) 118 (!) 23 99 °F (37.2 °C) (!) 88 %      MAP       --         Physical Exam    Nursing note and vitals reviewed.  Constitutional: He appears well-developed and well-nourished. He is diaphoretic. He appears distressed (severe).   In moderate distress   HENT:   Head: Normocephalic and atraumatic.   Eyes: EOM are normal. Pupils are equal, round, and reactive to light.   Neck: Trachea normal. Neck supple.   Normal range of motion.  Cardiovascular:  Regular rhythm.   Tachycardia present.         No murmur heard.  Pulmonary/Chest: Tachypnea noted. He is in  respiratory distress (moderate). He has wheezes. He has rales (bilateral bases).   Poor air movements   Abdominal: Abdomen is soft. Bowel sounds are normal. He exhibits no distension. There is no abdominal tenderness.   Musculoskeletal:         General: Normal range of motion.      Cervical back: Normal range of motion and neck supple.      Lumbar back: Normal.     Neurological: He is alert and oriented to person, place, and time. He has normal strength. No cranial nerve deficit.   Skin: Skin is warm. No rash noted.   Psychiatric: He has a normal mood and affect. Judgment normal.         ED Course   Critical Care    Date/Time: 9/28/2023 7:51 PM    Performed by: Stephane Rodriguez III, MD  Authorized by: Stephane Rodriguez III, MD  Direct patient critical care time: 45 minutes  Documentation critical care time: 5 minutes  Consulting other physicians critical care time: 5 minutes  Total critical care time (exclusive of procedural time) : 55 minutes  Critical care was necessary to treat or prevent imminent or life-threatening deterioration of the following conditions: shock and metabolic crisis.  Critical care was time spent personally by me on the following activities: discussions with primary provider, discussions with consultants, examination of patient, re-evaluation of patient's condition, review of old charts, ordering and review of laboratory studies and ordering and performing treatments and interventions.      Intubation    Date/Time: 9/28/2023 7:51 PM  Location procedure was performed: [facility] EMERGENCY DEPARTMENT    Performed by: Stephane Rodriguez III, MD  Authorized by: Stephane Rodriguez III, MD  Assisting provider: Stephane Rodriguez III, MD  Pre-operative diagnosis: Rep failure  Post-operative diagnosis: Resp failure  Consent Done: Not Needed  Description of findings: ETT   Intubation method: direct  Patient status: paralyzed (RSI)  Sedatives: etomidate  Paralytic: succinylcholine  Laryngoscope size: Mac 4  Tube  size: 8.5 mm  Tube type: cuffed  Number of attempts: 1  Cricoid pressure: yes  Cords visualized: yes  Post-procedure assessment: chest rise, ETCO2 monitor and CO2 detector  Breath sounds: clear, equal and equal and absent over the epigastrium  Cuff inflated: yes  Tube secured with: adhesive tape  Chest x-ray interpreted by me.  Chest x-ray findings: endotracheal tube in appropriate position  Patient tolerance: Patient tolerated the procedure well with no immediate complications  Complications: No        Labs Reviewed   COMPREHENSIVE METABOLIC PANEL - Abnormal; Notable for the following components:       Result Value    Chloride 109 (*)     Carbon Dioxide 19 (*)     Glucose Level 190 (*)     All other components within normal limits   CBC WITH DIFFERENTIAL - Abnormal; Notable for the following components:    WBC 21.13 (*)     MCHC 32.2 (*)     Neut # 16.51 (*)     IG# 0.15 (*)     All other components within normal limits   B-TYPE NATRIURETIC PEPTIDE - Normal   COVID/RSV/FLU A&B PCR - Normal    Narrative:     The Xpert Xpress SARS-CoV-2/FLU/RSV plus is a rapid, multiplexed real-time PCR test intended for the simultaneous qualitative detection and differentiation of SARS-CoV-2, Influenza A, Influenza B, and respiratory syncytial virus (RSV) viral RNA in either nasopharyngeal swab or nasal swab specimens.         TROPONIN I - Normal   TSH - Normal   BLOOD CULTURE [facility]   BLOOD CULTURE [facility]   RESPIRATORY CULTURE ([facility])   CBC W/ AUTO DIFFERENTIAL    Narrative:     The following orders were created for panel order CBC auto differential.  Procedure                               Abnormality         Status                     ---------                               -----------         ------                     CBC with Differential[[id number]]       Abnormal            Final result                 Please view results for these tests on the individual orders.   URINALYSIS   LACTIC ACID, PLASMA   BLOOD GAS   RESPIRATORY PANEL    URINALYSIS, MICROSCOPIC     EKG Readings: (Independently Interpreted)   Initial Reading: No STEMI. Rhythm: Sinus Tachycardia. Heart Rate: 123. Ectopy: No Ectopy. Conduction: RBBB. ST Segments: Normal ST Segments. T Waves: Normal. Clinical Impression: Sinus Tachycardia   Performed at 17:51 on 09/28/2023.       Imaging Results              X-Ray Chest AP Portable (Final result)  Result time 09/28/23 18:38:45      Final result by Kimberli Cisse MD (09/28/23 18:38:45)                   Impression:      Chronic emphysema and scarring within the lungs without appreciable acute superimposed airspace opacity.      Electronically signed by: Kimberli Cisse  Date:    09/28/2023  Time:    18:38               Narrative:    EXAMINATION:  XR CHEST AP PORTABLE    CLINICAL HISTORY:  Asthma;    TECHNIQUE:  Single frontal view of the chest was performed.    COMPARISON:  08/08/2023    FINDINGS:  LINES AND TUBES: EKG/telemetry leads overlie the chest.    MEDIASTINUM AND ANRDE: The cardiac silhouette is normal. Launch coronary    LUNGS: Emphysema.  Chronic basilar scarring.  No appreciable acute superimposed airspace opacity.    PLEURA:No pleural effusion. No pneumothorax.    BONES: No acute osseous abnormality.                                       Medications   magnesium sulfate 2g in water 50mL IVPB (premix) (2 g Intravenous New Bag 9/28/23 1833)   piperacillin-tazobactam (ZOSYN) 4.5 g in dextrose 5 % in water (D5W) 100 mL IVPB (MB+) (has no administration in time range)   lactated ringers bolus 2,367 mL (has no administration in time range)   azithromycin 500 mg in dextrose 5 % 250 mL IVPB (ready to mix) (has no administration in time range)   propofol (DIPRIVAN) 10 mg/mL infusion (5 mcg/kg/min × 78.9 kg Intravenous New Bag 9/28/23 1951)   propofoL (DIPRIVAN) 10 mg/mL infusion (has no administration in time range)   albuterol nebulizer solution 15 mg (15 mg Nebulization Given 9/28/23 1814)   ipratropium 0.02 % nebulizer  "solution 1 mg (1 mg Nebulization Given 9/28/23 1814)   methylPREDNISolone sodium succinate injection 125 mg (125 mg Intravenous Given 9/28/23 1810)   sodium chloride 0.9% bolus 1,000 mL 1,000 mL (0 mLs Intravenous Stopped 9/28/23 1910)     Medical Decision Making  The differential diagnosis includes, but is not limited to: COPD, CHF, and PNA.    Patient arrived with significant respiratory distress with poor air movement borderline temp but history of COPD was given an hour long neb magnesium Solu-Medrol with initially some moderation of symptoms but patient sat up in the bed to urinate started coughing had some purulent bloody sputum and became acutely short of breath again sats in the mid 80s went up to 88 on a non-rebreather patient appeared to be tiring so decision made to intubate RSI with etomidate and sucks a 0.5 ET tube placed per note patient given Zosyn and azithromycin for suspected pulmonary pathogens will get urinalysis respiratory culture respiratory panel flu COVID negative discussed case with Hospital Medicine will admit    Problems Addressed:  Respiratory failure: complicated acute illness or injury that poses a threat to life or bodily functions    Amount and/or Complexity of Data Reviewed  Independent Historian: spouse     Details: Spouse reports nonproductive cough that has been worsening in the past few days and leg swelling. Also reports "chocolate" colored urine acute onset approximately 15:30 today. Denies fever, blood thinners, and sick contacts. States pt is chronically on 2L O2 at home, but increased to 3L today due to trouble with breathing. Reportedly, pt quit smoking in 2009, denies alcohol consumption. Pt received Albuterol Neb treatment en route.   Labs: ordered. Decision-making details documented in ED Course.  Radiology: ordered.  ECG/medicine tests: ordered and independent interpretation performed. Decision-making details documented in ED Course.    Risk  Prescription drug " management.  Decision regarding hospitalization.            Scribe Attestation:   Scribe #1: I performed the above scribed service and the documentation accurately describes the services I performed. I attest to the accuracy of the note.    Attending Attestation:           Physician Attestation for Scribe:  Physician Attestation Statement for Scribe #1: I, Stephane Rodriguez III, MD, reviewed documentation, as scribed by Milli Dumont in my presence, and it is both accurate and complete.                             Clinical Impression:   Final diagnoses:  [J96.90] Respiratory failure        ED Disposition Condition    Admit                 Stephane Rodriguez III, MD  09/28/23 1953

## 2023-09-28 NOTE — Clinical Note
Diagnosis: Respiratory failure [167482]   Admitting Provider:: ELIGIO DUENAS [[id number]]   Future Attending Provider: ELIGIO DUENAS [[id number]]   Reason for IP Medical Treatment  (Clinical interventions that can only be accomplished in the IP setting? ) :: Needs inpatient managment   Estimated Length of Stay:: 3-4 midnights   I certify that Inpatient services for greater than or equal to 2 midnights are medically necessary:: Yes   Plans for Post-Acute care--if anticipated (pick the single best option):: A. No post acute care anticipated at this time   Special Needs:: No Special Needs [1]

## 2023-09-29 NOTE — NURSING
Nurses Note -- 4 Eyes      9/29/2023   3:55 AM      Skin assessed during: Admit      [] No Altered Skin Integrity Present    [x]Prevention Measures Documented      [x] Yes- Altered Skin Integrity Present or Discovered   [x] LDA Added if Not in Epic (Describe Wound)   [x] New Altered Skin Integrity was Present on Admit and Documented in LDA   [x] Wound Image Taken    Wound Care Consulted? Yes    Attending Nurse:  Fredis Muse RN/Staff Member:   Victor Hugo Herrmann RN

## 2023-09-29 NOTE — NURSING
Nurses Note -- 4 Eyes      9/29/2023   9:03 AM      Skin assessed during: Daily Assessment      [] No Altered Skin Integrity Present    []Prevention Measures Documented      [x] Yes- Altered Skin Integrity Present or Discovered   [] LDA Added if Not in Epic (Describe Wound)   [] New Altered Skin Integrity was Present on Admit and Documented in LDA   [] Wound Image Taken    Wound Care Consulted? Yes    Attending Nurse:  Yanelis Salgado RN      Second RN/Staff Member:  Pradeep Peoples CNA

## 2023-09-29 NOTE — H&P
[facility] - Emergency Dept  Pulmonary Critical Care Note    Patient Name: Helder Sharp Jr.  MRN: [id number]  Admission Date: 9/28/2023  Hospital Length of Stay: 0 days  Code Status: Full Code  Attending Provider: Antony Foreman MD  Primary Care Provider: Kimo Love MD     Subjective:     HPI: Mr. Helder Sharp Jr. Is a 83 y.o. male with a PMHx of COPD, CAD, HLD, and pulmonary nodules who presented to [facility] ED on 9/28/23 for COPD Exacerbation with associated hypoxic respiratory failure requiring intubation in the ED. patient's wife at bedside to clarify HPI.  Around noon today, patient reported to wife that he was feeling sick and was shivering.  Patient's wife went to an eye appointment, and upon her return patient was endorsing generalized weakness and was unable to get out of his chair.  She assisted him to the urinal in his bedroom in which he urinated chocolate colored urine which prompted her to bring him to the ER.  Patient has longstanding history of COPD with his last hospitalization being in January 2023 where he had a polymicrobial pneumonia, lost 20 lb during hospitalization, but did not require intubation.  Endorses 8-9 lb weight gain over the last 6 months, denies night sweats, fevers.  Uses Trelegy inhaler daily, DuoNeb and rescue inhaler as needed.  Patient has 100 pack year history (2 packs daily for 50 years) with a quit date in 2009.  Patient was intubated due to desaturations to the low 80s and respiratory distress/fatigue.    In the ED, patient received Zosyn and azithromycin x1, LR bolus 2367 cc, Solu-Medrol 125 mg, Mag sulfate 2 g, and DuoNeb x1.      Hospital Course/Significant events:      24 Hour Interval History:  Pending    Past Medical History:   Diagnosis Date    Acute bronchitis     Anxiety disorder, unspecified     COPD (chronic obstructive pulmonary disease)     Coronary artery disease     Hyperlipidemia     Left carotid bruit     Pulmonary nodules      Unspecified cataract        Past Surgical History:   Procedure Laterality Date    APPENDECTOMY      CATARACT EXTRACTION Left 2023    HERNIA REPAIR      PHACOEMULSIFICATION, CATARACT, WITH IOL INSERTION Left 1/3/2023    Procedure: PHACOEMULSIFICATION, CATARACT, WITH IOL INSERTION- OS;  Surgeon: Louis Abdi MD;  Location: [facility];  Service: Ophthalmology;  Laterality: Left;    TONSILLECTOMY         Social History     Socioeconomic History    Marital status:    Tobacco Use    Smoking status: Former     Current packs/day: 0.00     Types: Cigarettes     Quit date:      Years since quittin.7    Smokeless tobacco: Never   Substance and Sexual Activity    Alcohol use: Not Currently    Drug use: Never    Sexual activity: Yes           Current Outpatient Medications   Medication Instructions    albuterol (ACCUNEB) 0.63 mg, Nebulization, Every 6 hours PRN, Rescue    albuterol (PROVENTIL/VENTOLIN HFA) 90 mcg/actuation inhaler 2 puffs, Inhalation, Every 6 hours PRN, Rescue    albuterol-ipratropium (DUO-NEB) 2.5 mg-0.5 mg/3 mL nebulizer solution 3 mLs, Nebulization, Every 6 hours PRN, Rescue    ascorbic acid (vitamin C) (VITAMIN C) 500 mg, Oral, 2 times daily    aspirin (ECOTRIN) 81 mg, Oral    atorvastatin (LIPITOR) 20 mg, Oral    cholecalciferol (vitamin D3) (VITAMIN D3) 10,000 Units, Oral    DALIRESP 500 mcg Tab 0.5 tablets, Oral, Daily    fluticasone propionate (FLONASE) 50 mcg/actuation nasal spray 1 spray, Each Nostril, Daily    fluticasone-umeclidin-vilanter (TRELEGY ELLIPTA) 200-62.5-25 mcg inhaler 1 puff, Oral, Daily    guaiFENesin (MUCINEX) 1,200 mg, Oral, Daily    Lactobacillus rhamnosus GG (CULTURELLE) 15 billion cell capsule Oral    montelukast (SINGULAIR) 10 mg tablet TAKE 1 TABLET BY MOUTH EVERY DAY    predniSONE (DELTASONE) 20 MG tablet Take 3 tablets by mouth for three days, then take 2 tablets by mouth for four days, then 1 tablet for four days, then 1/2 tablet for 4 days.    ranolazine  (RANEXA) 500 mg, Oral, 2 times daily    roflumilast (DALIRESP) 500 mcg Tab 0.5 tablets, Oral, Daily    sertraline (ZOLOFT) 100 mg, Oral, Daily       Current Inpatient Medications   azithromycin  500 mg Intravenous ED 1 Time    lactated ringers  30 mL/kg Intravenous Once    midazolam  4 mg Intravenous Once    piperacillin-tazobactam (Zosyn) IV (PEDS and ADULTS) (extended infusion is not appropriate)  4.5 g Intravenous ED 1 Time    propofoL           Current Intravenous Infusions   propofoL 20 mcg/kg/min (09/28/23 2100)         Review of Systems   Unable to perform ROS: Intubated          Objective:       Intake/Output Summary (Last 24 hours) at 9/28/2023 2024  Last data filed at 9/28/2023 1955  Gross per 24 hour   Intake 27.35 ml   Output --   Net 27.35 ml         Vital Signs (Most Recent):  Temp: 99 °F (37.2 °C) (09/28/23 1742)  Pulse: 92 (09/28/23 1814)  Resp: 18 (09/28/23 1814)  BP: (!) 141/67 (09/28/23 1802)  SpO2: 95 % (09/28/23 1814)  Body mass index is 22.34 kg/m².  Weight: 78.9 kg (174 lb) Vital Signs (24h Range):  Temp:  [99 °F (37.2 °C)] 99 °F (37.2 °C)  Pulse:  [] 92  Resp:  [18-28] 18  SpO2:  [88 %-96 %] 95 %  BP: (141-146)/(67-79) 141/67     Physical Exam  Vitals and nursing note reviewed.   Constitutional:       Comments: Patient is intubated and sedated.   HENT:      Head: Normocephalic.   Eyes:      General: No scleral icterus.     Extraocular Movements: Extraocular movements intact.      Pupils: Pupils are equal, round, and reactive to light.   Cardiovascular:      Rate and Rhythm: Normal rate and regular rhythm.      Pulses: Normal pulses.      Heart sounds: Normal heart sounds. No murmur heard.  Pulmonary:      Breath sounds: Normal breath sounds. No wheezing or rales.   Abdominal:      General: Abdomen is flat. There is no distension.      Palpations: Abdomen is soft.      Tenderness: There is no abdominal tenderness.   Musculoskeletal:         General: No swelling.      Cervical back:  "Normal range of motion.      Right lower leg: No edema.      Left lower leg: No edema.   Skin:     Capillary Refill: Capillary refill takes less than 2 seconds.      Coloration: Skin is not jaundiced or pale.      Findings: No lesion or rash.           Lines/Drains/Airways       Peripheral Intravenous Line  Duration                  Peripheral IV - Single Lumen 09/28/23 1810 18 G Anterior;Left Forearm <1 day                    Significant Labs:    Lab Results   Component Value Date    WBC 21.13 (H) 09/28/2023    HGB 14.4 09/28/2023    HCT 44.7 09/28/2023    MCV 92.0 09/28/2023     09/28/2023           BMP  Lab Results   Component Value Date     09/28/2023    K 4.3 09/28/2023    CHLORIDE 109 (H) 09/28/2023    CO2 19 (L) 09/28/2023    BUN 22.3 09/28/2023    CREATININE 1.18 09/28/2023    CALCIUM 9.5 09/28/2023    AGAP 10.0 01/09/2023    EGFRNONAA 55 07/15/2021         ABG  No results for input(s): "PH", "PO2", "PCO2", "HCO3", "POCBASEDEF" in the last 168 hours.    Mechanical Ventilation Support:         Significant Imaging:  I have reviewed the pertinent imaging within the past 24 hours.        Assessment/Plan:     Assessment  COPD Exacerbation w/ associated Hypoxic Respiratory Failure likely 2/2 to Atypical PNA  Hematuria  HLD   History of Pulmonary Nodules      Plan  Admitted to ICU for respiratory failure requiring intubation  Wean intubation as tolerated  Continue on Zosyn and Azithromycin  RC, BC x2, Respiratory Panel, Legionella Urine Antigen pending  Solu Medrol 60 mg BID, DuoNebs q6hr  Ordered CTPA for r/o PE; ordered TTE  LR at 100 cc/hour  Consult Urology in AM for hematuria  Holding anticoagulation at this time given hematuria; will consider starting pending CTPA results    DVT Prophylaxis: SCDs  GI Prophylaxis: None     32 minutes of critical care was time spent personally by me on the following activities: development of treatment plan with patient or surrogate and bedside caregivers, " discussions with consultants, evaluation of patient's response to treatment, examination of patient, ordering and performing treatments and interventions, ordering and review of laboratory studies, ordering and review of radiographic studies, pulse oximetry, re-evaluation of patient's condition.  This critical care time did not overlap with that of any other provider or involve time for any procedures.     Janes Xiao MD  [facility], [facility]2  Pulmonary Critical Care Medicine  [facility] - Emergency Dept

## 2023-09-29 NOTE — CONSULTS
"Helder Sharp Jr. 1940  [id number]  9/29/2023    CONSULTING PHYSICIAN: Janes Xiao MD    REASON FOR CONSULTATION:  Hematuria    HPI:  The patient is an 83-year-old male with a past medical history of COPD, CAD, hyperlipidemia, pulmonary nodules who presented to the emergency room on 09/28/2023 for COPD exacerbation and respiratory failure requiring intubation.  He has been admitted to the ICU.  On arrival to the emergency room, his wife reported he urinated" " chocolate" colored urine at home just prior to presentation ER. He has been afebrile since arrival.  Lab work today reveals WBC 16.09 (21.13 on admit), H&H 11.7/35.6, BUN and creatinine 19.4/1.23 (creatinine 1.18 yesterday); UA yesterday with turbid orange urine, negative nitrites, 1+ leukocytes, greater than 100 RBC, 3-5 WBC, rare bacteria.    Patient is intubated and sedated.  His wife is at the bedside.  She denies any history of hematuria or urologic surgery.  Denies any prostate problems.  She reports he began with dark brown/bloody urine at home just prior to admission.  He is not on any blood thinners at home.    Past Medical History:   Diagnosis Date    Acute bronchitis     Anxiety disorder, unspecified     COPD (chronic obstructive pulmonary disease)     Coronary artery disease     Hyperlipidemia     Left carotid bruit     Pulmonary nodules     Unspecified cataract      Past Surgical History:   Procedure Laterality Date    APPENDECTOMY      CATARACT EXTRACTION Left 01/03/2023    HERNIA REPAIR      PHACOEMULSIFICATION, CATARACT, WITH IOL INSERTION Left 1/3/2023    Procedure: PHACOEMULSIFICATION, CATARACT, WITH IOL INSERTION- OS;  Surgeon: Louis Abdi MD;  Location: [facility];  Service: Ophthalmology;  Laterality: Left;    TONSILLECTOMY       Family History   Problem Relation Age of Onset    Asthma Mother     Cancer Father     No Known Problems Sister     No Known Problems Brother      Social History     Tobacco Use    Smoking status: Former "     Current packs/day: 0.00     Types: Cigarettes     Quit date:      Years since quittin.7    Smokeless tobacco: Never   Substance Use Topics    Alcohol use: Not Currently    Drug use: Never     Current Facility-Administered Medications   Medication Dose Route Frequency Provider Last Rate Last Admin    0.9%  NaCl infusion   Intravenous Continuous Antony Foreman MD 5 mL/hr at 23 0423 New Bag at 23 0423    0.9%  NaCl infusion   Intravenous Continuous Antony Foreman MD 5 mL/hr at 23 0638 Restarted at 23 0638    albuterol-ipratropium 2.5 mg-0.5 mg/3 mL nebulizer solution 3 mL  3 mL Nebulization Q6H Janes Xiao MD   3 mL at 23 0810    azithromycin 500 mg in dextrose 5 % 250 mL IVPB (ready to mix)  500 mg Intravenous Q24H Janes Xiao MD        guaiFENesin 100 mg/5 ml syrup 200 mg  200 mg Per NG tube Q6H VANESSA Bauman MD        lactated ringers infusion   Intravenous Continuous Janes Xiao  mL/hr at 23 0706 Rate Verify at 23 0706    methylPREDNISolone sodium succinate injection 60 mg  60 mg Intravenous BID Janes Xiao MD   60 mg at 23 0808    midazolam (VERSED) 1 mg/mL injection 4 mg  4 mg Intravenous Once Stephane Rodriguez III, MD        piperacillin-tazobactam (ZOSYN) 4.5 g in dextrose 5 % in water (D5W) 100 mL IVPB (MB+)  4.5 g Intravenous Q8H Janes Xiao MD   Stopped at 23 0825    propofol (DIPRIVAN) 10 mg/mL infusion  0-50 mcg/kg/min Intravenous Continuous Stephane Rodriguez III, MD 16.6 mL/hr at 23 0706 35.065 mcg/kg/min at 23 0706    sodium chloride 0.9% flush 10 mL  10 mL Intravenous PRN Janes Xiao MD         Review of patient's allergies indicates:   Allergen Reactions    Flexeril [cyclobenzaprine]        ROS: 12 point review of systems negative other than the HPI    PHYSICAL EXAM:  Vitals:    23 0945 23 1000 23 1015 23 1030   BP: (!) 114/59 (!) 100/47 132/61 (!)  104/51   BP Location:  Left arm     Patient Position:  Lying     Pulse: 80 78 87 83   Resp: (!) 26 (!) 23 (!) 29 (!) 23   Temp:       TempSrc:       SpO2: 100% 96% 98% 95%   Weight:       Height:           Intake/Output Summary (Last 24 hours) at 9/29/2023 1051  Last data filed at 9/29/2023 1000  Gross per 24 hour   Intake 5888.62 ml   Output 1075 ml   Net 4813.62 ml       GEN:  NAD  HEENT: normocephalic, atraumatic, PERRLA, EOMI, OP clear, nares patent  CV: RRR  RESP:  Intubated on mechanical ventilation  ABD:  Soft, nondistended  :  Clear yellow urine draining to  bag  EXT: no C/C/E  NEURO:  Sedated    LABS:  Recent Results (from the past 24 hour(s))   Brain natriuretic peptide    Collection Time: 09/28/23  6:12 PM   Result Value Ref Range    Natriuretic Peptide 22.0 <=100.0 pg/mL   Comprehensive metabolic panel    Collection Time: 09/28/23  6:12 PM   Result Value Ref Range    Sodium Level 142 136 - 145 mmol/L    Potassium Level 4.3 3.5 - 5.1 mmol/L    Chloride 109 (H) 98 - 107 mmol/L    Carbon Dioxide 19 (L) 23 - 31 mmol/L    Glucose Level 190 (H) 82 - 115 mg/dL    Blood Urea Nitrogen 22.3 8.4 - 25.7 mg/dL    Creatinine 1.18 0.73 - 1.18 mg/dL    Calcium Level Total 9.5 8.8 - 10.0 mg/dL    Protein Total 6.9 5.8 - 7.6 gm/dL    Albumin Level 4.1 3.4 - 4.8 g/dL    Globulin 2.8 2.4 - 3.5 gm/dL    Albumin/Globulin Ratio 1.5 1.1 - 2.0 ratio    Bilirubin Total 0.9 <=1.5 mg/dL    Alkaline Phosphatase 93 40 - 150 unit/L    Alanine Aminotransferase 16 0 - 55 unit/L    Aspartate Aminotransferase 17 5 - 34 unit/L    eGFR >60 mls/min/1.73/m2   Troponin I    Collection Time: 09/28/23  6:12 PM   Result Value Ref Range    Troponin-I 0.014 0.000 - 0.045 ng/mL   TSH    Collection Time: 09/28/23  6:12 PM   Result Value Ref Range    Thyroid Stimulating Hormone 1.784 0.350 - 4.940 uIU/mL   CBC with Differential    Collection Time: 09/28/23  6:12 PM   Result Value Ref Range    WBC 21.13 (H) 4.50 - 11.50 x10(3)/mcL    RBC 4.86 4.70  - 6.10 x10(6)/mcL    Hgb 14.4 14.0 - 18.0 g/dL    Hct 44.7 42.0 - 52.0 %    MCV 92.0 80.0 - 94.0 fL    MCH 29.6 27.0 - 31.0 pg    MCHC 32.2 (L) 33.0 - 36.0 g/dL    RDW 15.3 11.5 - 17.0 %    Platelet 184 130 - 400 x10(3)/mcL    MPV 9.0 7.4 - 10.4 fL    Neut % 78.2 %    Lymph % 14.8 %    Mono % 5.9 %    Eos % 0.1 %    Basophil % 0.3 %    Lymph # 3.13 0.6 - 4.6 x10(3)/mcL    Neut # 16.51 (H) 2.1 - 9.2 x10(3)/mcL    Mono # 1.25 0.1 - 1.3 x10(3)/mcL    Eos # 0.02 0 - 0.9 x10(3)/mcL    Baso # 0.07 <=0.2 x10(3)/mcL    IG# 0.15 (H) 0 - 0.04 x10(3)/mcL    IG% 0.7 %    NRBC% 0.0 %   COVID/RSV/FLU A&B PCR    Collection Time: 09/28/23  6:13 PM   Result Value Ref Range    Influenza A PCR Not Detected Not Detected    Influenza B PCR Not Detected Not Detected    Respiratory Syncytial Virus PCR Not Detected Not Detected    SARS-CoV-2 PCR Not Detected Not Detected, Negative, Invalid   Urinalysis Only    Collection Time: 09/28/23  7:34 PM   Result Value Ref Range    Color, UA Orange (A) Yellow, Light-Yellow, Dark Yellow, Love, Straw    Appearance, UA Turbid (A) Clear    Specific Gravity, UA 1.023 1.005 - 1.030    pH, UA 5.0 5.0 - 8.5    Protein, UA 2+ (A) Negative    Glucose, UA Negative Negative, Normal    Ketones, UA Trace (A) Negative    Blood, UA 3+ (A) Negative    Bilirubin, UA 1+ (A) Negative    Urobilinogen, UA 0.2 0.2, 1.0, Normal    Nitrites, UA Negative Negative    Leukocyte Esterase, UA 1+ (A) Negative   Urinalysis, Microscopic    Collection Time: 09/28/23  7:34 PM   Result Value Ref Range    RBC, UA >100 (A) None Seen, 0-2, 3-5, 0-5 /HPF    WBC, UA 3-5 None Seen, 0-2, 3-5, 0-5 /HPF    Squamous Epithelial Cells, UA 0-4 0-4, None Seen /HPF    Bacteria, UA Rare None Seen, Rare, Occasional /HPF   Lactic acid, plasma    Collection Time: 09/28/23  7:43 PM   Result Value Ref Range    Lactic Acid Level 3.6 (HH) 0.5 - 2.2 mmol/L   Protime-INR    Collection Time: 09/28/23  8:10 PM   Result Value Ref Range    PT 15.6 (H) 12.5 -  14.5 seconds    INR 1.3 <=1.3   APTT    Collection Time: 09/28/23  8:10 PM   Result Value Ref Range    PTT 29.1 23.2 - 33.7 seconds   Lactic Acid, Plasma    Collection Time: 09/28/23  9:35 PM   Result Value Ref Range    Lactic Acid Level 4.5 (HH) 0.5 - 2.2 mmol/L   RT Blood Gas    Collection Time: 09/28/23 10:06 PM   Result Value Ref Range    Sample Type Arterial Blood     Sample site Right Radial Artery     Drawn by daniel, rt     pH, Blood gas 7.310 (L) 7.350 - 7.450    pCO2, Blood gas 34.0 (L) 35.0 - 45.0 mmHg    pO2, Blood gas 77.0 (L) 80.0 - 100.0 mmHg    Sodium, Blood Gas 136 (L) 137 - 145 mmol/L    Potassium, Blood Gas 3.3 (L) 3.5 - 5.0 mmol/L    Calcium Level Ionized 1.20 1.12 - 1.23 mmol/L    TOC2, Blood gas 18.1 mmol/L    Base Excess, Blood gas -8.30 (L) -2.00 - 2.00 mmol/L    sO2, Blood gas 93.9 %    HCO3, Blood gas 17.1 (L) 22.0 - 26.0 mmol/L    THb, Blood gas 11.7 (L) 12 - 16 g/dL    O2 Hb, Blood Gas 93.2 (L) 94.0 - 97.0 %    CO Hgb 1.4 0.5 - 1.5 %    Met Hgb 0.2 (L) 0.4 - 1.5 %    Allens Test Yes     MODE AC     Oxygen Device, Blood gas Ventilator     FIO2, Blood gas 50 %    Mech Vt 500 ml    Mech RR 20 b/min    PEEP 5.0 cmH2O   Lactic Acid, Plasma    Collection Time: 09/29/23  1:14 AM   Result Value Ref Range    Lactic Acid Level 4.3 (HH) 0.5 - 2.2 mmol/L   Comprehensive Metabolic Panel    Collection Time: 09/29/23  1:14 AM   Result Value Ref Range    Sodium Level 141 136 - 145 mmol/L    Potassium Level 3.3 (L) 3.5 - 5.1 mmol/L    Chloride 112 (H) 98 - 107 mmol/L    Carbon Dioxide 17 (L) 23 - 31 mmol/L    Glucose Level 257 (H) 82 - 115 mg/dL    Blood Urea Nitrogen 19.4 8.4 - 25.7 mg/dL    Creatinine 1.23 (H) 0.73 - 1.18 mg/dL    Calcium Level Total 8.5 (L) 8.8 - 10.0 mg/dL    Protein Total 5.3 (L) 5.8 - 7.6 gm/dL    Albumin Level 3.2 (L) 3.4 - 4.8 g/dL    Globulin 2.1 (L) 2.4 - 3.5 gm/dL    Albumin/Globulin Ratio 1.5 1.1 - 2.0 ratio    Bilirubin Total 0.7 <=1.5 mg/dL    Alkaline Phosphatase 65 40 - 150  unit/L    Alanine Aminotransferase 12 0 - 55 unit/L    Aspartate Aminotransferase 16 5 - 34 unit/L    eGFR 58 mls/min/1.73/m2   CBC with Differential    Collection Time: 09/29/23  1:14 AM   Result Value Ref Range    WBC 16.09 (H) 4.50 - 11.50 x10(3)/mcL    RBC 3.84 (L) 4.70 - 6.10 x10(6)/mcL    Hgb 11.7 (L) 14.0 - 18.0 g/dL    Hct 35.6 (L) 42.0 - 52.0 %    MCV 92.7 80.0 - 94.0 fL    MCH 30.5 27.0 - 31.0 pg    MCHC 32.9 (L) 33.0 - 36.0 g/dL    RDW 15.4 11.5 - 17.0 %    Platelet 135 130 - 400 x10(3)/mcL    MPV 9.0 7.4 - 10.4 fL    Neut % 92.3 %    Lymph % 3.0 %    Mono % 3.8 %    Eos % 0.0 %    Basophil % 0.2 %    Lymph # 0.49 (L) 0.6 - 4.6 x10(3)/mcL    Neut # 14.84 (H) 2.1 - 9.2 x10(3)/mcL    Mono # 0.61 0.1 - 1.3 x10(3)/mcL    Eos # 0.00 0 - 0.9 x10(3)/mcL    Baso # 0.03 <=0.2 x10(3)/mcL    IG# 0.12 (H) 0 - 0.04 x10(3)/mcL    IG% 0.7 %    NRBC% 0.0 %   Echo    Collection Time: 09/29/23  9:20 AM   Result Value Ref Range    BSA 2.09 m2    Justin's Biplane MOD Ejection Fraction 60 %    LVOT stroke volume 60.03 cm3    LVIDd 4.40 3.5 - 6.0 cm    LV Systolic Volume 70.00 mL    LV Systolic Volume Index 33.3 mL/m2    LVIDs 4.00 2.1 - 4.0 cm    LV Diastolic Volume 87.70 mL    LV Diastolic Volume Index 41.76 mL/m2    IVS 0.90 0.6 - 1.1 cm    LVOT diameter 2.20 cm    LVOT area 3.8 cm2    FS 9 (A) 28 - 44 %    Left Ventricle Relative Wall Thickness 0.45 cm    Posterior Wall 1.00 0.6 - 1.1 cm    LV mass 137.77 g    LV Mass Index 66 g/m2    MV Peak E Oleg 0.69 m/s    TDI LATERAL 0.07 m/s    TDI SEPTAL 0.04 m/s    E/E' ratio 12.55 m/s    MV Peak A Oleg 0.85 m/s    TR Max Oleg 2.12 m/s    E/A ratio 0.81     E wave deceleration time 217.00 msec    LV SEPTAL E/E' RATIO 17.25 m/s    LV LATERAL E/E' RATIO 9.86 m/s    LVOT peak oleg 0.76 m/s    Left Ventricular Outflow Tract Mean Velocity 0.50 cm/s    Left Ventricular Outflow Tract Mean Gradient 1.00 mmHg    LA size 3.50 cm    RVDD 3.30 cm    TAPSE 1.98 cm    AV mean gradient 2 mmHg    AV  peak gradient 4 mmHg    Ao peak virgil 1.06 m/s    Ao VTI 19.70 cm    LVOT peak VTI 15.80 cm    AV valve area 3.05 cm²    AV Velocity Ratio 0.72     AV index (prosthetic) 0.80     TRAMAINE by Velocity Ratio 2.72 cm²    MV mean gradient 2 mmHg    MV peak gradient 5 mmHg    MV stenosis pressure 1/2 time 60.00 ms    MV valve area p 1/2 method 3.67 cm2    MV valve area by continuity eq 2.30 cm2    MV VTI 26.1 cm    Triscuspid Valve Regurgitation Peak Gradient 18 mmHg    PV PEAK VELOCITY 0.83 m/s    PV peak gradient 3 mmHg    Mean e' 0.06 m/s    ZLVIDS 0.02     ZLVIDD -3.95        IMAGING:  No urologic imaging      ASSESSMENT:  -hematuria-resolved  -COPD exacerbation with respiratory failure requiring intubation    PLAN:  -hematuria is resolved   -ultrasound is pending for further evaluation   -we will likely follow up outpatient for hematuria workup.  Definitive plan to follow once imaging is completed. Discussed with patient's wife.    Addendum:  -Abdominal US without any abnormality. Continue indwelling lang, OK for voiding trial once felt appropriate by primary service. He can f/u with Dr. Guajardo outpatient for hematuria workup. Urology is signing off. Please call as needed with any issues.       Love Peterson NP

## 2023-09-29 NOTE — CONSULTS
Inpatient Nutrition Assessment    Admit Date: 9/28/2023   Total duration of encounter: 1 day     Nutrition Recommendation/Prescription     Tube feeding as tolerated:  Peptamen Intense VHP goal rate 70 ml/hr to provide  1400 kcal/d, 98% needs with Diprivan kcals considered  130 g protein/d, 103% needs  1176 ml free water/d  (calculations based on estimated 20 hr/d run time)    Communication of Recommendations:  EMR    Nutrition Assessment     Malnutrition Assessment/Nutrition-Focused Physical Exam    Deferred.                                                                A minimum of two characteristics is recommended for diagnosis of either severe or non-severe malnutrition.    Chart Review    Reason Seen: continuous nutrition monitoring and physician consult for tube feeding    Malnutrition Screening Tool Results   Have you recently lost weight without trying?: No  Have you been eating poorly because of a decreased appetite?: No   MST Score: 0   Diagnosis:  COPD exacerbation w/ associated hypoxic respiratory failure likely 2/2 to atypical PNA  Hematuria  HLD     Relevant Medical History: COPD, CAD, HLD, and pulmonary nodules     Scheduled Medications:   albuterol-ipratropium  3 mL Nebulization Q6H    azithromycin  500 mg Intravenous Q24H    guaiFENesin 100 mg/5 ml  200 mg Per NG tube Q6H    methylPREDNISolone sodium succinate injection  60 mg Intravenous BID    midazolam  4 mg Intravenous Once    piperacillin-tazobactam (Zosyn) IV (PEDS and ADULTS) (extended infusion is not appropriate)  4.5 g Intravenous Q8H     Continuous Infusions:   sodium chloride 0.9% 5 mL/hr at 09/29/23 0825    sodium chloride 0.9% 5 mL/hr at 09/29/23 1435    lactated ringers 100 mL/hr at 09/29/23 1435    propofoL 40 mcg/kg/min (09/29/23 1435)     Calorie Containing IV Medications: Diprivan @ 18.9 ml/hr (provides 500 kcal/d)    Recent Labs   Lab 09/28/23  1812 09/29/23  0114    141   K 4.3 3.3*   CALCIUM 9.5 8.5*   CHLORIDE 109* 112*  "  CO2 19* 17*   BUN 22.3 19.4   CREATININE 1.18 1.23*   GLUCOSE 190* 257*   BILITOT 0.9 0.7   ALKPHOS 93 65   ALT 16 12   AST 17 16   ALBUMIN 4.1 3.2*     Dietary Orders (From admission, onward)       Start     Ordered    23  Diet NPO  (Diet/Nutrition [facility])  Diet effective now         23                Appetite/Oral Intake: NPO/not applicable  Factors Affecting Nutritional Intake: NPO and on mechanical ventilation  Food/Catholic/Cultural Preferences: none reported  Food Allergies: none reported    Wound(s): sacral redness noted    Last Bowel Movement: 23    Comments    23 Patient on ventilator, receiving kcals from Diprivan, consult received for tube feeding, will provide recommendations.    Anthropometrics    Height: 6' 2" (188 cm) Height Method: Stated  Last Weight: 83.5 kg (184 lb 1.6 oz) (23) Weight Method: Bed Scale  BMI (Calculated): 23.6  BMI Classification: normal (BMI 18.5-24.9)        Ideal Body Weight (IBW), Male: 190 lb     % Ideal Body Weight, Male (lb): 96.89 %                          Usual Weight Provided By: unable to obtain usual weight    Wt Readings from Last 5 Encounters:   23 83.5 kg (184 lb 1.6 oz)   23 78.9 kg (174 lb)   23 82.1 kg (181 lb)   23 90.2 kg (198 lb 13.7 oz)   23 85.7 kg (189 lb)     Weight Change(s) Since Admission: 10 lb weight change in same day noted, likely error  Wt Readings from Last 1 Encounters:   23 83.5 kg (184 lb 1.6 oz)   23 78.9 kg (174 lb)   Admit Weight: 78.9 kg (174 lb) (23)    Estimated Needs    Weight Used For Calorie Calculations: 83.5 kg (184 lb 1.4 oz)  Energy Calorie Requirements (kcal): 1,949.07  Energy Need Method: [facility]  Weight Used For Protein Calculations: 83.5 kg (184 lb 1.4 oz)  Protein Requirements: 100-126 g, 1.2-1.5 g/kg  Fluid Requirements (mL): 1,949.07, 1 ml/kcal  Total Ve: 13.2 L/m; Temp (24hrs), Av.2 °F (36.8 °C), Min:97.6 °F " (36.4 °C), Max:99 °F (37.2 °C)  Vtot (L/Min) for [place] Equation Calculation: 13.2; Max Temp for [place] Equation Calculation: 99 °F  Last Updated: 9/29/23    Enteral Nutrition Patient not receiving enteral nutrition at this time.    Parenteral Nutrition Patient not receiving parenteral nutrition support at this time.    Evaluation of Received Nutrient Intake    Calories: not meeting estimated needs  Protein: not meeting estimated needs    Patient Education Not applicable.    Nutrition Diagnosis     PES (1): Inadequate energy intake related to acute illness as evidenced by less than 75% needs met. (new)    Interventions/Goals     Intervention(s): modified composition of enteral nutrition, modified rate of enteral nutrition, and collaboration with other providers    Goal (1): Meet greater than 75% of nutritional needs by follow-up. (new)  Goal (2): Maintain weight throughout hospitalization. (new)    Monitoring & Evaluation     Dietitian will monitor energy intake, enteral nutrition intake, weight, weight change, electrolyte/renal panel, glucose/endocrine profile, and gastrointestinal profile.    Nutrition Risk/Follow-Up: high (follow-up in 1-4 days)   Please consult if re-assessment needed sooner.

## 2023-09-29 NOTE — PLAN OF CARE
Problem: Adult Inpatient Plan of Care  Goal: Plan of Care Review  Outcome: Ongoing, Progressing  Goal: Patient-Specific Goal (Individualized)  Outcome: Ongoing, Progressing  Goal: Absence of Hospital-Acquired Illness or Injury  Outcome: Ongoing, Progressing  Goal: Optimal Comfort and Wellbeing  Outcome: Ongoing, Progressing  Goal: Readiness for Transition of Care  Outcome: Ongoing, Progressing     Problem: Infection  Goal: Absence of Infection Signs and Symptoms  Outcome: Ongoing, Progressing     Problem: Impaired Wound Healing  Goal: Optimal Wound Healing  Outcome: Ongoing, Progressing     Problem: Skin Injury Risk Increased  Goal: Skin Health and Integrity  Outcome: Ongoing, Progressing     Problem: Communication Impairment (Mechanical Ventilation, Invasive)  Goal: Effective Communication  Outcome: Ongoing, Progressing     Problem: Device-Related Complication Risk (Mechanical Ventilation, Invasive)  Goal: Optimal Device Function  Outcome: Ongoing, Progressing     Problem: Inability to Wean (Mechanical Ventilation, Invasive)  Goal: Mechanical Ventilation Liberation  Outcome: Ongoing, Progressing     Problem: Nutrition Impairment (Mechanical Ventilation, Invasive)  Goal: Optimal Nutrition Delivery  Outcome: Ongoing, Progressing     Problem: Skin and Tissue Injury (Mechanical Ventilation, Invasive)  Goal: Absence of Device-Related Skin and Tissue Injury  Outcome: Ongoing, Progressing     Problem: Ventilator-Induced Lung Injury (Mechanical Ventilation, Invasive)  Goal: Absence of Ventilator-Induced Lung Injury  Outcome: Ongoing, Progressing     Problem: Communication Impairment (Artificial Airway)  Goal: Effective Communication  Outcome: Ongoing, Progressing     Problem: Device-Related Complication Risk (Artificial Airway)  Goal: Optimal Device Function  Outcome: Ongoing, Progressing     Problem: Skin and Tissue Injury (Artificial Airway)  Goal: Absence of Device-Related Skin or Tissue Injury  Outcome: Ongoing,  Progressing     Problem: Noninvasive Ventilation Acute  Goal: Effective Unassisted Ventilation and Oxygenation  Outcome: Ongoing, Progressing

## 2023-09-30 NOTE — NURSING
Nurses Note -- 4 Eyes      9/30/2023   8:58 AM      Skin assessed during: Daily Assessment      [] No Altered Skin Integrity Present    [x]Prevention Measures Documented      [x] Yes- Altered Skin Integrity Present or Discovered   [] LDA Added if Not in Epic (Describe Wound)   [] New Altered Skin Integrity was Present on Admit and Documented in LDA   [] Wound Image Taken    Wound Care Consulted? Yes    Attending Nurse:  Yanelis Salgado RN    Second RN/Staff Member:   BRIANNA Panchal

## 2023-09-30 NOTE — PROGRESS NOTES
"[facility]  ICU Progress Note            ASSESSMENT & PLAN:   Acute hypoxemic respiratory failure  Pneumonia, suspect aspiration  COPD      PLAN:  DC azithromycin.   Continue pip/tazo 4.5g IV q8h.  Sputum cx with normal nasra thus far.   Continue steroids, can likely begin weaning.   Continue Tfs as ordered.    Protonix / Lovenox ordered for GI / DVT prophylaxis.  Will need ST eval once extubated given h/o polymicrobial PNA in January suspected s/t aspiration and again now.  Discussed with nursing.       SUBJECTIVE:   AF, VSS.  On mechanical ventilation without issues overnight.     MEDICATIONS:   Reviewed in EMR    REVIEW OF SYSTEMS:   Except as documented, all other systems reviewed and negative     PHYSICAL EXAM:   T 98.1 °F (36.7 °C)   BP (!) 118/52   P 85   RR (!) 27   O2 (!) 92 %  GENERAL: Intubated; NAD; does not appear toxic  SKIN: no rash  HEENT: sclera non-icteric; PERRL; OETT to external os  NECK: supple; no LAD  CHEST: CTA, diminished in bases; nonlabored, equal expansion; no adventitious BS  CARDIOVASCULAR: RRR, S1S2; no murmur; equal peripheral pulses; no edema  ABDOMEN:  active bowel sounds; abdomen soft, nondistended, nontender to palpation  GENITOURINARY: Licea in place  EXTREMITIES: no cyanosis or clubbing  NEURO: Sedated    LABS AND IMAGING:     Recent Labs     09/29/23 0114 09/30/23 0117   WBC 16.09* 13.50*   RBC 3.84* 3.48*   HGB 11.7* 10.4*   HCT 35.6* 32.4*   MCV 92.7 93.1   MCH 30.5 29.9   MCHC 32.9* 32.1*   RDW 15.4 15.6    110*     Recent Labs     09/28/23 1943 09/28/23 2135 09/29/23 0114   LACTIC 3.6* 4.5* 4.3*     Recent Labs     09/28/23  2010   INR 1.3     No results for input(s): "HGBA1C", "CHOL", "TRIG", "LDL", "VLDL", "HDL" in the last 72 hours.   Recent Labs     09/28/23  1812 09/29/23 0114 09/30/23 0117    141 142   K 4.3 3.3* 3.8   CHLORIDE 109* 112* 113*   CO2 19* 17* 18*   BUN 22.3 19.4 20.9   CREATININE 1.18 1.23* 0.93   GLUCOSE 190* 257* 177*   CALCIUM 9.5 8.5* " 8.8   ALBUMIN 4.1 3.2* 2.8*   GLOBULIN 2.8 2.1* 2.7   ALKPHOS 93 65 55   ALT 16 12 11   AST 17 16 11   BILITOT 0.9 0.7 0.6   TSH 1.784  --   --      Recent Labs     09/28/23  1812   BNP 22.0   TROPONINI 0.014          US Retroperitoneal Complete  Narrative: EXAMINATION:  US RETROPERITONEAL COMPLETE    CLINICAL HISTORY:  hematuria;    COMPARISON:  12/17/2018    FINDINGS:  The right kidney measures 11.4 x 4.8 x 5.2 cm.  There are no focal lesions noted.  There is no hydronephrosis.  There is flow in the proximal IVC.  Aorta is not visualized.    The left kidney is poorly demonstrated.  It measures 9.1 x 5.5 x 4.9 cm there are no focal lesions noted there is no hydronephrosis.  The urinary bladder is collapsed with a Licea catheter.  Impression: Limited study, no abnormalities are demonstrated.    Electronically signed by: Aron Barger MD  Date:    09/29/2023  Time:    13:51  Echo    Left Ventricle: The left ventricle is normal in size. Normal wall   thickness. Normal wall motion. There is normal systolic function with a   visually estimated ejection fraction of 55 - 60%. Grade I diastolic   dysfunction.    Right Ventricle: Normal right ventricular cavity size. Systolic   function is normal.    Mitral Valve: There is no stenosis. The mean pressure gradient across   the mitral valve is 2 mmHg at a heart rate of  bpm.    Pulmonary Artery: No pulmonary hypertension.  CTA Chest Non-Coronary (PE Studies)  Narrative: Technique:CT Scan of the chest was performed with intravenous contrast with direct axial images as well as sagittal and coronal reconstruction images pulmonary embolus protocol.    Dosage Information:Automated Exposure Control was utilized.      Comparison: January 5, 2023 .    Clinical History:Pe suspected.    Findings:    Lines and Tubes:Endotracheal tube is present with its tip projecting 4 cm above the alycia. A nasogastric catheter is seen with its tip in the body of the stomach.    Neck:The thyroid  gland appear unremarkable.    Mediastinum:Multiple similar enlarged mediastinal lymph nodes are seen. The largest is in subcarinal space and measures 1.7 cm in least dimension.    Heart:The heart size is within normal limits. Coronary artery calcification is seen.    Aorta:No aortic dissection or aneurysm is seen. Moderate aortic calcification is seen in the thoracic aorta.    Pulmonary Arteries:No filling defects are seen in the pulmonary arteries to suggest pulmonary embolus.    Lungs:Severe emphysematous changes are identified in the lungs. There is consolidation in the posterior basal segments of bilateral lower lobes demonstrating air bronchograms. This may reflect aspiration pneumonia.    Pleura:No effusions or pneumothorax are identified.    Bony Structures:    Spine:Mild spondylolytic changes are seen in the thoracic spine.    Abdomen:Both adrenal glands are thickened and may reflect adrenal hyperplasia. The rest of the visualized upper abdominal organs appear unremarkable.  Impression: Impression:    1. Multiple enlarged mediastinal lymph nodes are seen. The largest is in subcarinal space and measures 1.7 cm in least dimension.    2. No filling defects are seen in the pulmonary arteries to suggest pulmonary embolus.    3. Severe emphysematous changes are identified in the lungs. There is consolidation in the posterior basal segments of bilateral lower lobes demonstrating air bronchograms. This may reflect aspiration pneumonia. Correlate clinically as regards further evaluation and followup.    4. Details and other findings as discussed above.    No significant discrepancy with overnight report.    Electronically signed by: Ronny Hamilton  Date:    09/29/2023  Time:    08:09          MYCHAL Mcintyre  Critical Care Medicine

## 2023-09-30 NOTE — NURSING
I updated the patient's family on the plan of care and answered all questions, comments and concerns.

## 2023-10-01 NOTE — PROGRESS NOTES
Urine is clear mellissa now  Good urine output  Renal U/S no significant findings  No further  recommendations at this time  He can follow up as an outpatient

## 2023-10-01 NOTE — PROGRESS NOTES
[facility] - Emergency Dept  Pulmonary Critical Care Note    Patient Name: Helder Sharp Jr.  MRN: [id number]  Admission Date: 9/28/2023  Hospital Length of Stay: 3 days  Code Status: Full Code  Attending Provider: Antony Foreman MD  Primary Care Provider: Kimo Love MD     Subjective:     HPI: Mr. Helder Sharp Jr. Is a 83 y.o. male with a PMHx of COPD, CAD, HLD, and pulmonary nodules who presented to [facility] ED on 9/28/23 for COPD Exacerbation with associated hypoxic respiratory failure requiring intubation in the ED. patient's wife at bedside to clarify HPI.  Around noon today, patient reported to wife that he was feeling sick and was shivering.  Patient's wife went to an eye appointment, and upon her return patient was endorsing generalized weakness and was unable to get out of his chair.  She assisted him to the urinal in his bedroom in which he urinated chocolate colored urine which prompted her to bring him to the ER.  Patient has longstanding history of COPD with his last hospitalization being in January 2023 where he had a polymicrobial pneumonia, lost 20 lb during hospitalization, but did not require intubation.  Endorses 8-9 lb weight gain over the last 6 months, denies night sweats, fevers.  Uses Trelegy inhaler daily, DuoNeb and rescue inhaler as needed.  Patient has 100 pack year history (2 packs daily for 50 years) with a quit date in 2009.  Patient was intubated due to desaturations to the low 80s and respiratory distress/fatigue.    In the ED, patient received Zosyn and azithromycin x1, LR bolus 2367 cc, Solu-Medrol 125 mg, Mag sulfate 2 g, and DuoNeb x1.      24 Hour Interval History:  No acute events overnight.  T-max 100.3° x1 overnight, stable oxygenation with saturations 93-94% on 40% FiO2.  Respiratory cultures with mixed nasra growth.        Review of systems unobtainable secondary to intubation and sedation.        Past Medical History:   Diagnosis Date    Acute  bronchitis     Anxiety disorder, unspecified     COPD (chronic obstructive pulmonary disease)     Coronary artery disease     Hyperlipidemia     Left carotid bruit     Pulmonary nodules     Unspecified cataract      Past Surgical History:   Procedure Laterality Date    APPENDECTOMY      CATARACT EXTRACTION Left 2023    HERNIA REPAIR      PHACOEMULSIFICATION, CATARACT, WITH IOL INSERTION Left 1/3/2023    Procedure: PHACOEMULSIFICATION, CATARACT, WITH IOL INSERTION- OS;  Surgeon: Louis Abdi MD;  Location: [facility];  Service: Ophthalmology;  Laterality: Left;    TONSILLECTOMY       Social History     Socioeconomic History    Marital status:    Tobacco Use    Smoking status: Former     Current packs/day: 0.00     Types: Cigarettes     Quit date:      Years since quittin.7    Smokeless tobacco: Never   Substance and Sexual Activity    Alcohol use: Not Currently    Drug use: Never    Sexual activity: Yes     Current Outpatient Medications   Medication Instructions    albuterol (ACCUNEB) 0.63 mg, Nebulization, Every 6 hours PRN, Rescue    albuterol (PROVENTIL/VENTOLIN HFA) 90 mcg/actuation inhaler 2 puffs, Inhalation, Every 6 hours PRN, Rescue    albuterol-ipratropium (DUO-NEB) 2.5 mg-0.5 mg/3 mL nebulizer solution 3 mLs, Nebulization, Every 6 hours PRN, Rescue    ascorbic acid (vitamin C) (VITAMIN C) 500 mg, Oral, 2 times daily    aspirin (ECOTRIN) 81 mg, Oral    atorvastatin (LIPITOR) 20 mg, Oral    cholecalciferol (vitamin D3) (VITAMIN D3) 10,000 Units, Oral    DALIRESP 500 mcg Tab 0.5 tablets, Oral, Daily    fluticasone propionate (FLONASE) 50 mcg/actuation nasal spray 1 spray, Each Nostril, Daily    fluticasone-umeclidin-vilanter (TRELEGY ELLIPTA) 200-62.5-25 mcg inhaler 1 puff, Oral, Daily    guaiFENesin (MUCINEX) 1,200 mg, Oral, Daily    Lactobacillus rhamnosus GG (CULTURELLE) 15 billion cell capsule Oral    montelukast (SINGULAIR) 10 mg tablet TAKE 1 TABLET BY MOUTH EVERY DAY    predniSONE  (DELTASONE) 20 MG tablet Take 3 tablets by mouth for three days, then take 2 tablets by mouth for four days, then 1 tablet for four days, then 1/2 tablet for 4 days.    ranolazine (RANEXA) 500 mg, Oral, 2 times daily    roflumilast (DALIRESP) 500 mcg Tab 0.5 tablets, Oral, Daily    sertraline (ZOLOFT) 100 mg, Oral, Daily     Current Inpatient Medications   albuterol-ipratropium  3 mL Nebulization Q6H    enoxparin  40 mg Subcutaneous Q24H (prophylaxis, 1700)    guaiFENesin 100 mg/5 ml  200 mg Per NG tube Q6H    midazolam  4 mg Intravenous Once    mupirocin   Nasal BID    pantoprazole  40 mg Intravenous Daily    piperacillin-tazobactam (Zosyn) IV (PEDS and ADULTS) (extended infusion is not appropriate)  4.5 g Intravenous Q8H    predniSONE  40 mg Oral Daily     Current Intravenous Infusions   sodium chloride 0.9% 5 mL/hr at 10/01/23 0018    sodium chloride 0.9% 5 mL/hr at 10/01/23 0618    lactated ringers 100 mL/hr at 10/01/23 0618    propofoL 40 mcg/kg/min (10/01/23 0618)     Objective:       Intake/Output Summary (Last 24 hours) at 10/1/2023 0836  Last data filed at 10/1/2023 0618  Gross per 24 hour   Intake 4328.32 ml   Output 2025 ml   Net 2303.32 ml       Vital Signs (Most Recent):  Temp: 99.6 °F (37.6 °C) (10/01/23 0400)  Pulse: 69 (10/01/23 0735)  Resp: (!) 27 (10/01/23 0735)  BP: (!) 147/80 (10/01/23 0600)  SpO2: (!) 92 % (10/01/23 0735)  Body mass index is 23.64 kg/m².  Weight: 83.5 kg (184 lb 1.6 oz) Vital Signs (24h Range):  Temp:  [98.3 °F (36.8 °C)-100.3 °F (37.9 °C)] 99.6 °F (37.6 °C)  Pulse:  [69-96] 69  Resp:  [15-35] 27  SpO2:  [90 %-99 %] 92 %  BP: (122-155)/() 147/80         Physical exam:  Gen- somnolent but arouses to tactile and verbal stimulation  HENT- ATNC, MMM  CV- RRR  Resp- faint right-sided rhonchi, no audible wheezing  MSK- trace bilateral lower extremity edema  Neuro- sedated, no response to verbal or tactile stimuli  Psych- unable to assess due to intubation,  sedation        Lines/Drains/Airways       Drain  Duration                  NG/OG Tube 09/28/23 2104 Left mouth 2 days         Urethral Catheter 09/28/23 2104 2 days              Airway  Duration                  Airway - Non-Surgical 09/28/23 1935 Endotracheal Tube 2 days              Peripheral Intravenous Line  Duration                  Peripheral IV - Single Lumen 09/28/23 1810 18 G Anterior;Left Forearm 2 days         Peripheral IV - Single Lumen 09/28/23 2104 18 G Anterior;Distal;Right Wrist 2 days         Peripheral IV - Single Lumen 09/28/23 2104 18 G Anterior;Right Forearm 2 days         Peripheral IV - Single Lumen 10/01/23 0500 20 G Anterior;Distal;Left Forearm <1 day                  Significant Labs:  Lab Results   Component Value Date    WBC 13.50 (H) 09/30/2023    HGB 10.4 (L) 09/30/2023    HCT 32.4 (L) 09/30/2023    MCV 93.1 09/30/2023     (L) 09/30/2023     BMP  Lab Results   Component Value Date     09/30/2023    K 3.8 09/30/2023    CHLORIDE 113 (H) 09/30/2023    CO2 18 (L) 09/30/2023    BUN 20.9 09/30/2023    CREATININE 0.93 09/30/2023    CALCIUM 8.8 09/30/2023    AGAP 10.0 01/09/2023    EGFRNONAA 55 07/15/2021     ABG  Recent Labs   Lab 09/30/23  0532   PH 7.440   PO2 67.0*   PCO2 36.0   HCO3 24.5   POCBASEDEF 0.50     Mechanical Ventilation Support:  Vent Mode: A/C (10/01/23 0736)  Ventilator Initiated: Yes (09/28/23 2007)  Set Rate: 20 BPM (10/01/23 0736)  Vt Set: 500 mL (10/01/23 0736)  PEEP/CPAP: 5 cmH20 (10/01/23 0736)  Oxygen Concentration (%): 40 (10/01/23 0736)  Peak Airway Pressure: 18 cmH20 (10/01/23 0736)  Total Ve: 9.3 L/m (10/01/23 0736)  F/VT Ratio<105 (RSBI): (!) 55.34 (10/01/23 0447)      Significant Imaging:  CXR 09/30/2023 personally reviewed:  Mild bilateral lower lung field airspace disease        Assessment/Plan:     Assessment  Acute on chronic hypoxic respiratory failure  Acute exacerbation of COPD  History of Pulmonary Nodules      Plan  Oxygen saturations  stable, low/mid 90s on 40% FiO2  Respiratory cultures with mixed nasra, blood cultures no growth, viral respiratory panel negative  Continue Zosyn, plan for 7 day total course  Continue prednisone 40 mg daily for total 5 day course  Reported history of aspiration, NPO following extubation pending speech evaluation  ABG ordered for this morning  Wean sedation and assess suitability for extubation following spontaneous breathing trial        DVT Prophylaxis: LMWH  GI Prophylaxis:  Famotidine        I spent 33 minutes providing critical care services to this patient.  This does not include time spent for separately billed procedures.          Flaquito Garland MD  Pulmonary Critical Care Medicine  [facility] - Emergency Dept

## 2023-10-01 NOTE — NURSING
Nurses Note -- 4 Eyes      10/1/2023   10:04 AM      Skin assessed during: Daily Assessment      [] No Altered Skin Integrity Present    []Prevention Measures Documented      [x] Yes- Altered Skin Integrity Present or Discovered   [] LDA Added if Not in Epic (Describe Wound)   [] New Altered Skin Integrity was Present on Admit and Documented in LDA   [] Wound Image Taken    Wound Care Consulted? No    Attending Nurse:  Yanelis Salgado RN     Second RN/Staff Member:  Venus Canas RN

## 2023-10-02 PROBLEM — E44.0 MODERATE MALNUTRITION: Status: ACTIVE | Noted: 2023-01-01

## 2023-10-02 NOTE — NURSING
Nurses Note -- 4 Eyes      10/2/2023   4:49 PM      Skin assessed during: Daily Assessment      [] No Altered Skin Integrity Present    []Prevention Measures Documented      [x] Yes- Altered Skin Integrity Present or Discovered   [] LDA Added if Not in Epic (Describe Wound)   [] New Altered Skin Integrity was Present on Admit and Documented in LDA   [] Wound Image Taken    Wound Care Consulted? No    Attending Nurse:  Joy Muse RN/Staff Member:  KELVIN Jimenez

## 2023-10-02 NOTE — PT/OT/SLP PROGRESS
SLP orders received, chart reviewed, and nursing consulted.  Pt was admitted on 9/28 with a COPD exacerbation with hypoxic respiratory failure secondary to atypical PNA requiring intubation for mechanical ventilation.  Pt extubated on 10/1.    Pt known to the clinician from admission in January of this year at which time pt was admitted with acute on chronic hypoxemic respiratory failure and PNA.  MBS completed on 1/6 revealed severe oropharyngeal dysphagia with SILENT aspiration of thin liquids and reduced airway protection with mildly/moderately thick liquids.  Pt participate in dysphagia tx and study was repeated on 1/11 with improvement in function noted.  Soft & Bite-sized solids and thin liquids were recommended as no laryngeal penetration or aspiration was visualized at that time.  Continued dysphagia tx was recommended.    Pt currently requiring HFNC for respiratory support and has an NG tube for nutrition/hydration/medication.  Given hx of dysphagia with SILENT aspiration, MBS study is warranted for comprehensive assessment of swallow function.    Pt and wife verbalized understanding regarding POC.

## 2023-10-02 NOTE — PROGRESS NOTES
[facility] - Emergency Dept  Pulmonary Critical Care Note    Patient Name: Helder Sharp Jr.  MRN: [id number]  Admission Date: 9/28/2023  Hospital Length of Stay: 4 days  Code Status: Full Code  Attending Provider: Antony Foreman MD  Primary Care Provider: Kimo Love MD     Subjective:     HPI: Mr. Helder Sharp Jr. Is a 83 y.o. male with a PMHx of COPD, CAD, HLD, and pulmonary nodules who presented to [facility] ED on 9/28/23 for COPD Exacerbation with associated hypoxic respiratory failure requiring intubation in the ED. patient's wife at bedside to clarify HPI.  Around noon today, patient reported to wife that he was feeling sick and was shivering.  Patient's wife went to an eye appointment, and upon her return patient was endorsing generalized weakness and was unable to get out of his chair.  She assisted him to the urinal in his bedroom in which he urinated chocolate colored urine which prompted her to bring him to the ER.  Patient has longstanding history of COPD with his last hospitalization being in January 2023 where he had a polymicrobial pneumonia, lost 20 lb during hospitalization, but did not require intubation.  Endorses 8-9 lb weight gain over the last 6 months, denies night sweats, fevers.  Uses Trelegy inhaler daily, DuoNeb and rescue inhaler as needed.  Patient has 100 pack year history (2 packs daily for 50 years) with a quit date in 2009.  Patient was intubated due to desaturations to the low 80s and respiratory distress/fatigue.    In the ED, patient received Zosyn and azithromycin x1, LR bolus 2367 cc, Solu-Medrol 125 mg, Mag sulfate 2 g, and DuoNeb x1.      24 Hour Interval History:  Extubated yesterday afternoon with some slightly worsening respiratory distress overnight necessitating initiation of Vapotherm this morning.  Oxygen saturations low 90s on 55% FiO2.  Afebrile overnight.  Awake and alert this morning, conversant, normal mental status.        Review of systems  unobtainable secondary to intubation and sedation.        Past Medical History:   Diagnosis Date    Acute bronchitis     Anxiety disorder, unspecified     COPD (chronic obstructive pulmonary disease)     Coronary artery disease     Hyperlipidemia     Left carotid bruit     Pulmonary nodules     Unspecified cataract      Past Surgical History:   Procedure Laterality Date    APPENDECTOMY      CATARACT EXTRACTION Left 2023    HERNIA REPAIR      PHACOEMULSIFICATION, CATARACT, WITH IOL INSERTION Left 1/3/2023    Procedure: PHACOEMULSIFICATION, CATARACT, WITH IOL INSERTION- OS;  Surgeon: Louis Abdi MD;  Location: [facility];  Service: Ophthalmology;  Laterality: Left;    TONSILLECTOMY       Social History     Socioeconomic History    Marital status:    Tobacco Use    Smoking status: Former     Current packs/day: 0.00     Types: Cigarettes     Quit date:      Years since quittin.7    Smokeless tobacco: Never   Substance and Sexual Activity    Alcohol use: Not Currently    Drug use: Never    Sexual activity: Yes     Current Outpatient Medications   Medication Instructions    albuterol (ACCUNEB) 0.63 mg, Nebulization, Every 6 hours PRN, Rescue    albuterol (PROVENTIL/VENTOLIN HFA) 90 mcg/actuation inhaler 2 puffs, Inhalation, Every 6 hours PRN, Rescue    albuterol-ipratropium (DUO-NEB) 2.5 mg-0.5 mg/3 mL nebulizer solution 3 mLs, Nebulization, Every 6 hours PRN, Rescue    ascorbic acid (vitamin C) (VITAMIN C) 500 mg, Oral, 2 times daily    aspirin (ECOTRIN) 81 mg, Oral    atorvastatin (LIPITOR) 20 mg, Oral    cholecalciferol (vitamin D3) (VITAMIN D3) 10,000 Units, Oral    DALIRESP 500 mcg Tab 0.5 tablets, Oral, Daily    fluticasone propionate (FLONASE) 50 mcg/actuation nasal spray 1 spray, Each Nostril, Daily    fluticasone-umeclidin-vilanter (TRELEGY ELLIPTA) 200-62.5-25 mcg inhaler 1 puff, Oral, Daily    guaiFENesin (MUCINEX) 1,200 mg, Oral, Daily    Lactobacillus rhamnosus GG (CULTURELLE) 15 billion  cell capsule Oral    montelukast (SINGULAIR) 10 mg tablet TAKE 1 TABLET BY MOUTH EVERY DAY    predniSONE (DELTASONE) 20 MG tablet Take 3 tablets by mouth for three days, then take 2 tablets by mouth for four days, then 1 tablet for four days, then 1/2 tablet for 4 days.    ranolazine (RANEXA) 500 mg, Oral, 2 times daily    roflumilast (DALIRESP) 500 mcg Tab 0.5 tablets, Oral, Daily    sertraline (ZOLOFT) 100 mg, Oral, Daily     Current Inpatient Medications   albuterol-ipratropium  3 mL Nebulization Q6H    enoxparin  40 mg Subcutaneous Q24H (prophylaxis, 1700)    famotidine  20 mg Oral BID    guaiFENesin 100 mg/5 ml  200 mg Per NG tube Q6H    midazolam  4 mg Intravenous Once    mupirocin   Nasal BID    piperacillin-tazobactam (Zosyn) IV (PEDS and ADULTS) (extended infusion is not appropriate)  4.5 g Intravenous Q8H    predniSONE  40 mg Oral Daily     Current Intravenous Infusions   sodium chloride 0.9% Stopped (10/02/23 0515)    sodium chloride 0.9% Stopped (10/01/23 1519)    dexmedeTOMIDine (Precedex) infusion (titrating) Stopped (10/01/23 1615)    propofoL Stopped (10/01/23 1304)     Objective:       Intake/Output Summary (Last 24 hours) at 10/2/2023 0927  Last data filed at 10/2/2023 0800  Gross per 24 hour   Intake 2473.35 ml   Output 4280 ml   Net -1806.65 ml     Vital Signs (Most Recent):  Temp: 98.4 °F (36.9 °C) (10/02/23 0800)  Pulse: 85 (10/02/23 0900)  Resp: 19 (10/02/23 0900)  BP: 139/69 (10/02/23 0900)  SpO2: (!) 91 % (10/02/23 0900)  Body mass index is 23.64 kg/m².  Weight: 83.5 kg (184 lb 1.6 oz) Vital Signs (24h Range):  Temp:  [98 °F (36.7 °C)-99.2 °F (37.3 °C)] 98.4 °F (36.9 °C)  Pulse:  [57-85] 85  Resp:  [16-69] 19  SpO2:  [87 %-98 %] 91 %  BP: (139-166)/(63-91) 139/69         Physical exam:  Gen- A/O, NAD  HENT- ATNC, MMM  CV- RRR  Resp- mild scattered crackles, normal work of breathing on Vapotherm  MSK- trace bilateral lower extremity edema  Neuro- A/Ox3, KARISHMA, no gross deficits  Psych-  appropriate mood and affect, normal mental status        Lines/Drains/Airways       Drain  Duration                  Urethral Catheter 09/28/23 2104 3 days         NG/OG Tube 10/02/23 0710 Left nostril <1 day              Peripheral Intravenous Line  Duration                  Peripheral IV - Single Lumen 09/28/23 1810 18 G Anterior;Left Forearm 3 days         Peripheral IV - Single Lumen 09/28/23 2104 18 G Anterior;Distal;Right Wrist 3 days         Peripheral IV - Single Lumen 09/28/23 2104 18 G Anterior;Right Forearm 3 days         Peripheral IV - Single Lumen 10/01/23 0500 20 G Anterior;Distal;Left Forearm 1 day                  Significant Labs:  Lab Results   Component Value Date    WBC 13.50 (H) 09/30/2023    HGB 10.4 (L) 09/30/2023    HCT 32.4 (L) 09/30/2023    MCV 93.1 09/30/2023     (L) 09/30/2023     BMP  Lab Results   Component Value Date     09/30/2023    K 3.8 09/30/2023    CHLORIDE 113 (H) 09/30/2023    CO2 18 (L) 09/30/2023    BUN 20.9 09/30/2023    CREATININE 0.93 09/30/2023    CALCIUM 8.8 09/30/2023    AGAP 10.0 01/09/2023    EGFRNONAA 55 07/15/2021         Assessment/Plan:     Assessment  Acute on chronic hypoxic respiratory failure requiring intubation and mechanical ventilation  Extubated 10/01/2023  Acute exacerbation of COPD  History of Pulmonary Nodules      Plan  Successfully extubated yesterday, but required initiation of Vapotherm this morning for increased work of breathing  Respiratory cultures with mixed nasra, blood cultures no growth, viral respiratory panel negative  Continue Zosyn, plan for 7 day total course, currently day 4  Continue prednisone 40 mg daily for total 5 day course  Reported history of aspiration, NPO pending speech evaluation; continue tube feeds  Observe in ICU today given increased oxygen requirements, possible downgrade tomorrow if improvement noted        DVT Prophylaxis: LMWH  GI Prophylaxis:  Famotidine         Flaquito Garland MD  Pulmonary  Critical Care Medicine  [facility] - Emergency Dept

## 2023-10-02 NOTE — PROCEDURES
[facility]  Speech Language Pathology Department  Modified Barium Swallow (MBS) Study    Patient Name:  Helder Sharp Jr.   MRN:  [id number]    Recommendations:     General recommendations:  dysphagia therapy  Repeat MBS study: 5-7 days or as clinically warranted  Diet texture/consistency recommendations:  NPO  Medications: via NGT tube  General precautions: Standard,      History/Reason for Referral:     Helder Sharp Jr. is a/n 83 y.o. male was admitted on 9/28 with a COPD exacerbation with hypoxic respiratory failure secondary to atypical PNA requiring intubation for mechanical ventilation.  Pt extubated on 10/1. Pt has hx  of SILENT aspiration.     Past Medical History:   Diagnosis Date    Acute bronchitis     Anxiety disorder, unspecified     COPD (chronic obstructive pulmonary disease)     Coronary artery disease     Hyperlipidemia     Left carotid bruit     Pulmonary nodules     Unspecified cataract      Past Surgical History:   Procedure Laterality Date    APPENDECTOMY      CATARACT EXTRACTION Left 01/03/2023    HERNIA REPAIR      PHACOEMULSIFICATION, CATARACT, WITH IOL INSERTION Left 1/3/2023    Procedure: PHACOEMULSIFICATION, CATARACT, WITH IOL INSERTION- OS;  Surgeon: Louis Abdi MD;  Location: [facility];  Service: Ophthalmology;  Laterality: Left;    TONSILLECTOMY       A MBS Study was completed to assess the efficiency of his swallow function, rule out aspiration and make recommendations regarding safe dietary consistencies, effective compensatory strategies, and safe eating environment.   Home diet texture/consistency: Regular and thin liquids  Current Method of Nutrition: NPO  Patient complaint: denies    Imaging   Results for orders placed during the hospital encounter of 09/28/23    X-Ray Chest 1 View    Narrative  EXAMINATION  XR CHEST 1 VIEW    CLINICAL HISTORY  post intubation ;    TECHNIQUE  A total of 1 frontal image(s) of the chest.    COMPARISON  28 September  2023, 18:21    FINDINGS  Lines/tubes/devices: Interval placement of endotracheal tube, terminating approximately 6 cm superior to the alycia. Esophagogastric tube also now visualized, extending inferior to the level the diaphragm; side port noted at the level of the GE junction.    The cardiac silhouette and central vascular structures are unchanged.  The trachea is midline. No new or worsening consolidation is identified. There is no enlarging pleural effusion or convincing pneumothorax.    Regional osseous structures and extrathoracic soft tissues are similar.    IMPRESSION  Interval tracheal and esophageal intubation, as above.    Please note this exam was initially acquired immediately following intubation on 28 September 2023.  However, the study could not be completed and sent to PACS for interpretation due to technical issues with the order.  The exam was interpreted as soon is the issues were resolved morning of 2 October 2023.    Electronically signed by: Morales Goldberg  Date:    10/02/2023  Time:    11:55  Subjective:     Patient awake and alert.  Spiritual/Cultural/Holiness Beliefs/Practices that affect care: no  Pain/Comfort:  0/10    Fluoroscopic Results:     Oral Musculature  Dentition: own teeth  Secretion Management: adequate  Mucosal Quality: good  Facial Movement: WFL  Buccal Strength & Mobility: WFL  Mandibular Strength & Mobility: WFL  Oral Labial Strength & Mobility: WFL  Volitional Cough: Able to clear secretions    Positioning: upright in bed  Visualization  Patient was seen in the lateral view  Supplement O2 in place via vapotherm  55%/ 35L per min    Oral Phase:   Adequate lip closure  Adequate bolus formation  Prolonged/disorganized bolus formation  Disorganized lingual movement  Prolonged mastication  Loss of bolus control with liquids     Pharyngeal Phase:   Swallow delay with spill to the valleculae  Reduced base of tongue retraction  Reduced hyolaryngeal excursion  Reduced airway  protection  Laryngeal penetration with liquids and residue   Consistency Laryngeal Penetration Aspiration Residue   Mildly thick liquid by spoon None None Mild-moderate   Mildly thick liquid by straw During the swallow None Mild-moderate   Thin liquid by straw During the swallow  After the swallow During the swallow  After the swallow  SILENT Mild-moderate   Moderately thick liquid by spoon  *labeled incorrectly on imaging  During the swallow  Did NOT clear None Moderate   Moderately thick liquid by straw During the swallow  Did NOT clear None Mild-moderate   Puree None None Moderate  Valleculae  Did NOT fully clear   Chewable solid After the swallow  Did NOT clear None Mild-moderate  Valleculae       Cervical Esophageal Phase:   decreased UES opening    Assessment:     Pt exhibited moderate-severe oropharyngeal dysphagia characterized by the findings noted above.  SILENT aspiration of thin liquids was visualized, placing pt at risk for aspiration. Laryngeal penetration was visualized after the swallow secondary to difficulty clearing pharyngeal residue.   Both swallow safety and efficiency are impaired.     Patient appears to be at high risk for aspiration related pneumonia when considering severity of dysphagia, complicated medical status, and reduced airway closure.  Prognosis for behavioral swallow rehabilitation is fair.    Goals:     Multidisciplinary Problems       SLP Goals          Problem: SLP    Goal Priority Disciplines Outcome   SLP Goal     SLP    Description: LTG: To tolerate least restrictive diet without clinical signs/sx of aspiration.   ST. Tongue base/laryngeal excursion exercise   2. Tolerate thermal stimulation x10 with 100% swallow response with less than a 2 second delay.   3. Puree solids without clinical signs/sx of aspiration.                      Patient Education:     Patient provided with verbal education regarding POC.  Understanding was verbalized.    Plan:     SLP Follow-Up:   Yes    Patient to be seen:  daily   Plan of Care expires:  10/09/23  Plan of Care reviewed with:  patient     Time Tracking:     SLP Treatment Date:    10/2/23  Speech Start Time:   1400  Speech Stop Time:     1430   Speech Total Time (min):   30    Billable minutes:   Motion Fluoroscopic Evaluation, Video Recording, 30 minutes     10/02/2023

## 2023-10-02 NOTE — ASSESSMENT & PLAN NOTE
"Patient meets ASPEN criteria for moderate malnutrition of acute illness or injury per RD assessment as evidenced by:  Energy Intake (Malnutrition):  (does not meet criteria)  Weight Loss (Malnutrition):  (does not meet criteria)  Subcutaneous Fat (Malnutrition): mild depletion  Muscle Mass (Malnutrition): mild depletion           A minimum of two characteristics is recommended for diagnosis of either severe or non-severe malnutrition.    Ht Readings from Last 1 Encounters:   09/28/23 6' 2" (1.88 m)     Wt Readings from Last 1 Encounters:   09/28/23 1905 83.5 kg (184 lb 1.6 oz)   09/28/23 1742 78.9 kg (174 lb)     Body mass index is 23.64 kg/m².    Patient has been screened and assessed by RD. See nutrition recommendations documented in inpatient nutrition assessment. RD will continue to follow patient throughout hospitalization.  "

## 2023-10-02 NOTE — PLAN OF CARE
LTG: To tolerate least restrictive diet without clinical signs/sx of aspiration.   ST. Tongue base/laryngeal excursion exercise   2. Tolerate thermal stimulation x10 with 100% swallow response with less than a 2 second delay.   3. Puree solids without clinical signs/sx of aspiration.

## 2023-10-02 NOTE — CONSULTS
Inpatient Nutrition Assessment    Admit Date: 9/28/2023   Total duration of encounter: 4 days     Nutrition Recommendation/Prescription     Tube feeding as tolerated:  Diabetisource AC goal rate 85 ml/hr to provide  2040 kcal, 100% needs  102 g protein, 100% needs  1394 ml free water, will require additional fluid source, can be given as 30 ml/hr water flush  (calculations based on estimated 20 hr/d run time)    Communication of Recommendations: reviewed with nurse, reviewed with patient, and reviewed with family    Nutrition Assessment     Malnutrition Assessment/Nutrition-Focused Physical Exam    Malnutrition Context: acute illness or injury (10/02/23 1349)  Malnutrition Level: moderate (10/02/23 1349)  Energy Intake (Malnutrition):  (does not meet criteria) (10/02/23 1349)  Weight Loss (Malnutrition):  (does not meet criteria) (10/02/23 1349)  Subcutaneous Fat (Malnutrition): mild depletion (10/02/23 1349)  Orbital Region (Subcutaneous Fat Loss): mild depletion  Upper Arm Region (Subcutaneous Fat Loss): mild depletion     Muscle Mass (Malnutrition): mild depletion (10/02/23 1349)  [place] Region (Muscle Loss): mild depletion  Clavicle Bone Region (Muscle Loss): mild depletion                             A minimum of two characteristics is recommended for diagnosis of either severe or non-severe malnutrition.    Chart Review    Reason Seen: continuous nutrition monitoring, physician consult for tube feeding, and follow-up    Malnutrition Screening Tool Results   Have you recently lost weight without trying?: No  Have you been eating poorly because of a decreased appetite?: No   MST Score: 0   Diagnosis:  COPD exacerbation w/ associated hypoxic respiratory failure likely 2/2 to atypical PNA  Hematuria  HLD     Relevant Medical History: COPD, CAD, HLD, and pulmonary nodules     Scheduled Medications:   albuterol-ipratropium  3 mL Nebulization Q6H    enoxparin  40 mg Subcutaneous Q24H (prophylaxis, 1700)    famotidine  " 20 mg Oral BID    guaiFENesin 100 mg/5 ml  200 mg Per NG tube Q6H    midazolam  4 mg Intravenous Once    mupirocin   Nasal BID    piperacillin-tazobactam (Zosyn) IV (PEDS and ADULTS) (extended infusion is not appropriate)  4.5 g Intravenous Q8H    predniSONE  40 mg Oral Daily     Continuous Infusions:   sodium chloride 0.9% 5 mL/hr at 10/02/23 1200    sodium chloride 0.9% Stopped (10/01/23 1519)    dexmedeTOMIDine (Precedex) infusion (titrating) Stopped (10/01/23 1615)    propofoL Stopped (10/01/23 1304)     Calorie Containing IV Medications: no significant kcals from medications at this time    Recent Labs   Lab 09/28/23  1812 09/29/23  0114 09/30/23  0117 10/02/23  1005    141 142 143   K 4.3 3.3* 3.8 3.5   CALCIUM 9.5 8.5* 8.8 8.8   CHLORIDE 109* 112* 113* 106   CO2 19* 17* 18* 25   BUN 22.3 19.4 20.9 32.6*   CREATININE 1.18 1.23* 0.93 0.84   GLUCOSE 190* 257* 177* 149*   BILITOT 0.9 0.7 0.6  --    ALKPHOS 93 65 55  --    ALT 16 12 11  --    AST 17 16 11  --    ALBUMIN 4.1 3.2* 2.8*  --      Dietary Orders (From admission, onward)       Start     Ordered    09/28/23 2019  Diet NPO  (Diet/Nutrition [facility])  Diet effective now         09/28/23 2019                Appetite/Oral Intake: NPO/not applicable  Factors Affecting Nutritional Intake: NPO and on mechanical ventilation  Food/Sabianism/Cultural Preferences: none reported  Food Allergies: none reported    Wound(s): sacral redness noted    Last Bowel Movement: 09/27/23    Comments    9/29/23 Patient on ventilator, receiving kcals from [facility], consult received for tube feeding, will provide recommendations.    10/2/23 Patient extubated 10/1, remains on tube feeding, will update needs/recommendations; recommend Diabetisource to better meet needs, lower carbohydrate formula due to hyperglycemia, no h/o DM noted. Patient/family reports h/o weight loss back in January, has since gained weight back.    Anthropometrics    Height: 6' 2" (188 cm) Height Method: " Stated  Last Weight: 83.5 kg (184 lb 1.6 oz) (23 190) Weight Method: Bed Scale  BMI (Calculated): 23.6  BMI Classification: normal (BMI 18.5-24.9)        Ideal Body Weight (IBW), Male: 190 lb     % Ideal Body Weight, Male (lb): 96.89 %                 Usual Body Weight (UBW), k.2 kg-81.6 kg  % Usual Body Weight: 104.34     Usual Weight Provided By: patient and family/caregiver    Wt Readings from Last 5 Encounters:   23 83.5 kg (184 lb 1.6 oz)   23 78.9 kg (174 lb)   23 82.1 kg (181 lb)   23 90.2 kg (198 lb 13.7 oz)   23 85.7 kg (189 lb)     Weight Change(s) Since Admission: 10 lb weight change in same day noted, admission weight likely in error  Wt Readings from Last 1 Encounters:   23 83.5 kg (184 lb 1.6 oz)   23 78.9 kg (174 lb)   Admit Weight: 78.9 kg (174 lb) (23 174)    Estimated Needs    Weight Used For Calorie Calculations: 83.5 kg (184 lb 1.4 oz)  Energy Calorie Requirements (kcal): 2497-1468, 1.2-1.4 stress factor  Energy Need Method: [place]-St Jeor  Weight Used For Protein Calculations: 83.5 kg (184 lb 1.4 oz)  Protein Requirements: 100-126 g, 1.2-1.5 g/kg  Fluid Requirements (mL): 6883-9481, 1 ml/kcal  Total Ve: 17.9 L/m; Temp (24hrs), Av.4 °F (36.9 °C), Min:98 °F (36.7 °C), Max:99.2 °F (37.3 °C)  Vtot (L/Min) for [place] Equation Calculation: 13.2; Max Temp for [place] Equation Calculation: 99 °F  Last Updated: 23    Enteral Nutrition     Formula: Peptamen Intense VHP  Rate/Volume: 70 ml/hr  Water Flushes: 50 ml every 4 hours  Additives/Modulars: none at this time  Route: nasogastric tube  Method: continuous  Total Nutrition Provided by Tube Feeding, Additives, and Flushes:  Calories Provided  1400 kcal/d, 73% needs   Protein Provided  130 g/d, 103% needs   Fluid Provided  1426 ml/d, 74% needs   Continuous feeding calculations based on estimated 20 hr/d run time unless otherwise stated.    Parenteral Nutrition Patient  not receiving parenteral nutrition support at this time.    Evaluation of Received Nutrient Intake    Calories: not meeting estimated needs  Protein: meeting estimated needs    Patient Education Not applicable.    Nutrition Diagnosis     PES (1): Inadequate energy intake related to acute illness as evidenced by less than 75% needs met. (improving)  PES (2): Malnutrition related to acute illness as evidenced by mild fat depletion and mild muscle depletion. (new)    Interventions/Goals     Intervention(s): modified composition of enteral nutrition, modified rate of enteral nutrition, and collaboration with other providers    Goal (1): Meet greater than 75% of nutritional needs by follow-up. (goal progressing)  Goal (2): Maintain weight throughout hospitalization. (goal progressing)    Monitoring & Evaluation     Dietitian will monitor energy intake, enteral nutrition intake, weight, weight change, electrolyte/renal panel, glucose/endocrine profile, and gastrointestinal profile.    Nutrition Risk/Follow-Up: moderate (follow-up in 3-5 days)   Please consult if re-assessment needed sooner.

## 2023-10-02 NOTE — PLAN OF CARE
Patient is current with [place] [place] [facility].     10/02/23 1104   Discharge Assessment   Assessment Type Discharge Planning Assessment   Confirmed/corrected address, phone number and insurance Yes   Confirmed Demographics Correct on Facesheet   Source of Information patient;family   Does patient/caregiver understand observation status Yes   Communicated JEFF with patient/caregiver Yes;Date not available/Unable to determine   People in Home spouse   Prior to hospitilization cognitive status: Alert/Oriented   Current cognitive status: Alert/Oriented   Walking or Climbing Stairs ambulation difficulty, requires equipment   Equipment Currently Used at Home walker, rolling;oxygen;wheelchair   Readmission within 30 days? No   Do you currently have service(s) that help you manage your care at home? Yes   Name and Contact number of agency [facility]   Do you take prescription medications? Yes   Do you have prescription coverage? Yes   Coverage Medicare Part A & B and [facility]   Do you have any problems affording any of your prescribed medications? No   Who is going to help you get home at discharge? Family can take home.   How do you get to doctors appointments? family or friend will provide   Are you on dialysis? No   Do you take coumadin? No   Discharge Plan discussed with: Spouse/sig other;Patient   Name(s) and Number(s) Maryjo Sharp    Spouse   [phone]   Discharge Plan A Other  (To be determined)   Discharge Plan B   (To be determined)

## 2023-10-03 NOTE — PLAN OF CARE
Problem: Impaired Wound Healing  Goal: Optimal Wound Healing  Outcome: Ongoing, Progressing     Problem: Skin Injury Risk Increased  Goal: Skin Health and Integrity  Outcome: Ongoing, Progressing     Problem: Communication Impairment (Mechanical Ventilation, Invasive)  Goal: Effective Communication  Outcome: Ongoing, Progressing     Problem: Inability to Wean (Mechanical Ventilation, Invasive)  Goal: Mechanical Ventilation Liberation  Outcome: Ongoing, Progressing     Problem: Nutrition Impairment (Mechanical Ventilation, Invasive)  Goal: Optimal Nutrition Delivery  Outcome: Ongoing, Progressing

## 2023-10-03 NOTE — PROGRESS NOTES
[facility] - Emergency Dept  Pulmonary Critical Care Note    Patient Name: Helder Sharp Jr.  MRN: [id number]  Admission Date: 9/28/2023  Hospital Length of Stay: 5 days  Code Status: Full Code  Attending Provider: Antony Foreman MD  Primary Care Provider: Kimo Love MD     Subjective:     HPI: Mr. Helder Sharp Jr. Is a 83 y.o. male with a PMHx of COPD, CAD, HLD, and pulmonary nodules who presented to [facility] ED on 9/28/23 for COPD Exacerbation with associated hypoxic respiratory failure requiring intubation in the ED. patient's wife at bedside to clarify HPI.  Around noon today, patient reported to wife that he was feeling sick and was shivering.  Patient's wife went to an eye appointment, and upon her return patient was endorsing generalized weakness and was unable to get out of his chair.  She assisted him to the urinal in his bedroom in which he urinated chocolate colored urine which prompted her to bring him to the ER.  Patient has longstanding history of COPD with his last hospitalization being in January 2023 where he had a polymicrobial pneumonia, lost 20 lb during hospitalization, but did not require intubation.  Endorses 8-9 lb weight gain over the last 6 months, denies night sweats, fevers.  Uses Trelegy inhaler daily, DuoNeb and rescue inhaler as needed.  Patient has 100 pack year history (2 packs daily for 50 years) with a quit date in 2009.  Patient was intubated due to desaturations to the low 80s and respiratory distress/fatigue.    In the ED, patient received Zosyn and azithromycin x1, LR bolus 2367 cc, Solu-Medrol 125 mg, Mag sulfate 2 g, and DuoNeb x1.      24 Hour Interval History:  No acute events overnight.  Oxygen requirements largely stable, saturations mid 90s on 50% FiO2 via Vapotherm.  Patient states he overall feels well.  Mildly worsened leukocytosis but patient remains afebrile.        14 point ROS reviewed and negative unless documented in the history of  present illness.         Past Medical History:   Diagnosis Date    Acute bronchitis     Anxiety disorder, unspecified     COPD (chronic obstructive pulmonary disease)     Coronary artery disease     Hyperlipidemia     Left carotid bruit     Pulmonary nodules     Unspecified cataract      Past Surgical History:   Procedure Laterality Date    APPENDECTOMY      CATARACT EXTRACTION Left 2023    HERNIA REPAIR      PHACOEMULSIFICATION, CATARACT, WITH IOL INSERTION Left 1/3/2023    Procedure: PHACOEMULSIFICATION, CATARACT, WITH IOL INSERTION- OS;  Surgeon: oLuis Abdi MD;  Location: [facility];  Service: Ophthalmology;  Laterality: Left;    TONSILLECTOMY       Social History     Socioeconomic History    Marital status:    Tobacco Use    Smoking status: Former     Current packs/day: 0.00     Types: Cigarettes     Quit date:      Years since quittin.7    Smokeless tobacco: Never   Substance and Sexual Activity    Alcohol use: Not Currently    Drug use: Never    Sexual activity: Yes     Current Outpatient Medications   Medication Instructions    albuterol (ACCUNEB) 0.63 mg, Nebulization, Every 6 hours PRN, Rescue    albuterol (PROVENTIL/VENTOLIN HFA) 90 mcg/actuation inhaler 2 puffs, Inhalation, Every 6 hours PRN, Rescue    albuterol-ipratropium (DUO-NEB) 2.5 mg-0.5 mg/3 mL nebulizer solution 3 mLs, Nebulization, Every 6 hours PRN, Rescue    ascorbic acid (vitamin C) (VITAMIN C) 500 mg, Oral, 2 times daily    aspirin (ECOTRIN) 81 mg, Oral    atorvastatin (LIPITOR) 20 mg, Oral    cholecalciferol (vitamin D3) (VITAMIN D3) 10,000 Units, Oral    DALIRESP 500 mcg Tab 0.5 tablets, Oral, Daily    fluticasone propionate (FLONASE) 50 mcg/actuation nasal spray 1 spray, Each Nostril, Daily    fluticasone-umeclidin-vilanter (TRELEGY ELLIPTA) 200-62.5-25 mcg inhaler 1 puff, Oral, Daily    guaiFENesin (MUCINEX) 1,200 mg, Oral, Daily    Lactobacillus rhamnosus GG (CULTURELLE) 15 billion cell capsule Oral    montelukast  (SINGULAIR) 10 mg tablet TAKE 1 TABLET BY MOUTH EVERY DAY    predniSONE (DELTASONE) 20 MG tablet Take 3 tablets by mouth for three days, then take 2 tablets by mouth for four days, then 1 tablet for four days, then 1/2 tablet for 4 days.    ranolazine (RANEXA) 500 mg, Oral, 2 times daily    roflumilast (DALIRESP) 500 mcg Tab 0.5 tablets, Oral, Daily    sertraline (ZOLOFT) 100 mg, Oral, Daily     Current Inpatient Medications   albuterol-ipratropium  3 mL Nebulization Q6H    enoxparin  40 mg Subcutaneous Q24H (prophylaxis, 1700)    famotidine  20 mg Oral BID    guaiFENesin 100 mg/5 ml  200 mg Per NG tube Q6H    midazolam  4 mg Intravenous Once    mupirocin   Nasal BID    piperacillin-tazobactam (Zosyn) IV (PEDS and ADULTS) (extended infusion is not appropriate)  4.5 g Intravenous Q8H    predniSONE  40 mg Oral Daily     Current Intravenous Infusions   sodium chloride 0.9% Stopped (10/03/23 0527)    sodium chloride 0.9% Stopped (10/01/23 1519)    dexmedeTOMIDine (Precedex) infusion (titrating) Stopped (10/01/23 1615)    propofoL Stopped (10/01/23 1304)     Objective:       Intake/Output Summary (Last 24 hours) at 10/3/2023 0916  Last data filed at 10/3/2023 0745  Gross per 24 hour   Intake 1853.47 ml   Output 2675 ml   Net -821.53 ml     Vital Signs (Most Recent):  Temp: 98.8 °F (37.1 °C) (10/03/23 0400)  Pulse: 72 (10/03/23 0833)  Resp: 18 (10/03/23 0833)  BP: 132/84 (10/03/23 0700)  SpO2: (!) 94 % (10/03/23 0833)  Body mass index is 23.64 kg/m².  Weight: 83.5 kg (184 lb 1.6 oz) Vital Signs (24h Range):  Temp:  [97.5 °F (36.4 °C)-98.8 °F (37.1 °C)] 98.8 °F (37.1 °C)  Pulse:  [69-91] 72  Resp:  [16-24] 18  SpO2:  [90 %-97 %] 94 %  BP: (132-167)/(66-84) 132/84         Physical exam:  Gen- A/O, NAD, interactive and conversant  HENT- ATNC, MMM  CV- RRR  Resp- normal work of breathing, respiratory rate 18 on 30 liters/minute Vapotherm; oxygen saturations 90-92% on 40% FiO2  MSK- WWP, trace LE edema   Neuro- A/Ox3, KARISHMA, no  gross deficits  Psych- appropriate mood and affect        Lines/Drains/Airways       Drain  Duration                  NG/OG Tube 10/02/23 0710 Left nostril 1 day              Peripheral Intravenous Line  Duration                  Peripheral IV - Single Lumen 09/28/23 1810 18 G Anterior;Left Forearm 4 days         Peripheral IV - Single Lumen 09/28/23 2104 18 G Anterior;Distal;Right Wrist 4 days         Peripheral IV - Single Lumen 09/28/23 2104 18 G Anterior;Right Forearm 4 days         Peripheral IV - Single Lumen 10/01/23 0500 20 G Anterior;Distal;Left Forearm 2 days                  Significant Labs:  Lab Results   Component Value Date    WBC 14.07 (H) 10/03/2023    HGB 13.1 (L) 10/03/2023    HCT 39.9 (L) 10/03/2023    MCV 90.3 10/03/2023     10/03/2023     BMP  Lab Results   Component Value Date     10/03/2023    K 3.7 10/03/2023    CHLORIDE 105 10/03/2023    CO2 25 10/03/2023    BUN 31.2 (H) 10/03/2023    CREATININE 0.81 10/03/2023    CALCIUM 8.6 (L) 10/03/2023    AGAP 12.0 10/02/2023    EGFRNONAA 55 07/15/2021         Assessment/Plan:     Assessment  Acute on chronic hypoxic respiratory failure requiring intubation and mechanical ventilation  Extubated 10/01/2023  Patient uses nocturnal trilogy at home  Acute exacerbation of COPD  History of Pulmonary Nodules      Plan  Successfully extubated and tolerating wean of Vapotherm settings this morning, oxygen saturations low 90s on 40% FiO2  Wean oxygen as tolerated for goal saturations >88%  Respiratory cultures with mixed nasra, blood cultures no growth, viral respiratory panel negative  Continue Zosyn, currently day 5/7  Complete prednisone 40 mg daily for total 5 day course  Reported history of aspiration, speech therapy following; continue tube feeds for now  Stable for downgrade to floor today, pulmonary will follow        DVT Prophylaxis: LMWH  GI Prophylaxis:  Famotidine         Flaquito Garland MD  Pulmonary Critical Care Medicine  [facility] - Emergency Dept

## 2023-10-03 NOTE — NURSING
Nurses Note -- 4 Eyes      10/3/2023   4:32 PM      Skin assessed during: Daily Assessment      [x] No Altered Skin Integrity Present    [x]Prevention Measures Documented      [] Yes- Altered Skin Integrity Present or Discovered   [] LDA Added if Not in Epic (Describe Wound)   [] New Altered Skin Integrity was Present on Admit and Documented in LDA   [] Wound Image Taken    Wound Care Consulted? No    Attending Nurse:  Cee Muse RN/Staff Member:  KELVIN quintana

## 2023-10-03 NOTE — PT/OT/SLP PROGRESS
[facility]  Speech Language Pathology Department  Dysphagia Therapy Progress Note    Patient Name:  Helder Sharp Jr.   MRN:  [id number]  Admitting Diagnosis: hypoxic respiratory failure     Recommendations:     General recommendations:  dysphagia therapy  Diet texture/consistency recommendations:  NPO  Medications: via NG tube  Aspiration precautions: small bites/sips and slow rate    Discharge recommendations:  outpatient speech therapy, home health speech therapy   Barriers to safe discharge:  none    Subjective     Patient awake and alert.  Pain/Comfort:  0/10  Spiritual/Cultural/Gnosticist Beliefs/Practices that affect care: no    Objective:     Therapeutic Activities:  Pt completed base of tongue and laryngeal x60 with minimal-moderate cues.  Pt tolerated thermal stimulation to the anterior faucial pillars x10 with 100% swallow responses.  Laryngeal excursion reduced.     Therapeutic PO Trials:  Consistency Amount Fed By Oral Symptoms Pharyngeal Symptoms   Puree 3 oz SLP None Multiple swallows  Throat clear after swallow     Assessment:     Pt continues to present with oropharyngeal dysphagia and remains unsafe for PO diet.    Goals:     Multidisciplinary Problems       SLP Goals          Problem: SLP    Goal Priority Disciplines Outcome   SLP Goal     SLP    Description: LTG: To tolerate least restrictive diet without clinical signs/sx of aspiration.   ST. Tongue base/laryngeal excursion exercise   2. Tolerate thermal stimulation x10 with 100% swallow response with less than a 2 second delay.   3. Puree solids without clinical signs/sx of aspiration.                      Patient Education:     Patient and spouse were provided with verbal education regarding POC.  Understanding was verbalized.    Plan:     Will continue to follow and tx as appropriate.    SLP Follow-Up:  Yes   Patient to be seen:  daily   Plan of Care expires:  10/09/23  Plan of Care reviewed with:  patient        Time Tracking:     SLP Treatment Date:    10/3/23  Speech Start Time:   1350  Speech Stop Time:    1410    Speech Total Time (min):   20    Billable minutes:  Treatment of Swallow Dysfunction, 20 minutes       10/03/2023

## 2023-10-03 NOTE — PLAN OF CARE
Problem: Physical Therapy  Goal: Physical Therapy Goal  Description: Goals to be met by: 11/3/2023     Patient will increase functional independence with mobility by performin. Supine to sit with [place]  2. Sit to stand transfer with Modified [place]  3. Gait  x 150 feet with Modified [place] using Rolling Walker.     Outcome: Ongoing, Progressing

## 2023-10-03 NOTE — H&P
"[facility] Hospital Medicine History & Physical Examination       Patient Name: Helder Sharp Jr.  MRN: [id number]  Patient Class: IP- Inpatient   Admission Date: 9/28/2023   Admitting Physician: Antony Foreman MD  Length of Stay: 5  Attending Physician: Kali Mehta MD   Primary Care Provider: Kimo Love MD  Face-to-Face encounter date: 10/03/2023  Code Status: Full code   Chief Complaint: Shortness of Breath (Presents via EMS with SOB since 1200 today. Hx of COPD, on 2L/min NC at home. Currently 88% on 6L/min NC. Wife reports increased weakness, cough, and "chocolate" colored urine. )        Patient information was obtained from patient, patient's family, past medical records and ER records.     HISTORY OF PRESENT ILLNESS:   Helder Sharp Jr. is a 83 y.o. male with a past medical history of hyperlipidemia, CAD, COPD, on 2 L home oxygen, pulmonary nodules, and anxiety presented to [facility] on 9/28/2023 for shortness of breath and weakness. Wife also endorsed worsening nonproductive cough and lower extremity swelling.  Wife noticed chocolate" colored urine at approximately 15:30 on 09/28, prompting them to ED. Patient also had to increase home oxygen to 3 L secondary to increased dyspnea.  Patient received albuterol nebulizer treatment en route with EMS.  Patient was given an hour long nebulizer treatment, magnesium, and Solu-Medrol upon arrival to ED. Patient remained hypoxic on non-rebreather and  was intubated in ED for airway protection.  Labs revealed WBC 21.13, chloride 109, CO2 19, glucose 190, BNP 22, troponin 0.014, and lactic acid 3.8.  Flu/RSV/COVID were negative.  UA revealed 2+ protein, trace ketones, 3+ occult blood, 1+ bilirubin, and greater than 100 RBCs.  Blood and respiratory cultures were obtained.  Chest x-ray revealed chronic emphysema and scarring within the lungs without appreciable acute superimposed airspace opacity.  Patient was given LR, IV Zosyn, and " IV Azithromycin in ED.  Patient was admitted to ICU for close monitoring.  Patient was started on Solu-Medrol 60 mg BID and DuoNebs q.6 hours.  Urology was consulted for hematuria. Ultrasound retroperitoneal complete revealed no abnormalities. Urology recommended continuing indwelling lang with outpatient follow-up. Patient had previous history of Klebsiella pneumonia which was sensitive to Zosyn.  Azithromycin was discontinued.  CTA chest revealed multiple enlarged mediastinal lymph nodes, no filling defects seen to suggest PE, and severe emphysematous changes identified in the lungs with consolidation in the posterior basal segments of bilateral lower lobes demonstrating air bronchograms which may reflect aspiration pneumonia.  NG tube was placed and patient was started on tube feeds.  Patient was transitioned to prednisone 40 mg daily for 5 day course on 10/01. Patient was extubated on 10/01 and placed on Vapotherm. Speech therapy was consulted. Patient was cleared for downgrade out of ICU on 10/03/2023. Hospital medicine was consulted for transition of care and further medical management.     PAST MEDICAL HISTORY:   Hyperlipidemia   CAD  COPD on 2 L home oxygen   Pulmonary nodules  Anxiety    PAST SURGICAL HISTORY:     Past Surgical History:   Procedure Laterality Date    APPENDECTOMY      CATARACT EXTRACTION Left 01/03/2023    HERNIA REPAIR      PHACOEMULSIFICATION, CATARACT, WITH IOL INSERTION Left 1/3/2023    Procedure: PHACOEMULSIFICATION, CATARACT, WITH IOL INSERTION- OS;  Surgeon: Louis Abdi MD;  Location: [facility];  Service: Ophthalmology;  Laterality: Left;    TONSILLECTOMY         ALLERGIES:   Flexeril [cyclobenzaprine]    FAMILY HISTORY:   Reviewed and negative    SOCIAL HISTORY:   Former smoker: quit in 2009  Denied alcohol and drug use     HOME MEDICATIONS:     Prior to Admission medications    Medication Sig Start Date End Date Taking? Authorizing Provider   albuterol (ACCUNEB) 0.63 mg/3 mL Nebu  Take 0.63 mg by nebulization every 6 (six) hours as needed. Rescue    Provider, Historical   albuterol (PROVENTIL/VENTOLIN HFA) 90 mcg/actuation inhaler Inhale 2 puffs into the lungs every 6 (six) hours as needed (wheezing). Rescue 1/3/23   Kimo Love MD   albuterol-ipratropium (DUO-NEB) 2.5 mg-0.5 mg/3 mL nebulizer solution Take 3 mLs by nebulization every 6 (six) hours as needed for Wheezing. Rescue    Provider, Historical   ascorbic acid, vitamin C, (VITAMIN C) 500 MG tablet Take 500 mg by mouth 2 (two) times daily.    Provider, Historical   aspirin (ECOTRIN) 81 MG EC tablet Take 81 mg by mouth. 2/9/22   Provider, Historical   atorvastatin (LIPITOR) 20 MG tablet Take 20 mg by mouth. 5/31/23   Provider, Historical   cholecalciferol, vitamin D3, (VITAMIN D3) 250 mcg (10,000 unit) Cap capsule Take 10,000 Units by mouth.    Provider, Historical   DALIRESP 500 mcg Tab Take 0.5 tablets by mouth once daily. 6/23/22   Provider, Historical   fluticasone propionate (FLONASE) 50 mcg/actuation nasal spray 1 spray by Each Nostril route once daily.    Provider, Historical   fluticasone-umeclidin-vilanter (TRELEGY ELLIPTA) 200-62.5-25 mcg inhaler Take 1 puff by mouth once daily. 2/7/23   Provider, Historical   guaiFENesin (MUCINEX) 600 mg 12 hr tablet Take 1,200 mg by mouth once daily.    Provider, Historical   Lactobacillus rhamnosus GG (CULTURELLE) 15 billion cell capsule Take by mouth. 2/9/22   Provider, Historical   montelukast (SINGULAIR) 10 mg tablet TAKE 1 TABLET BY MOUTH EVERY DAY 9/11/23   Kimo Love MD   predniSONE (DELTASONE) 20 MG tablet Take 3 tablets by mouth for three days, then take 2 tablets by mouth for four days, then 1 tablet for four days, then 1/2 tablet for 4 days. 8/7/23   Kimo Love MD   ranolazine (RANEXA) 500 MG Tb12 Take 500 mg by mouth 2 (two) times daily.    Provider, Historical   roflumilast (DALIRESP) 500 mcg Tab Take 0.5 tablets by mouth once daily. 5/18/23   Provider,  Historical   sertraline (ZOLOFT) 100 MG tablet Take 1 tablet (100 mg total) by mouth once daily. 12/12/22   Kimo Love MD       REVIEW OF SYSTEMS:   Except as documented, all other systems reviewed and negative     PHYSICAL EXAM:     VITAL SIGNS: 24 HRS MIN & MAX LAST   Temp  Min: 97.5 °F (36.4 °C)  Max: 98.8 °F (37.1 °C) 98.1 °F (36.7 °C)   BP  Min: 116/61  Max: 167/81 122/66   Pulse  Min: 69  Max: 91  75   Resp  Min: 16  Max: 24 (!) 21   SpO2  Min: 90 %  Max: 97 % (!) 93 %       General appearance: Elderly male in no apparent distress.  HEENNT: Atraumatic head. NG tube in place   Lungs: Coarse breath sounds bilaterally. +vapotherm   Heart: Regular rate and rhythm.    Abdomen: Soft, non-distended, non-tender. Bowel sounds are normal.   Extremities:  No deformities.   Skin: No Rash. Warm and dry.   Neuro: Awake, alert, and oriented. Motor and sensory exams grossly intact.   Psych/mental status: Appropriate mood and affect. Responds appropriately to questions.     LABS AND IMAGING:     Recent Labs   Lab 09/30/23  0117 10/02/23  1005 10/03/23  0557   WBC 13.50* 11.17 14.07*   RBC 3.48* 4.07* 4.42*   HGB 10.4* 12.1* 13.1*   HCT 32.4* 37.1* 39.9*   MCV 93.1 91.2 90.3   MCH 29.9 29.7 29.6   MCHC 32.1* 32.6* 32.8*   RDW 15.6 14.6 14.5   * 168 183   MPV 9.6 9.6 9.4       Recent Labs   Lab 09/29/23  0114 09/30/23  0117 09/30/23  0532 10/01/23  1121 10/02/23  0324 10/02/23  1005 10/03/23  0557    142  --   --   --  143 140   K 3.3* 3.8  --   --   --  3.5 3.7   CO2 17* 18*  --   --   --  25 25   BUN 19.4 20.9  --   --   --  32.6* 31.2*   CREATININE 1.23* 0.93  --   --   --  0.84 0.81   CALCIUM 8.5* 8.8  --   --   --  8.8 8.6*   PH  --   --  7.440 7.470* 7.500*  --   --    ALBUMIN 3.2* 2.8*  --   --   --   --  2.8*   ALKPHOS 65 55  --   --   --   --  61   ALT 12 11  --   --   --   --  18   AST 16 11  --   --   --   --  18   BILITOT 0.7 0.6  --   --   --   --  0.8       Microbiology Results (last 7 days)        Procedure Component Value Units Date/Time    Blood culture #1 **CANNOT BE ORDERED STAT** [[id number]]  (Normal) Collected: 09/28/23 1812    Order Status: Completed Specimen: Blood Updated: 10/02/23 2100     CULTURE, BLOOD ([facility]) No Growth At 96 Hours    Blood culture #2 **CANNOT BE ORDERED STAT** [[id number]]  (Normal) Collected: 09/28/23 1812    Order Status: Completed Specimen: Blood Updated: 10/02/23 2100     CULTURE, BLOOD ([facility]) No Growth At 96 Hours    Respiratory Culture [[id number]] Collected: 09/29/23 0344    Order Status: Completed Specimen: Sputum Updated: 10/01/23 0744     Respiratory Culture Normal respiratory nasra     GRAM STAIN Quality 3+      Moderate Gram positive cocci      Few Gram Positive Rods             XR Gastric tube check, non-radiologist performed  Narrative: EXAMINATION:  XR GASTRIC TUBE CHECK, NON-RADIOLOGIST PERFORMED    CLINICAL HISTORY:  Ng tube placement;    COMPARISON:  October 2, 2023    FINDINGS:  Examination reveals a nasogastric tube with the tip in the fundus of the stomach  Impression: Nasogastric tube as above    Electronically signed by: Marc Andrews  Date:    10/03/2023  Time:    08:19        ASSESSMENT & PLAN:   Assessment:  Acute on chronic hypoxic respiratory failure requiring intubation, extubated 10/01/2023   COPD exacerbation  Aspiration pneumonia   Leukocytosis  Hematuria, resolved  History of hyperlipidemia, CAD, and pulmonary nodules      Plan:  Continue Vapotherm, wean as tolerated   Continue Zosyn, day 5/7   Prednisone 40 mg   DuoNebs q.6 hours  Continue tube feeds  SLP/OT/PT  Blood cultures without growth, respiratory culture with normal nasra, and respiratory panel negative  Pulmonary following, appreciate recommendations  Continue appropriate home medications once med rec updated   Labs in a.m.    VTE Prophylaxis:  Already on Lovenox    __________________________________________________________________________  INPATIENT LIST OF MEDICATIONS      Scheduled Meds:   albuterol-ipratropium  3 mL Nebulization Q6H    enoxparin  40 mg Subcutaneous Q24H (prophylaxis, 1700)    famotidine  20 mg Oral BID    guaiFENesin 100 mg/5 ml  200 mg Per NG tube Q6H    midazolam  4 mg Intravenous Once    mupirocin   Nasal BID    piperacillin-tazobactam (Zosyn) IV (PEDS and ADULTS) (extended infusion is not appropriate)  4.5 g Intravenous Q8H    predniSONE  40 mg Oral Daily     Continuous Infusions:   sodium chloride 0.9% Stopped (10/03/23 0527)    sodium chloride 0.9% Stopped (10/01/23 1519)    dexmedeTOMIDine (Precedex) infusion (titrating) Stopped (10/01/23 1615)    propofoL Stopped (10/01/23 1304)     PRN Meds:.hydrALAZINE, melatonin, sodium chloride 0.9%      I, Maxi Copeland PA-C, have reviewed and discussed the case with Dr. Kali Mehta MD   Please see the following addendum for further assessment and plan from there attending MD.    10/03/2023    ________________________________________________________________________________    For this patient encounter, I reviewed the NP/PA/resident documentation, treatment plan, and medical decision making; and I had face-to-face time with this patient.     History: Reviewed HPI, medical, surgical, family, and social histories as above    Physical exam: Agree with documentation as above    Treatment Plan:  Patient admitted to the ICU on 09/28/2023 for worsening shortness of breath and COPD exacerbation.  Noted to be dehydrated in acute respiratory distress.  He was eventually intubated in the emergency room and admitted to the ICU.  He was started on empiric Zosyn and azithromycin.  Also with high-dose steroids.  He was due to have dark-colored urine.  Urology was consulted and recommended continuing his Licea catheter and follow up as an outpatient.  Concern for bladder outlet syndrome.  CTA of the chest showed multiple enlarged lymph nodes but no PE.  He does have severe emphysematous changes.  He was able to be weaned off of the  ventilator and is now on Vapotherm.  We continue wean his oxygen daily.  He is being downgraded to the hospitalist service on 10/03.  Pulmonary planning to continue to follow along.    Patient remains on IV Zosyn for now.  He is completed course of azithromycin.  Now switch to oral prednisone currently at 40 mg daily.  Will continue titrate this slowly as well.  He was not yet passed his swallow study.  Has not NG tube for meds.  Also getting tube feeds.  PT and OT have been consulted.    Critical care diagnosis: acute hypoxemic respiratory failure requiring high-flow oxygen  Critical care interventions: hands on evaluation, review of labs/radiographs/records and discussions with family  Critical care time spent: >32 minutes        10/03/2023

## 2023-10-03 NOTE — PT/OT/SLP EVAL
Physical Therapy Evaluation    Patient Name:  Helder Sharp Jr.   MRN:  [id number]    Recommendations:     Discharge Recommendations: nursing facility, skilled, home, home with home health, home health PT (pending progress)   Discharge Equipment Recommendations: none   Barriers to discharge: Impaired mobility and Ongoing medical needs    Assessment:     Helder Sharp Jr. is a 83 y.o. male admitted with a medical diagnosis of COPD exacerbation, atypical PNA hematuria, HLD, required mechanical ventilation now on NC, uses trilogy at home, goal O2 sats >88%.  He presents with the following impairments/functional limitations: weakness, impaired endurance, impaired self care skills, impaired functional mobility, gait instability, impaired balance. At baseline, pt uses continuous O2 and ambulates with RW. Pt was attending pulmonary rehab. Currently, the pt demonstrates generalized weakness and impaired activity tolerance. Tolerated sitting EOB, standing with RW, and taking a few side steps on 7L/minO2. May need SNF at d/c, but hopefully will continue to improve enough to return home with HHPT.     Rehab Prognosis: Good; patient would benefit from acute skilled PT services to address these deficits and reach maximum level of function.    Recent Surgery: * No surgery found *      Plan:     During this hospitalization, patient to be seen 5 x/week to address the identified rehab impairments via gait training, therapeutic activities, therapeutic exercises and progress toward the following goals:    Plan of Care Expires:  11/03/23    Subjective     Chief Complaint: tired/weak  Patient/Family Comments/goals: to go home  Pain/Comfort:  Pain Rating 1: 0/10    Patients cultural, spiritual, Ep[employer]opalian conflicts given the current situation: no    Living Environment:  Lives with wife, no steps to enter.   Prior to admission, patients level of function was independent with walker.  Equipment used at home: walker, rolling, oxygen.  DME  owned (not currently used): w/c.  Upon discharge, patient will have assistance from wife.    Objective:     Communicated with nurse prior to session.  Patient found supine with oxygen, telemetry, pulse ox (continuous), blood pressure cuff, SCD, pressure relief boots, NG tube  upon PT entry to room.    General Precautions: Standard, fall  Orthopedic Precautions:    Braces: N/A  Respiratory Status: Nasal cannula, flow 7 L/min  Blood Pressure: 116/59, 91%, 81 HR  With sitting/standin%-91%      Exams:  Cognitive Exam:  Patient is oriented to Person, Place, Time, and Situation  RLE ROM: WNL  RLE Strength: Deficits: grossly 4/5  LLE ROM: WNL  LLE Strength: Deficits: grossly 4/5  Skin integrity: Visible skin intact      Functional Mobility:  Bed Mobility:     Scooting: stand by assistance  Supine to Sit: minimum assistance  Sit to Supine: stand by assistance  Transfers:     Sit to Stand:  moderate assistance with rolling walker  Gait: 2 side steps with RW and Min A for balance.       AM-PAC 6 CLICK MOBILITY  Total Score:15       Treatment & Education:    Patient and spouse were provided with verbal education education regarding supine exercises.  Understanding was verbalized.     Patient left supine with all lines intact, call button in reach, and nurse notified.    GOALS:   Multidisciplinary Problems       Physical Therapy Goals          Problem: Physical Therapy    Goal Priority Disciplines Outcome Goal Variances Interventions   Physical Therapy Goal     PT, PT/OT Ongoing, Progressing     Description: Goals to be met by: 11/3/2023     Patient will increase functional independence with mobility by performin. Supine to sit with [place]  2. Sit to stand transfer with Modified [place]  3. Gait  x 150 feet with Modified [place] using Rolling Walker.                          History:     Past Medical History:   Diagnosis Date    Acute bronchitis     Anxiety disorder, unspecified     COPD (chronic  obstructive pulmonary disease)     Coronary artery disease     Hyperlipidemia     Left carotid bruit     Pulmonary nodules     Unspecified cataract        Past Surgical History:   Procedure Laterality Date    APPENDECTOMY      CATARACT EXTRACTION Left 01/03/2023    HERNIA REPAIR      PHACOEMULSIFICATION, CATARACT, WITH IOL INSERTION Left 1/3/2023    Procedure: PHACOEMULSIFICATION, CATARACT, WITH IOL INSERTION- OS;  Surgeon: Louis Abdi MD;  Location[facility];  Service: Ophthalmology;  Laterality: Left;    TONSILLECTOMY         Time Tracking:     PT Received On: 10/03/23  PT Start Time: 1458     PT Stop Time: 1520  PT Total Time (min): 22 min     Billable Minutes: Evaluation 22      10/03/2023

## 2023-10-03 NOTE — PT/OT/SLP PROGRESS
[facility]  Speech Language Pathology Department  Dysphagia Therapy Progress Note    Patient Name:  Helder Sharp Jr.   MRN:  [id number]  Admitting Diagnosis: hypoxic respiratory failure     Recommendations:     General recommendations:  dysphagia therapy  Diet texture/consistency recommendations:  NPO  Medications: via NG tube  Discharge recommendations:  outpatient speech therapy, home health speech therapy     Subjective     Patient awake and alert.  Pain/Comfort:  0/10  Spiritual/Cultural/Sabianism Beliefs/Practices that affect care: no    Objective:     Oral Musculature  Dentition: own teeth  Secretion Management: adequate    Therapeutic Activities:  Pt completed base of tongue and laryngeal x60 with minimal-moderate cues.  Pt tolerated thermal stimulation to the anterior faucial pillars x10 with 100% swallow responses.  Laryngeal excursion reduced.      Assessment:     Pt continues to present with oropharyngeal dysphagia and remains unsafe for PO intake.    Goals:     Multidisciplinary Problems       SLP Goals          Problem: SLP    Goal Priority Disciplines Outcome   SLP Goal     SLP    Description: LTG: To tolerate least restrictive diet without clinical signs/sx of aspiration.   ST. Tongue base/laryngeal excursion exercise   2. Tolerate thermal stimulation x10 with 100% swallow response with less than a 2 second delay.   3. Puree solids without clinical signs/sx of aspiration.                      Patient Education:     Patient provided with verbal education regarding POC.  Understanding was verbalized.    Plan:     Will continue to follow and tx as appropriate.    SLP Follow-Up:  Yes   Patient to be seen:  daily   Plan of Care expires:  10/09/23  Plan of Care reviewed with:  patient       Time Tracking:     SLP Treatment Date:    10/3/23  Speech Start Time:   0840  Speech Stop Time:    0850  Speech Total Time (min):   10    Billable minutes:  Treatment of Swallow  Dysfunction, 10 minutes       10/03/2023

## 2023-10-04 NOTE — HOSPITAL COURSE
Aakash called in today for follow-up of his depression.  He is on 150 mg of venlafaxine and is tolerating this well.  This is still working well for his depression.  Refilled medication today.  Follow-up 1 year.  No charge visit.   Patient is doing better and continuing to wean down the oxygen.  He has still an NG-tube and being evaluated for swallowing.

## 2023-10-04 NOTE — PT/OT/SLP PROGRESS
[facility]  Speech Language Pathology Department  Dysphagia Therapy Progress Note    Patient Name:  Helder Sharp Jr.   MRN:  [id number]  Admitting Diagnosis: hypoxic respiratory failure     Recommendations:     General recommendations:  dysphagia therapy  Diet texture/consistency recommendations:  NPO  Medications: NPO  Discharge recommendations:  outpatient speech therapy, home health speech therapy   Barriers to safe discharge:  safety awareness    Subjective     Patient awake and alert.  Pain/Comfort:  0/10  Spiritual/Cultural/Restorationism Beliefs/Practices that affect care: no    Objective:     Therapeutic Activities:  Pt completed base of tongue and laryngeal x60 with minimal-moderate cues.  Pt tolerated thermal stimulation to the anterior faucial pillars x10 with 100 swallow responses.   Therapeutic PO Trials:  Consistency Amount Fed By Oral Symptoms Pharyngeal Symptoms   Puree 3 oz SLP None None     Assessment:     Pt continues to present with oropharyngeal dysphagia and remains unsafe for PO diet.    Goals:     Multidisciplinary Problems       SLP Goals          Problem: SLP    Goal Priority Disciplines Outcome   SLP Goal     SLP    Description: LTG: To tolerate least restrictive diet without clinical signs/sx of aspiration.   ST. Tongue base/laryngeal excursion exercise   2. Tolerate thermal stimulation x10 with 100% swallow response with less than a 2 second delay.   3. Puree solids without clinical signs/sx of aspiration.                      Patient Education:     Patient provided with verbal education regarding POC.  Understanding was verbalized.    Plan:     Will continue to follow and tx as appropriate.    SLP Follow-Up:  Yes   Patient to be seen:  daily   Plan of Care expires:  10/09/23  Plan of Care reviewed with:  patient       Time Tracking:     SLP Treatment Date:    10/4/23  Speech Start Time:   1400  Speech Stop Time:     1415   Speech Total Time (min):    15    Billable minutes:  Treatment of Swallow Dysfunction, 15 minutes       10/04/2023

## 2023-10-04 NOTE — PROGRESS NOTES
[facility] - Emergency Dept  Pulmonary Critical Care Note    Patient Name: Helder Sharp Jr.  MRN: [id number]  Admission Date: 9/28/2023  Hospital Length of Stay: 6 days  Code Status: Full Code  Attending Provider: Aggie Pelaez MD  Primary Care Provider: Kimo Love MD     Subjective:     HPI: Mr. Helder Sharp Jr. Is a 83 y.o. male with a PMHx of COPD, CAD, HLD, and pulmonary nodules who presented to [facility] ED on 9/28/23 for COPD Exacerbation with associated hypoxic respiratory failure requiring intubation in the ED. patient's wife at bedside to clarify HPI.     Patient has longstanding history of COPD with his last hospitalization being in January 2023 where he had a polymicrobial pneumonia, lost 20 lb during hospitalization, but did not require intubation.  Endorses 8-9 lb weight gain over the last 6 months, denies night sweats, fevers.  Uses Trelegy inhaler daily, DuoNeb and rescue inhaler as needed.  Patient has 100 pack year history (2 packs daily for 50 years) with a quit date in 2009.  Patient was intubated due to desaturations to the low 80s and respiratory distress/fatigue. Downgraded from the ICU on 10/3/2023; Cultures without growth     24 Hour Interval History:  No acute events overnight.  Weaned from Vapotherm; sats in  the upper 80s to low 90s on 4 L NC; nursing has since placed on an Oxymizer at 4 L as he is a mouth breather O2 saturations have improved to the low 90s.      14 point ROS reviewed and negative unless documented in the history of present illness.       Past Medical History:   Diagnosis Date    Acute bronchitis     Anxiety disorder, unspecified     COPD (chronic obstructive pulmonary disease)     Coronary artery disease     Hyperlipidemia     Left carotid bruit     Pulmonary nodules     Unspecified cataract      Past Surgical History:   Procedure Laterality Date    APPENDECTOMY      CATARACT EXTRACTION Left 01/03/2023    HERNIA REPAIR      PHACOEMULSIFICATION,  CATARACT, WITH IOL INSERTION Left 1/3/2023    Procedure: PHACOEMULSIFICATION, CATARACT, WITH IOL INSERTION- OS;  Surgeon: Louis Abdi MD;  Location: [facility];  Service: Ophthalmology;  Laterality: Left;    TONSILLECTOMY       Social History     Socioeconomic History    Marital status:    Tobacco Use    Smoking status: Former     Current packs/day: 0.00     Types: Cigarettes     Quit date:      Years since quittin.7    Smokeless tobacco: Never   Substance and Sexual Activity    Alcohol use: Not Currently    Drug use: Never    Sexual activity: Yes     Current Outpatient Medications   Medication Instructions    albuterol (ACCUNEB) 0.63 mg, Nebulization, Every 6 hours PRN, Rescue    albuterol (PROVENTIL/VENTOLIN HFA) 90 mcg/actuation inhaler 2 puffs, Inhalation, Every 6 hours PRN, Rescue    albuterol-ipratropium (DUO-NEB) 2.5 mg-0.5 mg/3 mL nebulizer solution 3 mLs, Nebulization, Every 6 hours PRN, Rescue    ascorbic acid (vitamin C) (VITAMIN C) 500 mg, Oral, 2 times daily    aspirin (ECOTRIN) 81 mg, Oral    atorvastatin (LIPITOR) 20 mg, Oral    cholecalciferol (vitamin D3) (VITAMIN D3) 10,000 Units, Oral    DALIRESP 500 mcg Tab 0.5 tablets, Oral, Daily    fluticasone propionate (FLONASE) 50 mcg/actuation nasal spray 1 spray, Each Nostril, Daily    fluticasone-umeclidin-vilanter (TRELEGY ELLIPTA) 200-62.5-25 mcg inhaler 1 puff, Oral, Daily    guaiFENesin (MUCINEX) 1,200 mg, Oral, Daily    Lactobacillus rhamnosus GG (CULTURELLE) 15 billion cell capsule Oral    montelukast (SINGULAIR) 10 mg tablet TAKE 1 TABLET BY MOUTH EVERY DAY    predniSONE (DELTASONE) 20 MG tablet Take 3 tablets by mouth for three days, then take 2 tablets by mouth for four days, then 1 tablet for four days, then 1/2 tablet for 4 days.    ranolazine (RANEXA) 500 mg, Oral, 2 times daily    roflumilast (DALIRESP) 500 mcg Tab 0.5 tablets, Oral, Daily    sertraline (ZOLOFT) 100 mg, Oral, Daily     Current Inpatient Medications    albuterol-ipratropium  3 mL Nebulization Q6H    busPIRone  10 mg Oral QHS    enoxparin  40 mg Subcutaneous Q24H (prophylaxis, 1700)    famotidine  20 mg Oral BID    guaiFENesin 100 mg/5 ml  200 mg Per NG tube Q6H    midazolam  4 mg Intravenous Once    mupirocin   Nasal BID    piperacillin-tazobactam (Zosyn) IV (PEDS and ADULTS) (extended infusion is not appropriate)  4.5 g Intravenous Q8H     Current Intravenous Infusions   sodium chloride 0.9% Stopped (10/03/23 0527)    sodium chloride 0.9% Stopped (10/01/23 1519)     Objective:       Intake/Output Summary (Last 24 hours) at 10/4/2023 1036  Last data filed at 10/4/2023 0948  Gross per 24 hour   Intake 2075.33 ml   Output 450 ml   Net 1625.33 ml     Vital Signs (Most Recent):  Temp: 98.1 °F (36.7 °C) (10/04/23 0800)  Pulse: 83 (10/04/23 1030)  Resp: 19 (10/04/23 1030)  BP: (!) 158/74 (10/04/23 1030)  SpO2: (!) 89 % (10/04/23 1030)  Body mass index is 23.64 kg/m².  Weight: 83.5 kg (184 lb 1.6 oz) Vital Signs (24h Range):  Temp:  [97.6 °F (36.4 °C)-98.4 °F (36.9 °C)] 98.1 °F (36.7 °C)  Pulse:  [] 83  Resp:  [15-31] 19  SpO2:  [85 %-95 %] 89 %  BP: ()/(59-88) 158/74       Physical exam:  Gen- A/O, NAD, interactive and converse  HENT- ATNC, MMM  CV- RRR  Resp- normal work of breathing, breath sounds are diminished but no wheezing is heard   MSK- WWP, trace LE edema   Neuro- A/Ox3, KARISHMA, no gross deficits  Psych- appropriate mood and affect    Lines/Drains/Airways       Drain  Duration                  NG/OG Tube 10/02/23 0710 Left nostril 2 days              Peripheral Intravenous Line  Duration                  Peripheral IV - Single Lumen 09/28/23 1810 18 G Anterior;Left Forearm 5 days         Peripheral IV - Single Lumen 09/28/23 2104 18 G Anterior;Distal;Right Wrist 5 days         Peripheral IV - Single Lumen 09/28/23 2104 18 G Anterior;Right Forearm 5 days         Peripheral IV - Single Lumen 10/01/23 0500 20 G Anterior;Distal;Left Forearm 3 days                   Significant Labs:  Lab Results   Component Value Date    WBC 14.07 (H) 10/03/2023    HGB 13.1 (L) 10/03/2023    HCT 39.9 (L) 10/03/2023    MCV 90.3 10/03/2023     10/03/2023     BMP  Lab Results   Component Value Date     10/03/2023    K 3.7 10/03/2023    CHLORIDE 105 10/03/2023    CO2 25 10/03/2023    BUN 31.2 (H) 10/03/2023    CREATININE 0.81 10/03/2023    CALCIUM 8.6 (L) 10/03/2023    AGAP 12.0 10/02/2023    EGFRNONAA 55 07/15/2021         Assessment/Plan:     Assessment  Acute on chronic hypoxic respiratory failure requiring intubation and mechanical ventilation  Extubated 10/01/2023  Patient uses nocturnal trilogy at home  Acute exacerbation of COPD  History of Pulmonary Nodules      Plan  Weaned from vapotherm sats are marginal will need close monitoring goal is to keep sat of 88% or greater.  Seems to be doing better on OxyMask as he is a mouth breather.  Asked nursing to have family bring trilogy from home to utilize with all sleep  Respiratory cultures with mixed nasra, blood cultures no growth, viral respiratory panel negative  Continue Zosyn, currently day 6/7  Complete prednisone 40 mg daily for total 5 day course  Reported history of aspiration, speech therapy following; continue tube feeds for now      DVT Prophylaxis: LMWH  GI Prophylaxis:  Famotidine      Miriam Perze, ANP  Pulmonary Critical Care Medicine  [facility] - Emergency Dept

## 2023-10-04 NOTE — HPI
"Helder Sharp Jr. is a 83 y.o. male with a past medical history of hyperlipidemia, CAD, COPD, on 2 L home oxygen, pulmonary nodules, and anxiety presented to [facility] on 9/28/2023 for shortness of breath and weakness. Wife also endorsed worsening nonproductive cough and lower extremity swelling.  Wife noticed chocolate" colored urine at approximately 15:30 on 09/28, prompting them to ED. Patient also had to increase home oxygen to 3 L secondary to increased dyspnea.  Patient received albuterol nebulizer treatment en route with EMS.  Patient was given an hour long nebulizer treatment, magnesium, and Solu-Medrol upon arrival to ED. Patient remained hypoxic on non-rebreather and  was intubated in ED for airway protection.  Labs revealed WBC 21.13, chloride 109, CO2 19, glucose 190, BNP 22, troponin 0.014, and lactic acid 3.8.  Flu/RSV/COVID were negative.  UA revealed 2+ protein, trace ketones, 3+ occult blood, 1+ bilirubin, and greater than 100 RBCs.  Blood and respiratory cultures were obtained.  Chest x-ray revealed chronic emphysema and scarring within the lungs without appreciable acute superimposed airspace opacity.  Patient was given LR, IV Zosyn, and IV Azithromycin in ED.  Patient was admitted to ICU for close monitoring.  Patient was started on Solu-Medrol 60 mg BID and DuoNebs q.6 hours.  Urology was consulted for hematuria. Ultrasound retroperitoneal complete revealed no abnormalities. Urology recommended continuing indwelling lang with outpatient follow-up. Patient had previous history of Klebsiella pneumonia which was sensitive to Zosyn.  Azithromycin was discontinued.  CTA chest revealed multiple enlarged mediastinal lymph nodes, no filling defects seen to suggest PE, and severe emphysematous changes identified in the lungs with consolidation in the posterior basal segments of bilateral lower lobes demonstrating air bronchograms which may reflect aspiration pneumonia.  NG tube was placed and patient was " started on tube feeds.  Patient was transitioned to prednisone 40 mg daily for 5 day course on 10/01. Patient was extubated on 10/01 and placed on Vapotherm. Speech therapy was consulted. Patient was cleared for downgrade out of ICU on 10/03/2023. Hospital medicine was consulted for transition of care and further medical management.

## 2023-10-04 NOTE — PT/OT/SLP PROGRESS
[facility]  Speech Language Pathology Department  Dysphagia Therapy Progress Note    Patient Name:  Helder Sharp Jr.   MRN:  [id number]  Admitting Diagnosis: hypoxic respiratory failure    Recommendations:     General recommendations:  dysphagia therapy  Diet texture/consistency recommendations:  NPO  Medications: NPO  Discharge recommendations:  outpatient speech therapy, home health speech therapy     Subjective     Patient awake and alert.    Pain/Comfort:  0/10    Spiritual/Cultural/Muslim Beliefs/Practices that affect care: no    Objective:     Therapeutic Activities:  Pt completed base of tongue and laryngeal x50 with minimal-moderate cues.  Pt tolerated thermal stimulation to the anterior faucial pillars x10 with 100% swallow responses.    Assessment:     Pt continues to present with oropharyngeal dysphagia and remains unsafe for PO diet.    Goals:     Multidisciplinary Problems       SLP Goals          Problem: SLP    Goal Priority Disciplines Outcome   SLP Goal     SLP    Description: LTG: To tolerate least restrictive diet without clinical signs/sx of aspiration.   ST. Tongue base/laryngeal excursion exercise   2. Tolerate thermal stimulation x10 with 100% swallow response with less than a 2 second delay.   3. Puree solids without clinical signs/sx of aspiration.                      Patient Education:     Patient provided with verbal education regarding POC.  Understanding was verbalized.    Plan:     Will continue to follow and tx as appropriate.    SLP Follow-Up:  Yes   Patient to be seen:  daily   Plan of Care expires:  10/09/23  Plan of Care reviewed with:  patient       Time Tracking:     SLP Treatment Date:    10/4/23  Speech Start Time:   0840  Speech Stop Time:    0850    Speech Total Time (min):   10    Billable minutes:  Treatment of Swallow Dysfunction, 10 minutes       10/04/2023

## 2023-10-04 NOTE — PROGRESS NOTES
"[facility] - 5th Floor [facility] Medicine  Progress Note    Patient Name: Helder Sharp Jr.  MRN: [id number]  Patient Class: IP- Inpatient   Admission Date: 9/28/2023  Length of Stay: 6 days  Attending Physician: Aggie Pelaez MD  Primary Care Provider: Kimo Love MD        Subjective:     Principal Problem:<principal problem not specified>        HPI:  Helder Sharp Jr. is a 83 y.o. male with a past medical history of hyperlipidemia, CAD, COPD, on 2 L home oxygen, pulmonary nodules, and anxiety presented to [facility] on 9/28/2023 for shortness of breath and weakness. Wife also endorsed worsening nonproductive cough and lower extremity swelling.  Wife noticed chocolate" colored urine at approximately 15:30 on 09/28, prompting them to ED. Patient also had to increase home oxygen to 3 L secondary to increased dyspnea.  Patient received albuterol nebulizer treatment en route with EMS.  Patient was given an hour long nebulizer treatment, magnesium, and Solu-Medrol upon arrival to ED. Patient remained hypoxic on non-rebreather and  was intubated in ED for airway protection.  Labs revealed WBC 21.13, chloride 109, CO2 19, glucose 190, BNP 22, troponin 0.014, and lactic acid 3.8.  Flu/RSV/COVID were negative.  UA revealed 2+ protein, trace ketones, 3+ occult blood, 1+ bilirubin, and greater than 100 RBCs.  Blood and respiratory cultures were obtained.  Chest x-ray revealed chronic emphysema and scarring within the lungs without appreciable acute superimposed airspace opacity.  Patient was given LR, IV Zosyn, and IV Azithromycin in ED.  Patient was admitted to ICU for close monitoring.  Patient was started on Solu-Medrol 60 mg BID and DuoNebs q.6 hours.  Urology was consulted for hematuria. Ultrasound retroperitoneal complete revealed no abnormalities. Urology recommended continuing indwelling lang with outpatient follow-up. Patient had previous history of Klebsiella pneumonia which was " sensitive to Zosyn.  Azithromycin was discontinued.  CTA chest revealed multiple enlarged mediastinal lymph nodes, no filling defects seen to suggest PE, and severe emphysematous changes identified in the lungs with consolidation in the posterior basal segments of bilateral lower lobes demonstrating air bronchograms which may reflect aspiration pneumonia.  NG tube was placed and patient was started on tube feeds.  Patient was transitioned to prednisone 40 mg daily for 5 day course on 10/01. Patient was extubated on 10/01 and placed on Vapotherm. Speech therapy was consulted. Patient was cleared for downgrade out of ICU on 10/03/2023. Hospital medicine was consulted for transition of care and further medical management.      Overview/Hospital Course:  Patient is doing better and continuing to wean down the oxygen.  He has still an NG-tube and being evaluated for swallowing.          Review of Systems   Respiratory:  Positive for cough, shortness of breath and wheezing.    Cardiovascular: Negative.    Genitourinary: Negative.    Neurological: Negative.    All other systems reviewed and are negative.    Objective:     Vital Signs (Most Recent):  Temp: 98.4 °F (36.9 °C) (10/04/23 1600)  Pulse: 93 (10/04/23 1600)  Resp: 18 (10/04/23 1600)  BP: 122/81 (10/04/23 1600)  SpO2: (!) 90 % (10/04/23 1600) Vital Signs (24h Range):  Temp:  [97.6 °F (36.4 °C)-98.4 °F (36.9 °C)] 98.4 °F (36.9 °C)  Pulse:  [] 93  Resp:  [14-29] 18  SpO2:  [85 %-97 %] 90 %  BP: ()/(57-88) 122/81     Weight: 83.5 kg (184 lb 1.6 oz)  Body mass index is 23.64 kg/m².    Intake/Output Summary (Last 24 hours) at 10/4/2023 1719  Last data filed at 10/4/2023 1641  Gross per 24 hour   Intake 2082.96 ml   Output 850 ml   Net 1232.96 ml         Physical Exam  Vitals and nursing note reviewed.   Constitutional:       Appearance: Normal appearance.   HENT:      Head:      Comments: NGT in place  Cardiovascular:      Rate and Rhythm: Normal rate and  regular rhythm.   Pulmonary:      Effort: Pulmonary effort is normal.      Breath sounds: Normal breath sounds.      Comments: Vapotherm in place  Abdominal:      General: Abdomen is flat. Bowel sounds are normal.      Palpations: Abdomen is soft.   Skin:     General: Skin is warm and dry.   Neurological:      Mental Status: He is alert and oriented to person, place, and time. Mental status is at baseline.             Significant Labs: All pertinent labs within the past 24 hours have been reviewed.  BMP:   Recent Labs   Lab 10/03/23  0557      K 3.7   CO2 25   BUN 31.2*   CREATININE 0.81   CALCIUM 8.6*     CBC:   Recent Labs   Lab 10/03/23  0557   WBC 14.07*   HGB 13.1*   HCT 39.9*        CMP:   Recent Labs   Lab 10/03/23  0557      K 3.7   CO2 25   BUN 31.2*   CREATININE 0.81   CALCIUM 8.6*   ALBUMIN 2.8*   BILITOT 0.8   ALKPHOS 61   AST 18   ALT 18       Significant Imaging: I have reviewed all pertinent imaging results/findings within the past 24 hours.      Assessment/Plan:      No notes have been filed under this hospital service.  Service: Hospital Medicine    Acute on chronic hypoxic respiratory failure requiring intubation, extubated 10/01/2023   COPD exacerbation  Aspiration pneumonia   Leukocytosis  Hematuria, resolved  History of hyperlipidemia, CAD, and pulmonary nodules  Oropharyngeal dysphagia        Plan:  Continue Vapotherm, wean as tolerated   Continue Zosyn, day 6/7   Prednisone 40 mg   DuoNebs q.6 hours  Continue tube feeds  SLP/OT/PT and follow up MBS when appropriate  Pulmonary has signed off and recommended follow-up outpatient pulmonology appointment within 2-3 weeks after discharge.  Continue appropriate home medications once med rec updated   Labs in a.m.    VTE Risk Mitigation (From admission, onward)         Ordered     enoxaparin injection 40 mg  Every 24 hours         09/30/23 0957     IP VTE HIGH RISK PATIENT  Once         09/28/23 2019     Place sequential  compression device  Until discontinued         09/28/23 2019                Discharge Planning   JEFF:      Code Status: Full Code   Is the patient medically ready for discharge?:     Reason for patient still in hospital (select all that apply): Treatment  Discharge Plan A: Other (To be determined)                  Evette Bar MD  Department of Hospital Medicine   [facility] - 5th Floor Med Surg

## 2023-10-04 NOTE — PT/OT/SLP PROGRESS
Physical Therapy Treatment    Patient Name:  Helder Sharp Jr.   MRN:  [id number]    Recommendations:     Discharge Recommendations: nursing facility, skilled, home, home with home health, home health PT (pending progress)  Discharge Equipment Recommendations: none  Barriers to discharge: Ongoing medical needs    Assessment:     Helder Sharp Jr. is a 83 y.o. male admitted with a medical diagnosis of COPD exacerbation, atypical PNA hematuria, HLD, required mechanical ventilation now on NC, uses trilogy at home, goal O2 sats >88%..  He presents with the following impairments/functional limitations: weakness, impaired endurance, impaired cardiopulmonary response to activity .    Pt stacey tx fairly, unable to amb today 2/2 SOB and c/o dizziness with activity.     Rehab Prognosis: Good; patient would benefit from acute skilled PT services to address these deficits and reach maximum level of function.    Recent Surgery: * No surgery found *      Plan:     During this hospitalization, patient to be seen 5 x/week to address the identified rehab impairments via gait training, therapeutic activities, therapeutic exercises and progress toward the following goals:    Plan of Care Expires:  11/03/23    Subjective     Chief Complaint: SOB  Patient/Family Comments/goals: Get better  Pain/Comfort:         Objective:     Communicated with RN prior to session.  Patient found HOB elevated with oxygen, NG tube, peripheral IV upon PT entry to room.     General Precautions: Standard, fall  Orthopedic Precautions:    Braces: N/A  Respiratory Status:  OXYMASK 3L   O2:85% with ax  Skin Integrity: Visible skin intact      Functional Mobility:  Bed Mobility:     Supine to Sit: modified independence  Sit to Supine: minimum assistance  Transfers:     Sit to Stand:  contact guard assistance with rolling walker    Therapeutic Activities/Exercises:  Pt sat EOB spv with cues for breathing techniques and sitting posture. Performed 3 sit<>stands with  RW [place], o2 desats with activity. Seated rest breaks needed between standing trials. While standing in RW pt began having BM unknowingly, returned pt B2B.     Education:  Patient provided with verbal education education regarding POC.  Understanding was verbalized, however additional teaching warranted.     Patient left HOB elevated with all lines intact, call button in reach, \RN notified, and wife present..    GOALS:   Multidisciplinary Problems       Physical Therapy Goals          Problem: Physical Therapy    Goal Priority Disciplines Outcome Goal Variances Interventions   Physical Therapy Goal     PT, PT/OT Ongoing, Progressing     Description: Goals to be met by: 11/3/2023     Patient will increase functional independence with mobility by performin. Supine to sit with [place]  2. Sit to stand transfer with Modified [place]  3. Gait  x 150 feet with Modified [place] using Rolling Walker.                          Time Tracking:     PT Received On: 10/04/23  PT Start Time: 1430     PT Stop Time: 1501  PT Total Time (min): 31 min     Billable Minutes: Therapeutic Activity 31    Treatment Type: Treatment  PT/PTA: PTA     Number of PTA visits since last PT visit: 1     10/04/2023

## 2023-10-04 NOTE — PT/OT/SLP EVAL
Occupational Therapy  Evaluation    Name: Helder Sharp Jr.  MRN: [id number]  Admitting Diagnosis: hypoxic respiratory failure   Recent Surgery: * No surgery found *      Recommendations:     Discharge Recommendations: nursing facility, skilled, home with home health  Discharge Equipment Recommendations:   (TBD pending progress)  Barriers to discharge:  None    Assessment:     Helder Sharp Jr. is a 83 y.o. male with a medical diagnosis of hypoxic respiratory failure 2/2 COPD exacerbation. Goal O2 sats >88%. Prior to hospitalization, pt was ambulating with rollator, but reports completing daily activities independently. He presented with overall generalized weakness and poor ax tolerance, but demo'd good effort throughout. He presents with the following performance deficits affecting function: impaired endurance, weakness, impaired self care skills, impaired functional mobility, impaired cardiopulmonary response to activity, gait instability.  Upon discharge, this patient would benefit from SNF or home with HH pending progress.    Rehab Prognosis: Good; patient would benefit from acute skilled OT services to address these deficits and reach maximum level of function.       Plan:     Patient to be seen 3 x/week to address the above listed problems via self-care/home management, therapeutic activities, therapeutic exercises  Plan of Care Expires: 11/01/23  Plan of Care Reviewed with: patient    Subjective     Chief Complaint: none stated  Patient/Family Comments/goals: return home to [facility]    Occupational Profile:  Living Environment: lives with wife, no steps to enter, tub/shower  Previous level of function: Mod I/I  Roles and Routines:   Equipment Used at Home: shower chair, rollator  Assistance upon Discharge: wife    Pain/Comfort:  Pain Rating 1: 0/10    Patients cultural, spiritual, Latter day conflicts given the current situation:      Objective:     OT communicated with Nsg prior to session.      Patient  was found HOB with blood pressure cuff, NG tube, pressure relief boots, SCD, lang catheter, peripheral IV, oxygen, pulse ox (continuous) upon OT entry to room.    General Precautions: Standard, fall  Orthopedic Precautions: N/A  Braces: N/A    Vital Signs: Blood Pressure: 123/57, HR: 84,   Sp02: 3L/ min., bumped up to 5L/min with ax per MD request.    Pt was stable at 92% at rest, dropped to 84% with ax, bumped O2 to 6L/min and O2 increased to 89%. After pt put back  to bed, pt was able to maintain 90-92% at 3L/min     Bed Mobility:    Patient completed Supine to Sit with contact guard assistance  Scooting with SBA    Functional Mobility/Transfers:  Patient completed Sit <> Stand Transfer with moderate assistance  with  rolling walker   Functional Mobility: lateral steps x 4 to HOB with min A and RW    Activities of Daily Living:  Lower Body Dressing:  pt able to doff socks with CGA while sitting EOB, but 2/2 poor ax tolerance pt required max A to don socks    Functional Cognition:  Pt reports hallicinations since hospitalization, but mostly oriented.   Follows 1-2 step tasks with increased verbal and tactile cues      Upper Extremity Function:  Right Upper Extremity:   WFL    Left Upper Extremity:  WFL    Therapeutic Positioning  Risk for acquired pressure injuries is increased due to impaired mobility.      OT recommendations for therapeutic positioning throughout hospitalization:   Follow [facility] Pressure Injury Prevention Protocol      Patient Education:  Patient provided with verbal education education regarding OT role/goals/POC.  Understanding was verbalized.     Patient left HOB elevated with all lines intact and call button in reach    GOALS:   Multidisciplinary Problems       Occupational Therapy Goals          Problem: Occupational Therapy    Goal Priority Disciplines Outcome Interventions   Occupational Therapy Goal     OT, PT/OT Ongoing, Progressing    Description: Goals to be met by: 11/1/23     Patient  will increase functional independence with ADLs by performing:    UE Dressing with Modified [place].  LE Dressing with Modified [place] and Assistive Devices as needed.  Grooming while standing at sink with Modified [place].  Toileting from [facility] with Modified [place] for hygiene and clothing management. Progress to toilet as appropriate.  Toilet transfer to [facility] with Modified [place]. Progress to toilet as appropriate.                         History:     Past Medical History:   Diagnosis Date    Acute bronchitis     Anxiety disorder, unspecified     COPD (chronic obstructive pulmonary disease)     Coronary artery disease     Hyperlipidemia     Left carotid bruit     Pulmonary nodules     Unspecified cataract          Past Surgical History:   Procedure Laterality Date    APPENDECTOMY      CATARACT EXTRACTION Left 01/03/2023    HERNIA REPAIR      PHACOEMULSIFICATION, CATARACT, WITH IOL INSERTION Left 1/3/2023    Procedure: PHACOEMULSIFICATION, CATARACT, WITH IOL INSERTION- OS;  Surgeon: Louis Abdi MD;  Location: [facility];  Service: Ophthalmology;  Laterality: Left;    TONSILLECTOMY         Time Tracking:     OT Date of Treatment: 10/04/23  OT Start Time: 1104  OT Stop Time: 1134  OT Total Time (min): 30 min    Billable Minutes:Evaluation Mod Complexity    10/4/2023

## 2023-10-04 NOTE — PLAN OF CARE
Problem: Occupational Therapy  Goal: Occupational Therapy Goal  Description: Goals to be met by: 11/1/23     Patient will increase functional independence with ADLs by performing:    UE Dressing with Modified [place].  LE Dressing with Modified [place] and Assistive Devices as needed.  Grooming while standing at sink with Modified [place].  Toileting from [facility] with Modified [place] for hygiene and clothing management. Progress to toilet as appropriate.  Toilet transfer to [facility] with Modified [place]. Progress to toilet as appropriate.    Outcome: Ongoing, Progressing

## 2023-10-04 NOTE — NURSING
Nurses Note -- 4 Eyes      10/4/2023   5:56 PM      Skin assessed during: Transfer      [] No Altered Skin Integrity Present    []Prevention Measures Documented      [x] Yes- Altered Skin Integrity Present or Discovered   [] LDA Added if Not in Epic (Describe Wound)   [x] New Altered Skin Integrity was Present on Admit and Documented in LDA   [] Wound Image Taken    Wound Care Consulted? No    Attending Nurse:  Gauri Muse RN/Staff Member:  GAIL Stockton

## 2023-10-04 NOTE — SUBJECTIVE & OBJECTIVE
Review of Systems   Respiratory:  Positive for cough, shortness of breath and wheezing.    Cardiovascular: Negative.    Genitourinary: Negative.    Neurological: Negative.    All other systems reviewed and are negative.    Objective:     Vital Signs (Most Recent):  Temp: 98.4 °F (36.9 °C) (10/04/23 1600)  Pulse: 93 (10/04/23 1600)  Resp: 18 (10/04/23 1600)  BP: 122/81 (10/04/23 1600)  SpO2: (!) 90 % (10/04/23 1600) Vital Signs (24h Range):  Temp:  [97.6 °F (36.4 °C)-98.4 °F (36.9 °C)] 98.4 °F (36.9 °C)  Pulse:  [] 93  Resp:  [14-29] 18  SpO2:  [85 %-97 %] 90 %  BP: ()/(57-88) 122/81     Weight: 83.5 kg (184 lb 1.6 oz)  Body mass index is 23.64 kg/m².    Intake/Output Summary (Last 24 hours) at 10/4/2023 1719  Last data filed at 10/4/2023 1641  Gross per 24 hour   Intake 2082.96 ml   Output 850 ml   Net 1232.96 ml         Physical Exam  Vitals and nursing note reviewed.   Constitutional:       Appearance: Normal appearance.   HENT:      Head:      Comments: NGT in place  Cardiovascular:      Rate and Rhythm: Normal rate and regular rhythm.   Pulmonary:      Effort: Pulmonary effort is normal.      Breath sounds: Normal breath sounds.      Comments: Vapotherm in place  Abdominal:      General: Abdomen is flat. Bowel sounds are normal.      Palpations: Abdomen is soft.   Skin:     General: Skin is warm and dry.   Neurological:      Mental Status: He is alert and oriented to person, place, and time. Mental status is at baseline.             Significant Labs: All pertinent labs within the past 24 hours have been reviewed.  BMP:   Recent Labs   Lab 10/03/23  0557      K 3.7   CO2 25   BUN 31.2*   CREATININE 0.81   CALCIUM 8.6*     CBC:   Recent Labs   Lab 10/03/23  0557   WBC 14.07*   HGB 13.1*   HCT 39.9*        CMP:   Recent Labs   Lab 10/03/23  0557      K 3.7   CO2 25   BUN 31.2*   CREATININE 0.81   CALCIUM 8.6*   ALBUMIN 2.8*   BILITOT 0.8   ALKPHOS 61   AST 18   ALT 18        Significant Imaging: I have reviewed all pertinent imaging results/findings within the past 24 hours.

## 2023-10-05 NOTE — PLAN OF CARE
Spoke to patient and his wife about discharge planning. No one has discussed with them previously. Discussed therapy recommendations. Do not seem keen upon NH. Discussed swingbed and TCU as well. They will discuss together and I will revisit conversation.

## 2023-10-05 NOTE — PT/OT/SLP PROGRESS
Physical Therapy Treatment    Patient Name:  Helder Sharp Jr.   MRN:  [id number]    Recommendations:     Discharge Recommendations: nursing facility, skilled, home, home with home health, home health PT (pending progress)  Discharge Equipment Recommendations: none  Barriers to discharge: Ongoing medical needs and placement    Assessment:     Helder Sharp Jr. is a 83 y.o. male admitted with a medical diagnosis of Moderate malnutrition.  He presents with the following impairments/functional limitations: impaired endurance, weakness, impaired cardiopulmonary response to activity .    Rehab Prognosis: Good; patient would benefit from acute skilled PT services to address these deficits and reach maximum level of function.    Recent Surgery: * No surgery found *      Plan:     During this hospitalization, patient to be seen 5 x/week to address the identified rehab impairments via gait training, therapeutic activities, therapeutic exercises and progress toward the following goals:    Plan of Care Expires:  11/03/23    Subjective     Chief Complaint: SOB  Patient/Family Comments/goals:   Pain/Comfort:         Objective:     Communicated with RN prior to session.  Patient found HOB elevated with NG tube, oxygen upon PT entry to room.     General Precautions: Standard, fall  Orthopedic Precautions:    Braces: N/A  Respiratory Status:  OXYMASK 4L  O2: 85% with ax  Skin Integrity: Visible skin intact      Functional Mobility:  Bed Mobility:     Rolling Left:  modified independence  Rolling Right: modified independence  Supine to Sit: stand by assistance  Transfers:     Sit to Stand:  stand by assistance with rolling walker  Bed to Chair: contact guard assistance with  rolling walker  using  Step Transfer    Therapeutic Activities/Exercises:  Pt sat EOB while waiting for o2 sats to increase and to use urinal.    Education:  Patient provided with verbal education education regarding POC.  Understanding was verbalized, however  additional teaching warranted.     Patient left up in chair with all lines intact, call button in reach, and wife present..    GOALS:   Multidisciplinary Problems       Physical Therapy Goals          Problem: Physical Therapy    Goal Priority Disciplines Outcome Goal Variances Interventions   Physical Therapy Goal     PT, PT/OT Ongoing, Progressing     Description: Goals to be met by: 11/3/2023     Patient will increase functional independence with mobility by performin. Supine to sit with [place]  2. Sit to stand transfer with Modified [place]  3. Gait  x 150 feet with Modified [place] using Rolling Walker.                          Time Tracking:     PT Received On: 10/05/23  PT Start Time: 1041     PT Stop Time: 1107  PT Total Time (min): 26 min     Billable Minutes: Therapeutic Activity 26    Treatment Type: Treatment  PT/PTA: PTA     Number of PTA visits since last PT visit: 2     10/05/2023

## 2023-10-05 NOTE — PT/OT/SLP PROGRESS
[facility]  Speech Language Pathology Department  Dysphagia Therapy Progress Note    Patient Name:  Helder Sharp Jr.   MRN:  [id number]  Admitting Diagnosis: hypoxic respiratory failure     Recommendations:     General recommendations:  dysphagia therapy  Diet texture/consistency recommendations:  NPO  Medications: NPO  Discharge recommendations:  outpatient speech therapy, home health speech therapy     Subjective     Patient awake and alert.  Pain/Comfort:  0/10  Spiritual/Cultural/Shinto Beliefs/Practices that affect care: no    Objective:     Therapeutic Activities:  Pt completed base of tongue and laryngeal x60 with minimal-moderate cues.  Pt tolerated thermal stimulation to the anterior faucial pillars x10 with 100 swallow responses.     Assessment:     Pt continues to present with oropharyngeal dysphagia and remains unsafe for PO diet.    Goals:     Multidisciplinary Problems       SLP Goals          Problem: SLP    Goal Priority Disciplines Outcome   SLP Goal     SLP    Description: LTG: To tolerate least restrictive diet without clinical signs/sx of aspiration.   ST. Tongue base/laryngeal excursion exercise   2. Tolerate thermal stimulation x10 with 100% swallow response with less than a 2 second delay.   3. Puree solids without clinical signs/sx of aspiration.                      Patient Education:     Patient provided with verbal education regarding POC.  Understanding was verbalized.    Plan:     Will continue to follow and tx as appropriate.    SLP Follow-Up:  Yes   Patient to be seen:  daily   Plan of Care expires:  10/09/23  Plan of Care reviewed with:  patient       Time Tracking:     SLP Treatment Date:    10/5/23  Speech Start Time:   1300  Speech Stop Time:     1310   Speech Total Time (min):   10    Billable minutes:  Treatment of Swallow Dysfunction, 10 minutes       10/05/2023

## 2023-10-05 NOTE — PT/OT/SLP PROGRESS
[facility]  Speech Language Pathology Department  Dysphagia Therapy Progress Note    Patient Name:  Helder Sharp Jr.   MRN:  [id number]  Admitting Diagnosis: hypoxic respiratory failure    Recommendations:     General recommendations:  dysphagia therapy  Diet texture/consistency recommendations:  NPO  Medications: NPO  Discharge recommendations:  outpatient speech therapy, home health speech therapy   Barriers to safe discharge:  severity of impairment    Subjective     Patient awake and alert.  Pain/Comfort:  0/10  Spiritual/Cultural/Latter day Beliefs/Practices that affect care: no    Objective:     Therapeutic Activities:  Pt completed base of tongue and laryngeal x50 with minimal cues.  Pt tolerated thermal stimulation to the anterior faucial pillars x10 with 100 swallow responses.   Therapeutic PO Trials:  Consistency Amount Fed By Oral Symptoms Pharyngeal Symptoms   Puree 3 oz SLP None None     Assessment:     Pt continues to present with oropharyngeal dysphagia and remains unsafe for PO diet.    Goals:     Multidisciplinary Problems       SLP Goals          Problem: SLP    Goal Priority Disciplines Outcome   SLP Goal     SLP    Description: LTG: To tolerate least restrictive diet without clinical signs/sx of aspiration.   ST. Tongue base/laryngeal excursion exercise   2. Tolerate thermal stimulation x10 with 100% swallow response with less than a 2 second delay.   3. Puree solids without clinical signs/sx of aspiration.                      Patient Education:     Patient provided with verbal education regarding POC.  Understanding was verbalized.    Plan:     Will continue to follow and tx as appropriate.    SLP Follow-Up:  Yes   Patient to be seen:  daily   Plan of Care expires:  10/09/23  Plan of Care reviewed with:  patient       Time Tracking:     SLP Treatment Date:    10/5/23  Speech Start Time:   0850  Speech Stop Time:    0900    Speech Total Time (min):    10    Billable minutes:  Treatment of Swallow Dysfunction, 10 minutes       10/05/2023

## 2023-10-05 NOTE — PROGRESS NOTES
"[facility] - 5th Floor [facility] MEDICINE ~ PROGRESS NOTE        CHIEF COMPLAINT   Hospital follow up    HOSPITAL COURSE   Helder Sharp Jr. is a 83 y.o. male with a past medical history of hyperlipidemia, CAD, COPD, on 2 L home oxygen, pulmonary nodules, and anxiety presented to [facility] on 9/28/2023 for shortness of breath and weakness. Wife also endorsed worsening nonproductive cough and lower extremity swelling.  Wife noticed chocolate" colored urine at approximately 15:30 on 09/28, prompting them to ED. Patient also had to increase home oxygen to 3 L secondary to increased dyspnea.  Patient received albuterol nebulizer treatment en route with EMS.  Patient was given an hour long nebulizer treatment, magnesium, and Solu-Medrol upon arrival to ED. Patient remained hypoxic on non-rebreather and  was intubated in ED for airway protection.  Labs revealed WBC 21.13, chloride 109, CO2 19, glucose 190, BNP 22, troponin 0.014, and lactic acid 3.8.  Flu/RSV/COVID were negative.  UA revealed 2+ protein, trace ketones, 3+ occult blood, 1+ bilirubin, and greater than 100 RBCs.  Blood and respiratory cultures were obtained.  Chest x-ray revealed chronic emphysema and scarring within the lungs without appreciable acute superimposed airspace opacity.  Patient was given LR, IV Zosyn, and IV Azithromycin in ED.  Patient was admitted to ICU for close monitoring.  Patient was started on Solu-Medrol 60 mg BID and DuoNebs q.6 hours.  Urology was consulted for hematuria. Ultrasound retroperitoneal complete revealed no abnormalities. Urology recommended continuing indwelling lang with outpatient follow-up. Patient had previous history of Klebsiella pneumonia which was sensitive to Zosyn.  Azithromycin was discontinued.  CTA chest revealed multiple enlarged mediastinal lymph nodes, no filling defects seen to suggest PE, and severe emphysematous changes identified in the lungs with consolidation in the posterior " basal segments of bilateral lower lobes demonstrating air bronchograms which may reflect aspiration pneumonia.  NG tube was placed and patient was started on tube feeds.  Patient was transitioned to prednisone 40 mg daily for 5 day course on 10/01. Patient was extubated on 10/01 and placed on Vapotherm. Speech therapy was consulted. Patient was cleared for downgrade out of ICU on 10/03/2023. Hospital medicine was consulted for transition of care and further medical management.    Has completed 7 days of Zosyn therapy for possible aspiration pneumonia.    Today  Seen and examined this morning.  He was restless.  Still remains on NG tube feeds, pending plans from speech therapist.  Hypoxic.  Baseline 2 L nasal cannula which likely will need to be increased.        OBJECTIVE/PHYSICAL EXAM     VITAL SIGNS (MOST RECENT):  Temp: 98.7 °F (37.1 °C) (10/05/23 0840)  Pulse: 85 (10/05/23 0840)  Resp: 17 (10/05/23 0840)  BP: (!) 124/51 (10/05/23 0840)  SpO2: (!) 85 % (10/05/23 0840) VITAL SIGNS (24 HOUR RANGE):  Temp:  [97.9 °F (36.6 °C)-98.8 °F (37.1 °C)] 98.7 °F (37.1 °C)  Pulse:  [] 85  Resp:  [14-25] 17  SpO2:  [85 %-97 %] 85 %  BP: (109-158)/(51-81) 124/51   GENERAL: In no acute distress, afebrile  HEENT:  NG tube  CHEST:  Greatly diminished breath sounds with crackles bases  HEART: S1, S2, no appreciable murmur  ABDOMEN: Soft, nontender, BS +  MSK: Warm, no lower extremity edema, no clubbing or cyanosis  NEUROLOGIC: Alert and oriented x4, moving all extremities with good strength   INTEGUMENTARY:  PSYCHIATRY:        ASSESSMENT/PLAN   Acute on chronic hypoxemic respiratory failure-intubated/extubated  Chronic hypoxic respiratory failure 2 L nasal cannula at home   COPD exacerbation  Bilateral aspiration pneumonia   Sepsis 2/2 above   Hematuria-resolved    History of: HLD, CAD, pulmonary nodules      Pulmonology signed off.  Continue trilogy once back home, follow-up in 2 to 3 weeks after discharge.  Continue DuoNeb 4  times daily and as needed.    Completed 7 days of Zosyn today, discontinue.  Continue NG tube feeds, pending speech therapist evaluation.  Pending MBS tomorrow.    DVT prophylaxis:  Lovenox 40    Anticipated discharge and disposition:  Probably we will need to increase his baseline O2 needs at home upon discharge  __________________________________________________________________________    LABS/MICRO/MEDS/DIAGNOSTICS       LABS  Recent Labs     10/05/23  0607      K 3.7   CHLORIDE 106   CO2 26   BUN 30.6*   CREATININE 0.87   GLUCOSE 119*   CALCIUM 8.8   ALKPHOS 60   AST 13   ALT 19   ALBUMIN 2.9*     Recent Labs     10/05/23  0607   WBC 11.09   RBC 4.17*   HCT 38.1*   MCV 91.4          MICROBIOLOGY  Microbiology Results (last 7 days)       Procedure Component Value Units Date/Time    Blood culture #1 **CANNOT BE ORDERED STAT** [[id number]]  (Normal) Collected: 09/28/23 1812    Order Status: Completed Specimen: Blood Updated: 10/03/23 2100     CULTURE, BLOOD ([facility]) No Growth at 5 days    Blood culture #2 **CANNOT BE ORDERED STAT** [[id number]]  (Normal) Collected: 09/28/23 1812    Order Status: Completed Specimen: Blood Updated: 10/03/23 2100     CULTURE, BLOOD ([facility]) No Growth at 5 days    Respiratory Culture [[id number]] Collected: 09/29/23 0344    Order Status: Completed Specimen: Sputum Updated: 10/01/23 0744     Respiratory Culture Normal respiratory nasra     GRAM STAIN Quality 3+      Moderate Gram positive cocci      Few Gram Positive Rods               MEDICATIONS   albuterol-ipratropium  3 mL Nebulization Q6H    busPIRone  10 mg Oral QHS    enoxparin  40 mg Subcutaneous Q24H (prophylaxis, 1700)    famotidine  20 mg Oral BID    guaiFENesin 100 mg/5 ml  200 mg Per NG tube Q6H    midazolam  4 mg Intravenous Once    piperacillin-tazobactam (Zosyn) IV (PEDS and ADULTS) (extended infusion is not appropriate)  4.5 g Intravenous Q8H         INFUSIONS   sodium chloride 0.9% Stopped (10/05/23 0566)     "      DIAGNOSTIC TESTS  XR Gastric tube check, non-radiologist performed   Final Result      Nasogastric tube as above         Electronically signed by: Marc Andrews   Date:    10/03/2023   Time:    08:19      Fl Modified Barium Swallow Speech   Final Result      X-Ray Chest 1 View   Final Result      XR Gastric tube check, non-radiologist performed   Final Result      As above.         Electronically signed by: Hugh De Leon   Date:    10/02/2023   Time:    07:55      X-Ray Chest 1 View   Final Result      As above.         Electronically signed by: Hugh De Leon   Date:    09/30/2023   Time:    11:54      US Retroperitoneal Complete   Final Result      Limited study, no abnormalities are demonstrated.         Electronically signed by: Aron Barger MD   Date:    09/29/2023   Time:    13:51      CTA Chest Non-Coronary (PE Studies)   Final Result   Impression:      1. Multiple enlarged mediastinal lymph nodes are seen. The largest is in subcarinal space and measures 1.7 cm in least dimension.      2. No filling defects are seen in the pulmonary arteries to suggest pulmonary embolus.      3. Severe emphysematous changes are identified in the lungs. There is consolidation in the posterior basal segments of bilateral lower lobes demonstrating air bronchograms. This may reflect aspiration pneumonia. Correlate clinically as regards further evaluation and followup.      4. Details and other findings as discussed above.      No significant discrepancy with overnight report.         Electronically signed by: Ronny Hamilton   Date:    09/29/2023   Time:    08:09      X-Ray Chest AP Portable   Final Result      Chronic emphysema and scarring within the lungs without appreciable acute superimposed airspace opacity.         Electronically signed by: Kimberli Cisse   Date:    09/28/2023   Time:    18:38           No results found for: "EF"       NUTRITION STATUS  Patient meets ASPEN criteria for moderate malnutrition of " acute illness or injury per RD assessment as evidenced by:  Energy Intake (Malnutrition):  (does not meet criteria)  Weight Loss (Malnutrition):  (does not meet criteria)  Subcutaneous Fat (Malnutrition): mild depletion  Muscle Mass (Malnutrition): mild depletion           A minimum of two characteristics is recommended for diagnosis of either severe or non-severe malnutrition.       Case related differential diagnoses have been reviewed; assessment and plan has been documented. I have personally reviewed the labs and test results that are currently available; I have reviewed the patients medication list. I have reviewed the consulting providers recommendations. I have reviewed or attempted to review medical records based upon their availability.  All of the patient's and/or family's questions have been addressed and answered to the best of my ability.  I will continue to monitor closely and make adjustments to medical management as needed.  This document was created using [employer] Fluency Direct.  Transcription errors may have been made.  Please contact me if any questions may rise regarding documentation to clarify transcription.        Joshua Best MD   Internal Medicine  Department of Hospital Medicine  [facility] - 5th Floor Med Surg

## 2023-10-06 NOTE — PLAN OF CARE
Spoke to patient and wife at bedside they asked the name of the swingbed in [place] they are waiting for MBS results to assist with making a decision.

## 2023-10-06 NOTE — PT/OT/SLP PROGRESS
Physical Therapy Treatment    Patient Name:  Helder Sharp Jr.   MRN:  [id number]    Recommendations:     Discharge Recommendations: nursing facility, skilled  Discharge Equipment Recommendations: none  Barriers to discharge:  placement    Assessment:     Helder Sharp Jr. is a 83 y.o. male admitted with a medical diagnosis of Moderate malnutrition.  He presents with the following impairments/functional limitations: weakness, impaired endurance, impaired self care skills, impaired functional mobility, gait instability, impaired cardiopulmonary response to activity .    Rehab Prognosis: Good; patient would benefit from acute skilled PT services to address these deficits and reach maximum level of function.    Recent Surgery: * No surgery found *      Plan:     During this hospitalization, patient to be seen 5 x/week to address the identified rehab impairments via gait training, therapeutic activities, therapeutic exercises and progress toward the following goals:    Plan of Care Expires:  11/03/23    Subjective     Chief Complaint: weakness and SOB  Patient/Family Comments/goals: get stronger  Pain/Comfort:         Objective:     Communicated with RN prior to session.  Patient found HOB elevated with oxygen, NG tube upon PT entry to room.     General Precautions: Standard, fall  Orthopedic Precautions:    Braces: N/A  Respiratory Status:  OXYMASK 5L  Skin Integrity: Visible skin intact      Functional Mobility:  Bed Mobility:     Supine to Sit: minimum assistance  Sit to Supine: moderate assistance  Transfers:     Sit to Stand:  contact guard assistance with rolling walker  Bed to Chair: contact guard assistance with  rolling walker  using  Step Transfer  Gait: Pt amb 30ftx 1 and 30ft x 2 with RW cga. Slow candice step through gait pattern. Seated rest break required between trials. Pt desat to 82% while amb.    Therapeutic Activities/Exercises:  Pt performed 4 sit<>stands through out session  cga.    Education:  Patient provided with verbal education education regarding POC.  Understanding was verbalized, however additional teaching warranted.     Patient left HOB elevated with all lines intact, call button in reach, and wife present..    GOALS:   Multidisciplinary Problems       Physical Therapy Goals          Problem: Physical Therapy    Goal Priority Disciplines Outcome Goal Variances Interventions   Physical Therapy Goal     PT, PT/OT Ongoing, Progressing     Description: Goals to be met by: 11/3/2023     Patient will increase functional independence with mobility by performin. Supine to sit with [place]  2. Sit to stand transfer with Modified [place]  3. Gait  x 150 feet with Modified [place] using Rolling Walker.                          Time Tracking:     PT Received On: 10/06/23  PT Start Time: 1435     PT Stop Time: 1458  PT Total Time (min): 23 min     Billable Minutes: Gait Training 15 and Therapeutic Activity 8    Treatment Type: Treatment  PT/PTA: PTA     Number of PTA visits since last PT visit: 3     10/06/2023

## 2023-10-06 NOTE — PT/OT/SLP PROGRESS
Occupational Therapy   Treatment    Name: Helder Sharp Jr.  MRN: [id number]  Admitting Diagnosis:  Moderate malnutrition       Recommendations:     Discharge Recommendations: nursing facility, skilled  Discharge Equipment Recommendations:   (TBD pending progress)  Barriers to discharge:       Assessment:     Helder Sharp Jr. is a 83 y.o. male with a medical diagnosis of Moderate malnutrition.  Performance deficits affecting function are impaired endurance, weakness, impaired self care skills, impaired functional mobility, impaired cardiopulmonary response to activity, gait instability.     Rehab Prognosis:  Fair; patient would benefit from acute skilled OT services to address these deficits and reach maximum level of function.       Plan:     Patient to be seen 3 x/week to address the above listed problems via self-care/home management, therapeutic activities, therapeutic exercises  Plan of Care Expires: 11/01/23  Plan of Care Reviewed with: patient    Subjective     Pain/Comfort:       Objective:     Communicated with: RN prior to session.  Patient found HOB elevated with   upon OT entry to room.    General Precautions: Standard, fall    Orthopedic Precautions:N/A  Braces: N/A     Occupational Performance:     Bed Mobility:    Patient completed Supine to Sit with minimum assistance     Functional Mobility/Transfers:  Patient completed Sit <> Stand Transfer with minimum assistance  with  rolling walker   Patient completed Bed <> Chair Transfer using Step Transfer technique with minimum assistance with rolling walker  Functional Mobility: no LOB O2% ranging 90-85%    Activities of Daily Living:  Grooming: stand by assistance sitting in chair brushing teeth with suction attachment  Lower Body Dressing: total assistance donning brief in sidelying    Patient Education:  Patient provided with verbal education education regarding OT role/goals/POC.  Understanding was verbalized.      Patient left HOB elevated with all  lines intact and call button in reach    GOALS:   Multidisciplinary Problems       Occupational Therapy Goals          Problem: Occupational Therapy    Goal Priority Disciplines Outcome Interventions   Occupational Therapy Goal     OT, PT/OT Ongoing, Progressing    Description: Goals to be met by: 11/1/23     Patient will increase functional independence with ADLs by performing:    UE Dressing with Modified [place].  LE Dressing with Modified [place] and Assistive Devices as needed.  Grooming while standing at sink with Modified [place].  Toileting from [facility] with Modified [place] for hygiene and clothing management. Progress to toilet as appropriate.  Toilet transfer to [facility] with Modified [place]. Progress to toilet as appropriate.                         Time Tracking:     OT Date of Treatment: 10/06/23  OT Start Time: 1113  OT Stop Time: 1137  OT Total Time (min): 24 min    Billable Minutes:Self Care/Home Management 1  Therapeutic Activity 1    OT/SHELLEY: SHELLEY     Number of SHELLEY visits since last OT visit: 1    10/6/2023

## 2023-10-06 NOTE — CONSULTS
Inpatient consult to Palliative Care  Consult performed by: Kwasi Phipps MD  Consult ordered by: Juliane Tucker MD      Patient Name: Helder Sharp Jr.   MRN: [id number]   Admission Date: 9/28/2023   Hospital Length of Stay: 8   Attending Provider: Aggie Pelaez MD   Consulting Provider: Kwasi Phipps M.D.  Reason for Consult: Goals of Care  Primary Care Physician: Kimo Love MD     Principal Problem: Moderate malnutrition     Patient information was obtained from patient, spouse/SO, and ER records.      Final diagnoses:  [J96.90] Respiratory failure     Assessment/Plan:     I reviewed the patient and family's understanding of the seriousness of the illness and its expected prognosis. We discussed the patient's goals of care and treatment preferences. We discussed the difference between palliative and curative medicine.  I clarified current code status. I identified the surrogate decision maker or health care POA (wife has HCPOA (not present)). His wife states that he has completed 5 Wishes. I asked what was stated in the document in regards to our goals discussion today. She did not give any definite answers. I asked if she could please bring in these documents for our records and my review. We discussed the patient's chosen code status as listed above and the contents of the LaPOST. I answered all questions and we formulated a plan including recommendations for symptom management and how to best achieve goals of care.     I reviewed the patient's current clinical status with the nurse. I reviewed clinical documentation, labs and imaging.     Symptom management review: He denies dyspnea at present or other symptoms.    Advance Care Planning     Date: 10/06/2023    Today a voluntary meeting took place: bedside    Patient Participation: Patient is able to participate     Attendees (Name and  Relationship to patient): Health care power of : wife, Maryjo Sharp and the patient.    Staff  "attendees (Name and  Role): Dr. Kwasi Phipps, Palliative Care    ACP Conversation (General): Understanding of current condition I reviewed his understanding of his current condition. His wife had to interject at times to remind him of limitations. However, he has good insight.  Experience with serious illness He reflected on his history of COPD and the hard work he has had to do to stay fit and out of the hospital. He utilizes trilogy and O2 at home ATC.     I reviewed his understanding of the option for or against PEG for long term artificial nutrition. He repeated that he was researching it and it may take several months for him to decide. I explained that he did not have several months as he can not begin eating by mouth or continue with NG feedings more than a few short weeks. I recommended the choice of PEG due to his previous willingness to do whatever it takes to survive and get better. Also he is not overly concerned with the lose of po food intake as he recognizes other pleasures in life and has a very positive outlook.  He said that he was leaning towards PEG but needed some time to think.    I reviewed the risks and benefits of full resuscitative measures in the event of a cardiopulmonary arrest event. He stated twice that "it depends on what is recommended at the time." I tried to explain that he will not have time to consider options as this is often sudden, which is why a decision prior to the event is needed. I reviewed the statistics concerning those who survive to go home and those who have complications thereafter. I explained that some complications may deter someone from electing full resuscitation and in favor of allowing a natural death. I explained that in his current state of frailty and with advanced COPD, he is at high risk of becoming ventilator dependent as well as other risks of complications. His wife seemed to understand this all better, but the patient did not make a decision at " this time. I also reviewed separately the possibility of another non-cardiac arrest event in which he may require intubation again. He was also not willing to make a decision. He will remain a full code for now. I will revisit this at a later time.    Code Status: Full Code . The patient was very nice but exhibited some avoidance behavior and changing of the subject with all goals of care questions.     ACP Documents: None. I requested documents to be brought on his wife's return.    Goals of care: The patient and healthcare power of   endorses that what is most important right now is to focus on remaining as independent as possible and extending life as long as possible, even it it means sacrificing quality    Accordingly, we have decided that the best plan to meet the patient's goals includes continuing with treatment      Recommendations/  Follow-up tasks: Other (specify below) I will follow up these conversations.              History of Present Illness:     82 y/o very pleasant WM, whom I'm met on a previous admit 1/2023 when he presented with bilateral aspiration pna and related respiratory distress. He was not intubated at that time. He has utilized pulmonary rehab for some time. He is admitted this time with aspiration pna and acute hypoxic respiratory failure requiring intubation/ mechanical ventilation. He has extubated and weaned from vapotherm to 6L O2 by facemask. He underwent MBS this AM which revealed severe dysphagia with laryngeal penetration on all consistencies. We were consulted to review goals of care with patient and family.      Active Ambulatory Problems     Diagnosis Date Noted    Generalized anxiety disorder 09/27/2022    COPD exacerbation 09/27/2022    Former smoker 09/27/2022    Pulmonary nodule 09/27/2022    Carotid bruit 09/27/2022    Hyperlipidemia 09/27/2022    Inguinal hernia 09/27/2022    Plantar fasciitis 09/27/2022    Polyp of colon 09/27/2022    End stage COPD 01/18/2023     Anxiety 08/08/2023    Chronic respiratory failure with hypoxia 12/27/2018     Resolved Ambulatory Problems     Diagnosis Date Noted    No Resolved Ambulatory Problems     Past Medical History:   Diagnosis Date    Acute bronchitis     Anxiety disorder, unspecified     COPD (chronic obstructive pulmonary disease)     Coronary artery disease     Left carotid bruit     Pulmonary nodules     Unspecified cataract         Past Surgical History:   Procedure Laterality Date    APPENDECTOMY      CATARACT EXTRACTION Left 01/03/2023    HERNIA REPAIR      PHACOEMULSIFICATION, CATARACT, WITH IOL INSERTION Left 1/3/2023    Procedure: PHACOEMULSIFICATION, CATARACT, WITH IOL INSERTION- OS;  Surgeon: Louis Abdi MD;  Location: [facility];  Service: Ophthalmology;  Laterality: Left;    TONSILLECTOMY          Review of patient's allergies indicates:   Allergen Reactions    Flexeril [cyclobenzaprine]           Current Facility-Administered Medications:     0.9%  NaCl infusion, , Intravenous, Continuous, Kali Mehta MD, Stopped at 10/05/23 0523    albuterol-ipratropium 2.5 mg-0.5 mg/3 mL nebulizer solution 3 mL, 3 mL, Nebulization, Q4H WAKE, Joshua Best MD, 3 mL at 10/06/23 1314    albuterol-ipratropium 2.5 mg-0.5 mg/3 mL nebulizer solution 3 mL, 3 mL, Nebulization, Q6H PRN, Joshua Best MD    busPIRone tablet 10 mg, 10 mg, Oral, BID, Joshua Best MD, 10 mg at 10/06/23 0850    enoxaparin injection 40 mg, 40 mg, Subcutaneous, Q24H (prophylaxis, 1700), Katy Cotton FNP, 40 mg at 10/05/23 1752    fluticasone furoate-vilanteroL 200-25 mcg/dose diskus inhaler 1 puff, 1 puff, Inhalation, Daily, Joshua Best MD, 1 puff at 10/06/23 0850    guaiFENesin 100 mg/5 ml syrup 200 mg, 200 mg, Per NG tube, Q6H, VANESSA Bauman MD, 200 mg at 10/06/23 1151    hydrALAZINE injection 10 mg, 10 mg, Intravenous, Q4H PRN, Jessica Kahn MD    melatonin tablet 6 mg, 6 mg, Per NG tube, Nightly PRN, Jessica Kahn MD, 6 mg at 10/04/23 2022     "sodium chloride 0.9% flush 10 mL, 10 mL, Intravenous, PRN, Janes Xiao MD    tiotropium bromide 2.5 mcg/actuation inhaler 2 puff, 2 puff, Inhalation, Daily, Joshua Best MD, 2 puff at 10/06/23 0853     albuterol-ipratropium, hydrALAZINE, melatonin, sodium chloride 0.9%     Family History   Problem Relation Age of Onset    Asthma Mother     Cancer Father     No Known Problems Sister     No Known Problems Brother         Review of Systems   Constitutional:  Positive for activity change and unexpected weight change.   HENT:  Positive for trouble swallowing.    Gastrointestinal:  Negative for nausea.   Genitourinary:  Positive for hematuria.            Objective:   BP (!) 123/57   Pulse 88   Temp 97.5 °F (36.4 °C) (Oral)   Resp 20   Ht 6' 2" (1.88 m)   Wt 83.5 kg (184 lb 1.6 oz)   SpO2 (!) 91%   BMI 23.64 kg/m²      Physical Exam  Vitals reviewed.   Constitutional:       General: He is not in acute distress.     Appearance: He is ill-appearing. He is not toxic-appearing.   HENT:      Head:      Comments: Temporal wasting     Right Ear: External ear normal.      Left Ear: External ear normal.      Nose: Nose normal.      Mouth/Throat:      Mouth: Mucous membranes are moist.      Pharynx: Oropharynx is clear.   Eyes:      Extraocular Movements: Extraocular movements intact.      Conjunctiva/sclera: Conjunctivae normal.      Pupils: Pupils are equal, round, and reactive to light.   Cardiovascular:      Rate and Rhythm: Normal rate and regular rhythm.      Pulses: Normal pulses.      Heart sounds: Normal heart sounds.   Pulmonary:      Effort: Pulmonary effort is normal.      Breath sounds: Normal breath sounds.   Abdominal:      General: Abdomen is flat. Bowel sounds are normal. There is no distension.      Palpations: Abdomen is soft.      Tenderness: There is no abdominal tenderness.   Musculoskeletal:      Right lower leg: No edema.      Left lower leg: No edema.   Skin:     General: Skin is warm. "   Neurological:      General: No focal deficit present.      Mental Status: He is alert and oriented to person, place, and time.   Psychiatric:         Mood and Affect: Mood normal.         Behavior: Behavior normal.         Thought Content: Thought content normal.         Judgment: Judgment normal.            Review of Symptoms  Review of Symptoms      Symptom Assessment (ESAS 0-10 Scale)  Pain:  0  Dyspnea:  0  Anxiety:  0  Nausea:  0  Depression:  0  Anorexia:  0  Fatigue:  0  Insomnia:  0  Restlessness:  0  Agitation:  0     CAM / Delirium:  Negative  Constipation:  Negative  Diarrhea:  Negative      Pain Assessment:  OME in 24 hours:  0  Location(s):      Modified Jose Scale:  0    Performance Status:  40    Living Arrangements:  Lives with spouse and Lives in home    Psychosocial/Cultural:   See Palliative Psychosocial Note: No  **Primary  to Follow**  Palliative Care  Consult: No    Spiritual:  F - Betsey and Belief:  Caodaism  I - Importance:  Very  C - Community:  Patient is a  and      Time-Based Charting:  Yes    Total Time Spent: 0 minutes      Advance Care Planning   Advance Directives:   Living Will: No    LaPOST: No    Do Not Resuscitate Status: No    Medical Power of : No    Agent's Name:  Lindsay   Agent's Contact Number:  [phone]    Decision Making:  Patient answered questions and Family answered questions            Caregiver burden formerly assessed: Yes        > 50% of 74 min of encounter was spent in chart review, face to face discussion of goals of care, symptom assessment, coordination of care and emotional support.     Kwasi Phipps M.D.  Palliative Medicine  [facility]

## 2023-10-06 NOTE — PROGRESS NOTES
"[facility] Hospital Medicine Progress Note        Chief Complaint: Inpatient Follow-up for Acute on chronic hypoxemic respiratory failure    HPI: Helder Sharp Jr. is a 83 y.o. male with a past medical history of hyperlipidemia, CAD, COPD, on 2 L home oxygen, pulmonary nodules, and anxiety presented to [facility] on 9/28/2023 for shortness of breath and weakness. Wife also endorsed worsening nonproductive cough and lower extremity swelling.  Wife noticed chocolate" colored urine at approximately 15:30 on 09/28, prompting them to ED. Patient also had to increase home oxygen to 3 L secondary to increased dyspnea.  Patient received albuterol nebulizer treatment en route with EMS.  Patient was given an hour long nebulizer treatment, magnesium, and Solu-Medrol upon arrival to ED. Patient remained hypoxic on non-rebreather and  was intubated in ED for airway protection.  Labs revealed WBC 21.13, chloride 109, CO2 19, glucose 190, BNP 22, troponin 0.014, and lactic acid 3.8.  Flu/RSV/COVID were negative.  UA revealed 2+ protein, trace ketones, 3+ occult blood, 1+ bilirubin, and greater than 100 RBCs.  Blood and respiratory cultures were obtained.  Chest x-ray revealed chronic emphysema and scarring within the lungs without appreciable acute superimposed airspace opacity.  Patient was given LR, IV Zosyn, and IV Azithromycin in ED.  Patient was admitted to ICU for close monitoring.  Patient was started on Solu-Medrol 60 mg BID and DuoNebs q.6 hours.  Urology was consulted for hematuria. Ultrasound retroperitoneal complete revealed no abnormalities. Urology recommended continuing indwelling lang with outpatient follow-up. Patient had previous history of Klebsiella pneumonia which was sensitive to Zosyn.  Azithromycin was discontinued.  CTA chest revealed multiple enlarged mediastinal lymph nodes, no filling defects seen to suggest PE, and severe emphysematous changes identified in the lungs with " consolidation in the posterior basal segments of bilateral lower lobes demonstrating air bronchograms which may reflect aspiration pneumonia.  NG tube was placed and patient was started on tube feeds.  Patient was transitioned to prednisone 40 mg daily for 5 day course on 10/01. Patient was extubated on 10/01 and placed on Vapotherm. Speech therapy was consulted. Patient was cleared for downgrade out of ICU on 10/03/2023. Hospital medicine was consulted for transition of care and further medical management.     Has completed 7 days of Zosyn therapy for possible aspiration pneumonia.    Interval Hx:   Patient was seen and examined at bedside , offers no complaints,underwent MBS today.  Family at bedside, concerned about his anxiety during the evenings    Chart was reviewed, afebrile, hemodynamically stable, labs were reviewed      Objective/physical exam:  General: In no acute distress, afebrile  Chest: Clear to auscultation bilaterally  Heart: RRR, +S1, S2, no appreciable murmur  Abdomen: Soft, nontender, BS +  MSK: Warm, no lower extremity edema, no clubbing or cyanosis  Neurologic: Alert and oriented x4      VITAL SIGNS: 24 HRS MIN & MAX LAST   Temp  Min: 97.5 °F (36.4 °C)  Max: 98.3 °F (36.8 °C) 98.2 °F (36.8 °C)   BP  Min: 123/57  Max: 178/69 (!) 148/63   Pulse  Min: 77  Max: 88  84   Resp  Min: 18  Max: 20 20   SpO2  Min: 85 %  Max: 94 % (!) 91 %     I have reviewed the following labs:  Recent Labs   Lab 10/02/23  1005 10/03/23  0557 10/05/23  0607   WBC 11.17 14.07* 11.09   RBC 4.07* 4.42* 4.17*   HGB 12.1* 13.1* 12.4*   HCT 37.1* 39.9* 38.1*   MCV 91.2 90.3 91.4   MCH 29.7 29.6 29.7   MCHC 32.6* 32.8* 32.5*   RDW 14.6 14.5 14.6    183 195   MPV 9.6 9.4 9.7     Recent Labs   Lab 09/30/23  0117 09/30/23  0532 10/01/23  1121 10/02/23  0324 10/02/23  1005 10/03/23  0557 10/05/23  0607     --   --   --  143 140 143   K 3.8  --   --   --  3.5 3.7 3.7   CO2 18*  --   --   --  25 25 26   BUN 20.9  --    --   --  32.6* 31.2* 30.6*   CREATININE 0.93  --   --   --  0.84 0.81 0.87   CALCIUM 8.8  --   --   --  8.8 8.6* 8.8   PH  --  7.440 7.470* 7.500*  --   --   --    ALBUMIN 2.8*  --   --   --   --  2.8* 2.9*   ALKPHOS 55  --   --   --   --  61 60   ALT 11  --   --   --   --  18 19   AST 11  --   --   --   --  18 13   BILITOT 0.6  --   --   --   --  0.8 0.7     Microbiology Results (last 7 days)       Procedure Component Value Units Date/Time    Blood culture #1 **CANNOT BE ORDERED STAT** [[id number]]  (Normal) Collected: 09/28/23 1812    Order Status: Completed Specimen: Blood Updated: 10/03/23 2100     CULTURE, BLOOD ([facility]) No Growth at 5 days    Blood culture #2 **CANNOT BE ORDERED STAT** [[id number]]  (Normal) Collected: 09/28/23 1812    Order Status: Completed Specimen: Blood Updated: 10/03/23 2100     CULTURE, BLOOD ([facility]) No Growth at 5 days    Respiratory Culture [[id number]] Collected: 09/29/23 0344    Order Status: Completed Specimen: Sputum Updated: 10/01/23 0744     Respiratory Culture Normal respiratory nasra     GRAM STAIN Quality 3+      Moderate Gram positive cocci      Few Gram Positive Rods             See below for Radiology    Scheduled Med:   albuterol-ipratropium  3 mL Nebulization Q4H WAKE    busPIRone  10 mg Oral BID    enoxparin  40 mg Subcutaneous Q24H (prophylaxis, 1700)    fluticasone furoate-vilanteroL  1 puff Inhalation Daily    guaiFENesin 100 mg/5 ml  200 mg Per NG tube Q6H    tiotropium bromide  2 puff Inhalation Daily      Continuous Infusions:   sodium chloride 0.9% Stopped (10/05/23 0523)      PRN Meds:  albuterol-ipratropium, hydrALAZINE, melatonin, sodium chloride 0.9%     Assessment/Plan:  Acute on chronic hypoxemic respiratory failure-intubated/extubated  Chronic hypoxic respiratory failure 2 L nasal cannula at home   COPD exacerbation  Bilateral aspiration pneumonia   Sepsis 2/2 above   Hematuria-resolved  Dysphagia     History of: HLD, CAD, pulmonary nodules        Plan    Continue oxygen supplementation, may need to increase his baseline oxygen requirements  Pulmonology signed off.  Continue trilogy once back home, follow-up in 2 to 3 weeks after discharge.  Continue DuoNeb 4 times daily and as needed.    Completed 7 days of Zosyn   Underwent MBS, recommending NPO and consideration for PEG tube.  Palliative was consulted, plan to consult GI if everyone is on the same page  Continue supportive care       DVT prophylaxis:  Lovenox 40     Anticipated discharge and disposition:  Probably we will need to increase his baseline O2 needs at home upon discharge    VTE prophylaxis:  Lovenox    Anticipated discharge and Disposition:     To be decided, likely to nursing facility, likely to a nursing facility after PEG tube    All diagnosis and differential diagnosis have been reviewed; assessment and plan has been documented; I have personally reviewed the labs and test results that are presently available; I have reviewed the patients medication list; I have reviewed the consulting providers response and recommendations. I have reviewed or attempted to review medical records based upon their availability    All of the patient's questions have been  addressed and answered. Patient's is agreeable to the above stated plan. I will continue to monitor closely and make adjustments to medical management as needed.  _____________________________________________________________________    Nutrition Status:    Radiology:  I have personally reviewed the following imaging and agree with the radiologist.     Fl Modified Barium Swallow Speech  See procedure notes from Speech Pathologist.    This procedure was auto-finalized.      Juliane Tucker MD   10/06/2023

## 2023-10-06 NOTE — CONSULTS
Inpatient Nutrition Assessment    Admit Date: 9/28/2023   Total duration of encounter: 8 days     Nutrition Recommendation/Prescription     Continue Tube feeding as tolerated:  Diabetisource AC goal rate 85 ml/hr with 30 ml/hr water flush to provide  2040 kcal, 100% needs  102 g protein, 100% needs  1994 ml free water, 100% needs  (calculations based on estimated 20 hr/d run time)    Communication of Recommendations: reviewed with nurse    Nutrition Assessment     Malnutrition Assessment/Nutrition-Focused Physical Exam    Malnutrition Context: acute illness or injury (10/02/23 1349)  Malnutrition Level: moderate (10/02/23 1349)  Energy Intake (Malnutrition):  (does not meet criteria) (10/02/23 1349)  Weight Loss (Malnutrition):  (does not meet criteria) (10/02/23 1349)  Subcutaneous Fat (Malnutrition): mild depletion (10/02/23 1349)  Orbital Region (Subcutaneous Fat Loss): mild depletion  Upper Arm Region (Subcutaneous Fat Loss): mild depletion     Muscle Mass (Malnutrition): mild depletion (10/02/23 1349)  [place] Region (Muscle Loss): mild depletion  Clavicle Bone Region (Muscle Loss): mild depletion                             A minimum of two characteristics is recommended for diagnosis of either severe or non-severe malnutrition.    Chart Review    Reason Seen: continuous nutrition monitoring, physician consult for tube feeding, and follow-up    Malnutrition Screening Tool Results   Have you recently lost weight without trying?: No  Have you been eating poorly because of a decreased appetite?: No   MST Score: 0   Diagnosis:  COPD exacerbation w/ associated hypoxic respiratory failure likely 2/2 to atypical PNA  Hematuria  HLD     Relevant Medical History: COPD, CAD, HLD, and pulmonary nodules     Scheduled Medications:   albuterol-ipratropium  3 mL Nebulization Q4H WAKE    busPIRone  10 mg Oral BID    enoxparin  40 mg Subcutaneous Q24H (prophylaxis, 1700)    fluticasone furoate-vilanteroL  1 puff Inhalation  Daily    guaiFENesin 100 mg/5 ml  200 mg Per NG tube Q6H    tiotropium bromide  2 puff Inhalation Daily     Continuous Infusions:   sodium chloride 0.9% Stopped (10/05/23 0523)     Calorie Containing IV Medications: no significant kcals from medications at this time    Recent Labs   Lab 09/30/23  0117 10/02/23  1005 10/03/23  0557 10/05/23  0607    143 140 143   K 3.8 3.5 3.7 3.7   CALCIUM 8.8 8.8 8.6* 8.8   CHLORIDE 113* 106 105 106   CO2 18* 25 25 26   BUN 20.9 32.6* 31.2* 30.6*   CREATININE 0.93 0.84 0.81 0.87   GLUCOSE 177* 149* 135* 119*   BILITOT 0.6  --  0.8 0.7   ALKPHOS 55  --  61 60   ALT 11  --  18 19   AST 11  --  18 13   ALBUMIN 2.8*  --  2.8* 2.9*       Dietary Orders (From admission, onward)       Start     Ordered    10/02/23 1358  Tube Feedings/Formulas 85; 1,700; Diabetisource AC; NG; 30; Every hour  Continuous        Question Answer Comment   Formula Rate (mL/hr): 85    Feeding Volume (mL): 1700    Select Formula: Diabetisource AC    Route: NG    Free water flush volume (mL): 30    Free water flush frequency: Every hour        10/02/23 1358    09/28/23 2019  Diet NPO  (Diet/Nutrition [facility])  Diet effective now         09/28/23 2019                Appetite/Oral Intake: NPO/not applicable  Factors Affecting Nutritional Intake: NPO and on mechanical ventilation  Food/Anabaptism/Cultural Preferences: none reported  Food Allergies: none reported    Wound(s): sacral redness noted    Last Bowel Movement: 10/04/23    Comments    9/29/23 Patient on ventilator, receiving kcals from [facility], consult received for tube feeding, will provide recommendations.    10/2/23 Patient extubated 10/1, remains on tube feeding, will update needs/recommendations; recommend Diabetisource to better meet needs, lower carbohydrate formula due to hyperglycemia, no h/o DM noted. Patient/family reports h/o weight loss back in January, has since gained weight back.    10/6: Pt s/p MBS this morning-+severe oropharyngeal  "dysphagia and at high risk of aspiration; SLP recommends long term alternative means of nutrition. Nsg states, MD will discuss Peg with family. In the meantime, pt continues with NG tube with feeds. Pt tolerating tube feeds without problems. Pt currently on a diabetic formula due to elevated blood sugars at the time. Prednisone has been discontinued. Blood sugars have been 115-150 last few days. Will continue current regimen.     Anthropometrics    Height: 6' 2" (188 cm) Height Method: Stated  Last Weight: 83.5 kg (184 lb 1.6 oz) (23 190) Weight Method: Bed Scale  BMI (Calculated): 23.6  BMI Classification: normal (BMI 18.5-24.9)        Ideal Body Weight (IBW), Male: 190 lb     % Ideal Body Weight, Male (lb): 96.89 %                 Usual Body Weight (UBW), k.2 kg-81.6 kg  % Usual Body Weight: 104.34     Usual Weight Provided By: patient and family/caregiver    Wt Readings from Last 5 Encounters:   23 83.5 kg (184 lb 1.6 oz)   23 78.9 kg (174 lb)   23 82.1 kg (181 lb)   23 90.2 kg (198 lb 13.7 oz)   23 85.7 kg (189 lb)     Weight Change(s) Since Admission: 10 lb weight change in same day noted, admission weight likely in error  Wt Readings from Last 1 Encounters:   23 83.5 kg (184 lb 1.6 oz)   23 78.9 kg (174 lb)   Admit Weight: 78.9 kg (174 lb) (23 174)  10/6: no new wts at this time.     Estimated Needs    Weight Used For Calorie Calculations: 83.5 kg (184 lb 1.4 oz)  Energy Calorie Requirements (kcal): 1082-8504, 1.2-1.4 stress factor  Energy Need Method: [facility]  Weight Used For Protein Calculations: 83.5 kg (184 lb 1.4 oz)  Protein Requirements: 100-126 g, 1.2-1.5 g/kg  Fluid Requirements (mL): 6097-0804, 1 ml/kcal  Total Ve: 17.9 L/m; Temp (24hrs), Av.7 °F (36.5 °C), Min:97.5 °F (36.4 °C), Max:98.3 °F (36.8 °C)      Enteral Nutrition     Formula: Diabetisource AC  Rate/Volume: 85 ml/hr  Water Flushes: 30 " ml/hour  Additives/Modulars: none at this time  Route: nasogastric tube  Method: continuous  Total Nutrition Provided by Tube Feeding, Additives, and Flushes:  Calories Provided  2040 kcal/d, 100% needs   Protein Provided  102 g/d, 100% needs   Fluid Provided  1994 ml/d, 100% needs   Continuous feeding calculations based on estimated 20 hr/d run time unless otherwise stated.    Parenteral Nutrition Patient not receiving parenteral nutrition support at this time.    Evaluation of Received Nutrient Intake    Calories: meeting estimated needs  Protein: meeting estimated needs    Patient Education Not applicable.    Nutrition Diagnosis     PES (1): Inadequate energy intake related to acute illness as evidenced by less than 75% needs met. (improving)  PES (2): Malnutrition related to acute illness as evidenced by mild fat depletion and mild muscle depletion. (continues)    Interventions/Goals     Intervention(s): modified composition of enteral nutrition, modified rate of enteral nutrition, and collaboration with other providers    Goal (1): Meet greater than 75% of nutritional needs by follow-up. (goal progressing)  Goal (2): Maintain weight throughout hospitalization. (goal progressing)    Monitoring & Evaluation     Dietitian will monitor energy intake, enteral nutrition intake, weight, weight change, electrolyte/renal panel, glucose/endocrine profile, and gastrointestinal profile.    Nutrition Risk/Follow-Up: moderate (follow-up in 3-5 days)   Please consult if re-assessment needed sooner.

## 2023-10-06 NOTE — PROCEDURES
[facility]  Speech Language Pathology Department  Modified Barium Swallow (MBS) Study    Patient Name:  Helder Sharp Jr.   MRN:  [id number]    Recommendations:     General recommendations:  dysphagia therapy  Repeat MBS study: 5-7 days or as clinically warranted  Diet texture/consistency recommendations:  NPO  Medications: NPO  General precautions: Standard,      History/Reason for Referral:     Helder Sharp Jr. is a/n 83 y.o. male male was admitted on 9/28 with a COPD exacerbation with hypoxic respiratory failure secondary to atypical PNA requiring intubation for mechanical ventilation.  Pt extubated on 10/1. Pt has hx of SILENT aspiration. Initial MBS completed 10/2 with NPO diet recs.     Past Medical History:   Diagnosis Date    Acute bronchitis     Anxiety disorder, unspecified     COPD (chronic obstructive pulmonary disease)     Coronary artery disease     Hyperlipidemia     Left carotid bruit     Pulmonary nodules     Unspecified cataract      Past Surgical History:   Procedure Laterality Date    APPENDECTOMY      CATARACT EXTRACTION Left 01/03/2023    HERNIA REPAIR      PHACOEMULSIFICATION, CATARACT, WITH IOL INSERTION Left 1/3/2023    Procedure: PHACOEMULSIFICATION, CATARACT, WITH IOL INSERTION- OS;  Surgeon: Louis Abdi MD;  Location: [facility];  Service: Ophthalmology;  Laterality: Left;    TONSILLECTOMY       A MBS Study was completed to assess the efficiency of his swallow function, rule out aspiration and make recommendations regarding safe dietary consistencies, effective compensatory strategies, and safe eating environment.   Home diet texture/consistency: Regular and thin liquids  Current Method of Nutrition: NPO    Patient complaint: denies    Imaging   Results for orders placed during the hospital encounter of 09/28/23    X-Ray Chest 1 View    Narrative  EXAMINATION:  XR CHEST 1 VIEW    CPT 58430    CLINICAL HISTORY:  f/u;    COMPARISON:  September 30,  2023    FINDINGS:  Examination reveals mediastinal silhouette to be within normal limits cardiac silhouette is not enlarged improved aeration identified in the right infrahilar region and at the region of the right cardiophrenic angle.    Persistent confluent airspace opacities identified in the left infrahilar region and left base including increased left retrocardiac density and silhouetting of the left hemidiaphragm which might be related to an infiltrate/atelectasis.    Endotracheal tube has been removed    Impression  Improved aeration in the right infrahilar region and right cardiophrenic angle region.    Persistent confluent opacities at the left base including the left retrocardiac region which might reflect an infiltrate/atelectasis.    Interval removal of endotracheal tube  Electronically signed by: Marc Andrews  Date:    10/05/2023  Time:    10:47    Subjective:     Patient awake and alert.    Spiritual/Cultural/Mormon Beliefs/Practices that affect care: no  Pain/Comfort:  0/10    Fluoroscopic Results:     Oral Musculature  Dentition: own teeth  Secretion Management: adequate  Mucosal Quality: good  Facial Movement: WFL  Buccal Strength & Mobility: WFL  Mandibular Strength & Mobility: WFL  Vocal Quality: adequate      Positioning: upright in bed  Visualization  Patient was seen in the lateral view  NG tube observed in place    Oral Phase:   Adequate lip closure  Prolonged/disorganized bolus formation  Loss of bolus control with liquids     Pharyngeal Phase:   Swallow delay with spill to the valleculae/pyriform sinuses  Reduced base of tongue retraction  Reduced hyolaryngeal excursion  Reduced airway protection  Laryngeal penetration    Consistency Laryngeal Penetration Aspiration Residue   Mildly thick liquid by spoon During the swallow  Did NOT clear None Mild   Mildly thick liquid by straw During the swallow  Did NOT clear None Mild   Moderately thick liquid by spoon During the swallow  After  the swallow  Did NOT clear None Moderate-severe   Mildly thick liquid by straw During the swallow  After the swallow  Did NOT clear None Mild   Puree After the swallow None Moderate-severe       Cervical Esophageal Phase:   decreased UES opening    Assessment:     Pt exhibited severe oropharyngeal dysphagia characterized by the findings noted above.  Laryngeal penetration of all consistencies visualized. Penetration visualized after the swallow secondary to residue.   Both swallow safety and efficiency are impaired.     Patient appears to be at high risk for aspiration related pneumonia when considering severity of dysphagia, poor oral health/hygiene, and reduced mobility.  Prognosis for behavioral swallow rehabilitation is fair. REC: long term alternative means of nutrition.     Goals:     Multidisciplinary Problems       SLP Goals          Problem: SLP    Goal Priority Disciplines Outcome   SLP Goal     SLP    Description: LTG: To tolerate least restrictive diet without clinical signs/sx of aspiration. (Continue)  ST. Tongue base/laryngeal excursion exercise (continue)  2. Tolerate thermal stimulation x10 with 100% swallow response with less than a 2 second delay. (Continue)  3. Puree solids without clinical signs/sx of aspiration.                      Patient Education:     Patient provided with verbal education regarding POC.  Understanding was verbalized.    Plan:     SLP Follow-Up:  Yes    Patient to be seen:  5 x/week   Plan of Care expires:  10/13/23  Plan of Care reviewed with:  patient     Time Tracking:     SLP Treatment Date:    10/6/23  Speech Start Time:   0800  Speech Stop Time:    0820    Speech Total Time (min):   20    Billable minutes:   Motion Fluoroscopic Evaluation, Video Recording, 20 minutes     10/06/2023

## 2023-10-07 NOTE — PROGRESS NOTES
"[facility] - 5th Floor [facility] MEDICINE ~ PROGRESS NOTE        CHIEF COMPLAINT   Hospital follow up    HOSPITAL COURSE   Helder Sharp Jr. is a 83 y.o. male with a past medical history of hyperlipidemia, CAD, COPD, on 2 L home oxygen, pulmonary nodules, and anxiety presented to [facility] on 9/28/2023 for shortness of breath and weakness. Wife also endorsed worsening nonproductive cough and lower extremity swelling.  Wife noticed chocolate" colored urine at approximately 15:30 on 09/28, prompting them to ED. Patient also had to increase home oxygen to 3 L secondary to increased dyspnea.  Patient received albuterol nebulizer treatment en route with EMS.  Patient was given an hour long nebulizer treatment, magnesium, and Solu-Medrol upon arrival to ED. Patient remained hypoxic on non-rebreather and  was intubated in ED for airway protection.  Labs revealed WBC 21.13, chloride 109, CO2 19, glucose 190, BNP 22, troponin 0.014, and lactic acid 3.8.  Flu/RSV/COVID were negative.  UA revealed 2+ protein, trace ketones, 3+ occult blood, 1+ bilirubin, and greater than 100 RBCs.  Blood and respiratory cultures were obtained.  Chest x-ray revealed chronic emphysema and scarring within the lungs without appreciable acute superimposed airspace opacity.  Patient was given LR, IV Zosyn, and IV Azithromycin in ED.  Patient was admitted to ICU for close monitoring.  Patient was started on Solu-Medrol 60 mg BID and DuoNebs q.6 hours.  Urology was consulted for hematuria. Ultrasound retroperitoneal complete revealed no abnormalities. Urology recommended continuing indwelling lang with outpatient follow-up. Patient had previous history of Klebsiella pneumonia which was sensitive to Zosyn.  Azithromycin was discontinued.  CTA chest revealed multiple enlarged mediastinal lymph nodes, no filling defects seen to suggest PE, and severe emphysematous changes identified in the lungs with consolidation in the posterior " basal segments of bilateral lower lobes demonstrating air bronchograms which may reflect aspiration pneumonia.  NG tube was placed and patient was started on tube feeds.  Patient was transitioned to prednisone 40 mg daily for 5 day course on 10/01. Patient was extubated on 10/01 and placed on Vapotherm. Speech therapy was consulted. Patient was cleared for downgrade out of ICU on 10/03/2023. Hospital medicine was consulted for transition of care and further medical management.    Has completed 7 days of Zosyn therapy for possible aspiration pneumonia.    Today  Seen examined this morning.  He was lying almost supine in the bed with tube feeds running, discussed that he is supposed to be sitting up in bed with this tube feeds ongoing.        OBJECTIVE/PHYSICAL EXAM     VITAL SIGNS (MOST RECENT):  Temp: 98.1 °F (36.7 °C) (10/07/23 0733)  Pulse: 78 (10/07/23 0827)  Resp: 18 (10/07/23 0827)  BP: (!) 147/66 (10/07/23 0733)  SpO2: (!) 94 % (10/07/23 0811) VITAL SIGNS (24 HOUR RANGE):  Temp:  [97.5 °F (36.4 °C)-98.3 °F (36.8 °C)] 98.1 °F (36.7 °C)  Pulse:  [75-88] 78  Resp:  [18-20] 18  SpO2:  [85 %-94 %] 94 %  BP: (121-148)/(57-66) 147/66   GENERAL: In no acute distress, afebrile  HEENT:  NG tube  CHEST:  Greatly diminished breath sounds with crackles bases  HEART: S1, S2, no appreciable murmur  ABDOMEN: Soft, nontender, BS +  MSK: Warm, no lower extremity edema, no clubbing or cyanosis  NEUROLOGIC: Alert and oriented x4, moving all extremities with good strength   INTEGUMENTARY:  PSYCHIATRY:        ASSESSMENT/PLAN   Acute on chronic hypoxemic respiratory failure-intubated/extubated  Chronic hypoxic respiratory failure 2 L nasal cannula at home   COPD exacerbation  Bilateral aspiration pneumonia   Sepsis 2/2 above   Hematuria-resolved    History of: HLD, CAD, pulmonary nodules      Pulmonology signed off.  Continue trilogy once back home, follow-up in 2 to 3 weeks after discharge.  Continue DuoNeb 4 times daily and as  needed.    Completed 7 days of Zosyn.  Continue NG tube feeds, will consult GI for PEG tube.    DVT prophylaxis:  Lovenox 40    Anticipated discharge and disposition:    __________________________________________________________________________    LABS/MICRO/MEDS/DIAGNOSTICS       LABS  Recent Labs     10/05/23  0607      K 3.7   CHLORIDE 106   CO2 26   BUN 30.6*   CREATININE 0.87   GLUCOSE 119*   CALCIUM 8.8   ALKPHOS 60   AST 13   ALT 19   ALBUMIN 2.9*       Recent Labs     10/05/23  0607   WBC 11.09   RBC 4.17*   HCT 38.1*   MCV 91.4            MICROBIOLOGY  Microbiology Results (last 7 days)       Procedure Component Value Units Date/Time    Blood culture #1 **CANNOT BE ORDERED STAT** [[id number]]  (Normal) Collected: 09/28/23 1812    Order Status: Completed Specimen: Blood Updated: 10/03/23 2100     CULTURE, BLOOD ([facility]) No Growth at 5 days    Blood culture #2 **CANNOT BE ORDERED STAT** [[id number]]  (Normal) Collected: 09/28/23 1812    Order Status: Completed Specimen: Blood Updated: 10/03/23 2100     CULTURE, BLOOD ([facility]) No Growth at 5 days    Respiratory Culture [[id number]] Collected: 09/29/23 0344    Order Status: Completed Specimen: Sputum Updated: 10/01/23 0744     Respiratory Culture Normal respiratory nasra     GRAM STAIN Quality 3+      Moderate Gram positive cocci      Few Gram Positive Rods               MEDICATIONS   albuterol-ipratropium  3 mL Nebulization Q4H WAKE    busPIRone  10 mg Oral BID    enoxparin  40 mg Subcutaneous Q24H (prophylaxis, 1700)    fluticasone furoate-vilanteroL  1 puff Inhalation Daily    guaiFENesin 100 mg/5 ml  200 mg Per NG tube Q6H    tiotropium bromide  2 puff Inhalation Daily         INFUSIONS   sodium chloride 0.9% Stopped (10/05/23 0523)          DIAGNOSTIC TESTS  Fl Modified Barium Swallow Speech   Final Result      X-Ray Chest 1 View   Final Result      Improved aeration in the right infrahilar region and right cardiophrenic angle region.       Persistent confluent opacities at the left base including the left retrocardiac region which might reflect an infiltrate/atelectasis.      Interval removal of endotracheal tube         Electronically signed by: Marc Andrews   Date:    10/05/2023   Time:    10:47      XR Gastric tube check, non-radiologist performed   Final Result      Nasogastric tube as above         Electronically signed by: Marc Andrews   Date:    10/03/2023   Time:    08:19      Fl Modified Barium Swallow Speech   Final Result      X-Ray Chest 1 View   Final Result      XR Gastric tube check, non-radiologist performed   Final Result      As above.         Electronically signed by: Hugh De Leon   Date:    10/02/2023   Time:    07:55      X-Ray Chest 1 View   Final Result      As above.         Electronically signed by: Hugh De Leon   Date:    09/30/2023   Time:    11:54      US Retroperitoneal Complete   Final Result      Limited study, no abnormalities are demonstrated.         Electronically signed by: Aron Barger MD   Date:    09/29/2023   Time:    13:51      CTA Chest Non-Coronary (PE Studies)   Final Result   Impression:      1. Multiple enlarged mediastinal lymph nodes are seen. The largest is in subcarinal space and measures 1.7 cm in least dimension.      2. No filling defects are seen in the pulmonary arteries to suggest pulmonary embolus.      3. Severe emphysematous changes are identified in the lungs. There is consolidation in the posterior basal segments of bilateral lower lobes demonstrating air bronchograms. This may reflect aspiration pneumonia. Correlate clinically as regards further evaluation and followup.      4. Details and other findings as discussed above.      No significant discrepancy with overnight report.         Electronically signed by: Ronny Hamilton   Date:    09/29/2023   Time:    08:09      X-Ray Chest AP Portable   Final Result      Chronic emphysema and scarring within the lungs without  "appreciable acute superimposed airspace opacity.         Electronically signed by: Kimberli Cisse   Date:    09/28/2023   Time:    18:38           No results found for: "EF"       NUTRITION STATUS  Patient meets ASPEN criteria for moderate malnutrition of acute illness or injury per RD assessment as evidenced by:  Energy Intake (Malnutrition):  (does not meet criteria)  Weight Loss (Malnutrition):  (does not meet criteria)  Subcutaneous Fat (Malnutrition): mild depletion  Muscle Mass (Malnutrition): mild depletion           A minimum of two characteristics is recommended for diagnosis of either severe or non-severe malnutrition.       Case related differential diagnoses have been reviewed; assessment and plan has been documented. I have personally reviewed the labs and test results that are currently available; I have reviewed the patients medication list. I have reviewed the consulting providers recommendations. I have reviewed or attempted to review medical records based upon their availability.  All of the patient's and/or family's questions have been addressed and answered to the best of my ability.  I will continue to monitor closely and make adjustments to medical management as needed.  This document was created using M*Modal Fluency Direct.  Transcription errors may have been made.  Please contact me if any questions may rise regarding documentation to clarify transcription.        Joshua Best MD   Internal Medicine  Department of Hospital Medicine  [facility] - 5th Floor Med Surg               "

## 2023-10-07 NOTE — PLAN OF CARE
Problem: Adult Inpatient Plan of Care  Goal: Plan of Care Review  Outcome: Ongoing, Progressing  Goal: Patient-Specific Goal (Individualized)  Outcome: Ongoing, Progressing  Goal: Absence of Hospital-Acquired Illness or Injury  Outcome: Ongoing, Progressing  Goal: Optimal Comfort and Wellbeing  Outcome: Ongoing, Progressing  Goal: Readiness for Transition of Care  Outcome: Ongoing, Progressing     Problem: Infection  Goal: Absence of Infection Signs and Symptoms  Outcome: Ongoing, Progressing     Problem: Impaired Wound Healing  Goal: Optimal Wound Healing  Outcome: Ongoing, Progressing     Problem: Skin Injury Risk Increased  Goal: Skin Health and Integrity  Outcome: Ongoing, Progressing     Problem: Communication Impairment (Mechanical Ventilation, Invasive)  Goal: Effective Communication  Outcome: Ongoing, Progressing     Problem: Device-Related Complication Risk (Mechanical Ventilation, Invasive)  Goal: Optimal Device Function  Outcome: Ongoing, Progressing     Problem: Inability to Wean (Mechanical Ventilation, Invasive)  Goal: Mechanical Ventilation Liberation  Outcome: Ongoing, Progressing     Problem: Nutrition Impairment (Mechanical Ventilation, Invasive)  Goal: Optimal Nutrition Delivery  Outcome: Ongoing, Progressing     Problem: Skin and Tissue Injury (Mechanical Ventilation, Invasive)  Goal: Absence of Device-Related Skin and Tissue Injury  Outcome: Ongoing, Progressing     Problem: Ventilator-Induced Lung Injury (Mechanical Ventilation, Invasive)  Goal: Absence of Ventilator-Induced Lung Injury  Outcome: Ongoing, Progressing     Problem: Communication Impairment (Artificial Airway)  Goal: Effective Communication  Outcome: Ongoing, Progressing     Problem: Device-Related Complication Risk (Artificial Airway)  Goal: Optimal Device Function  Outcome: Ongoing, Progressing     Problem: Skin and Tissue Injury (Artificial Airway)  Goal: Absence of Device-Related Skin or Tissue Injury  Outcome: Ongoing,  Progressing     Problem: Noninvasive Ventilation Acute  Goal: Effective Unassisted Ventilation and Oxygenation  Outcome: Ongoing, Progressing     Problem: Coping Ineffective  Goal: Effective Coping  Outcome: Ongoing, Progressing

## 2023-10-07 NOTE — NURSING
Pt continues to pull oxymask off face and desats to the high 70's.  Encouraged pt not to remove mask.  Pt also pull NG tube out by 5 cm.  Feedings stopped and tube pushed by in to 63 cm and re secured NG tube and resumed feeding.  Explained to pt the importance of keeping mask on.  Pt continuous to cuss staff.  Will monitor pt closely.

## 2023-10-07 NOTE — PT/OT/SLP PROGRESS
[facility]  Speech Language Pathology Department  Dysphagia Therapy Progress Note    Patient Name:  Helder Sharp Jr.   MRN:  [id number]    Recommendations:     General recommendations:  dysphagia therapy  Diet texture/consistency recommendations:  NPO  Medications: NPO  Aspiration precautions:  NPO    Discharge recommendations:  nursing facility, skilled   Barriers to safe discharge:  acuity of illness and severity of impairment    Subjective     Patient awake, alert, and cooperative.    Pain/Comfort: Pain Rating 1: 0/10    Spiritual/Cultural/Congregation Beliefs/Practices that affect care: no    Respiratory Status: oxymask     Objective:     Therapeutic Activities:  Pt completed base of tongue and laryngeal x100 with moderate cues.    Assessment:     Pt continues to present with oropharyngeal dysphagia and remains unsafe for PO intake.    Goals:     Multidisciplinary Problems       SLP Goals          Problem: SLP    Goal Priority Disciplines Outcome   SLP Goal     SLP    Description: LTG: To tolerate least restrictive diet without clinical signs/sx of aspiration. (Continue)  ST. Tongue base/laryngeal excursion exercise (continue)  2. Tolerate thermal stimulation x10 with 100% swallow response with less than a 2 second delay. (Continue)  3. Puree solids without clinical signs/sx of aspiration.                      Patient Education:     Patient and spouse were provided with verbal education regarding POC.  Understanding was verbalized.    Plan:     Will continue to follow and tx as appropriate.    SLP Follow-Up:  Yes   Patient to be seen:  daily   Plan of Care expires:  10/13/23  Plan of Care reviewed with:  patient, spouse       Time Tracking:     SLP Treatment Date:   10/07/23  Speech Start Time:  1340  Speech Stop Time:  1355     Speech Total Time (min):  15 min    Billable minutes:  Treatment of Swallow Dysfunction, 15 minutes       10/07/2023

## 2023-10-08 NOTE — CONSULTS
[facility] - 5th Floor Med Surg  Pulmonary Critical Care Note    Patient Name: Helder Sharp Jr.  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 Root from pulmonary aspect at our [facility].  Patient is on Daliresp according to their notes for frequent episodes of bronchitis/pneumonitis also has hypoxemic respiratory failure on supplemental O2 and trilogy with sleep.  He was last seen in their pulmonary clinic in August of 2023.  Their notes according to that time had documentation of aspiration; and was undergoing evaluation by palliative care.  There no further documented patient had bilateral pneumonia requiring hospitalization a PEG tube was recommended for aspiration however the patient refused.    Patient presented to [facility] on 09/28/2023 for complaints of shortness of breath and weakness.  Upon arrival the patient received an hour long neb, magnesium and Solu-Medrol in the emergency room.  Patient did require intubation and ICU admit.  Was subsequently extubated however after extubation required use of Vapotherm.  Downgraded from the ICU on 10/04/2023.  GI has been consulted for the consideration of PEG placement he in turn is consulting Pulmonary for surgical clearance.  Patient is currently on OxyMask at 8 L with O2 saturations in the low-to-mid 90s.  Cultures from this admit are negative.  He continues to follow with speech therapy however they are currently recommended NPO status.  Palliative care has also been consulted and they are following.    Hospital Course/Significant events:  As above    24 Hour Interval History:  Currently sitting in bed watching a sermon on his phone. Significant other at BS.  Discussed  potentials for complications proceeding with the procedure also had long discussion about needing to weigh benefits vs. Risk she voiced understanding     Past Medical History:   Diagnosis Date    Acute bronchitis     Anxiety disorder, unspecified     COPD (chronic obstructive pulmonary disease)     Coronary artery disease     Hyperlipidemia     Left carotid bruit     Pulmonary nodules     Unspecified cataract        Past Surgical History:   Procedure Laterality Date    APPENDECTOMY      CATARACT EXTRACTION Left 2023    HERNIA REPAIR      PHACOEMULSIFICATION, CATARACT, WITH IOL INSERTION Left 1/3/2023    Procedure: PHACOEMULSIFICATION, CATARACT, WITH IOL INSERTION- OS;  Surgeon: Louis Abdi MD;  Location: [facility];  Service: Ophthalmology;  Laterality: Left;    TONSILLECTOMY         Social History     Socioeconomic History    Marital status:    Tobacco Use    Smoking status: Former     Current packs/day: 0.00     Types: Cigarettes     Quit date:      Years since quittin.    Smokeless tobacco: Never   Substance and Sexual Activity    Alcohol use: Not Currently    Drug use: Never    Sexual activity: Yes           Current Outpatient Medications   Medication Instructions    albuterol (ACCUNEB) 0.63 mg, Nebulization, Every 6 hours PRN, Rescue    albuterol (PROVENTIL/VENTOLIN HFA) 90 mcg/actuation inhaler 2 puffs, Inhalation, Every 6 hours PRN, Rescue    albuterol-ipratropium (DUO-NEB) 2.5 mg-0.5 mg/3 mL nebulizer solution 3 mLs, Nebulization, Every 6 hours PRN, Rescue    ascorbic acid (vitamin C) (VITAMIN C) 500 mg, Oral, 2 times daily    aspirin (ECOTRIN) 81 mg, Oral    atorvastatin (LIPITOR) 20 mg, Oral    cholecalciferol (vitamin D3) (VITAMIN D3) 10,000 Units, Oral    DALIRESP 500 mcg Tab 0.5 tablets, Oral, Daily    fluticasone propionate (FLONASE) 50 mcg/actuation nasal spray 1 spray, Each Nostril, Daily    fluticasone-umeclidin-vilanter (TRELEGY ELLIPTA) 200-62.5-25 mcg inhaler 1 puff, Oral,  Daily    guaiFENesin (MUCINEX) 1,200 mg, Oral, Daily    Lactobacillus rhamnosus GG (CULTURELLE) 15 billion cell capsule Oral    montelukast (SINGULAIR) 10 mg tablet TAKE 1 TABLET BY MOUTH EVERY DAY    predniSONE (DELTASONE) 20 MG tablet Take 3 tablets by mouth for three days, then take 2 tablets by mouth for four days, then 1 tablet for four days, then 1/2 tablet for 4 days.    ranolazine (RANEXA) 500 mg, Oral, 2 times daily    roflumilast (DALIRESP) 500 mcg Tab 0.5 tablets, Oral, Daily    sertraline (ZOLOFT) 100 mg, Oral, Daily       Current Inpatient Medications   albuterol-ipratropium  3 mL Nebulization Q4H WAKE    busPIRone  10 mg Oral BID    enoxparin  40 mg Subcutaneous Q24H (prophylaxis, 1700)    fluticasone furoate-vilanteroL  1 puff Inhalation Daily    guaiFENesin 100 mg/5 ml  200 mg Per NG tube Q6H    tiotropium bromide  2 puff Inhalation Daily       Current Intravenous Infusions   sodium chloride 0.9% Stopped (10/05/23 0523)         Review of Systems   Constitutional: Negative.    HENT: Negative.     Respiratory:  Positive for cough and shortness of breath.    Psychiatric/Behavioral: Negative.            Objective:       Intake/Output Summary (Last 24 hours) at 10/8/2023 0901  Last data filed at 10/7/2023 2010  Gross per 24 hour   Intake 1770 ml   Output 475 ml   Net 1295 ml         Vital Signs (Most Recent):  Temp: 97.4 °F (36.3 °C) (10/08/23 0808)  Pulse: 82 (10/08/23 0831)  Resp: (!) 24 (10/08/23 0831)  BP: 128/61 (10/08/23 0808)  SpO2: (!) 91 % (10/08/23 0831)  Body mass index is 23.64 kg/m².  Weight: 83.5 kg (184 lb 1.6 oz) Vital Signs (24h Range):  Temp:  [97.4 °F (36.3 °C)-98.3 °F (36.8 °C)] 97.4 °F (36.3 °C)  Pulse:  [80-91] 82  Resp:  [18-24] 24  SpO2:  [90 %-95 %] 91 %  BP: (106-134)/(58-67) 128/61     Physical Exam  Constitutional:       Appearance: Normal appearance.   HENT:      Head: Normocephalic and atraumatic.   Cardiovascular:      Rate and Rhythm: Regular rhythm.      Heart sounds:  Normal heart sounds.   Pulmonary:      Effort: Pulmonary effort is normal.      Breath sounds: Rhonchi present.   Abdominal:      General: Bowel sounds are normal.      Palpations: Abdomen is soft.   Neurological:      Mental Status: He is alert.           Lines/Drains/Airways       Drain  Duration                  NG/OG Tube 10/02/23 0710 Left nostril 6 days              Peripheral Intravenous Line  Duration                  Peripheral IV - Single Lumen 09/28/23 1810 18 G Anterior;Left Forearm 9 days         Peripheral IV - Single Lumen 09/28/23 2104 18 G Anterior;Right Forearm 9 days                    Significant Labs:    Lab Results   Component Value Date    WBC 16.91 (H) 10/08/2023    HGB 13.3 (L) 10/08/2023    HCT 40.1 (L) 10/08/2023    MCV 91.3 10/08/2023     10/08/2023     BMP  Lab Results   Component Value Date     10/08/2023    K 4.2 10/08/2023    CHLORIDE 104 10/08/2023    CO2 25 10/08/2023    BUN 33.4 (H) 10/08/2023    CREATININE 0.88 10/08/2023    CALCIUM 9.5 10/08/2023    AGAP 12.0 10/02/2023    EGFRNONAA 55 07/15/2021     ABG  Recent Labs   Lab 10/02/23  0324   PH 7.500*   PO2 61.0*   PCO2 36.0   HCO3 28.1*   POCBASEDEF 4.80*       Mechanical Ventilation Support:  Vent Mode: CPAP / PSV (10/01/23 1351)  Ventilator Initiated: Yes (09/28/23 2007)  Set Rate: 20 BPM (10/01/23 1115)  Vt Set: 500 mL (10/01/23 1115)  Pressure Support: 10 cmH20 (10/01/23 1351)  PEEP/CPAP: 5 cmH20 (10/01/23 1351)  Oxygen Concentration (%): 50 (10/07/23 0508)  Peak Airway Pressure: 17 cmH20 (10/01/23 1351)  Total Ve: 17.9 L/m (10/01/23 1351)  F/VT Ratio<105 (RSBI): (!) 55.34 (10/01/23 0447)      Significant Imaging:  No imaging over the past 24 hours    Assessment/Plan:     Assessment  Reported end-stage chronic obstructive pulmonary disease with chronic hypoxemic respiratory failure on trilogy q.h.s. followed by Dr. Root   Aspiration/oropharyngeal dysphagia  History of hyperlipidemia, coronary artery disease and  pulmonary nodules -pulmonary notes from outpatient state the patient is not a candidate for any type of procedure in regards to these pulmonary nodules however they are continuing to follow them    Plan  Discussed potential pulmonary complications with patient and significant other at bedside.  Recommend scheduled bronchodilation during hospital stay and 1 week postoperatively and attempts to minimize pulmonary complications  Further to follow     JACK Graf  Pulmonary Critical Care Medicine  [facility] - 5th Floor Med Surg  DOS: 10/08/2023

## 2023-10-08 NOTE — H&P (VIEW-ONLY)
"Helder Sharp Jr.   MRN: [id number]   ADMISSION DATE: 2023  : 1940  AGE: 83 y.o.    DATE :  10/07/2023     PROVIDER: CONRADO FARRIS    REASON FOR REFERRAL   Possible gastrostomy tube placement (peg) history of advanced COPD    SUBJECTIVE:   Helder Sharp Jr. is a 83 y.o. male with a past medical history of hyperlipidemia, CAD, COPD, on 2 L home oxygen, pulmonary nodules, and anxiety presented to [facility] on 2023 for shortness of breath and weakness. Wife also endorsed worsening nonproductive cough and lower extremity swelling.  Wife noticed chocolate" colored urine at approximately 15:30 on , prompting them to ED. Patient also had to increase home oxygen to 3 L secondary to increased dyspnea.  Patient received albuterol nebulizer treatment en route with EMS.  Patient was given an hour long nebulizer treatment, magnesium, and Solu-Medrol upon arrival to ED. Patient remained hypoxic on non-rebreather and  was intubated in ED for airway protection.  Labs revealed WBC 21.13, chloride 109, CO2 19, glucose 190, BNP 22, troponin 0.014, and lactic acid 3.8.  Flu/RSV/COVID were negative.  UA revealed 2+ protein, trace ketones, 3+ occult blood, 1+ bilirubin, and greater than 100 RBCs.  Blood and respiratory cultures were obtained.  Chest x-ray revealed chronic emphysema and scarring within the lungs without appreciable acute superimposed airspace opacity.  Patient was given LR, IV Zosyn, and IV Azithromycin in ED.  Patient was admitted to ICU for close monitoring.  Patient was started on Solu-Medrol 60 mg BID and DuoNebs q.6 hours.  Urology was consulted for hematuria. Ultrasound retroperitoneal complete revealed no abnormalities. Urology recommended continuing indwelling lang with outpatient follow-up. Patient had previous history of Klebsiella pneumonia which was sensitive to Zosyn.  Azithromycin was discontinued.  CTA chest revealed multiple enlarged mediastinal lymph nodes, no filling defects seen to " suggest PE, and severe emphysematous changes identified in the lungs with consolidation in the posterior basal segments of bilateral lower lobes demonstrating air bronchograms which may reflect aspiration pneumonia.  NG tube was placed and patient was started on tube feeds.  Patient was transitioned to prednisone 40 mg daily for 5 day course on 10/01. Patient was extubated on 10/01 and placed on Vapotherm. Speech therapy was consulted. Patient was cleared for downgrade out of ICU on 10/03/2023. Hospital medicine was consulted for transition of care and further medical management.    Patient has been seen by multiple gastroenterologist in the past including Dr. Emigdio Blake, Dr. Lantigua as well as Dr. Mota.  He still is on high-dose OxyMask     Review of Systems   Awake and alert.  Reported to have some cough with expectoration.  History of chronic intermittent shortness of breath.  No chest pain.  No overt GI bleed.  History of severe oropharyngeal dysphagia.  Nasogastric tube in place     Review of patient's allergies indicates:   Allergen Reactions    Flexeril [cyclobenzaprine]          albuterol-ipratropium  3 mL Nebulization Q4H WAKE    busPIRone  10 mg Oral BID    enoxparin  40 mg Subcutaneous Q24H (prophylaxis, 1700)    fluticasone furoate-vilanteroL  1 puff Inhalation Daily    guaiFENesin 100 mg/5 ml  200 mg Per NG tube Q6H    tiotropium bromide  2 puff Inhalation Daily       Medications Discontinued During This Encounter   Medication Reason    ciprofloxacin (CIPRO)400mg/200ml D5W IVPB 400 mg     azithromycin 500 mg in dextrose 5 % 250 mL IVPB (ready to mix)     methylPREDNISolone sodium succinate injection 60 mg     lactated ringers infusion     pantoprazole injection 40 mg     propofol (DIPRIVAN) 10 mg/mL infusion     dexmedetomidine (PRECEDEX) 400mcg/100mL 0.9% NaCL infusion     0.9%  NaCl infusion     albuterol-ipratropium 2.5 mg-0.5 mg/3 mL nebulizer solution 3 mL     midazolam (VERSED) 1 mg/mL  injection 4 mg     piperacillin-tazobactam (ZOSYN) 4.5 g in dextrose 5 % in water (D5W) 100 mL IVPB (MB+)     famotidine tablet 20 mg     busPIRone tablet 10 mg          sodium chloride 0.9% Stopped (10/05/23 05)        Past Medical History:   Diagnosis Date    Acute bronchitis     Anxiety disorder, unspecified     COPD (chronic obstructive pulmonary disease)     Coronary artery disease     Hyperlipidemia     Left carotid bruit     Pulmonary nodules     Unspecified cataract       Social History     Socioeconomic History    Marital status:    Tobacco Use    Smoking status: Former     Current packs/day: 0.00     Types: Cigarettes     Quit date:      Years since quittin.7    Smokeless tobacco: Never   Substance and Sexual Activity    Alcohol use: Not Currently    Drug use: Never    Sexual activity: Yes      Past Surgical History:   Procedure Laterality Date    APPENDECTOMY      CATARACT EXTRACTION Left 2023    HERNIA REPAIR      PHACOEMULSIFICATION, CATARACT, WITH IOL INSERTION Left 1/3/2023    Procedure: PHACOEMULSIFICATION, CATARACT, WITH IOL INSERTION- OS;  Surgeon: Louis Abdi MD;  Location: [facility];  Service: Ophthalmology;  Laterality: Left;    TONSILLECTOMY          Family History   Problem Relation Age of Onset    Asthma Mother     Cancer Father     No Known Problems Sister     No Known Problems Brother           OBJECTIVE:     Vitals:    10/07/23 1550 10/07/23 1601 10/07/23 1900 10/07/23 2000   BP: 115/67      BP Location:       Patient Position:       Pulse: 83 82  86   Resp: 18 18  (!) 22   Temp: 97.7 °F (36.5 °C)      TempSrc: Oral      SpO2: 95% 95% (!) 93% (!) 93%   Weight:       Height:             Physical Exam   HEENT examination:  Unremarkable   Neck:  Supple   Chest:  Decreased breath sounds bilaterally   Heart examination:  S1, S2 audible   Abdomen:  Bowel sounds positive, soft, nontender   Extremities:  SCDs in place    LABS    Recent Labs   Lab 10/02/23  1005  "10/03/23  0557 10/05/23  0607   WBC 11.17 14.07* 11.09   HGB 12.1* 13.1* 12.4*   HCT 37.1* 39.9* 38.1*    183 195      Recent Labs   Lab 10/02/23  1005 10/03/23  0557 10/05/23  0607    140 143   K 3.5 3.7 3.7   CO2 25 25 26   BUN 32.6* 31.2* 30.6*   CREATININE 0.84 0.81 0.87   CALCIUM 8.8 8.6* 8.8   BILITOT  --  0.8 0.7   ALKPHOS  --  61 60   ALT  --  18 19   AST  --  18 13   GLUCOSE 149* 135* 119*    No results for input(s): "INR" in the last 168 hours. No results for input(s): "AMYLASE" in the last 168 hours. No results for input(s): "APTT", "INR", "PTT" in the last 168 hours.        RESULTS: Fl Modified Barium Swallow Speech    Result Date: 10/6/2023  See procedure notes from Speech Pathologist. This procedure was auto-finalized.    X-Ray Chest 1 View    Result Date: 10/5/2023  EXAMINATION: XR CHEST 1 VIEW CPT 91929 CLINICAL HISTORY: f/u; COMPARISON: September 30, 2023 FINDINGS: Examination reveals mediastinal silhouette to be within normal limits cardiac silhouette is not enlarged improved aeration identified in the right infrahilar region and at the region of the right cardiophrenic angle. Persistent confluent airspace opacities identified in the left infrahilar region and left base including increased left retrocardiac density and silhouetting of the left hemidiaphragm which might be related to an infiltrate/atelectasis. Endotracheal tube has been removed     Improved aeration in the right infrahilar region and right cardiophrenic angle region. Persistent confluent opacities at the left base including the left retrocardiac region which might reflect an infiltrate/atelectasis. Interval removal of endotracheal tube Electronically signed by: Marc Andrews Date:    10/05/2023 Time:    10:47    XR Gastric tube check, non-radiologist performed    Result Date: 10/3/2023  EXAMINATION: XR GASTRIC TUBE CHECK, NON-RADIOLOGIST PERFORMED CLINICAL HISTORY: Ng tube placement; COMPARISON: October 2, 2023 " FINDINGS: Examination reveals a nasogastric tube with the tip in the fundus of the stomach     Nasogastric tube as above Electronically signed by: Marc Andrews Date:    10/03/2023 Time:    08:19    Fl Modified Barium Swallow Speech    Result Date: 10/2/2023  See procedure notes from Speech Pathologist. This procedure was auto-finalized.    X-Ray Chest 1 View    Result Date: 10/2/2023  EXAMINATION XR CHEST 1 VIEW CLINICAL HISTORY post intubation ; TECHNIQUE A total of 1 frontal image(s) of the chest. COMPARISON 28 September 2023, 18:21 FINDINGS Lines/tubes/devices: Interval placement of endotracheal tube, terminating approximately 6 cm superior to the alycia. Esophagogastric tube also now visualized, extending inferior to the level the diaphragm; side port noted at the level of the GE junction. The cardiac silhouette and central vascular structures are unchanged.  The trachea is midline. No new or worsening consolidation is identified. There is no enlarging pleural effusion or convincing pneumothorax. Regional osseous structures and extrathoracic soft tissues are similar. IMPRESSION Interval tracheal and esophageal intubation, as above. Please note this exam was initially acquired immediately following intubation on 28 September 2023.  However, the study could not be completed and sent to PACS for interpretation due to technical issues with the order.  The exam was interpreted as soon is the issues were resolved morning of 2 October 2023. Electronically signed by: Morales Goldberg Date:    10/02/2023 Time:    11:55    XR Gastric tube check, non-radiologist performed    Result Date: 10/2/2023  EXAMINATION: XR GASTRIC TUBE CHECK, NON-RADIOLOGIST PERFORMED CLINICAL HISTORY: NG tube placement; TECHNIQUE: Single view of the chest. COMPARISON: None FINDINGS: NG tube projects over the left upper quadrant.     As above. Electronically signed by: Hugh De Leon Date:    10/02/2023 Time:    07:55    X-Ray Chest 1  View    Result Date: 9/30/2023  EXAMINATION: XR CHEST 1 VIEW CLINICAL HISTORY: intubated; TECHNIQUE: Single view of the chest COMPARISON: 09/28/2023 FINDINGS: ET tube terminates just below the clavicular heads.  Prominent interstitial markings of bilateral lower lung zones grossly unchanged.     As above. Electronically signed by: Hugh De Leon Date:    09/30/2023 Time:    11:54    US Retroperitoneal Complete    Result Date: 9/29/2023  EXAMINATION: US RETROPERITONEAL COMPLETE CLINICAL HISTORY: hematuria; COMPARISON: 12/17/2018 FINDINGS: The right kidney measures 11.4 x 4.8 x 5.2 cm.  There are no focal lesions noted.  There is no hydronephrosis.  There is flow in the proximal IVC.  Aorta is not visualized. The left kidney is poorly demonstrated.  It measures 9.1 x 5.5 x 4.9 cm there are no focal lesions noted there is no hydronephrosis.  The urinary bladder is collapsed with a Licea catheter.     Limited study, no abnormalities are demonstrated. Electronically signed by: Aron Barger MD Date:    09/29/2023 Time:    13:51    Echo    Result Date: 9/29/2023    Left Ventricle: The left ventricle is normal in size. Normal wall thickness. Normal wall motion. There is normal systolic function with a visually estimated ejection fraction of 55 - 60%. Grade I diastolic dysfunction.   Right Ventricle: Normal right ventricular cavity size. Systolic function is normal.   Mitral Valve: There is no stenosis. The mean pressure gradient across the mitral valve is 2 mmHg at a heart rate of  bpm.   Pulmonary Artery: No pulmonary hypertension.     CTA Chest Non-Coronary (PE Studies)    Result Date: 9/29/2023  Technique:CT Scan of the chest was performed with intravenous contrast with direct axial images as well as sagittal and coronal reconstruction images pulmonary embolus protocol. Dosage Information:Automated Exposure Control was utilized.   Comparison: January 5, 2023 . Clinical History:Pe suspected. Findings: Lines and  Tubes:Endotracheal tube is present with its tip projecting 4 cm above the alycia. A nasogastric catheter is seen with its tip in the body of the stomach. Neck:The thyroid gland appear unremarkable. Mediastinum:Multiple similar enlarged mediastinal lymph nodes are seen. The largest is in subcarinal space and measures 1.7 cm in least dimension. Heart:The heart size is within normal limits. Coronary artery calcification is seen. Aorta:No aortic dissection or aneurysm is seen. Moderate aortic calcification is seen in the thoracic aorta. Pulmonary Arteries:No filling defects are seen in the pulmonary arteries to suggest pulmonary embolus. Lungs:Severe emphysematous changes are identified in the lungs. There is consolidation in the posterior basal segments of bilateral lower lobes demonstrating air bronchograms. This may reflect aspiration pneumonia. Pleura:No effusions or pneumothorax are identified. Bony Structures: Spine:Mild spondylolytic changes are seen in the thoracic spine. Abdomen:Both adrenal glands are thickened and may reflect adrenal hyperplasia. The rest of the visualized upper abdominal organs appear unremarkable.     Impression: 1. Multiple enlarged mediastinal lymph nodes are seen. The largest is in subcarinal space and measures 1.7 cm in least dimension. 2. No filling defects are seen in the pulmonary arteries to suggest pulmonary embolus. 3. Severe emphysematous changes are identified in the lungs. There is consolidation in the posterior basal segments of bilateral lower lobes demonstrating air bronchograms. This may reflect aspiration pneumonia. Correlate clinically as regards further evaluation and followup. 4. Details and other findings as discussed above. No significant discrepancy with overnight report. Electronically signed by: Ronny Hamilton Date:    09/29/2023 Time:    08:09    X-Ray Chest AP Portable    Result Date: 9/28/2023  EXAMINATION: XR CHEST AP PORTABLE CLINICAL HISTORY: Asthma; TECHNIQUE:  "Single frontal view of the chest was performed. COMPARISON: 08/08/2023 FINDINGS: LINES AND TUBES: EKG/telemetry leads overlie the chest. MEDIASTINUM AND ANDRE: The cardiac silhouette is normal. Launch coronary LUNGS: Emphysema.  Chronic basilar scarring.  No appreciable acute superimposed airspace opacity. PLEURA:No pleural effusion. No pneumothorax. BONES: No acute osseous abnormality.     Chronic emphysema and scarring within the lungs without appreciable acute superimposed airspace opacity. Electronically signed by: Kimberli Cisse Date:    09/28/2023 Time:    18:38      ICD-10-CM ICD-9-CM   1. Shortness of breath  R06.02 786.05   2. Respiratory failure  J96.90 518.81   3. SOB (shortness of breath)  R06.02 786.05      ASSESSMENT & PLAN:   Helder Sharp Jr. is a 83 y.o. male with a past medical history of hyperlipidemia, CAD, COPD, on 2 L home oxygen, pulmonary nodules, and anxiety presented to [facility] on 9/28/2023 for shortness of breath and weakness. Wife also endorsed worsening nonproductive cough and lower extremity swelling.  Wife noticed chocolate" colored urine at approximately 15:30 on 09/28, prompting them to ED. Patient also had to increase home oxygen to 3 L secondary to increased dyspnea.  Patient received albuterol nebulizer treatment en route with EMS.  Patient was given an hour long nebulizer treatment, magnesium, and Solu-Medrol upon arrival to ED. Patient remained hypoxic on non-rebreather and  was intubated in ED for airway protection.  Labs revealed WBC 21.13, chloride 109, CO2 19, glucose 190, BNP 22, troponin 0.014, and lactic acid 3.8.  Flu/RSV/COVID were negative.  UA revealed 2+ protein, trace ketones, 3+ occult blood, 1+ bilirubin, and greater than 100 RBCs.  Blood and respiratory cultures were obtained.  Chest x-ray revealed chronic emphysema and scarring within the lungs without appreciable acute superimposed airspace opacity.  Patient was given LR, IV Zosyn, and IV Azithromycin in ED.  " Patient was admitted to ICU for close monitoring.  Patient was started on Solu-Medrol 60 mg BID and DuoNebs q.6 hours.  Urology was consulted for hematuria. Ultrasound retroperitoneal complete revealed no abnormalities. Urology recommended continuing indwelling lang with outpatient follow-up. Patient had previous history of Klebsiella pneumonia which was sensitive to Zosyn.  Azithromycin was discontinued.  CTA chest revealed multiple enlarged mediastinal lymph nodes, no filling defects seen to suggest PE, and severe emphysematous changes identified in the lungs with consolidation in the posterior basal segments of bilateral lower lobes demonstrating air bronchograms which may reflect aspiration pneumonia.  NG tube was placed and patient was started on tube feeds.  Patient was transitioned to prednisone 40 mg daily for 5 day course on 10/01. Patient was extubated on 10/01 and placed on Vapotherm. Speech therapy was consulted. Patient was cleared for downgrade out of ICU on 10/03/2023. Hospital medicine was consulted for transition of care and further medical management.    Patient has been seen by multiple gastroenterologist in the past including Dr. Emigdio Blake, Dr. Lantigua as well as Dr. Mota.  He still is on high-dose OxyMask.    Recommendations:   1 . Recommend pulmonary consultation in view of advanced COPD and compromised respiratory status.  Patient was on mechanical ventilator during this admit and is obviously known to them.  He would be high risk for sedation in view of compromised respiratory status.  2. GI consult service to follow the patient on Monday regarding additional recommendation in regards to possible percutaneous endoscopic gastrostomy pending Pulmonary consultation.      Thank you for the consultation

## 2023-10-08 NOTE — CONSULTS
"Helder Sharp Jr.   MRN: [id number]   ADMISSION DATE: 2023  : 1940  AGE: 83 y.o.    DATE :  10/07/2023     PROVIDER: CONRADO FARRIS    REASON FOR REFERRAL   Possible gastrostomy tube placement (peg) history of advanced COPD    SUBJECTIVE:   Helder Sharp Jr. is a 83 y.o. male with a past medical history of hyperlipidemia, CAD, COPD, on 2 L home oxygen, pulmonary nodules, and anxiety presented to [facility] on 2023 for shortness of breath and weakness. Wife also endorsed worsening nonproductive cough and lower extremity swelling.  Wife noticed chocolate" colored urine at approximately 15:30 on , prompting them to ED. Patient also had to increase home oxygen to 3 L secondary to increased dyspnea.  Patient received albuterol nebulizer treatment en route with EMS.  Patient was given an hour long nebulizer treatment, magnesium, and Solu-Medrol upon arrival to ED. Patient remained hypoxic on non-rebreather and  was intubated in ED for airway protection.  Labs revealed WBC 21.13, chloride 109, CO2 19, glucose 190, BNP 22, troponin 0.014, and lactic acid 3.8.  Flu/RSV/COVID were negative.  UA revealed 2+ protein, trace ketones, 3+ occult blood, 1+ bilirubin, and greater than 100 RBCs.  Blood and respiratory cultures were obtained.  Chest x-ray revealed chronic emphysema and scarring within the lungs without appreciable acute superimposed airspace opacity.  Patient was given LR, IV Zosyn, and IV Azithromycin in ED.  Patient was admitted to ICU for close monitoring.  Patient was started on Solu-Medrol 60 mg BID and DuoNebs q.6 hours.  Urology was consulted for hematuria. Ultrasound retroperitoneal complete revealed no abnormalities. Urology recommended continuing indwelling lang with outpatient follow-up. Patient had previous history of Klebsiella pneumonia which was sensitive to Zosyn.  Azithromycin was discontinued.  CTA chest revealed multiple enlarged mediastinal lymph nodes, no filling defects seen to " suggest PE, and severe emphysematous changes identified in the lungs with consolidation in the posterior basal segments of bilateral lower lobes demonstrating air bronchograms which may reflect aspiration pneumonia.  NG tube was placed and patient was started on tube feeds.  Patient was transitioned to prednisone 40 mg daily for 5 day course on 10/01. Patient was extubated on 10/01 and placed on Vapotherm. Speech therapy was consulted. Patient was cleared for downgrade out of ICU on 10/03/2023. Hospital medicine was consulted for transition of care and further medical management.    Patient has been seen by multiple gastroenterologist in the past including Dr. Emigdio Blake, Dr. Lantigua as well as Dr. Mota.  He still is on high-dose OxyMask     Review of Systems   Awake and alert.  Reported to have some cough with expectoration.  History of chronic intermittent shortness of breath.  No chest pain.  No overt GI bleed.  History of severe oropharyngeal dysphagia.  Nasogastric tube in place     Review of patient's allergies indicates:   Allergen Reactions    Flexeril [cyclobenzaprine]          albuterol-ipratropium  3 mL Nebulization Q4H WAKE    busPIRone  10 mg Oral BID    enoxparin  40 mg Subcutaneous Q24H (prophylaxis, 1700)    fluticasone furoate-vilanteroL  1 puff Inhalation Daily    guaiFENesin 100 mg/5 ml  200 mg Per NG tube Q6H    tiotropium bromide  2 puff Inhalation Daily       Medications Discontinued During This Encounter   Medication Reason    ciprofloxacin (CIPRO)400mg/200ml D5W IVPB 400 mg     azithromycin 500 mg in dextrose 5 % 250 mL IVPB (ready to mix)     methylPREDNISolone sodium succinate injection 60 mg     lactated ringers infusion     pantoprazole injection 40 mg     propofol (DIPRIVAN) 10 mg/mL infusion     dexmedetomidine (PRECEDEX) 400mcg/100mL 0.9% NaCL infusion     0.9%  NaCl infusion     albuterol-ipratropium 2.5 mg-0.5 mg/3 mL nebulizer solution 3 mL     midazolam (VERSED) 1 mg/mL  injection 4 mg     piperacillin-tazobactam (ZOSYN) 4.5 g in dextrose 5 % in water (D5W) 100 mL IVPB (MB+)     famotidine tablet 20 mg     busPIRone tablet 10 mg          sodium chloride 0.9% Stopped (10/05/23 05)        Past Medical History:   Diagnosis Date    Acute bronchitis     Anxiety disorder, unspecified     COPD (chronic obstructive pulmonary disease)     Coronary artery disease     Hyperlipidemia     Left carotid bruit     Pulmonary nodules     Unspecified cataract       Social History     Socioeconomic History    Marital status:    Tobacco Use    Smoking status: Former     Current packs/day: 0.00     Types: Cigarettes     Quit date:      Years since quittin.7    Smokeless tobacco: Never   Substance and Sexual Activity    Alcohol use: Not Currently    Drug use: Never    Sexual activity: Yes      Past Surgical History:   Procedure Laterality Date    APPENDECTOMY      CATARACT EXTRACTION Left 2023    HERNIA REPAIR      PHACOEMULSIFICATION, CATARACT, WITH IOL INSERTION Left 1/3/2023    Procedure: PHACOEMULSIFICATION, CATARACT, WITH IOL INSERTION- OS;  Surgeon: Louis Abdi MD;  Location: [facility];  Service: Ophthalmology;  Laterality: Left;    TONSILLECTOMY          Family History   Problem Relation Age of Onset    Asthma Mother     Cancer Father     No Known Problems Sister     No Known Problems Brother           OBJECTIVE:     Vitals:    10/07/23 1550 10/07/23 1601 10/07/23 1900 10/07/23 2000   BP: 115/67      BP Location:       Patient Position:       Pulse: 83 82  86   Resp: 18 18  (!) 22   Temp: 97.7 °F (36.5 °C)      TempSrc: Oral      SpO2: 95% 95% (!) 93% (!) 93%   Weight:       Height:             Physical Exam   HEENT examination:  Unremarkable   Neck:  Supple   Chest:  Decreased breath sounds bilaterally   Heart examination:  S1, S2 audible   Abdomen:  Bowel sounds positive, soft, nontender   Extremities:  SCDs in place    LABS    Recent Labs   Lab 10/02/23  1005  "10/03/23  0557 10/05/23  0607   WBC 11.17 14.07* 11.09   HGB 12.1* 13.1* 12.4*   HCT 37.1* 39.9* 38.1*    183 195      Recent Labs   Lab 10/02/23  1005 10/03/23  0557 10/05/23  0607    140 143   K 3.5 3.7 3.7   CO2 25 25 26   BUN 32.6* 31.2* 30.6*   CREATININE 0.84 0.81 0.87   CALCIUM 8.8 8.6* 8.8   BILITOT  --  0.8 0.7   ALKPHOS  --  61 60   ALT  --  18 19   AST  --  18 13   GLUCOSE 149* 135* 119*    No results for input(s): "INR" in the last 168 hours. No results for input(s): "AMYLASE" in the last 168 hours. No results for input(s): "APTT", "INR", "PTT" in the last 168 hours.        RESULTS: Fl Modified Barium Swallow Speech    Result Date: 10/6/2023  See procedure notes from Speech Pathologist. This procedure was auto-finalized.    X-Ray Chest 1 View    Result Date: 10/5/2023  EXAMINATION: XR CHEST 1 VIEW CPT 64309 CLINICAL HISTORY: f/u; COMPARISON: September 30, 2023 FINDINGS: Examination reveals mediastinal silhouette to be within normal limits cardiac silhouette is not enlarged improved aeration identified in the right infrahilar region and at the region of the right cardiophrenic angle. Persistent confluent airspace opacities identified in the left infrahilar region and left base including increased left retrocardiac density and silhouetting of the left hemidiaphragm which might be related to an infiltrate/atelectasis. Endotracheal tube has been removed     Improved aeration in the right infrahilar region and right cardiophrenic angle region. Persistent confluent opacities at the left base including the left retrocardiac region which might reflect an infiltrate/atelectasis. Interval removal of endotracheal tube Electronically signed by: Marc Andrews Date:    10/05/2023 Time:    10:47    XR Gastric tube check, non-radiologist performed    Result Date: 10/3/2023  EXAMINATION: XR GASTRIC TUBE CHECK, NON-RADIOLOGIST PERFORMED CLINICAL HISTORY: Ng tube placement; COMPARISON: October 2, 2023 " FINDINGS: Examination reveals a nasogastric tube with the tip in the fundus of the stomach     Nasogastric tube as above Electronically signed by: Marc Andrews Date:    10/03/2023 Time:    08:19    Fl Modified Barium Swallow Speech    Result Date: 10/2/2023  See procedure notes from Speech Pathologist. This procedure was auto-finalized.    X-Ray Chest 1 View    Result Date: 10/2/2023  EXAMINATION XR CHEST 1 VIEW CLINICAL HISTORY post intubation ; TECHNIQUE A total of 1 frontal image(s) of the chest. COMPARISON 28 September 2023, 18:21 FINDINGS Lines/tubes/devices: Interval placement of endotracheal tube, terminating approximately 6 cm superior to the alycia. Esophagogastric tube also now visualized, extending inferior to the level the diaphragm; side port noted at the level of the GE junction. The cardiac silhouette and central vascular structures are unchanged.  The trachea is midline. No new or worsening consolidation is identified. There is no enlarging pleural effusion or convincing pneumothorax. Regional osseous structures and extrathoracic soft tissues are similar. IMPRESSION Interval tracheal and esophageal intubation, as above. Please note this exam was initially acquired immediately following intubation on 28 September 2023.  However, the study could not be completed and sent to PACS for interpretation due to technical issues with the order.  The exam was interpreted as soon is the issues were resolved morning of 2 October 2023. Electronically signed by: Morales Goldberg Date:    10/02/2023 Time:    11:55    XR Gastric tube check, non-radiologist performed    Result Date: 10/2/2023  EXAMINATION: XR GASTRIC TUBE CHECK, NON-RADIOLOGIST PERFORMED CLINICAL HISTORY: NG tube placement; TECHNIQUE: Single view of the chest. COMPARISON: None FINDINGS: NG tube projects over the left upper quadrant.     As above. Electronically signed by: Hugh De Leon Date:    10/02/2023 Time:    07:55    X-Ray Chest 1  View    Result Date: 9/30/2023  EXAMINATION: XR CHEST 1 VIEW CLINICAL HISTORY: intubated; TECHNIQUE: Single view of the chest COMPARISON: 09/28/2023 FINDINGS: ET tube terminates just below the clavicular heads.  Prominent interstitial markings of bilateral lower lung zones grossly unchanged.     As above. Electronically signed by: Hugh De Leon Date:    09/30/2023 Time:    11:54    US Retroperitoneal Complete    Result Date: 9/29/2023  EXAMINATION: US RETROPERITONEAL COMPLETE CLINICAL HISTORY: hematuria; COMPARISON: 12/17/2018 FINDINGS: The right kidney measures 11.4 x 4.8 x 5.2 cm.  There are no focal lesions noted.  There is no hydronephrosis.  There is flow in the proximal IVC.  Aorta is not visualized. The left kidney is poorly demonstrated.  It measures 9.1 x 5.5 x 4.9 cm there are no focal lesions noted there is no hydronephrosis.  The urinary bladder is collapsed with a Licea catheter.     Limited study, no abnormalities are demonstrated. Electronically signed by: Aron Barger MD Date:    09/29/2023 Time:    13:51    Echo    Result Date: 9/29/2023    Left Ventricle: The left ventricle is normal in size. Normal wall thickness. Normal wall motion. There is normal systolic function with a visually estimated ejection fraction of 55 - 60%. Grade I diastolic dysfunction.   Right Ventricle: Normal right ventricular cavity size. Systolic function is normal.   Mitral Valve: There is no stenosis. The mean pressure gradient across the mitral valve is 2 mmHg at a heart rate of  bpm.   Pulmonary Artery: No pulmonary hypertension.     CTA Chest Non-Coronary (PE Studies)    Result Date: 9/29/2023  Technique:CT Scan of the chest was performed with intravenous contrast with direct axial images as well as sagittal and coronal reconstruction images pulmonary embolus protocol. Dosage Information:Automated Exposure Control was utilized.   Comparison: January 5, 2023 . Clinical History:Pe suspected. Findings: Lines and  Tubes:Endotracheal tube is present with its tip projecting 4 cm above the alycia. A nasogastric catheter is seen with its tip in the body of the stomach. Neck:The thyroid gland appear unremarkable. Mediastinum:Multiple similar enlarged mediastinal lymph nodes are seen. The largest is in subcarinal space and measures 1.7 cm in least dimension. Heart:The heart size is within normal limits. Coronary artery calcification is seen. Aorta:No aortic dissection or aneurysm is seen. Moderate aortic calcification is seen in the thoracic aorta. Pulmonary Arteries:No filling defects are seen in the pulmonary arteries to suggest pulmonary embolus. Lungs:Severe emphysematous changes are identified in the lungs. There is consolidation in the posterior basal segments of bilateral lower lobes demonstrating air bronchograms. This may reflect aspiration pneumonia. Pleura:No effusions or pneumothorax are identified. Bony Structures: Spine:Mild spondylolytic changes are seen in the thoracic spine. Abdomen:Both adrenal glands are thickened and may reflect adrenal hyperplasia. The rest of the visualized upper abdominal organs appear unremarkable.     Impression: 1. Multiple enlarged mediastinal lymph nodes are seen. The largest is in subcarinal space and measures 1.7 cm in least dimension. 2. No filling defects are seen in the pulmonary arteries to suggest pulmonary embolus. 3. Severe emphysematous changes are identified in the lungs. There is consolidation in the posterior basal segments of bilateral lower lobes demonstrating air bronchograms. This may reflect aspiration pneumonia. Correlate clinically as regards further evaluation and followup. 4. Details and other findings as discussed above. No significant discrepancy with overnight report. Electronically signed by: Ronny Hamilton Date:    09/29/2023 Time:    08:09    X-Ray Chest AP Portable    Result Date: 9/28/2023  EXAMINATION: XR CHEST AP PORTABLE CLINICAL HISTORY: Asthma; TECHNIQUE:  "Single frontal view of the chest was performed. COMPARISON: 08/08/2023 FINDINGS: LINES AND TUBES: EKG/telemetry leads overlie the chest. MEDIASTINUM AND ANDRE: The cardiac silhouette is normal. Launch coronary LUNGS: Emphysema.  Chronic basilar scarring.  No appreciable acute superimposed airspace opacity. PLEURA:No pleural effusion. No pneumothorax. BONES: No acute osseous abnormality.     Chronic emphysema and scarring within the lungs without appreciable acute superimposed airspace opacity. Electronically signed by: Kimberli Cisse Date:    09/28/2023 Time:    18:38      ICD-10-CM ICD-9-CM   1. Shortness of breath  R06.02 786.05   2. Respiratory failure  J96.90 518.81   3. SOB (shortness of breath)  R06.02 786.05      ASSESSMENT & PLAN:   Helder Sharp Jr. is a 83 y.o. male with a past medical history of hyperlipidemia, CAD, COPD, on 2 L home oxygen, pulmonary nodules, and anxiety presented to [facility] on 9/28/2023 for shortness of breath and weakness. Wife also endorsed worsening nonproductive cough and lower extremity swelling.  Wife noticed chocolate" colored urine at approximately 15:30 on 09/28, prompting them to ED. Patient also had to increase home oxygen to 3 L secondary to increased dyspnea.  Patient received albuterol nebulizer treatment en route with EMS.  Patient was given an hour long nebulizer treatment, magnesium, and Solu-Medrol upon arrival to ED. Patient remained hypoxic on non-rebreather and  was intubated in ED for airway protection.  Labs revealed WBC 21.13, chloride 109, CO2 19, glucose 190, BNP 22, troponin 0.014, and lactic acid 3.8.  Flu/RSV/COVID were negative.  UA revealed 2+ protein, trace ketones, 3+ occult blood, 1+ bilirubin, and greater than 100 RBCs.  Blood and respiratory cultures were obtained.  Chest x-ray revealed chronic emphysema and scarring within the lungs without appreciable acute superimposed airspace opacity.  Patient was given LR, IV Zosyn, and IV Azithromycin in ED.  " Patient was admitted to ICU for close monitoring.  Patient was started on Solu-Medrol 60 mg BID and DuoNebs q.6 hours.  Urology was consulted for hematuria. Ultrasound retroperitoneal complete revealed no abnormalities. Urology recommended continuing indwelling lang with outpatient follow-up. Patient had previous history of Klebsiella pneumonia which was sensitive to Zosyn.  Azithromycin was discontinued.  CTA chest revealed multiple enlarged mediastinal lymph nodes, no filling defects seen to suggest PE, and severe emphysematous changes identified in the lungs with consolidation in the posterior basal segments of bilateral lower lobes demonstrating air bronchograms which may reflect aspiration pneumonia.  NG tube was placed and patient was started on tube feeds.  Patient was transitioned to prednisone 40 mg daily for 5 day course on 10/01. Patient was extubated on 10/01 and placed on Vapotherm. Speech therapy was consulted. Patient was cleared for downgrade out of ICU on 10/03/2023. Hospital medicine was consulted for transition of care and further medical management.    Patient has been seen by multiple gastroenterologist in the past including Dr. Emigdio Blake, Dr. Lantigua as well as Dr. Mota.  He still is on high-dose OxyMask.    Recommendations:   1 . Recommend pulmonary consultation in view of advanced COPD and compromised respiratory status.  Patient was on mechanical ventilator during this admit and is obviously known to them.  He would be high risk for sedation in view of compromised respiratory status.  2. GI consult service to follow the patient on Monday regarding additional recommendation in regards to possible percutaneous endoscopic gastrostomy pending Pulmonary consultation.      Thank you for the consultation

## 2023-10-08 NOTE — AI DETERIORATION ALERT
Artificial Intelligence Notification  [facility]  [address]  [place] [address]  Phone: [phone]    This documentation was triggered by an Artificial Intelligence Notification:    Admit Date: 2023   LOS: 10  Code Status: Full Code  : 1940  Age: 83 y.o.  Weight:   Wt Readings from Last 1 Encounters:   23 83.5 kg (184 lb 1.6 oz)        Sex: male  Bed: 514/514 A  MRN: [id number]  Attending Physician: Joshua Best MD     Date of Alert: 10/08/2023  Time AI Alert Received: 818            Vitals:    10/08/23 0808   BP: 128/61   Pulse: 80   Resp: (!) 24   Temp: 97.4 °F (36.3 °C)     SpO2: (!) 91 %      Artificial Intelligence alert discussed with Provider:     Name: Eda Vasquez   Date/Time of Provider Notification: 10/8/23      Patient Condition: Spoke with nurse regarding patient's elevated respiratory rate; seems to be at patient's baseline. Patient is chronic COPD patient on home O2. No acute issues at this time regarding patient; no ICU intervention.     Follow up on patient vitals; currently with respiratory rate of 20 and spO2 of 96%.

## 2023-10-08 NOTE — PROGRESS NOTES
"[facility] - 5th Floor [facility] MEDICINE ~ PROGRESS NOTE        CHIEF COMPLAINT   Hospital follow up    HOSPITAL COURSE   Helder Sharp Jr. is a 83 y.o. male with a past medical history of hyperlipidemia, CAD, COPD, on 2 L home oxygen, pulmonary nodules, and anxiety presented to [facility] on 9/28/2023 for shortness of breath and weakness. Wife also endorsed worsening nonproductive cough and lower extremity swelling.  Wife noticed chocolate" colored urine at approximately 15:30 on 09/28, prompting them to ED. Patient also had to increase home oxygen to 3 L secondary to increased dyspnea.  Patient received albuterol nebulizer treatment en route with EMS.  Patient was given an hour long nebulizer treatment, magnesium, and Solu-Medrol upon arrival to ED. Patient remained hypoxic on non-rebreather and  was intubated in ED for airway protection.  Labs revealed WBC 21.13, chloride 109, CO2 19, glucose 190, BNP 22, troponin 0.014, and lactic acid 3.8.  Flu/RSV/COVID were negative.  UA revealed 2+ protein, trace ketones, 3+ occult blood, 1+ bilirubin, and greater than 100 RBCs.  Blood and respiratory cultures were obtained.  Chest x-ray revealed chronic emphysema and scarring within the lungs without appreciable acute superimposed airspace opacity.  Patient was given LR, IV Zosyn, and IV Azithromycin in ED.  Patient was admitted to ICU for close monitoring.  Patient was started on Solu-Medrol 60 mg BID and DuoNebs q.6 hours.  Urology was consulted for hematuria. Ultrasound retroperitoneal complete revealed no abnormalities. Urology recommended continuing indwelling lang with outpatient follow-up. Patient had previous history of Klebsiella pneumonia which was sensitive to Zosyn.  Azithromycin was discontinued.  CTA chest revealed multiple enlarged mediastinal lymph nodes, no filling defects seen to suggest PE, and severe emphysematous changes identified in the lungs with consolidation in the posterior " basal segments of bilateral lower lobes demonstrating air bronchograms which may reflect aspiration pneumonia.  NG tube was placed and patient was started on tube feeds.  Patient was transitioned to prednisone 40 mg daily for 5 day course on 10/01. Patient was extubated on 10/01 and placed on Vapotherm. Speech therapy was consulted. Patient was cleared for downgrade out of ICU on 10/03/2023. Hospital medicine was consulted for transition of care and further medical management.    Has completed 7 days of Zosyn therapy for possible aspiration pneumonia.    Today  Increased sputum production, leukocytosis noted and slightly increased oxygen needs.  Chest x-ray shows new developing bilateral opacities.  I will collect respiratory culture and start Zosyn again.  He was restless and had to receive Haldol this morning, has not been sleeping well.        OBJECTIVE/PHYSICAL EXAM     VITAL SIGNS (MOST RECENT):  Temp: 97.4 °F (36.3 °C) (10/08/23 0808)  Pulse: 82 (10/08/23 0915)  Resp: 20 (10/08/23 0915)  BP: 128/61 (10/08/23 0808)  SpO2: 96 % (10/08/23 0915) VITAL SIGNS (24 HOUR RANGE):  Temp:  [97.4 °F (36.3 °C)-98.3 °F (36.8 °C)] 97.4 °F (36.3 °C)  Pulse:  [80-91] 82  Resp:  [18-24] 20  SpO2:  [90 %-96 %] 96 %  BP: (106-134)/(58-67) 128/61   GENERAL: In no acute distress, afebrile  HEENT:  NG tube  CHEST:  Greatly diminished breath sounds with crackles bases  HEART: S1, S2, no appreciable murmur  ABDOMEN: Soft, nontender, BS +  MSK: Warm, no lower extremity edema, no clubbing or cyanosis  NEUROLOGIC: Alert and oriented x4, moving all extremities with good strength   INTEGUMENTARY:  PSYCHIATRY:        ASSESSMENT/PLAN   Acute on chronic hypoxemic respiratory failure-intubated/extubated  Chronic hypoxic respiratory failure 2 L nasal cannula at home   COPD exacerbation  Bilateral aspiration pneumonia   Sepsis 2/2 above   Hematuria-resolved    History of: HLD, CAD, pulmonary nodules      Pulmonology reconsulted for clearance for  PEG tube placement per GI.    Continue DuoNeb 4 times daily and as needed.    Completed 7 days of Zosyn.  Restarted Zosyn October 8.  Collect respiratory culture.  Continue NG tube feeds  GI following.  Pending plans regarding PEG tube placement.    DVT prophylaxis:  Lovenox 40    Anticipated discharge and disposition:    __________________________________________________________________________    LABS/MICRO/MEDS/DIAGNOSTICS       LABS  Recent Labs     10/08/23  0452      K 4.2   CHLORIDE 104   CO2 25   BUN 33.4*   CREATININE 0.88   GLUCOSE 130*   CALCIUM 9.5   ALBUMIN 3.1*       Recent Labs     10/08/23  0452   WBC 16.91*   RBC 4.39*   HCT 40.1*   MCV 91.3            MICROBIOLOGY  Microbiology Results (last 7 days)       Procedure Component Value Units Date/Time    Respiratory Culture [[id number]]     Order Status: Sent Specimen: Sputum     Blood culture #1 **CANNOT BE ORDERED STAT** [[id number]]  (Normal) Collected: 09/28/23 1812    Order Status: Completed Specimen: Blood Updated: 10/03/23 2100     CULTURE, BLOOD ([facility]) No Growth at 5 days    Blood culture #2 **CANNOT BE ORDERED STAT** [[id number]]  (Normal) Collected: 09/28/23 1812    Order Status: Completed Specimen: Blood Updated: 10/03/23 2100     CULTURE, BLOOD ([facility]) No Growth at 5 days               MEDICATIONS   albuterol-ipratropium  3 mL Nebulization Q4H WAKE    busPIRone  10 mg Oral BID    enoxparin  40 mg Subcutaneous Q24H (prophylaxis, 1700)    fluticasone furoate-vilanteroL  1 puff Inhalation Daily    guaiFENesin 100 mg/5 ml  200 mg Per NG tube Q6H    piperacillin-tazobactam (Zosyn) IV (PEDS and ADULTS) (extended infusion is not appropriate)  4.5 g Intravenous Q8H    tiotropium bromide  2 puff Inhalation Daily         INFUSIONS           DIAGNOSTIC TESTS  X-Ray Chest 1 View   Final Result      Bilateral lower lung lobes new patchy opacities suggest atelectasis and/or associated early infiltrates.         Electronically signed by: Ronny  Hamilton   Date:    10/08/2023   Time:    09:14      Fl Modified Barium Swallow Speech   Final Result      X-Ray Chest 1 View   Final Result      Improved aeration in the right infrahilar region and right cardiophrenic angle region.      Persistent confluent opacities at the left base including the left retrocardiac region which might reflect an infiltrate/atelectasis.      Interval removal of endotracheal tube         Electronically signed by: Marc Andrews   Date:    10/05/2023   Time:    10:47      XR Gastric tube check, non-radiologist performed   Final Result      Nasogastric tube as above         Electronically signed by: Marc Andrews   Date:    10/03/2023   Time:    08:19      Fl Modified Barium Swallow Speech   Final Result      X-Ray Chest 1 View   Final Result      XR Gastric tube check, non-radiologist performed   Final Result      As above.         Electronically signed by: Hugh De Leon   Date:    10/02/2023   Time:    07:55      X-Ray Chest 1 View   Final Result      As above.         Electronically signed by: Hugh De Leon   Date:    09/30/2023   Time:    11:54      US Retroperitoneal Complete   Final Result      Limited study, no abnormalities are demonstrated.         Electronically signed by: Aron Barger MD   Date:    09/29/2023   Time:    13:51      CTA Chest Non-Coronary (PE Studies)   Final Result   Impression:      1. Multiple enlarged mediastinal lymph nodes are seen. The largest is in subcarinal space and measures 1.7 cm in least dimension.      2. No filling defects are seen in the pulmonary arteries to suggest pulmonary embolus.      3. Severe emphysematous changes are identified in the lungs. There is consolidation in the posterior basal segments of bilateral lower lobes demonstrating air bronchograms. This may reflect aspiration pneumonia. Correlate clinically as regards further evaluation and followup.      4. Details and other findings as discussed above.      No significant  "discrepancy with overnight report.         Electronically signed by: Ronny Hamilton   Date:    09/29/2023   Time:    08:09      X-Ray Chest AP Portable   Final Result      Chronic emphysema and scarring within the lungs without appreciable acute superimposed airspace opacity.         Electronically signed by: Kimberli Cisse   Date:    09/28/2023   Time:    18:38           No results found for: "EF"       NUTRITION STATUS  Patient meets ASPEN criteria for moderate malnutrition of acute illness or injury per RD assessment as evidenced by:  Energy Intake (Malnutrition):  (does not meet criteria)  Weight Loss (Malnutrition):  (does not meet criteria)  Subcutaneous Fat (Malnutrition): mild depletion  Muscle Mass (Malnutrition): mild depletion           A minimum of two characteristics is recommended for diagnosis of either severe or non-severe malnutrition.       Case related differential diagnoses have been reviewed; assessment and plan has been documented. I have personally reviewed the labs and test results that are currently available; I have reviewed the patients medication list. I have reviewed the consulting providers recommendations. I have reviewed or attempted to review medical records based upon their availability.  All of the patient's and/or family's questions have been addressed and answered to the best of my ability.  I will continue to monitor closely and make adjustments to medical management as needed.  This document was created using M*Modal Fluency Direct.  Transcription errors may have been made.  Please contact me if any questions may rise regarding documentation to clarify transcription.        Joshua Best MD   Internal Medicine  Department of Hospital Medicine  [facility] - 5th Floor Med Surg               "

## 2023-10-09 NOTE — PLAN OF CARE
Problem: Adult Inpatient Plan of Care  Goal: Plan of Care Review  Outcome: Ongoing, Progressing  Goal: Patient-Specific Goal (Individualized)  Outcome: Ongoing, Progressing  Goal: Absence of Hospital-Acquired Illness or Injury  Outcome: Ongoing, Progressing  Goal: Optimal Comfort and Wellbeing  Outcome: Ongoing, Progressing  Goal: Readiness for Transition of Care  Outcome: Ongoing, Progressing     Problem: Infection  Goal: Absence of Infection Signs and Symptoms  Outcome: Ongoing, Progressing     Problem: Impaired Wound Healing  Goal: Optimal Wound Healing  Outcome: Ongoing, Progressing     Problem: Skin Injury Risk Increased  Goal: Skin Health and Integrity  Outcome: Ongoing, Progressing     Problem: Skin and Tissue Injury (Artificial Airway)  Goal: Absence of Device-Related Skin or Tissue Injury  Outcome: Ongoing, Progressing     Problem: Coping Ineffective  Goal: Effective Coping  Outcome: Ongoing, Progressing

## 2023-10-09 NOTE — PROGRESS NOTES
"[facility] Hospital Medicine Progress Note        Chief Complaint: Inpatient Follow-up for  shortness of breath and weakness      HPI: Helder Sharp Jr. is a 83 y.o. male with a past medical history of hyperlipidemia, CAD, COPD, on 2 L home oxygen, pulmonary nodules, and anxiety presented to [facility] on 9/28/2023 for shortness of breath and weakness. Wife also endorsed worsening nonproductive cough and lower extremity swelling.  Wife noticed chocolate" colored urine at approximately 15:30 on 09/28, prompting them to ED. Patient also had to increase home oxygen to 3 L secondary to increased dyspnea.  Patient received albuterol nebulizer treatment en route with EMS.  Patient was given an hour long nebulizer treatment, magnesium, and Solu-Medrol upon arrival to ED. Patient remained hypoxic on non-rebreather and  was intubated in ED for airway protection.  Labs revealed WBC 21.13, chloride 109, CO2 19, glucose 190, BNP 22, troponin 0.014, and lactic acid 3.8.  Flu/RSV/COVID were negative.  UA revealed 2+ protein, trace ketones, 3+ occult blood, 1+ bilirubin, and greater than 100 RBCs.  Blood and respiratory cultures were obtained.  Chest x-ray revealed chronic emphysema and scarring within the lungs without appreciable acute superimposed airspace opacity.  Patient was given LR, IV Zosyn, and IV Azithromycin in ED.  Patient was admitted to ICU for close monitoring.  Patient was started on Solu-Medrol 60 mg BID and DuoNebs q.6 hours.  Urology was consulted for hematuria. Ultrasound retroperitoneal complete revealed no abnormalities. Urology recommended continuing indwelling lang with outpatient follow-up. Patient had previous history of Klebsiella pneumonia which was sensitive to Zosyn.  Azithromycin was discontinued.  CTA chest revealed multiple enlarged mediastinal lymph nodes, no filling defects seen to suggest PE, and severe emphysematous changes identified in the lungs with " consolidation in the posterior basal segments of bilateral lower lobes demonstrating air bronchograms which may reflect aspiration pneumonia.  NG tube was placed and patient was started on tube feeds.  Patient was transitioned to prednisone 40 mg daily for 5 day course on 10/01. Patient was extubated on 10/01 and placed on Vapotherm. Speech therapy was consulted. Patient was cleared for downgrade out of ICU on 10/03/2023. Hospital medicine was consulted for transition of care and further medical management.     Has completed 7 days of Zosyn therapy for possible aspiration pneumonia.  Failed MBS, recommendations made for PEG tube, Palliative was consulted, patient and family interested in PEG tube.  Gastroenterology following.  Patient is moderate risk for perioperative pulmonary complications and hypercapnia with PEG placement.Pulmonology following     On 10/8  Increased sputum production, leukocytosis noted and slightly increased oxygen needs.  Chest x-ray shows new developing bilateral opacities, collected respiratory culture and started Zosyn again.     Requiring Haldol for agitation in-house.    Interval Hx:   Patient was seen and examined, on OxyMask, reports excessive mucus production and throat pain , also with intermittent headaches.  Denies any fevers or chills.  Family at bedside, concerned about his agitation    Chart reviewed, T-max of 99.3°, labs reviewed, white blood cell count going up, 16--17.8, creatinine from 0.8-1.2, Gram-negative rods on respiratory culture    Objective/physical exam:  General: In no acute distress, afebrile  Chest: Clear to auscultation bilaterally  Heart: RRR, +S1, S2, no appreciable murmur  Abdomen: Soft, nontender, BS +  MSK: Warm, no lower extremity edema, no clubbing or cyanosis  Neurologic: Alert and oriented x4, Cranial nerve II-XII intact, Strength 5/5 in all 4 extremities    VITAL SIGNS: 24 HRS MIN & MAX LAST   Temp  Min: 97.5 °F (36.4 °C)  Max: 99.3 °F (37.4 °C) 97.5  °F (36.4 °C)   BP  Min: 98/62  Max: 151/68 138/68   Pulse  Min: 76  Max: 106  79   Resp  Min: 18  Max: 28 (!) 21   SpO2  Min: 82 %  Max: 96 % (!) 92 %     I have reviewed the following labs:  Recent Labs   Lab 10/05/23  0607 10/08/23  0452 10/09/23  0503   WBC 11.09 16.91* 17.86*   RBC 4.17* 4.39* 4.18*   HGB 12.4* 13.3* 12.5*   HCT 38.1* 40.1* 38.5*   MCV 91.4 91.3 92.1   MCH 29.7 30.3 29.9   MCHC 32.5* 33.2 32.5*   RDW 14.6 14.6 14.9    207 226   MPV 9.7 9.5 9.8     Recent Labs   Lab 10/03/23  0557 10/05/23  0607 10/08/23  0452 10/09/23  0503    143 138 137   K 3.7 3.7 4.2 4.0   CO2 25 26 25 26   BUN 31.2* 30.6* 33.4* 41.6*   CREATININE 0.81 0.87 0.88 1.22*   CALCIUM 8.6* 8.8 9.5 8.9   MG  --   --  2.30  --    ALBUMIN 2.8* 2.9* 3.1*  --    ALKPHOS 61 60  --   --    ALT 18 19  --   --    AST 18 13  --   --    BILITOT 0.8 0.7  --   --      Microbiology Results (last 7 days)       Procedure Component Value Units Date/Time    Respiratory Culture [[id number]]  (Abnormal) Collected: 10/08/23 1639    Order Status: Completed Specimen: Sputum Updated: 10/09/23 0703     Respiratory Culture Moderate Gram-negative Rods     Comment: with normal respiratory nasra        GRAM STAIN Quality 1+      Few Gram positive cocci      Rare Gram Negative Rods      Rare Gram Positive Rods    Blood culture #1 **CANNOT BE ORDERED STAT** [[id number]]  (Normal) Collected: 09/28/23 1812    Order Status: Completed Specimen: Blood Updated: 10/03/23 2100     CULTURE, BLOOD ([facility]) No Growth at 5 days    Blood culture #2 **CANNOT BE ORDERED STAT** [[id number]]  (Normal) Collected: 09/28/23 1812    Order Status: Completed Specimen: Blood Updated: 10/03/23 2100     CULTURE, BLOOD ([facility]) No Growth at 5 days             See below for Radiology    Scheduled Med:   albuterol-ipratropium  3 mL Nebulization Q4H WAKE    busPIRone  10 mg Oral BID    enoxparin  40 mg Subcutaneous Q24H (prophylaxis, 1700)    fluticasone furoate-vilanteroL  1 puff  Inhalation Daily    guaiFENesin 100 mg/5 ml  200 mg Per NG tube Q6H    melatonin  6 mg Oral Nightly    piperacillin-tazobactam (Zosyn) IV (PEDS and ADULTS) (extended infusion is not appropriate)  4.5 g Intravenous Q8H    tiotropium bromide  2 puff Inhalation Daily    zolpidem  5 mg Oral QHS      Continuous Infusions:     PRN Meds:  acetaminophen, albuterol-ipratropium, hydrALAZINE, morphine, sodium chloride 0.9%, ziprasidone     Assessment/Plan:  Acute on chronic hypoxemic respiratory failure-intubated/extubated  Chronic hypoxic respiratory failure 2 L nasal cannula at home   COPD exacerbation  Bilateral aspiration pneumonia   Sepsis 2/2 above   Leukocytosis  Elevated creatinine  Dysphagia  Hematuria-resolved  Agitation     History of: HLD, CAD, pulmonary nodules    Plan   Continue oxygen supplementation and supportive care, Continue DuoNeb 4 times daily and as needed.   Continue Zosyn, follow up cultures and sensitivities, monitor fever curve and WBC  Failed MBS, family and patient okay for PEG tube, palliative met with the family, GI following, Pulmonology reconsulted for clearance for PEG tube placement per GI.  Timing to be determined  Check urine studies, monitor renal function , avoid nephrotoxins  Encourage redirection  Encourage therapy  Continue appropriate home medications      Critical care Time Spent>35 min       VTE prophylaxis:  Lovenox      Anticipated discharge and Disposition:   To be decided      All diagnosis and differential diagnosis have been reviewed; assessment and plan has been documented; I have personally reviewed the labs and test results that are presently available; I have reviewed the patients medication list; I have reviewed the consulting providers response and recommendations. I have reviewed or attempted to review medical records based upon their availability    All of the patient's questions have been  addressed and answered. Patient's is agreeable to the above stated plan. I will  continue to monitor closely and make adjustments to medical management as needed.  _____________________________________________________________________    Nutrition Status:    Radiology:  I have personally reviewed the following imaging and agree with the radiologist.     X-Ray Chest 1 View  Narrative: EXAMINATION:  XR CHEST 1 VIEW    CLINICAL HISTORY:  f/u, leukocytosis;    TECHNIQUE:  One view    COMPARISON:  October 5, 2023..    FINDINGS:  Cardiopericardial silhouette is within normal limits.  There are new patchy opacities involving bilateral lower lung zones.  Upper lung lobes are remarkable for emphysematous bullous changes.  There is no overt pulmonary edema.  No significant fluid within the pleural space.  No pneumothorax.  Impression: Bilateral lower lung lobes new patchy opacities suggest atelectasis and/or associated early infiltrates.    Electronically signed by: Ronny Hamilton  Date:    10/08/2023  Time:    09:14      Juliane Tucker MD   10/09/2023

## 2023-10-09 NOTE — PROGRESS NOTES
"Gastroenterology Progress Note    Subjective/Interval History:  Multiple complaints: mucous in the back of his throat was able to cough it up after doing IS this am and it was the consistency of "biscuit dough" also c/o HA.     Denies nausea or abdominal pain.  Tolerating continuous NG TFs at 85 cc/hr.  Remains on 8L oxymask.    Vital Signs:  /60   Pulse 82   Temp 98.1 °F (36.7 °C) (Axillary)   Resp 18   Ht 6' 2" (1.88 m)   Wt 83.5 kg (184 lb 1.6 oz)   SpO2 95%   BMI 23.64 kg/m²   Body mass index is 23.64 kg/m².    Physical Exam:  Physical Exam  Constitutional:       General: He is not in acute distress.     Appearance: He is ill-appearing.      Comments: Wife at bedside   HENT:      Head: Normocephalic and atraumatic.   Eyes:      General: No scleral icterus.     Extraocular Movements: Extraocular movements intact.   Cardiovascular:      Rate and Rhythm: Normal rate and regular rhythm.   Pulmonary:      Effort: Pulmonary effort is normal. No respiratory distress.      Comments: 8L oxymask in place  Abdominal:      General: Bowel sounds are normal. There is no distension.      Palpations: Abdomen is soft. There is no mass.      Tenderness: There is no abdominal tenderness. There is no guarding or rebound.      Comments: No upper abdominal surgical scars   Musculoskeletal:      Right lower leg: No edema.      Left lower leg: No edema.   Skin:     General: Skin is warm and dry.      Coloration: Skin is not jaundiced.   Neurological:      Mental Status: He is alert and oriented to person, place, and time.   Psychiatric:         Mood and Affect: Mood normal.         Behavior: Behavior normal.         Labs:  Recent Results (from the past 48 hour(s))   Magnesium    Collection Time: 10/08/23  4:52 AM   Result Value Ref Range    Magnesium Level 2.30 1.60 - 2.60 mg/dL   Renal Function Panel    Collection Time: 10/08/23  4:52 AM   Result Value Ref Range    Sodium Level 138 136 - 145 mmol/L    Potassium Level 4.2 " 3.5 - 5.1 mmol/L    Chloride 104 98 - 107 mmol/L    Carbon Dioxide 25 23 - 31 mmol/L    Glucose Level 130 (H) 82 - 115 mg/dL    Blood Urea Nitrogen 33.4 (H) 8.4 - 25.7 mg/dL    Creatinine 0.88 0.73 - 1.18 mg/dL    Calcium Level Total 9.5 8.8 - 10.0 mg/dL    Albumin Level 3.1 (L) 3.4 - 4.8 g/dL    Phosphorus Level 3.3 2.3 - 4.7 mg/dL    eGFR >60 mls/min/1.73/m2   CBC with Differential    Collection Time: 10/08/23  4:52 AM   Result Value Ref Range    WBC 16.91 (H) 4.50 - 11.50 x10(3)/mcL    RBC 4.39 (L) 4.70 - 6.10 x10(6)/mcL    Hgb 13.3 (L) 14.0 - 18.0 g/dL    Hct 40.1 (L) 42.0 - 52.0 %    MCV 91.3 80.0 - 94.0 fL    MCH 30.3 27.0 - 31.0 pg    MCHC 33.2 33.0 - 36.0 g/dL    RDW 14.6 11.5 - 17.0 %    Platelet 207 130 - 400 x10(3)/mcL    MPV 9.5 7.4 - 10.4 fL    Neut % 79.6 %    Lymph % 12.5 %    Mono % 5.5 %    Eos % 0.5 %    Basophil % 0.2 %    Lymph # 2.12 0.6 - 4.6 x10(3)/mcL    Neut # 13.46 (H) 2.1 - 9.2 x10(3)/mcL    Mono # 0.93 0.1 - 1.3 x10(3)/mcL    Eos # 0.09 0 - 0.9 x10(3)/mcL    Baso # 0.03 <=0.2 x10(3)/mcL    IG# 0.28 (H) 0 - 0.04 x10(3)/mcL    IG% 1.7 %    NRBC% 0.0 %   Respiratory Culture    Collection Time: 10/08/23  4:39 PM    Specimen: Sputum   Result Value Ref Range    Respiratory Culture Moderate Gram-negative Rods (A)     GRAM STAIN Quality 1+     GRAM STAIN Few Gram positive cocci     GRAM STAIN Rare Gram Negative Rods     GRAM STAIN Rare Gram Positive Rods    Basic Metabolic Panel    Collection Time: 10/09/23  5:03 AM   Result Value Ref Range    Sodium Level 137 136 - 145 mmol/L    Potassium Level 4.0 3.5 - 5.1 mmol/L    Chloride 102 98 - 107 mmol/L    Carbon Dioxide 26 23 - 31 mmol/L    Glucose Level 124 (H) 82 - 115 mg/dL    Blood Urea Nitrogen 41.6 (H) 8.4 - 25.7 mg/dL    Creatinine 1.22 (H) 0.73 - 1.18 mg/dL    BUN/Creatinine Ratio 34     Calcium Level Total 8.9 8.8 - 10.0 mg/dL    Anion Gap 9.0 mEq/L    eGFR 59 mls/min/1.73/m2   CBC with Differential    Collection Time: 10/09/23  5:03 AM    Result Value Ref Range    WBC 17.86 (H) 4.50 - 11.50 x10(3)/mcL    RBC 4.18 (L) 4.70 - 6.10 x10(6)/mcL    Hgb 12.5 (L) 14.0 - 18.0 g/dL    Hct 38.5 (L) 42.0 - 52.0 %    MCV 92.1 80.0 - 94.0 fL    MCH 29.9 27.0 - 31.0 pg    MCHC 32.5 (L) 33.0 - 36.0 g/dL    RDW 14.9 11.5 - 17.0 %    Platelet 226 130 - 400 x10(3)/mcL    MPV 9.8 7.4 - 10.4 fL    Neut % 82.9 %    Lymph % 10.1 %    Mono % 5.4 %    Eos % 0.2 %    Basophil % 0.2 %    Lymph # 1.80 0.6 - 4.6 x10(3)/mcL    Neut # 14.81 (H) 2.1 - 9.2 x10(3)/mcL    Mono # 0.97 0.1 - 1.3 x10(3)/mcL    Eos # 0.03 0 - 0.9 x10(3)/mcL    Baso # 0.04 <=0.2 x10(3)/mcL    IG# 0.21 (H) 0 - 0.04 x10(3)/mcL    IG% 1.2 %    NRBC% 0.0 %       Imaging:  X-Ray Chest 1 View    Result Date: 10/8/2023  EXAMINATION: XR CHEST 1 VIEW CLINICAL HISTORY: f/u, leukocytosis; TECHNIQUE: One view COMPARISON: October 5, 2023.. FINDINGS: Cardiopericardial silhouette is within normal limits.  There are new patchy opacities involving bilateral lower lung zones.  Upper lung lobes are remarkable for emphysematous bullous changes.  There is no overt pulmonary edema.  No significant fluid within the pleural space.  No pneumothorax.     Bilateral lower lung lobes new patchy opacities suggest atelectasis and/or associated early infiltrates. Electronically signed by: Ronny Hamilton Date:    10/08/2023 Time:    09:14    Fl Modified Barium Swallow Speech    Result Date: 10/6/2023  See procedure notes from Speech Pathologist. This procedure was auto-finalized.    X-Ray Chest 1 View    Result Date: 10/5/2023  EXAMINATION: XR CHEST 1 VIEW CPT 04770 CLINICAL HISTORY: f/u; COMPARISON: September 30, 2023 FINDINGS: Examination reveals mediastinal silhouette to be within normal limits cardiac silhouette is not enlarged improved aeration identified in the right infrahilar region and at the region of the right cardiophrenic angle. Persistent confluent airspace opacities identified in the left infrahilar region and left base  including increased left retrocardiac density and silhouetting of the left hemidiaphragm which might be related to an infiltrate/atelectasis. Endotracheal tube has been removed     Improved aeration in the right infrahilar region and right cardiophrenic angle region. Persistent confluent opacities at the left base including the left retrocardiac region which might reflect an infiltrate/atelectasis. Interval removal of endotracheal tube Electronically signed by: Marc Andrews Date:    10/05/2023 Time:    10:47    XR Gastric tube check, non-radiologist performed    Result Date: 10/3/2023  EXAMINATION: XR GASTRIC TUBE CHECK, NON-RADIOLOGIST PERFORMED CLINICAL HISTORY: Ng tube placement; COMPARISON: October 2, 2023 FINDINGS: Examination reveals a nasogastric tube with the tip in the fundus of the stomach     Nasogastric tube as above Electronically signed by: Marc Andrews Date:    10/03/2023 Time:    08:19    Fl Modified Barium Swallow Speech    Result Date: 10/2/2023  See procedure notes from Speech Pathologist. This procedure was auto-finalized.    X-Ray Chest 1 View    Result Date: 10/2/2023  EXAMINATION XR CHEST 1 VIEW CLINICAL HISTORY post intubation ; TECHNIQUE A total of 1 frontal image(s) of the chest. COMPARISON 28 September 2023, 18:21 FINDINGS Lines/tubes/devices: Interval placement of endotracheal tube, terminating approximately 6 cm superior to the alycia. Esophagogastric tube also now visualized, extending inferior to the level the diaphragm; side port noted at the level of the GE junction. The cardiac silhouette and central vascular structures are unchanged.  The trachea is midline. No new or worsening consolidation is identified. There is no enlarging pleural effusion or convincing pneumothorax. Regional osseous structures and extrathoracic soft tissues are similar. IMPRESSION Interval tracheal and esophageal intubation, as above. Please note this exam was initially acquired immediately  following intubation on 28 September 2023.  However, the study could not be completed and sent to PACS for interpretation due to technical issues with the order.  The exam was interpreted as soon is the issues were resolved morning of 2 October 2023. Electronically signed by: Morales Goldberg Date:    10/02/2023 Time:    11:55    XR Gastric tube check, non-radiologist performed    Result Date: 10/2/2023  EXAMINATION: XR GASTRIC TUBE CHECK, NON-RADIOLOGIST PERFORMED CLINICAL HISTORY: NG tube placement; TECHNIQUE: Single view of the chest. COMPARISON: None FINDINGS: NG tube projects over the left upper quadrant.     As above. Electronically signed by: Hugh De Leon Date:    10/02/2023 Time:    07:55    X-Ray Chest 1 View    Result Date: 9/30/2023  EXAMINATION: XR CHEST 1 VIEW CLINICAL HISTORY: intubated; TECHNIQUE: Single view of the chest COMPARISON: 09/28/2023 FINDINGS: ET tube terminates just below the clavicular heads.  Prominent interstitial markings of bilateral lower lung zones grossly unchanged.     As above. Electronically signed by: Hugh De Leon Date:    09/30/2023 Time:    11:54    US Retroperitoneal Complete    Result Date: 9/29/2023  EXAMINATION: US RETROPERITONEAL COMPLETE CLINICAL HISTORY: hematuria; COMPARISON: 12/17/2018 FINDINGS: The right kidney measures 11.4 x 4.8 x 5.2 cm.  There are no focal lesions noted.  There is no hydronephrosis.  There is flow in the proximal IVC.  Aorta is not visualized. The left kidney is poorly demonstrated.  It measures 9.1 x 5.5 x 4.9 cm there are no focal lesions noted there is no hydronephrosis.  The urinary bladder is collapsed with a Licea catheter.     Limited study, no abnormalities are demonstrated. Electronically signed by: Aron Barger MD Date:    09/29/2023 Time:    13:51    Echo    Result Date: 9/29/2023    Left Ventricle: The left ventricle is normal in size. Normal wall thickness. Normal wall motion. There is normal systolic function with a visually  estimated ejection fraction of 55 - 60%. Grade I diastolic dysfunction.   Right Ventricle: Normal right ventricular cavity size. Systolic function is normal.   Mitral Valve: There is no stenosis. The mean pressure gradient across the mitral valve is 2 mmHg at a heart rate of  bpm.   Pulmonary Artery: No pulmonary hypertension.     CTA Chest Non-Coronary (PE Studies)    Result Date: 9/29/2023  Technique:CT Scan of the chest was performed with intravenous contrast with direct axial images as well as sagittal and coronal reconstruction images pulmonary embolus protocol. Dosage Information:Automated Exposure Control was utilized.   Comparison: January 5, 2023 . Clinical History:Pe suspected. Findings: Lines and Tubes:Endotracheal tube is present with its tip projecting 4 cm above the alycia. A nasogastric catheter is seen with its tip in the body of the stomach. Neck:The thyroid gland appear unremarkable. Mediastinum:Multiple similar enlarged mediastinal lymph nodes are seen. The largest is in subcarinal space and measures 1.7 cm in least dimension. Heart:The heart size is within normal limits. Coronary artery calcification is seen. Aorta:No aortic dissection or aneurysm is seen. Moderate aortic calcification is seen in the thoracic aorta. Pulmonary Arteries:No filling defects are seen in the pulmonary arteries to suggest pulmonary embolus. Lungs:Severe emphysematous changes are identified in the lungs. There is consolidation in the posterior basal segments of bilateral lower lobes demonstrating air bronchograms. This may reflect aspiration pneumonia. Pleura:No effusions or pneumothorax are identified. Bony Structures: Spine:Mild spondylolytic changes are seen in the thoracic spine. Abdomen:Both adrenal glands are thickened and may reflect adrenal hyperplasia. The rest of the visualized upper abdominal organs appear unremarkable.     Impression: 1. Multiple enlarged mediastinal lymph nodes are seen. The largest  is in subcarinal space and measures 1.7 cm in least dimension. 2. No filling defects are seen in the pulmonary arteries to suggest pulmonary embolus. 3. Severe emphysematous changes are identified in the lungs. There is consolidation in the posterior basal segments of bilateral lower lobes demonstrating air bronchograms. This may reflect aspiration pneumonia. Correlate clinically as regards further evaluation and followup. 4. Details and other findings as discussed above. No significant discrepancy with overnight report. Electronically signed by: Ronny Hamilton Date:    09/29/2023 Time:    08:09    X-Ray Chest AP Portable    Result Date: 9/28/2023  EXAMINATION: XR CHEST AP PORTABLE CLINICAL HISTORY: Asthma; TECHNIQUE: Single frontal view of the chest was performed. COMPARISON: 08/08/2023 FINDINGS: LINES AND TUBES: EKG/telemetry leads overlie the chest. MEDIASTINUM AND ANDRE: The cardiac silhouette is normal. Launch coronary LUNGS: Emphysema.  Chronic basilar scarring.  No appreciable acute superimposed airspace opacity. PLEURA:No pleural effusion. No pneumothorax. BONES: No acute osseous abnormality.     Chronic emphysema and scarring within the lungs without appreciable acute superimposed airspace opacity. Electronically signed by: Kimberli Cisse Date:    09/28/2023 Time:    18:38         Assessment/Plan:  84 yo M known to Dr. Sunny Mota with a past medical history of hyperlipidemia, CAD, COPD on 2 L home oxygen, pulmonary nodules, and anxiety.  Admitted 9/28 with Acute on chronic hypoxemic respiratory failure requiring intubation now extubated, COPD exacerbation, Bilateral aspiration pneumonia, Sepsis, and Hematuria now resolved.  GI consulted for PEG.     1. Oropharyngeal dysphagia   Failed MBS 10/6/23.   - Patient remains at risk for respiratory decompensation.  On oxymask at 8L.  Pulm note reviewed: moderate risk for perioperative pulmonary complications and hypercapnia with PEG placement.  Consideration of  airway management with ET tube or LMA during procedure would assure better ventilation and oxygenation in this patient with limited overall respiratory status.  - Will discuss timeline for EGD/PEG with Dr. DMITRY Duffy.   - Will need to Hold AM lovenox day of procedure.    Vandana Sol PA-C

## 2023-10-09 NOTE — PROGRESS NOTES
[facility] - 5th Floor Med Surg  Pulmonary Critical Care Note    Patient Name: Helder Sharp Jr.  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 Root from pulmonary aspect at our [facility].  Patient is on Daliresp according to their notes for frequent episodes of bronchitis/pneumonitis also has hypoxemic respiratory failure on supplemental O2 and trilogy with sleep.  He was last seen in their pulmonary clinic in August of 2023.  Their notes according to that time had documentation of aspiration; and was undergoing evaluation by palliative care.  There no further documented patient had bilateral pneumonia requiring hospitalization a PEG tube was recommended for aspiration however the patient refused.    Patient presented to [facility] on 09/28/2023 for complaints of shortness of breath and weakness.  Upon arrival the patient received an hour long neb, magnesium and Solu-Medrol in the emergency room.  Patient did require intubation and ICU admit.  Was subsequently extubated however after extubation required use of Vapotherm.  Downgraded from the ICU on 10/04/2023.  GI has been consulted for the consideration of PEG placement he in turn is consulting Pulmonary for surgical clearance.  Cultures from this admit are negative.  He continues to follow with speech therapy however they are currently recommended NPO status.  Palliative care has also been consulted and they are following.    Hospital Course/Significant events:  As above    24 Hour Interval History:  He is currently on 6L oxymask. GI planning PEG placement with possible ET tube or LMA during procedure as recommended by Dr Dominguez. He remains weak. Wife is very concerned about his  weakness.     Past Medical History:   Diagnosis Date    Acute bronchitis     Anxiety disorder, unspecified     COPD (chronic obstructive pulmonary disease)     Coronary artery disease     Hyperlipidemia     Left carotid bruit     Pulmonary nodules     Unspecified cataract        Past Surgical History:   Procedure Laterality Date    APPENDECTOMY      CATARACT EXTRACTION Left 2023    HERNIA REPAIR      PHACOEMULSIFICATION, CATARACT, WITH IOL INSERTION Left 1/3/2023    Procedure: PHACOEMULSIFICATION, CATARACT, WITH IOL INSERTION- OS;  Surgeon: Louis Abdi MD;  Location: [facility];  Service: Ophthalmology;  Laterality: Left;    TONSILLECTOMY         Social History     Socioeconomic History    Marital status:    Tobacco Use    Smoking status: Former     Current packs/day: 0.00     Types: Cigarettes     Quit date:      Years since quittin.7    Smokeless tobacco: Never   Substance and Sexual Activity    Alcohol use: Not Currently    Drug use: Never    Sexual activity: Yes           Current Outpatient Medications   Medication Instructions    albuterol (ACCUNEB) 0.63 mg, Nebulization, Every 6 hours PRN, Rescue    albuterol (PROVENTIL/VENTOLIN HFA) 90 mcg/actuation inhaler 2 puffs, Inhalation, Every 6 hours PRN, Rescue    albuterol-ipratropium (DUO-NEB) 2.5 mg-0.5 mg/3 mL nebulizer solution 3 mLs, Nebulization, Every 6 hours PRN, Rescue    ascorbic acid (vitamin C) (VITAMIN C) 500 mg, Oral, 2 times daily    aspirin (ECOTRIN) 81 mg, Oral    atorvastatin (LIPITOR) 20 mg, Oral    cholecalciferol (vitamin D3) (VITAMIN D3) 10,000 Units, Oral    DALIRESP 500 mcg Tab 0.5 tablets, Oral, Daily    fluticasone propionate (FLONASE) 50 mcg/actuation nasal spray 1 spray, Each Nostril, Daily    fluticasone-umeclidin-vilanter (TRELEGY ELLIPTA) 200-62.5-25 mcg inhaler 1 puff, Oral, Daily    guaiFENesin (MUCINEX) 1,200 mg, Oral, Daily    Lactobacillus rhamnosus GG (CULTURELLE) 15 billion cell capsule Oral    montelukast  (SINGULAIR) 10 mg tablet TAKE 1 TABLET BY MOUTH EVERY DAY    predniSONE (DELTASONE) 20 MG tablet Take 3 tablets by mouth for three days, then take 2 tablets by mouth for four days, then 1 tablet for four days, then 1/2 tablet for 4 days.    ranolazine (RANEXA) 500 mg, Oral, 2 times daily    roflumilast (DALIRESP) 500 mcg Tab 0.5 tablets, Oral, Daily    sertraline (ZOLOFT) 100 mg, Oral, Daily       Current Inpatient Medications   albuterol-ipratropium  3 mL Nebulization Q4H WAKE    busPIRone  10 mg Oral BID    enoxparin  40 mg Subcutaneous Q24H (prophylaxis, 1700)    fluticasone furoate-vilanteroL  1 puff Inhalation Daily    guaiFENesin 100 mg/5 ml  200 mg Per NG tube Q6H    melatonin  6 mg Oral Nightly    piperacillin-tazobactam (Zosyn) IV (PEDS and ADULTS) (extended infusion is not appropriate)  4.5 g Intravenous Q8H    tiotropium bromide  2 puff Inhalation Daily    zolpidem  5 mg Oral QHS         Review of Systems   Constitutional: Negative.    HENT: Negative.     Respiratory:  Positive for cough and shortness of breath.    Psychiatric/Behavioral: Negative.            Objective:       Intake/Output Summary (Last 24 hours) at 10/9/2023 0955  Last data filed at 10/9/2023 0809  Gross per 24 hour   Intake 1714.41 ml   Output --   Net 1714.41 ml           Vital Signs (Most Recent):  Temp: 98.1 °F (36.7 °C) (10/09/23 0740)  Pulse: 82 (10/09/23 0829)  Resp: 18 (10/09/23 0829)  BP: 118/60 (10/09/23 0740)  SpO2: 95 % (10/09/23 0759)  Body mass index is 23.64 kg/m².  Weight: 83.5 kg (184 lb 1.6 oz) Vital Signs (24h Range):  Temp:  [98.1 °F (36.7 °C)-99.3 °F (37.4 °C)] 98.1 °F (36.7 °C)  Pulse:  [] 82  Resp:  [18-28] 18  SpO2:  [82 %-96 %] 95 %  BP: ()/(60-70) 118/60     Physical Exam  Constitutional:       Appearance: Normal appearance.   HENT:      Head: Normocephalic and atraumatic.   Cardiovascular:      Rate and Rhythm: Regular rhythm.      Heart sounds: Normal heart sounds.   Pulmonary:      Effort:  "Pulmonary effort is normal.      Breath sounds: Rhonchi present.   Abdominal:      General: Bowel sounds are normal.      Palpations: Abdomen is soft.   Neurological:      General: No focal deficit present.      Mental Status: He is alert and oriented to person, place, and time.           Lines/Drains/Airways       Drain  Duration                  NG/OG Tube 10/02/23 0710 Left nostril 7 days              Peripheral Intravenous Line  Duration                  Peripheral IV - Single Lumen 09/28/23 1810 18 G Anterior;Left Forearm 10 days         Peripheral IV - Single Lumen 09/28/23 2104 18 G Anterior;Right Forearm 10 days                    Significant Labs:    Lab Results   Component Value Date    WBC 17.86 (H) 10/09/2023    HGB 12.5 (L) 10/09/2023    HCT 38.5 (L) 10/09/2023    MCV 92.1 10/09/2023     10/09/2023     BMP  Lab Results   Component Value Date     10/09/2023    K 4.0 10/09/2023    CHLORIDE 102 10/09/2023    CO2 26 10/09/2023    BUN 41.6 (H) 10/09/2023    CREATININE 1.22 (H) 10/09/2023    CALCIUM 8.9 10/09/2023    AGAP 9.0 10/09/2023    EGFRNONAA 55 07/15/2021     ABG  No results for input(s): "PH", "PO2", "PCO2", "HCO3", "POCBASEDEF" in the last 168 hours.      Mechanical Ventilation Support:  Vent Mode: CPAP / PSV (10/01/23 1351)  Ventilator Initiated: Yes (09/28/23 2007)  Set Rate: 20 BPM (10/01/23 1115)  Vt Set: 500 mL (10/01/23 1115)  Pressure Support: 10 cmH20 (10/01/23 1351)  PEEP/CPAP: 5 cmH20 (10/01/23 1351)  Oxygen Concentration (%): 50 (10/07/23 0508)  Peak Airway Pressure: 17 cmH20 (10/01/23 1351)  Total Ve: 17.9 L/m (10/01/23 1351)  F/VT Ratio<105 (RSBI): (!) 55.34 (10/01/23 0457)      Significant Imaging:  No imaging over the past 24 hours    Assessment/Plan:     Assessment  Reported end-stage chronic obstructive pulmonary disease with chronic hypoxemic respiratory failure on trilogy q.h.s. followed by Dr. Root   Aspiration/oropharyngeal dysphagia  History of hyperlipidemia, " coronary artery disease and pulmonary nodules -pulmonary notes from outpatient state the patient is not a candidate for any type of procedure in regards to these pulmonary nodules however they are continuing to follow them    Plan  Continue nebs, mucolytics and inhaled regimen for COPD management  GI planning PEG placement  Needs aggressive PT/OT/ST   Continue weaning o2 as tolerated    MYHCAL Michael  Pulmonary Critical Care Medicine  [facility] - 5th Floor Med Surg  DOS: 10/09/2023

## 2023-10-09 NOTE — PT/OT/SLP PROGRESS
Physical Therapy Treatment    Patient Name:  Helder Sharp Jr.   MRN:  [id number]    Recommendations:     Discharge Recommendations: nursing facility, skilled  Discharge Equipment Recommendations: none  Barriers to discharge:  placement    Assessment:     Helder Sharp Jr. is a 83 y.o. male admitted with a medical diagnosis of COPD exacerbation, atypical PNA hematuria, HLD, required mechanical ventilation now on NC, uses trilogy at home, goal O2 sats >88%. .  He presents with the following impairments/functional limitations: weakness, impaired endurance, impaired cardiopulmonary response to activity .    Rehab Prognosis: Good; patient would benefit from acute skilled PT services to address these deficits and reach maximum level of function.    Recent Surgery: * No surgery found *      Plan:     During this hospitalization, patient to be seen 5 x/week to address the identified rehab impairments via gait training, therapeutic activities, therapeutic exercises and progress toward the following goals:    Plan of Care Expires:  11/03/23    Subjective     Chief Complaint: weakness and SOB  Patient/Family Comments/goals: get stronger  Pain/Comfort:         Objective:     Communicated with RN prior to session.  Patient found HOB elevated with oxygen, NG tube upon PT entry to room.     General Precautions: Standard, fall  Orthopedic Precautions:    Braces: N/A  Respiratory Status:  OXYMASK 7L  Skin Integrity: Visible skin intact      Functional Mobility:  Bed Mobility:     Supine to Sit: minimum assistance  Sit to Supine: moderate assistance  Transfers:     Sit to Stand:  contact guard assistance with rolling walker  Bed to Chair: contact guard assistance with  rolling walker  using  Step Transfer  Gait: Pt amb 50ftx 1 and 50ft x 2 with RW cga. Slow candice step through gait pattern. Seated rest break required between trials. Pt desat to 85% while amb.    Therapeutic Activities/Exercises:  Pt performed 4 sit<>stands through  out session cga.    Education:  Patient provided with verbal education education regarding POC.  Understanding was verbalized, however additional teaching warranted.     Patient left HOB elevated with all lines intact, call button in reach, and daughter present..    GOALS:   Multidisciplinary Problems       Physical Therapy Goals          Problem: Physical Therapy    Goal Priority Disciplines Outcome Goal Variances Interventions   Physical Therapy Goal     PT, PT/OT Ongoing, Progressing     Description: Goals to be met by: 11/3/2023     Patient will increase functional independence with mobility by performin. Supine to sit with [place]  2. Sit to stand transfer with Modified [place]  3. Gait  x 150 feet with Modified [place] using Rolling Walker.                          Time Tracking:     PT Received On: 10/09/23  PT Start Time: 1425     PT Stop Time: 1456  PT Total Time (min): 31 min     Billable Minutes: Gait Training 20 and Therapeutic Activity 11    Treatment Type: Treatment  PT/PTA: PTA     Number of PTA visits since last PT visit: 4     10/09/2023

## 2023-10-10 NOTE — CONSULTS
ConsultsPatient Name: Helder Sharp Jr.   MRN: [id number]   Admission Date: 9/28/2023   Hospital Length of Stay: 12   Attending Provider: Juliane Tucker MD   Consulting Provider: Kwasi Phipps M.D.  Reason for Consult: Goals of Care  Primary Care Physician: Kimo Love MD     Principal Problem: Moderate malnutrition     Patient information was obtained from patient, spouse/SO, and ER records.      Final diagnoses:  [J96.90] Respiratory failure     Assessment/Plan:     The patient has continued to fail bedside swallow study.They plan for PEG placement.    Symptom management review: Patient has had night time agitation and has received geodon over night. He is sleeping and not easily arousible. His wife is concerned that he has not received these meds before and that he is on zoloft 100 mg daily at home, not restarted in the hospital. She is worried about his depression and med withdrawal. I spoke to his hospitalist about restarting this medication when he is safe to receive it per NG.    I reviewed the plan for PEG with anesthesia standing by. I asked if she understood the risk of intubating him with his advanced COPD and generalized weakness. She said that Pulmonology assured her that since he was extubated before without difficulty, there should be no difficulty this time around. However, she states that she knows that the procedure is not without risks. I reviewed his Living Will. She states that she will bring this in for us. She says that the patient would not wish to remain on any form of life-support, if he is unable to wean from such. She is awaiting anesthesia's discussion about the risks and whether or not intubation is expected by them.     History of Present Illness:     84 y/o very pleasant WM, whom I'm met on a previous admit 1/2023 when he presented with bilateral aspiration pna and related respiratory distress. He was not intubated at that time. He has utilized pulmonary rehab for some  time. He is admitted this time with aspiration pna and acute hypoxic respiratory failure requiring intubation/ mechanical ventilation. He has extubated and weaned from vapotherm to 6L O2 by facemask. He underwent MBS this AM which revealed severe dysphagia with laryngeal penetration on all consistencies. We were consulted to review goals of care with patient and family.      Active Ambulatory Problems     Diagnosis Date Noted    Generalized anxiety disorder 09/27/2022    COPD exacerbation 09/27/2022    Former smoker 09/27/2022    Pulmonary nodule 09/27/2022    Carotid bruit 09/27/2022    Hyperlipidemia 09/27/2022    Inguinal hernia 09/27/2022    Plantar fasciitis 09/27/2022    Polyp of colon 09/27/2022    End stage COPD 01/18/2023    Anxiety 08/08/2023    Chronic respiratory failure with hypoxia 12/27/2018     Resolved Ambulatory Problems     Diagnosis Date Noted    No Resolved Ambulatory Problems     Past Medical History:   Diagnosis Date    Acute bronchitis     Anxiety disorder, unspecified     COPD (chronic obstructive pulmonary disease)     Coronary artery disease     Left carotid bruit     Pulmonary nodules     Unspecified cataract         Past Surgical History:   Procedure Laterality Date    APPENDECTOMY      CATARACT EXTRACTION Left 01/03/2023    HERNIA REPAIR      PHACOEMULSIFICATION, CATARACT, WITH IOL INSERTION Left 1/3/2023    Procedure: PHACOEMULSIFICATION, CATARACT, WITH IOL INSERTION- OS;  Surgeon: Louis Abdi MD;  [facility]: [facility];  Service: Ophthalmology;  Laterality: Left;    TONSILLECTOMY          Review of patient's allergies indicates:   Allergen Reactions    Flexeril [cyclobenzaprine]           Current Facility-Administered Medications:     acetaminophen oral solution 650 mg, 650 mg, Per NG tube, Q4H PRN, Joshua Best MD, 650 mg at 10/10/23 1021    albuterol-ipratropium 2.5 mg-0.5 mg/3 mL nebulizer solution 3 mL, 3 mL, Nebulization, Q4H WAKE, Joshua Best MD, 3 mL at 10/10/23 0840     albuterol-ipratropium 2.5 mg-0.5 mg/3 mL nebulizer solution 3 mL, 3 mL, Nebulization, Q6H PRN, Joshua Best MD, 3 mL at 10/09/23 0759    busPIRone tablet 10 mg, 10 mg, Oral, BID, Joshua Best MD, 10 mg at 10/08/23 0915    cefTRIAXone (ROCEPHIN) 2 g in dextrose 5 % in water (D5W) 100 mL IVPB (MB+), 2 g, Intravenous, Q24H, Juliane Tucker MD, Stopped at 10/10/23 1331    enoxaparin injection 40 mg, 40 mg, Subcutaneous, Q24H (prophylaxis, 1700), Katy Cotton, FNP, 40 mg at 10/09/23 1619    fluticasone furoate-vilanteroL 200-25 mcg/dose diskus inhaler 1 puff, 1 puff, Inhalation, Daily, Joshua Best MD, 1 puff at 10/10/23 0900    guaiFENesin 100 mg/5 ml syrup 200 mg, 200 mg, Per NG tube, Q6H, VANESSA Bauman MD, 200 mg at 10/10/23 1302    hydrALAZINE injection 10 mg, 10 mg, Intravenous, Q4H PRN, Jessica Kahn MD    melatonin tablet 6 mg, 6 mg, Oral, Nightly, Joshua Best MD, 6 mg at 10/09/23 2102    morphine injection 2 mg, 2 mg, Intravenous, Q4H PRN, Joshua Best MD    [START ON 10/12/2023] sertraline tablet 100 mg, 100 mg, Oral, Daily, Juliane Tucker MD    sodium chloride 0.9% flush 10 mL, 10 mL, Intravenous, PRN, Janes Xiao MD    tiotropium bromide 2.5 mcg/actuation inhaler 2 puff, 2 puff, Inhalation, Daily, Joshua Best MD, 2 puff at 10/10/23 0920    ziprasidone injection 10 mg, 10 mg, Intramuscular, Nightly PRN, Juliane Tucker MD, 10 mg at 10/10/23 0144    zolpidem tablet 5 mg, 5 mg, Oral, QHS, Joshua Best MD, 5 mg at 10/09/23 2102     acetaminophen, albuterol-ipratropium, hydrALAZINE, morphine, sodium chloride 0.9%, ziprasidone     Family History   Problem Relation Age of Onset    Asthma Mother     Cancer Father     No Known Problems Sister     No Known Problems Brother         Review of Systems   Constitutional:  Positive for activity change and unexpected weight change.   HENT:  Positive for trouble swallowing.    Gastrointestinal:  Negative for nausea.   Genitourinary:  Positive for  "hematuria.            Objective:   /66   Pulse 88   Temp 97.6 °F (36.4 °C) (Oral)   Resp 20   Ht 6' 2" (1.88 m)   Wt 83.5 kg (184 lb 1.6 oz)   SpO2 (!) 92%   BMI 23.64 kg/m²      Physical Exam  Vitals reviewed.   Constitutional:       General: He is not in acute distress.     Appearance: He is ill-appearing. He is not toxic-appearing.   HENT:      Head:      Comments: Temporal wasting     Right Ear: External ear normal.      Left Ear: External ear normal.      Nose: Nose normal.      Mouth/Throat:      Mouth: Mucous membranes are moist.      Pharynx: Oropharynx is clear.   Eyes:      Extraocular Movements: Extraocular movements intact.      Conjunctiva/sclera: Conjunctivae normal.      Pupils: Pupils are equal, round, and reactive to light.   Cardiovascular:      Rate and Rhythm: Normal rate and regular rhythm.      Pulses: Normal pulses.      Heart sounds: Normal heart sounds.   Pulmonary:      Effort: Pulmonary effort is normal.      Breath sounds: Normal breath sounds.   Abdominal:      General: Abdomen is flat. Bowel sounds are normal. There is no distension.      Palpations: Abdomen is soft.      Tenderness: There is no abdominal tenderness.   Musculoskeletal:      Right lower leg: No edema.      Left lower leg: No edema.   Skin:     General: Skin is warm.   Neurological:      General: No focal deficit present.      Mental Status: He is alert and oriented to person, place, and time.   Psychiatric:         Mood and Affect: Mood normal.         Behavior: Behavior normal.         Thought Content: Thought content normal.         Judgment: Judgment normal.            Review of Symptoms  Review of Symptoms      Symptom Assessment (ESAS 0-10 Scale)  Pain:  0  Dyspnea:  0  Anxiety:  0  Nausea:  0  Depression:  0  Anorexia:  0  Fatigue:  0  Insomnia:  0  Restlessness:  0  Agitation:  0     CAM / Delirium:  Negative  Constipation:  Negative  Diarrhea:  Negative      Pain Assessment:  OME in 24 hours:  " 0  Location(s):      Modified Jose Scale:  0    Performance Status:  40    Living Arrangements:  Lives with spouse and Lives in home    Psychosocial/Cultural:   See Palliative Psychosocial Note: No  **Primary  to Follow**  Palliative Care  Consult: No    Spiritual:  F - Betsey and Belief:  Scientology  I - Importance:  Very  C - Community:  Patient is a  and      Time-Based Charting:  Yes    Total Time Spent: 0 minutes      Advance Care Planning   Advance Directives:   Living Will: Yes (not currently availabe)        Copy on chart: No    [place]: No    Do Not Resuscitate Status: No    Medical Power of : No     Agent's Contact Number:  [phone]    Decision Making:  Patient answered questions and Family answered questions  Goals of Care: What is most important right now is to focus on remaining as independent as possible, extending life as long as possible, even it it means sacrificing quality. Accordingly, we have decided that the best plan to meet the patient's goals includes continuing with treatment.            Caregiver burden formerly assessed: Yes        > 50% of 28 min of encounter was spent in chart review, face to face discussion of goals of care, symptom assessment, coordination of care and emotional support.     Kwasi Phipps M.D.  Palliative Medicine  [facility] - Observation Unit

## 2023-10-10 NOTE — PROGRESS NOTES
[facility] - 5th Floor Med Surg  Pulmonary Critical Care Note    Patient Name: Helder Sharp Jr.  MRN: [id number]  Admission Date: 9/28/2023  Hospital Length of Stay: 12 days  Code Status: Full Code  Attending Provider: Juliane Tucker MD  Primary Care Provider: Kimo Love MD     Subjective:     HPI:   This is a 83-year-old patient with reported end-stage chronic obstructive pulmonary disease followed by Dr. Root from pulmonary aspect at our [facility].  Patient is on Daliresp according to their notes for frequent episodes of bronchitis/pneumonitis also has hypoxemic respiratory failure on supplemental O2 and trilogy with sleep.  He was last seen in their pulmonary clinic in August of 2023.  Their notes according to that time had documentation of aspiration; and was undergoing evaluation by palliative care.  There no further documented patient had bilateral pneumonia requiring hospitalization a PEG tube was recommended for aspiration however the patient refused.    Patient presented to [facility] on 09/28/2023 for complaints of shortness of breath and weakness.  Upon arrival the patient received an hour long neb, magnesium and Solu-Medrol in the emergency room.  Patient did require intubation and ICU admit.  Was subsequently extubated however after extubation required use of Vapotherm.  Downgraded from the ICU on 10/04/2023.  GI has been consulted for the consideration of PEG placement he in turn is consulting Pulmonary for surgical clearance.  Cultures from this admit are negative.  He continues to follow with speech therapy however they are currently recommended NPO status.  Palliative care has also been consulted and they are following.    Hospital Course/Significant events:  As above    24 Hour Interval History:  He is currently on 7L oxymask. Sitting up in the chair this AM.  GI planning PEG placement with possible ET tube or LMA during procedure as recommended by Dr Dominguez. Sputum culture  grew E. Coli, sensitive to Zosyn (day 3).    Past Medical History:   Diagnosis Date    Acute bronchitis     Anxiety disorder, unspecified     COPD (chronic obstructive pulmonary disease)     Coronary artery disease     Hyperlipidemia     Left carotid bruit     Pulmonary nodules     Unspecified cataract        Past Surgical History:   Procedure Laterality Date    APPENDECTOMY      CATARACT EXTRACTION Left 2023    HERNIA REPAIR      PHACOEMULSIFICATION, CATARACT, WITH IOL INSERTION Left 1/3/2023    Procedure: PHACOEMULSIFICATION, CATARACT, WITH IOL INSERTION- OS;  Surgeon: Louis Abdi MD;  Location: [facility];  Service: Ophthalmology;  Laterality: Left;    TONSILLECTOMY         Social History     Socioeconomic History    Marital status:    Tobacco Use    Smoking status: Former     Current packs/day: 0.00     Types: Cigarettes     Quit date:      Years since quittin.7    Smokeless tobacco: Never   Substance and Sexual Activity    Alcohol use: Not Currently    Drug use: Never    Sexual activity: Yes           Current Outpatient Medications   Medication Instructions    albuterol (ACCUNEB) 0.63 mg, Nebulization, Every 6 hours PRN, Rescue    albuterol (PROVENTIL/VENTOLIN HFA) 90 mcg/actuation inhaler 2 puffs, Inhalation, Every 6 hours PRN, Rescue    albuterol-ipratropium (DUO-NEB) 2.5 mg-0.5 mg/3 mL nebulizer solution 3 mLs, Nebulization, Every 6 hours PRN, Rescue    ascorbic acid (vitamin C) (VITAMIN C) 500 mg, Oral, 2 times daily    aspirin (ECOTRIN) 81 mg, Oral    atorvastatin (LIPITOR) 20 mg, Oral    cholecalciferol (vitamin D3) (VITAMIN D3) 10,000 Units, Oral    DALIRESP 500 mcg Tab 0.5 tablets, Oral, Daily    fluticasone propionate (FLONASE) 50 mcg/actuation nasal spray 1 spray, Each Nostril, Daily    fluticasone-umeclidin-vilanter (TRELEGY ELLIPTA) 200-62.5-25 mcg inhaler 1 puff, Oral, Daily    guaiFENesin (MUCINEX) 1,200 mg, Oral, Daily    Lactobacillus rhamnosus GG (CULTURELLE) 15 billion  cell capsule Oral    montelukast (SINGULAIR) 10 mg tablet TAKE 1 TABLET BY MOUTH EVERY DAY    predniSONE (DELTASONE) 20 MG tablet Take 3 tablets by mouth for three days, then take 2 tablets by mouth for four days, then 1 tablet for four days, then 1/2 tablet for 4 days.    ranolazine (RANEXA) 500 mg, Oral, 2 times daily    roflumilast (DALIRESP) 500 mcg Tab 0.5 tablets, Oral, Daily    sertraline (ZOLOFT) 100 mg, Oral, Daily       Current Inpatient Medications   albuterol-ipratropium  3 mL Nebulization Q4H WAKE    busPIRone  10 mg Oral BID    enoxparin  40 mg Subcutaneous Q24H (prophylaxis, 1700)    fluticasone furoate-vilanteroL  1 puff Inhalation Daily    guaiFENesin 100 mg/5 ml  200 mg Per NG tube Q6H    melatonin  6 mg Oral Nightly    piperacillin-tazobactam (Zosyn) IV (PEDS and ADULTS) (extended infusion is not appropriate)  4.5 g Intravenous Q8H    tiotropium bromide  2 puff Inhalation Daily    zolpidem  5 mg Oral QHS         Review of Systems   Constitutional: Negative.    HENT: Negative.     Respiratory:  Positive for cough and shortness of breath.    Psychiatric/Behavioral: Negative.            Objective:       Intake/Output Summary (Last 24 hours) at 10/10/2023 0824  Last data filed at 10/9/2023 2100  Gross per 24 hour   Intake 0 ml   Output --   Net 0 ml           Vital Signs (Most Recent):  Temp: 97.4 °F (36.3 °C) (10/10/23 0754)  Pulse: 86 (10/10/23 0754)  Resp: 20 (10/09/23 2300)  BP: 125/69 (10/10/23 0754)  SpO2: (!) 84 % (10/10/23 0754)  Body mass index is 23.64 kg/m².  Weight: 83.5 kg (184 lb 1.6 oz) Vital Signs (24h Range):  Temp:  [97.4 °F (36.3 °C)-98.8 °F (37.1 °C)] 97.4 °F (36.3 °C)  Pulse:  [79-89] 86  Resp:  [18-22] 20  SpO2:  [84 %-95 %] 84 %  BP: ()/(53-70) 125/69     Physical Exam  Constitutional:       Appearance: Normal appearance.   HENT:      Head: Normocephalic and atraumatic.   Cardiovascular:      Rate and Rhythm: Regular rhythm.      Heart sounds: Normal heart sounds.  "  Pulmonary:      Effort: Pulmonary effort is normal.      Breath sounds: Rhonchi present.   Abdominal:      General: Bowel sounds are normal.      Palpations: Abdomen is soft.   Neurological:      General: No focal deficit present.      Mental Status: He is alert and oriented to person, place, and time.           Lines/Drains/Airways       Drain  Duration                  NG/OG Tube 10/02/23 0710 Left nostril 8 days              Peripheral Intravenous Line  Duration                  Peripheral IV - Single Lumen 09/28/23 1810 18 G Anterior;Left Forearm 11 days         Peripheral IV - Single Lumen 09/28/23 2104 18 G Anterior;Right Forearm 11 days                    Significant Labs:    Lab Results   Component Value Date    WBC 14.98 (H) 10/10/2023    HGB 12.1 (L) 10/10/2023    HCT 37.3 (L) 10/10/2023    MCV 91.4 10/10/2023     10/10/2023     BMP  Lab Results   Component Value Date     10/10/2023    K 4.1 10/10/2023    CHLORIDE 102 10/10/2023    CO2 27 10/10/2023    BUN 34.1 (H) 10/10/2023    CREATININE 1.02 10/10/2023    CALCIUM 8.9 10/10/2023    AGAP 10.0 10/10/2023    EGFRNONAA 55 07/15/2021     ABG  No results for input(s): "PH", "PO2", "PCO2", "HCO3", "POCBASEDEF" in the last 168 hours.      Mechanical Ventilation Support:  Vent Mode: CPAP / PSV (10/01/23 1351)  Ventilator Initiated: Yes (09/28/23 2007)  Set Rate: 20 BPM (10/01/23 1115)  Vt Set: 500 mL (10/01/23 1115)  Pressure Support: 10 cmH20 (10/01/23 1351)  PEEP/CPAP: 5 cmH20 (10/01/23 1351)  Oxygen Concentration (%): 50 (10/07/23 0508)  Peak Airway Pressure: 17 cmH20 (10/01/23 1351)  Total Ve: 17.9 L/m (10/01/23 1351)  F/VT Ratio<105 (RSBI): (!) 55.34 (10/01/23 5457)      Significant Imaging:  No imaging over the past 24 hours    Assessment/Plan:     Assessment  Reported end-stage chronic obstructive pulmonary disease with chronic hypoxemic respiratory failure on trilogy q.h.s. followed by Dr. Root   Aspiration/oropharyngeal dysphagia  History " of hyperlipidemia, coronary artery disease and pulmonary nodules -pulmonary notes from outpatient state the patient is not a candidate for any type of procedure in regards to these pulmonary nodules however they are continuing to follow them    Plan  Continue nebs, mucolytics and inhaled regimen for COPD management  Continue Zosyn (day 3) for E Coli in sputum  GI planning PEG placement  Needs aggressive PT/OT/ST   Continue weaning o2 as tolerated    MYCHAL Michael  Pulmonary Critical Care Medicine  [facility] - 5th Floor Med Surg  DOS: 10/10/2023

## 2023-10-10 NOTE — PT/OT/SLP PROGRESS
[facility]  Speech Language Pathology Department  Dysphagia Therapy Progress Note    Patient Name:  Helder Sharp Jr.   MRN:  [id number]  Admitting Diagnosis: hypoxia    Recommendations:     General recommendations:  dysphagia therapy  Diet texture/consistency recommendations:  NPO  Medications: NPO  Discharge recommendations:  nursing facility, skilled   Subjective     Patient awake and alert.  Pain/Comfort:  0/10  Spiritual/Cultural/Yarsanism Beliefs/Practices that affect care: no    Objective:     Oral Musculature  Dentition: own teeth  Secretion Management: adequate  Facial Movement: WFL  Oral Labial Strength: WFL    Therapeutic Activities:  Pt completed base of tongue and laryngeal x50 with moderate cues.  Pt tolerated thermal stimulation to the anterior faucial pillars x10 with 100 swallow responses.     Assessment:     Pt continues to present with oropharyngeal dysphagia and remains unsafe for PO diet.    Goals:     Multidisciplinary Problems       SLP Goals          Problem: SLP    Goal Priority Disciplines Outcome   SLP Goal     SLP    Description: LTG: To tolerate least restrictive diet without clinical signs/sx of aspiration. (Continue)  ST. Tongue base/laryngeal excursion exercise (continue)  2. Tolerate thermal stimulation x10 with 100% swallow response with less than a 2 second delay. (Continue)  3. Puree solids without clinical signs/sx of aspiration.                      Patient Education:     Patient provided with verbal education regarding POC.  Understanding was verbalized.    Plan:     Will continue to follow and tx as appropriate.    SLP Follow-Up:  Yes   Patient to be seen:  daily   Plan of Care expires:  10/13/23  Plan of Care reviewed with:  patient, spouse       Time Tracking:     SLP Treatment Date:    10/10/23  Speech Start Time:   930  Speech Stop Time:     40   Speech Total Time (min):   10    Billable minutes:  Treatment of Swallow Dysfunction, 10  minutes       10/10/2023

## 2023-10-10 NOTE — PROGRESS NOTES
"Gastroenterology Progress Note    Subjective/Interval History:  Has some agitation overnight.  Weaned down to 5L oxymask.  No complaints.  Ambulated with therapy.  Tolerating continuous NG TFs at 85 cc/hr.      Vital Signs:  /66   Pulse 88   Temp 97.6 °F (36.4 °C) (Oral)   Resp 20   Ht 6' 2" (1.88 m)   Wt 83.5 kg (184 lb 1.6 oz)   SpO2 (!) 93%   BMI 23.64 kg/m²   Body mass index is 23.64 kg/m².    Physical Exam:  Physical Exam  Constitutional:       General: He is not in acute distress.     Appearance: He is ill-appearing.      Comments: Wife at bedside   HENT:      Head: Normocephalic and atraumatic.   Eyes:      General: No scleral icterus.     Extraocular Movements: Extraocular movements intact.   Cardiovascular:      Rate and Rhythm: Normal rate and regular rhythm.   Pulmonary:      Effort: Pulmonary effort is normal. No respiratory distress.      Comments: 8L oxymask in place  Abdominal:      General: Bowel sounds are normal. There is no distension.      Palpations: Abdomen is soft. There is no mass.      Tenderness: There is no abdominal tenderness. There is no guarding or rebound.      Comments: No upper abdominal surgical scars   Musculoskeletal:      Right lower leg: No edema.      Left lower leg: No edema.   Skin:     General: Skin is warm and dry.      Coloration: Skin is not jaundiced.   Neurological:      Mental Status: He is alert and oriented to person, place, and time.   Psychiatric:         Mood and Affect: Mood normal.         Behavior: Behavior normal.         Labs:  Recent Results (from the past 48 hour(s))   Respiratory Culture    Collection Time: 10/08/23  4:39 PM    Specimen: Sputum   Result Value Ref Range    Respiratory Culture Moderate Escherichia coli (A)     GRAM STAIN Quality 1+     GRAM STAIN Few Gram positive cocci     GRAM STAIN Rare Gram Negative Rods     GRAM STAIN Rare Gram Positive Rods        Susceptibility    Escherichia coli -  (no method available)     Amox/K Clav " 8 Sensitive      Ampicillin >=32 Resistant      Cefepime <=0.12 Sensitive      Ceftriaxone <=0.25 Sensitive      Cefuroxime 4 Sensitive      Ciprofloxacin >=4 Resistant      Gentamicin <=1 Sensitive      Levofloxacin >=8 Resistant      Meropenem <=0.25 Sensitive      Piperacillin/Tazobactam <=4 Sensitive      Tobramycin <=1 Sensitive    Procalcitonin ([place])    Collection Time: 10/09/23  5:02 AM   Result Value Ref Range    Procalcitonin 0.32 (H) <=0.15 ng/mL   Basic Metabolic Panel    Collection Time: 10/09/23  5:03 AM   Result Value Ref Range    Sodium Level 137 136 - 145 mmol/L    Potassium Level 4.0 3.5 - 5.1 mmol/L    Chloride 102 98 - 107 mmol/L    Carbon Dioxide 26 23 - 31 mmol/L    Glucose Level 124 (H) 82 - 115 mg/dL    Blood Urea Nitrogen 41.6 (H) 8.4 - 25.7 mg/dL    Creatinine 1.22 (H) 0.73 - 1.18 mg/dL    BUN/Creatinine Ratio 34     Calcium Level Total 8.9 8.8 - 10.0 mg/dL    Anion Gap 9.0 mEq/L    eGFR 59 mls/min/1.73/m2   CBC with Differential    Collection Time: 10/09/23  5:03 AM   Result Value Ref Range    WBC 17.86 (H) 4.50 - 11.50 x10(3)/mcL    RBC 4.18 (L) 4.70 - 6.10 x10(6)/mcL    Hgb 12.5 (L) 14.0 - 18.0 g/dL    Hct 38.5 (L) 42.0 - 52.0 %    MCV 92.1 80.0 - 94.0 fL    MCH 29.9 27.0 - 31.0 pg    MCHC 32.5 (L) 33.0 - 36.0 g/dL    RDW 14.9 11.5 - 17.0 %    Platelet 226 130 - 400 x10(3)/mcL    MPV 9.8 7.4 - 10.4 fL    Neut % 82.9 %    Lymph % 10.1 %    Mono % 5.4 %    Eos % 0.2 %    Basophil % 0.2 %    Lymph # 1.80 0.6 - 4.6 x10(3)/mcL    Neut # 14.81 (H) 2.1 - 9.2 x10(3)/mcL    Mono # 0.97 0.1 - 1.3 x10(3)/mcL    Eos # 0.03 0 - 0.9 x10(3)/mcL    Baso # 0.04 <=0.2 x10(3)/mcL    IG# 0.21 (H) 0 - 0.04 x10(3)/mcL    IG% 1.2 %    NRBC% 0.0 %   MRSA PCR    Collection Time: 10/09/23  1:14 PM   Result Value Ref Range    MRSA PCR SCRN ([facility]) Not Detected Not Detected   Sodium, Random Urine    Collection Time: 10/09/23  1:14 PM   Result Value Ref Range    Urine Sodium 29.0 mmol/L   Creatinine, Random Urine     Collection Time: 10/09/23  1:14 PM   Result Value Ref Range    Urine Creatinine 30.6 (L) 63.0 - 166.0 mg/dL   Osmolality, Urine    Collection Time: 10/09/23  1:14 PM   Result Value Ref Range    Urine Osmolality 292 (L) 300 - 1,300 mOsm/kg   Basic Metabolic Panel    Collection Time: 10/10/23  6:23 AM   Result Value Ref Range    Sodium Level 139 136 - 145 mmol/L    Potassium Level 4.1 3.5 - 5.1 mmol/L    Chloride 102 98 - 107 mmol/L    Carbon Dioxide 27 23 - 31 mmol/L    Glucose Level 119 (H) 82 - 115 mg/dL    Blood Urea Nitrogen 34.1 (H) 8.4 - 25.7 mg/dL    Creatinine 1.02 0.73 - 1.18 mg/dL    BUN/Creatinine Ratio 33     Calcium Level Total 8.9 8.8 - 10.0 mg/dL    Anion Gap 10.0 mEq/L    eGFR >60 mls/min/1.73/m2   CBC with Differential    Collection Time: 10/10/23  6:23 AM   Result Value Ref Range    WBC 14.98 (H) 4.50 - 11.50 x10(3)/mcL    RBC 4.08 (L) 4.70 - 6.10 x10(6)/mcL    Hgb 12.1 (L) 14.0 - 18.0 g/dL    Hct 37.3 (L) 42.0 - 52.0 %    MCV 91.4 80.0 - 94.0 fL    MCH 29.7 27.0 - 31.0 pg    MCHC 32.4 (L) 33.0 - 36.0 g/dL    RDW 14.8 11.5 - 17.0 %    Platelet 227 130 - 400 x10(3)/mcL    MPV 9.8 7.4 - 10.4 fL    Neut % 81.1 %    Lymph % 9.9 %    Mono % 7.6 %    Eos % 0.5 %    Basophil % 0.2 %    Lymph # 1.49 0.6 - 4.6 x10(3)/mcL    Neut # 12.14 (H) 2.1 - 9.2 x10(3)/mcL    Mono # 1.14 0.1 - 1.3 x10(3)/mcL    Eos # 0.07 0 - 0.9 x10(3)/mcL    Baso # 0.03 <=0.2 x10(3)/mcL    IG# 0.11 (H) 0 - 0.04 x10(3)/mcL    IG% 0.7 %    NRBC% 0.0 %       Imaging:  X-Ray Chest 1 View    Result Date: 10/8/2023  EXAMINATION: XR CHEST 1 VIEW CLINICAL HISTORY: f/u, leukocytosis; TECHNIQUE: One view COMPARISON: October 5, 2023.. FINDINGS: Cardiopericardial silhouette is within normal limits.  There are new patchy opacities involving bilateral lower lung zones.  Upper lung lobes are remarkable for emphysematous bullous changes.  There is no overt pulmonary edema.  No significant fluid within the pleural space.  No pneumothorax.      Bilateral lower lung lobes new patchy opacities suggest atelectasis and/or associated early infiltrates. Electronically signed by: Ronny Hamilton Date:    10/08/2023 Time:    09:14    Fl Modified Barium Swallow Speech    Result Date: 10/6/2023  See procedure notes from Speech Pathologist. This procedure was auto-finalized.    X-Ray Chest 1 View    Result Date: 10/5/2023  EXAMINATION: XR CHEST 1 VIEW CPT 01129 CLINICAL HISTORY: f/u; COMPARISON: September 30, 2023 FINDINGS: Examination reveals mediastinal silhouette to be within normal limits cardiac silhouette is not enlarged improved aeration identified in the right infrahilar region and at the region of the right cardiophrenic angle. Persistent confluent airspace opacities identified in the left infrahilar region and left base including increased left retrocardiac density and silhouetting of the left hemidiaphragm which might be related to an infiltrate/atelectasis. Endotracheal tube has been removed     Improved aeration in the right infrahilar region and right cardiophrenic angle region. Persistent confluent opacities at the left base including the left retrocardiac region which might reflect an infiltrate/atelectasis. Interval removal of endotracheal tube Electronically signed by: Marc Andrews Date:    10/05/2023 Time:    10:47    XR Gastric tube check, non-radiologist performed    Result Date: 10/3/2023  EXAMINATION: XR GASTRIC TUBE CHECK, NON-RADIOLOGIST PERFORMED CLINICAL HISTORY: Ng tube placement; COMPARISON: October 2, 2023 FINDINGS: Examination reveals a nasogastric tube with the tip in the fundus of the stomach     Nasogastric tube as above Electronically signed by: Marc Andrews Date:    10/03/2023 Time:    08:19    Fl Modified Barium Swallow Speech    Result Date: 10/2/2023  See procedure notes from Speech Pathologist. This procedure was auto-finalized.    X-Ray Chest 1 View    Result Date: 10/2/2023  EXAMINATION XR CHEST 1 VIEW CLINICAL  HISTORY post intubation ; TECHNIQUE A total of 1 frontal image(s) of the chest. COMPARISON 28 September 2023, 18:21 FINDINGS Lines/tubes/devices: Interval placement of endotracheal tube, terminating approximately 6 cm superior to the alycia. Esophagogastric tube also now visualized, extending inferior to the level the diaphragm; side port noted at the level of the GE junction. The cardiac silhouette and central vascular structures are unchanged.  The trachea is midline. No new or worsening consolidation is identified. There is no enlarging pleural effusion or convincing pneumothorax. Regional osseous structures and extrathoracic soft tissues are similar. IMPRESSION Interval tracheal and esophageal intubation, as above. Please note this exam was initially acquired immediately following intubation on 28 September 2023.  However, the study could not be completed and sent to PACS for interpretation due to technical issues with the order.  The exam was interpreted as soon is the issues were resolved morning of 2 October 2023. Electronically signed by: Morales Goldebrg Date:    10/02/2023 Time:    11:55    XR Gastric tube check, non-radiologist performed    Result Date: 10/2/2023  EXAMINATION: XR GASTRIC TUBE CHECK, NON-RADIOLOGIST PERFORMED CLINICAL HISTORY: NG tube placement; TECHNIQUE: Single view of the chest. COMPARISON: None FINDINGS: NG tube projects over the left upper quadrant.     As above. Electronically signed by: Hugh De Leon Date:    10/02/2023 Time:    07:55    X-Ray Chest 1 View    Result Date: 9/30/2023  EXAMINATION: XR CHEST 1 VIEW CLINICAL HISTORY: intubated; TECHNIQUE: Single view of the chest COMPARISON: 09/28/2023 FINDINGS: ET tube terminates just below the clavicular heads.  Prominent interstitial markings of bilateral lower lung zones grossly unchanged.     As above. Electronically signed by: Hugh De Leon Date:    09/30/2023 Time:    11:54    US Retroperitoneal Complete    Result Date:  9/29/2023  EXAMINATION: US RETROPERITONEAL COMPLETE CLINICAL HISTORY: hematuria; COMPARISON: 12/17/2018 FINDINGS: The right kidney measures 11.4 x 4.8 x 5.2 cm.  There are no focal lesions noted.  There is no hydronephrosis.  There is flow in the proximal IVC.  Aorta is not visualized. The left kidney is poorly demonstrated.  It measures 9.1 x 5.5 x 4.9 cm there are no focal lesions noted there is no hydronephrosis.  The urinary bladder is collapsed with a Licea catheter.     Limited study, no abnormalities are demonstrated. Electronically signed by: Aron Barger MD Date:    09/29/2023 Time:    13:51    Echo    Result Date: 9/29/2023    Left Ventricle: The left ventricle is normal in size. Normal wall thickness. Normal wall motion. There is normal systolic function with a visually estimated ejection fraction of 55 - 60%. Grade I diastolic dysfunction.   Right Ventricle: Normal right ventricular cavity size. Systolic function is normal.   Mitral Valve: There is no stenosis. The mean pressure gradient across the mitral valve is 2 mmHg at a heart rate of  bpm.   Pulmonary Artery: No pulmonary hypertension.     CTA Chest Non-Coronary (PE Studies)    Result Date: 9/29/2023  Technique:CT Scan of the chest was performed with intravenous contrast with direct axial images as well as sagittal and coronal reconstruction images pulmonary embolus protocol. Dosage Information:Automated Exposure Control was utilized.   Comparison: January 5, 2023 . Clinical History:Pe suspected. Findings: Lines and Tubes:Endotracheal tube is present with its tip projecting 4 cm above the alycia. A nasogastric catheter is seen with its tip in the body of the stomach. Neck:The thyroid gland appear unremarkable. Mediastinum:Multiple similar enlarged mediastinal lymph nodes are seen. The largest is in subcarinal space and measures 1.7 cm in least dimension. Heart:The heart size is within normal limits. Coronary artery calcification is seen.  Aorta:No aortic dissection or aneurysm is seen. Moderate aortic calcification is seen in the thoracic aorta. Pulmonary Arteries:No filling defects are seen in the pulmonary arteries to suggest pulmonary embolus. Lungs:Severe emphysematous changes are identified in the lungs. There is consolidation in the posterior basal segments of bilateral lower lobes demonstrating air bronchograms. This may reflect aspiration pneumonia. Pleura:No effusions or pneumothorax are identified. Bony Structures: Spine:Mild spondylolytic changes are seen in the thoracic spine. Abdomen:Both adrenal glands are thickened and may reflect adrenal hyperplasia. The rest of the visualized upper abdominal organs appear unremarkable.     Impression: 1. Multiple enlarged mediastinal lymph nodes are seen. The largest is in subcarinal space and measures 1.7 cm in least dimension. 2. No filling defects are seen in the pulmonary arteries to suggest pulmonary embolus. 3. Severe emphysematous changes are identified in the lungs. There is consolidation in the posterior basal segments of bilateral lower lobes demonstrating air bronchograms. This may reflect aspiration pneumonia. Correlate clinically as regards further evaluation and followup. 4. Details and other findings as discussed above. No significant discrepancy with overnight report. Electronically signed by: Ronny Hamilton Date:    09/29/2023 Time:    08:09    X-Ray Chest AP Portable    Result Date: 9/28/2023  EXAMINATION: XR CHEST AP PORTABLE CLINICAL HISTORY: Asthma; TECHNIQUE: Single frontal view of the chest was performed. COMPARISON: 08/08/2023 FINDINGS: LINES AND TUBES: EKG/telemetry leads overlie the chest. MEDIASTINUM AND ANDRE: The cardiac silhouette is normal. Launch coronary LUNGS: Emphysema.  Chronic basilar scarring.  No appreciable acute superimposed airspace opacity. PLEURA:No pleural effusion. No pneumothorax. BONES: No acute osseous abnormality.     Chronic emphysema and scarring  within the lungs without appreciable acute superimposed airspace opacity. Electronically signed by: Kimberli Cisse Date:    09/28/2023 Time:    18:38         Assessment/Plan:  82 yo M known to Dr. Sunny Mota with a past medical history of hyperlipidemia, CAD, COPD on 2 L home oxygen, pulmonary nodules, and anxiety.  Admitted 9/28 with Acute on chronic hypoxemic respiratory failure requiring intubation now extubated, COPD exacerbation, Bilateral aspiration pneumonia, Sepsis, and Hematuria now resolved.  GI consulted for PEG.     1. Oropharyngeal dysphagia   Failed MBS 10/6/23.   - Patient at risk for respiratory decompensation. Pulm note reviewed: moderate risk for perioperative pulmonary complications and hypercapnia with PEG placement.  Consideration of airway management with ET tube or LMA during procedure would assure better ventilation and oxygenation in this patient with limited overall respiratory status.  Discussed with patient and wife.  Wish to proceed with EGD/PEG.    - Will tentatively plan for EGD/PEG tomorrow unless for some reason there are any clinical changed overnight.  Hold AM lovenox.    Vandana Sol PA-C

## 2023-10-10 NOTE — PT/OT/SLP PROGRESS
[facility]  Speech Language Pathology Department  Dysphagia Therapy Progress Note    Patient Name:  Helder Sharp Jr.   MRN:  [id number]  Admitting Diagnosis: hypoxia    Recommendations:     General recommendations:  dysphagia therapy  Diet texture/consistency recommendations:  NPO  Medications: NPO  Discharge recommendations:  nursing facility, skilled     Subjective     Patient awake and alert.  Pain/Comfort:  0/10  Spiritual/Cultural/Hinduism Beliefs/Practices that affect care: no    Objective:     Oral Musculature  Dentition: own teeth  Secretion Management: adequate  Facial Movement: WFL    Therapeutic Activities:  Pt completed base of tongue and laryngeal x50 with minimal-moderate cues.  Pt tolerated thermal stimulation to the anterior faucial pillars x10 with 50 swallow responses.     Therapeutic PO Trials:  Consistency Amount Fed By Oral Symptoms Pharyngeal Symptoms   Puree 1 oz SLP None Cough after swallow     Assessment:     Pt continues to present with oropharyngeal dysphagia and remains unsafe for PO intake.    Goals:     Multidisciplinary Problems       SLP Goals          Problem: SLP    Goal Priority Disciplines Outcome   SLP Goal     SLP    Description: LTG: To tolerate least restrictive diet without clinical signs/sx of aspiration. (Continue)  ST. Tongue base/laryngeal excursion exercise (continue)  2. Tolerate thermal stimulation x10 with 100% swallow response with less than a 2 second delay. (Continue)  3. Puree solids without clinical signs/sx of aspiration.                      Patient Education:     Patient provided with verbal education regarding POC.  Understanding was verbalized.    Plan:     Will continue to follow and tx as appropriate.    SLP Follow-Up:  Yes   Patient to be seen:  daily   Plan of Care expires:  10/13/23  Plan of Care reviewed with:  patient, spouse       Time Tracking:     SLP Treatment Date:    10/10/23  Speech Start Time:   1410  Speech  Stop Time:    1420    Speech Total Time (min):   10    Billable minutes:  Treatment of Swallow Dysfunction, 10 minutes       10/10/2023

## 2023-10-10 NOTE — PLAN OF CARE
Problem: Adult Inpatient Plan of Care  Goal: Plan of Care Review  Outcome: Ongoing, Progressing  Goal: Patient-Specific Goal (Individualized)  Outcome: Ongoing, Progressing  Goal: Absence of Hospital-Acquired Illness or Injury  Outcome: Ongoing, Progressing  Goal: Optimal Comfort and Wellbeing  Outcome: Ongoing, Progressing     Problem: Infection  Goal: Absence of Infection Signs and Symptoms  Outcome: Ongoing, Progressing     Problem: Impaired Wound Healing  Goal: Optimal Wound Healing  Outcome: Ongoing, Progressing     Problem: Skin Injury Risk Increased  Goal: Skin Health and Integrity  Outcome: Ongoing, Progressing     Problem: Coping Ineffective  Goal: Effective Coping  Outcome: Ongoing, Progressing     Problem: Adult Inpatient Plan of Care  Goal: Plan of Care Review  Outcome: Ongoing, Progressing  Goal: Patient-Specific Goal (Individualized)  Outcome: Ongoing, Progressing  Goal: Absence of Hospital-Acquired Illness or Injury  Outcome: Ongoing, Progressing  Goal: Optimal Comfort and Wellbeing  Outcome: Ongoing, Progressing     Problem: Infection  Goal: Absence of Infection Signs and Symptoms  Outcome: Ongoing, Progressing     Problem: Impaired Wound Healing  Goal: Optimal Wound Healing  Outcome: Ongoing, Progressing     Problem: Skin Injury Risk Increased  Goal: Skin Health and Integrity  Outcome: Ongoing, Progressing     Problem: Coping Ineffective  Goal: Effective Coping  Outcome: Ongoing, Progressing

## 2023-10-10 NOTE — PROGRESS NOTES
"[facility] Hospital Medicine Progress Note        Chief Complaint: Inpatient Follow-up for  shortness of breath and weakness      HPI: Helder Sharp Jr. is a 83 y.o. male with a past medical history of hyperlipidemia, CAD, COPD, on 2 L home oxygen, pulmonary nodules, and anxiety presented to [facility] on 9/28/2023 for shortness of breath and weakness. Wife also endorsed worsening nonproductive cough and lower extremity swelling.  Wife noticed chocolate" colored urine at approximately 15:30 on 09/28, prompting them to ED. Patient also had to increase home oxygen to 3 L secondary to increased dyspnea.  Patient received albuterol nebulizer treatment en route with EMS.  Patient was given an hour long nebulizer treatment, magnesium, and Solu-Medrol upon arrival to ED. Patient remained hypoxic on non-rebreather and  was intubated in ED for airway protection.  Labs revealed WBC 21.13, chloride 109, CO2 19, glucose 190, BNP 22, troponin 0.014, and lactic acid 3.8.  Flu/RSV/COVID were negative.  UA revealed 2+ protein, trace ketones, 3+ occult blood, 1+ bilirubin, and greater than 100 RBCs.  Blood and respiratory cultures were obtained.  Chest x-ray revealed chronic emphysema and scarring within the lungs without appreciable acute superimposed airspace opacity.  Patient was given LR, IV Zosyn, and IV Azithromycin in ED.  Patient was admitted to ICU for close monitoring.  Patient was started on Solu-Medrol 60 mg BID and DuoNebs q.6 hours.  Urology was consulted for hematuria. Ultrasound retroperitoneal complete revealed no abnormalities. Urology recommended continuing indwelling lang with outpatient follow-up. Patient had previous history of Klebsiella pneumonia which was sensitive to Zosyn.  Azithromycin was discontinued.  CTA chest revealed multiple enlarged mediastinal lymph nodes, no filling defects seen to suggest PE, and severe emphysematous changes identified in the lungs with " consolidation in the posterior basal segments of bilateral lower lobes demonstrating air bronchograms which may reflect aspiration pneumonia.  NG tube was placed and patient was started on tube feeds.  Patient was transitioned to prednisone 40 mg daily for 5 day course on 10/01. Patient was extubated on 10/01 and placed on Vapotherm. Speech therapy was consulted. Patient was cleared for downgrade out of ICU on 10/03/2023. Hospital medicine was consulted for transition of care and further medical management.     Has completed 7 days of Zosyn therapy for possible aspiration pneumonia.  Failed MBS, recommendations made for PEG tube, Palliative was consulted, patient and family interested in PEG tube.  Gastroenterology following.  Patient is moderate risk for perioperative pulmonary complications and hypercapnia with PEG placement.Pulmonology following     On 10/8  Increased sputum production, leukocytosis noted and slightly increased oxygen needs.  Chest x-ray shows new developing bilateral opacities, collected respiratory culture and started Zosyn again.     Requiring Haldol for agitation in-house.    Interval Hx:   Patient was seen and examined, on OxyMask 5 L, reports throat pain , also with intermittent headaches-improving.  Denies any fevers or chills or chest pain or difficulty breathing.  Family at bedside.    Chart reviewed, T-max of 98.8, labs reviewed, white blood cell count going up, 16--17.8-14, creatinine improved. Ecoli on respiratory culture    Objective/physical exam:  General: In no acute distress, afebrile  Chest: Clear to auscultation bilaterally  Heart: RRR, +S1, S2, no appreciable murmur  Abdomen: Soft, nontender, BS +  MSK: Warm, no lower extremity edema, no clubbing or cyanosis  Neurologic: Alert and oriented     VITAL SIGNS: 24 HRS MIN & MAX LAST   Temp  Min: 97.4 °F (36.3 °C)  Max: 98.8 °F (37.1 °C) 97.5 °F (36.4 °C)   BP  Min: 95/53  Max: 125/69 104/62   Pulse  Min: 85  Max: 90  85   Resp   Min: 18  Max: 20 18   SpO2  Min: 90 %  Max: 94 % (!) 93 %     I have reviewed the following labs:  Recent Labs   Lab 10/08/23  0452 10/09/23  0503 10/10/23  0623   WBC 16.91* 17.86* 14.98*   RBC 4.39* 4.18* 4.08*   HGB 13.3* 12.5* 12.1*   HCT 40.1* 38.5* 37.3*   MCV 91.3 92.1 91.4   MCH 30.3 29.9 29.7   MCHC 33.2 32.5* 32.4*   RDW 14.6 14.9 14.8    226 227   MPV 9.5 9.8 9.8     Recent Labs   Lab 10/05/23  0607 10/08/23  0452 10/09/23  0503 10/10/23  0623    138 137 139   K 3.7 4.2 4.0 4.1   CO2 26 25 26 27   BUN 30.6* 33.4* 41.6* 34.1*   CREATININE 0.87 0.88 1.22* 1.02   CALCIUM 8.8 9.5 8.9 8.9   MG  --  2.30  --   --    ALBUMIN 2.9* 3.1*  --   --    ALKPHOS 60  --   --   --    ALT 19  --   --   --    AST 13  --   --   --    BILITOT 0.7  --   --   --      Microbiology Results (last 7 days)       Procedure Component Value Units Date/Time    Respiratory Culture [[id number]]  (Abnormal)  (Susceptibility) Collected: 10/08/23 1639    Order Status: Completed Specimen: Sputum Updated: 10/10/23 0810     Respiratory Culture Moderate Escherichia coli     Comment: with normal respiratory nasra        GRAM STAIN Quality 1+      Few Gram positive cocci      Rare Gram Negative Rods      Rare Gram Positive Rods    Blood culture #1 **CANNOT BE ORDERED STAT** [[id number]]  (Normal) Collected: 09/28/23 1812    Order Status: Completed Specimen: Blood Updated: 10/03/23 2100     CULTURE, BLOOD ([facility]) No Growth at 5 days    Blood culture #2 **CANNOT BE ORDERED STAT** [[id number]]  (Normal) Collected: 09/28/23 1812    Order Status: Completed Specimen: Blood Updated: 10/03/23 2100     CULTURE, BLOOD ([facility]) No Growth at 5 days             See below for Radiology    Scheduled Med:   albuterol-ipratropium  3 mL Nebulization Q4H WAKE    busPIRone  10 mg Oral BID    cefTRIAXone (ROCEPHIN) IVPB  2 g Intravenous Q24H    enoxparin  40 mg Subcutaneous Q24H (prophylaxis, 1700)    fluticasone furoate-vilanteroL  1 puff Inhalation Daily     guaiFENesin 100 mg/5 ml  200 mg Per NG tube Q6H    melatonin  6 mg Oral Nightly    [START ON 10/12/2023] sertraline  100 mg Oral Daily    tiotropium bromide  2 puff Inhalation Daily    zolpidem  5 mg Oral QHS      Continuous Infusions:     PRN Meds:  acetaminophen, albuterol-ipratropium, hydrALAZINE, morphine, sodium chloride 0.9%, ziprasidone     Assessment/Plan:  Acute on chronic hypoxemic respiratory failure-intubated/extubated  Chronic hypoxic respiratory failure 2 L nasal cannula at home   COPD exacerbation  Bilateral aspiration pneumonia   Sepsis 2/2 above   Leukocytosis  Elevated creatinine  Dysphagia  Hematuria-resolved  Agitation     History of: HLD, CAD, pulmonary nodules    Plan   Continue oxygen supplementation and supportive care, Continue DuoNeb 4 times daily and as needed.   Continue antibiotics, Zosyn switched to ceftriaxone, monitor fever curve and WBC  Failed MBS, family and patient okay for PEG tube, palliative met with the family, GI following, Pulmonology reconsulted for clearance for PEG tube placement per GI.  Timing to be determined, likely tomorrow per chart review.  Creatinine has improved  Encourage redirection, resuming Zoloft  Encourage therapy  Continue appropriate home medications      Critical care Time Spent>35 min       VTE prophylaxis:  Lovenox      Anticipated discharge and Disposition:   To be decided after PEG, nursing facility      All diagnosis and differential diagnosis have been reviewed; assessment and plan has been documented; I have personally reviewed the labs and test results that are presently available; I have reviewed the patients medication list; I have reviewed the consulting providers response and recommendations. I have reviewed or attempted to review medical records based upon their availability    All of the patient's questions have been  addressed and answered. Patient's is agreeable to the above stated plan. I will continue to monitor closely and make  adjustments to medical management as needed.  _____________________________________________________________________    Nutrition Status:    Radiology:  I have personally reviewed the following imaging and agree with the radiologist.     X-Ray Chest 1 View  Narrative: EXAMINATION:  XR CHEST 1 VIEW    CLINICAL HISTORY:  f/u, leukocytosis;    TECHNIQUE:  One view    COMPARISON:  October 5, 2023..    FINDINGS:  Cardiopericardial silhouette is within normal limits.  There are new patchy opacities involving bilateral lower lung zones.  Upper lung lobes are remarkable for emphysematous bullous changes.  There is no overt pulmonary edema.  No significant fluid within the pleural space.  No pneumothorax.  Impression: Bilateral lower lung lobes new patchy opacities suggest atelectasis and/or associated early infiltrates.    Electronically signed by: Ronny Hamilton  Date:    10/08/2023  Time:    09:14      Juliane Tucker MD   10/10/2023

## 2023-10-10 NOTE — PT/OT/SLP PROGRESS
Physical Therapy Treatment    Patient Name:  Helder Shrap Jr.   MRN:  [id number]    Recommendations:     Discharge Recommendations: nursing facility, skilled  Discharge Equipment Recommendations: none  Barriers to discharge:  placement    Assessment:     Helder Sharp Jr. is a 83 y.o. male admitted with a medical diagnosis of COPD exacerbation, atypical PNA hematuria, HLD, required mechanical ventilation now on NC, uses trilogy at home, goal O2 sats >88%. .  He presents with the following impairments/functional limitations: weakness, impaired endurance, impaired self care skills, impaired functional mobility, gait instability, impaired cardiopulmonary response to activity .    Rehab Prognosis: Good; patient would benefit from acute skilled PT services to address these deficits and reach maximum level of function.    Recent Surgery: Procedure(s) (LRB):  PEG (N/A)      Plan:     During this hospitalization, patient to be seen 5 x/week to address the identified rehab impairments via gait training, therapeutic activities, therapeutic exercises and progress toward the following goals:    Plan of Care Expires:  11/03/23    Subjective     Chief Complaint: weakness and SOB  Patient/Family Comments/goals: get stronger  Pain/Comfort:         Objective:     Communicated with RN prior to session.  Patient found HOB elevated with peripheral IV, NG tube upon PT entry to room.     General Precautions: Standard, fall  Orthopedic Precautions:    Braces: N/A  Respiratory Status:  OXYMASK 6L  Skin Integrity: Visible skin intact      Functional Mobility:  Transfers:     Sit to Stand:  contact guard assistance with rolling walker  Bed to Chair: contact guard assistance with  rolling walker  using  Step Transfer  Gait: Pt amb 50ftx 1 and 50ft x 2 with RW cga. Slow candice step through gait pattern. Seated rest break required between trials. Pt desat to 85% while amb.    Therapeutic Activities/Exercises:  Pt performed 4 sit<>stands  through out session cga.    Education:  Patient provided with verbal education education regarding POC.  Understanding was verbalized, however additional teaching warranted.     Patient left HOB elevated with all lines intact, call button in reach, and daughter present..    GOALS:   Multidisciplinary Problems       Physical Therapy Goals          Problem: Physical Therapy    Goal Priority Disciplines Outcome Goal Variances Interventions   Physical Therapy Goal     PT, PT/OT Ongoing, Progressing     Description: Goals to be met by: 11/3/2023     Patient will increase functional independence with mobility by performin. Supine to sit with [place]  2. Sit to stand transfer with Modified [place]  3. Gait  x 150 feet with Modified [place] using Rolling Walker.                          Time Tracking:     PT Received On: 10/10/23  PT Start Time: 1419     PT Stop Time: 1448  PT Total Time (min): 29 min     Billable Minutes: Gait Training 20 and Therapeutic Activity 9    Treatment Type: Treatment  PT/PTA: PTA     Number of PTA visits since last PT visit: 5     10/10/2023

## 2023-10-11 PROBLEM — R13.10 DYSPHAGIA: Status: ACTIVE | Noted: 2023-01-01

## 2023-10-11 NOTE — TRANSFER OF CARE
"Anesthesia Transfer of Care Note    Patient: Helder Sharp Jr.    Procedure(s) Performed: Procedure(s) (LRB):  PEG (N/A)    Patient location: PACU    Anesthesia Type: general    Transport from OR: Transported from OR on 6-10 L/min O2 by face mask with adequate spontaneous ventilation    Post pain: adequate analgesia    Post assessment: no apparent anesthetic complications    Post vital signs: stable    Level of consciousness: responds to stimulation    Nausea/Vomiting: no nausea/vomiting    Complications: none    Transfer of care protocol was followed      Last vitals:   Visit Vitals  /60   Pulse 74   Temp 36.7 °C (98.1 °F)   Resp 15   Ht 6' 2" (1.88 m)   Wt 83.5 kg (184 lb 1.6 oz)   SpO2 96%   BMI 23.64 kg/m²     "

## 2023-10-11 NOTE — INTERVAL H&P NOTE
The patient has been examined and the H&P has been reviewed:    I concur with the findings and no changes have occurred since H&P was written.    Procedure risks, benefits and alternative options discussed and understood by patient/family.          Active Hospital Problems    Diagnosis  POA    *Moderate malnutrition [E44.0]  Yes      Resolved Hospital Problems   No resolved problems to display.

## 2023-10-11 NOTE — PROGRESS NOTES
[facility] - 5th Floor Med Surg  Gastroenterology  Progress Note    Patient Name: Helder Sharp Jr.  MRN: [id number]  Admission Date: 9/28/2023  Hospital Length of Stay: 13 days  Code Status: Full Code   Attending Provider: Juliane Tucker MD  Consulting Provider: JULIETTE Duffy MD  Primary Care Physician: Kimo Love MD  Principal Problem: Moderate malnutrition    Subjective:     Interval History:  Need for PEG    Review of Systems  Objective:     Vital Signs (Most Recent):  Temp: 98.1 °F (36.7 °C) (10/11/23 1403)  Pulse: 74 (10/11/23 1403)  Resp: 15 (10/11/23 1403)  BP: 118/60 (10/11/23 1403)  SpO2: 96 % (10/11/23 1403) Vital Signs (24h Range):  Temp:  [97.2 °F (36.2 °C)-98.8 °F (37.1 °C)] 98.1 °F (36.7 °C)  Pulse:  [74-91] 74  Resp:  [15-22] 15  SpO2:  [88 %-98 %] 96 %  BP: (104-148)/(52-77) 118/60     Weight: 83.5 kg (184 lb 1.6 oz) (09/28/23 1905)  Body mass index is 23.64 kg/m².      Intake/Output Summary (Last 24 hours) at 10/11/2023 1405  Last data filed at 10/10/2023 2100  Gross per 24 hour   Intake 30 ml   Output --   Net 30 ml       Lines/Drains/Airways       Peripheral Intravenous Line  Duration                  Peripheral IV - Single Lumen 10/11/23 1345 18 G Right Forearm <1 day                    Physical Exam    Significant Labs:  All pertinent lab results from the last 24 hours have been reviewed.      Significant Imaging:  Imaging results within the past 24 hours have been reviewed.    Assessment/Plan:     Active Diagnoses:    Diagnosis Date Noted POA    PRINCIPAL PROBLEM:  Moderate malnutrition [E44.0] 10/02/2023 Yes      Problems Resolved During this Admission:       PEG placed.  See post PEG orders.    Thank you for your consult. I will follow-up with patient. Please contact us if you have any additional questions.  See post PEG orders.    JULIETTE Duffy MD  Gastroenterology  [facility] - 5th Floor Med Surg

## 2023-10-11 NOTE — PHYSICIAN QUERY
PT Name: Helder Sharp Jr.  MR #: [id number]     DOCUMENTATION CLARIFICATION     CDS: Leroy Holcomb RN CCDS               Contact information: [email or web address]    This form is a permanent document in the medical record.    Query Date: October 11, 2023    By submitting this query, we are merely seeking further clarification of documentation.  Please utilize your independent clinical judgment when addressing the question(s) below.  The Medical Record contains the following:   Indicators   Supporting Clinical Findings Location in Medical Record     x Pneumonia documented Has completed 7 days of Zosyn therapy for possible aspiration pneumonia. Increased sputum production, leukocytosis noted and slightly increased oxygen needs.  Chest x-ray shows new developing bilateral opacities.   10/8/2023 Hospital Medicine progress notes     x Chest X-Ray/CT Scan Chronic emphysema and scarring within the lungs without appreciable acute superimposed airspace opacity.    Severe emphysematous changes are identified in the lungs. There is consolidation in the posterior basal segments of bilateral lower lobes demonstrating air bronchograms. This may reflect aspiration pneumonia. Correlate clinically as regards further evaluation and followup.    Bilateral lower lung lobes new patchy opacities suggest atelectasis and/or associated early infiltrates. 9/28/2023 CXR      9/29/2023 CTA chest          10/8/2023 CXR     x PaO2    PaCO2     O2 sat O2 sat 88% on 6L  O2 sat 82% on 10L 9/28/2023 Vitals  10/8/2023 Vitals     x WBC  09/28/23 18:12 10/05/23 06:07 10/08/23 04:52 10/10/23 06:23 10/11/23 06:15   WBC 21.13 (H) 11.09 16.91 (H) 14.98 (H) 9.47    Lab values     x Vital Signs T 97.6- 99.0, HR , RR 18-30, /79  T 97.4-99.3 (axillary), HR 80-88, RR 20-26, /58 9/28/2023 Vitals  10/8/2023 Vitals     x Cultures  Respiratory cultures with mixed nasra growth.    Sputum culture is positive for Moderate Escherichia coli    with  normal respiratory nasra    Susceptibility   Escherichia coli    Not Specified    Ceftriaxone <=0.25  Sensitive    Piperacillin/Tazobactam <=4  Sensitive    10/1/2023 Pulmonology progress notes    10/8/2023 Microbiology report     x Treatment  I will collect respiratory culture and start Zosyn again.   Currently on Rocephin for E coli growth in sputum, recommend 7 day total antimicrobial course, including coverage prior to transition to Rocephin.  10/8/2023 Hospital Medicine progress notes  10/11/2023 Pulmonology progress notes     x Supplemental O2 FIO2 @ 50% via ventilator  O2 ranging from 5-10L 9/28/2023 Vitals  10/8/2023 Vitals    Dysphagia/Swallow study       x Other Seen examined this morning.  He was lying almost supine in the bed with tube feeds running, discussed that he is supposed to be sitting up in bed with this tube feeds ongoing.    Admitted to ICU for acute hypoxic respiratory failure in setting of suspected aspiration pneumonia with well documented history of chronic aspiration.  History of Klebsiella pneumonia documented 01/2023, sensitive to Zosyn.  Continue Zosyn at this time pending further culture data, okay to discontinue azithromycin.    Bilateral aspiration pneumonia   Sepsis 2/2 above     10/09/23 05:02   Procalcitonin 0.32 (H)    10/7/2023 Hospital Medicine progress notes        9/30/2023 Critical Care progress notes              10/5/2023 Hospital Medicine progress notes    Lab value     Provider, please provide the diagnosis related to the above clinical indicators:    [   ] E. coli Pneumonia, present on admission   [  x ] E. coli Pneumonia, not present on admission   [   ] Other respiratory diagnosis (please specify): _________   [  ] Clinically undetermined         Please document in your progress notes daily for the duration of treatment, until resolved, and include in your discharge summary.     Form No. [id number]

## 2023-10-11 NOTE — PROGRESS NOTES
"[facility] Hospital Medicine Progress Note        Chief Complaint: Inpatient Follow-up for  shortness of breath and weakness      HPI: Helder Sharp Jr. is a 83 y.o. male with a past medical history of hyperlipidemia, CAD, COPD, on 2 L home oxygen, pulmonary nodules, and anxiety presented to [facility] on 9/28/2023 for shortness of breath and weakness. Wife also endorsed worsening nonproductive cough and lower extremity swelling.  Wife noticed chocolate" colored urine at approximately 15:30 on 09/28, prompting them to ED. Patient also had to increase home oxygen to 3 L secondary to increased dyspnea.  Patient received albuterol nebulizer treatment en route with EMS.  Patient was given an hour long nebulizer treatment, magnesium, and Solu-Medrol upon arrival to ED. Patient remained hypoxic on non-rebreather and  was intubated in ED for airway protection.  Labs revealed WBC 21.13, chloride 109, CO2 19, glucose 190, BNP 22, troponin 0.014, and lactic acid 3.8.  Flu/RSV/COVID were negative.  UA revealed 2+ protein, trace ketones, 3+ occult blood, 1+ bilirubin, and greater than 100 RBCs.  Blood and respiratory cultures were obtained.  Chest x-ray revealed chronic emphysema and scarring within the lungs without appreciable acute superimposed airspace opacity.  Patient was given LR, IV Zosyn, and IV Azithromycin in ED.  Patient was admitted to ICU for close monitoring.  Patient was started on Solu-Medrol 60 mg BID and DuoNebs q.6 hours.  Urology was consulted for hematuria. Ultrasound retroperitoneal complete revealed no abnormalities. Urology recommended continuing indwelling lang with outpatient follow-up. Patient had previous history of Klebsiella pneumonia which was sensitive to Zosyn.  Azithromycin was discontinued.  CTA chest revealed multiple enlarged mediastinal lymph nodes, no filling defects seen to suggest PE, and severe emphysematous changes identified in the lungs with " consolidation in the posterior basal segments of bilateral lower lobes demonstrating air bronchograms which may reflect aspiration pneumonia.  NG tube was placed and patient was started on tube feeds.  Patient was transitioned to prednisone 40 mg daily for 5 day course on 10/01. Patient was extubated on 10/01 and placed on Vapotherm. Speech therapy was consulted. Patient was cleared for downgrade out of ICU on 10/03/2023. Hospital medicine was consulted for transition of care and further medical management.     Has completed 7 days of Zosyn therapy for possible aspiration pneumonia.  Failed MBS, recommendations made for PEG tube, Palliative was consulted, patient and family interested in PEG tube.  Gastroenterology following.  Patient is moderate risk for perioperative pulmonary complications and hypercapnia with PEG placement.Pulmonology following     On 10/8  Increased sputum production, leukocytosis noted and slightly increased oxygen needs.  Chest x-ray shows new developing bilateral opacities, collected respiratory culture and started Zosyn again.     Requiring Haldol for agitation in-house.    Interval Hx:   Patient going for PEG today, no acute overnight events reported by the staff, has intermittent agitation.    Chart reviewed, T-max of 98.8, labs reviewed, white blood cell count improved, creatinine improved. Ecoli on respiratory culture    Objective/physical exam:  General: In no acute distress, afebrile  Chest: Clear to auscultation bilaterally  Heart: RRR, +S1, S2, no appreciable murmur  Abdomen: Soft, nontender, BS +  MSK: Warm, no lower extremity edema, no clubbing or cyanosis  Neurologic: Alert and oriented     VITAL SIGNS: 24 HRS MIN & MAX LAST   Temp  Min: 97.2 °F (36.2 °C)  Max: 98.8 °F (37.1 °C) 98.1 °F (36.7 °C)   BP  Min: 104/62  Max: 148/68 (!) 115/50   Pulse  Min: 74  Max: 91  74   Resp  Min: 15  Max: 22 18   SpO2  Min: 88 %  Max: 98 % (!) 90 %     I have reviewed the following labs:  Recent  Labs   Lab 10/09/23  0503 10/10/23  0623 10/11/23  0615   WBC 17.86* 14.98* 9.47   RBC 4.18* 4.08* 4.25*   HGB 12.5* 12.1* 12.4*   HCT 38.5* 37.3* 39.3*   MCV 92.1 91.4 92.5   MCH 29.9 29.7 29.2   MCHC 32.5* 32.4* 31.6*   RDW 14.9 14.8 14.6    227 263   MPV 9.8 9.8 9.4     Recent Labs   Lab 10/05/23  0607 10/08/23  0452 10/09/23  0503 10/10/23  0623    138 137 139   K 3.7 4.2 4.0 4.1   CO2 26 25 26 27   BUN 30.6* 33.4* 41.6* 34.1*   CREATININE 0.87 0.88 1.22* 1.02   CALCIUM 8.8 9.5 8.9 8.9   MG  --  2.30  --   --    ALBUMIN 2.9* 3.1*  --   --    ALKPHOS 60  --   --   --    ALT 19  --   --   --    AST 13  --   --   --    BILITOT 0.7  --   --   --      Microbiology Results (last 7 days)       Procedure Component Value Units Date/Time    Respiratory Culture [[id number]]  (Abnormal)  (Susceptibility) Collected: 10/08/23 1639    Order Status: Completed Specimen: Sputum Updated: 10/11/23 1050     Respiratory Culture Moderate Escherichia coli     Comment: with normal respiratory nasra        GRAM STAIN Quality 1+      Few Gram positive cocci      Rare Gram Negative Rods      Rare Gram Positive Rods             See below for Radiology    Scheduled Med:   albuterol-ipratropium  3 mL Nebulization Q4H WAKE    albuterol-ipratropium  3 mL Nebulization Once    busPIRone  10 mg Oral BID    cefTRIAXone        cefTRIAXone (ROCEPHIN) IVPB  2 g Intravenous Q24H    enoxparin  40 mg Subcutaneous Q24H (prophylaxis, 1700)    fluticasone furoate-vilanteroL  1 puff Inhalation Daily    guaiFENesin 100 mg/5 ml  200 mg Per NG tube Q6H    melatonin  6 mg Oral Nightly    [START ON 10/12/2023] sertraline  100 mg Oral Daily    tiotropium bromide  2 puff Inhalation Daily    zolpidem  5 mg Oral QHS      Continuous Infusions:   electrolyte-A        PRN Meds:  acetaminophen, albuterol-ipratropium, albuterol-ipratropium, cefTRIAXone, hydrALAZINE, morphine, ondansetron, sodium chloride 0.9%, ziprasidone     Assessment/Plan:  Acute on  chronic hypoxemic respiratory failure-intubated/extubated  Chronic hypoxic respiratory failure 2 L nasal cannula at home   COPD exacerbation  Bilateral aspiration pneumonia   Sepsis 2/2 above   Leukocytosis  Elevated creatinine  Dysphagia  Hematuria-resolved  Agitation     History of: HLD, CAD, pulmonary nodules    Plan   Continue oxygen supplementation and supportive care, Continue DuoNeb 4 times daily and as needed.   Continue antibiotics, Zosyn switched to ceftriaxone, monitor fever curve and WBC  Failed MBS, family and patient okay for PEG tube, palliative met with the family, GI following, Pulmonology reconsulted for clearance for PEG tube placement per GI.  Went today  Creatinine has improved  Encourage redirection, resuming Zoloft, we will start nighttime Seroquel.  Encourage therapy  Continue appropriate home medications      Critical care Time Spent>35 min       VTE prophylaxis:  Lovenox      Anticipated discharge and Disposition:   To be decided after PEG, nursing facility      All diagnosis and differential diagnosis have been reviewed; assessment and plan has been documented; I have personally reviewed the labs and test results that are presently available; I have reviewed the patients medication list; I have reviewed the consulting providers response and recommendations. I have reviewed or attempted to review medical records based upon their availability    All of the patient's questions have been  addressed and answered. Patient's is agreeable to the above stated plan. I will continue to monitor closely and make adjustments to medical management as needed.  _____________________________________________________________________    Nutrition Status:    Radiology:  I have personally reviewed the following imaging and agree with the radiologist.     X-Ray Chest 1 View  Narrative: EXAMINATION:  XR CHEST 1 VIEW    CLINICAL HISTORY:  f/u, leukocytosis;    TECHNIQUE:  One view    COMPARISON:  October 5,  2023..    FINDINGS:  Cardiopericardial silhouette is within normal limits.  There are new patchy opacities involving bilateral lower lung zones.  Upper lung lobes are remarkable for emphysematous bullous changes.  There is no overt pulmonary edema.  No significant fluid within the pleural space.  No pneumothorax.  Impression: Bilateral lower lung lobes new patchy opacities suggest atelectasis and/or associated early infiltrates.    Electronically signed by: Ronny Hamilton  Date:    10/08/2023  Time:    09:14      Juliane Tucker MD   10/11/2023

## 2023-10-11 NOTE — PROVATION PATIENT INSTRUCTIONS
Discharge Summary/Instructions after an Endoscopic Procedure  Patient Name: Helder Sharp  Patient MRN: [id number]  Patient YOB: 1940 Wednesday, October 11, 2023  FRANCES Duffy MD  Dear patient,  As a result of recent federal legislation (The Federal Cures Act), you may   receive lab or pathology results from your procedure in your [facility]   account before your physician is able to contact you. Your physician or   their representative will relay the results to you with their   recommendations at their soonest availability.  Thank you,  RESTRICTIONS:  During your procedure today, you received medications for sedation.  These   medications may affect your judgment, balance and coordination.  Therefore,   for 24 hours, you have the following restrictions:   - DO NOT drive a car, operate machinery, make legal/financial decisions,   sign important papers or drink alcohol.    ACTIVITY:  Today: no heavy lifting, straining or running due to procedural   sedation/anesthesia.  The following day: return to full activity including work.  DIET:  Eat and drink normally unless instructed otherwise.     TREATMENT FOR COMMON SIDE EFFECTS:  - Mild abdominal pain, nausea, belching, bloating or excessive gas:  rest,   eat lightly and use a heating pad.  - Sore Throat: treat with throat lozenges and/or gargle with warm salt   water.  - Because air was used during the procedure, expelling large amounts of air   from your rectum or belching is normal.  - If a bowel prep was taken, you may not have a bowel movement for 1-3 days.    This is normal.  SYMPTOMS TO WATCH FOR AND REPORT TO YOUR PHYSICIAN:  1. Abdominal pain or bloating, other than gas cramps.  2. Chest pain.  3. Back pain.  4. Signs of infection such as: chills or fever occurring within 24 hours   after the procedure.  5. Rectal bleeding, which would show as bright red, maroon, or black stools.   (A tablespoon of blood from the rectum is not serious,  especially if   hemorrhoids are present.)  6. Vomiting.  7. Weakness or dizziness.  GO DIRECTLY TO THE NEAREST EMERGENCY ROOM IF YOU HAVE ANY OF THE FOLLOWING:      Difficulty breathing              Chills and/or fever over 101 F   Persistent vomiting and/or vomiting blood   Severe abdominal pain   Severe chest pain   Black, tarry stools   Bleeding- more than one tablespoon   Any other symptom or condition that you feel may need urgent attention  Your doctor recommends these additional instructions:  If any biopsies were taken, your doctors clinic will contact you in 1 to 2   weeks with any results.  - Please follow the post-PEG recommendations.   - Return patient to hospital [facility] for ongoing care.  For questions, problems or results please call your physician - FRANCES Duffy MD at Work:  [phone].  [facility] EMERGENCY ROOM PHONE NUMBER: [phone]  IF A COMPLICATION OR EMERGENCY SITUATION ARISES AND YOU ARE UNABLE TO REACH   YOUR PHYSICIAN - GO DIRECTLY TO THE EMERGENCY ROOM.  MD FRANCES Gayle MD  10/11/2023 2:01:48 PM  This report has been verified and signed electronically.  Dear patient,  As a result of recent federal legislation (The Federal Cures Act), you may   receive lab or pathology results from your procedure in your [facility]   account before your physician is able to contact you. Your physician or   their representative will relay the results to you with their   recommendations at their soonest availability.  Thank you,  PROVATION

## 2023-10-11 NOTE — ANESTHESIA PREPROCEDURE EVALUATION
10/11/2023  Helder Sharp Jr. is a 83 y.o., male with COPD/heavy smoker (on 2 L at home and oral steroids), HPA suppression, moderate CAD with preserved EF admitted September 28th with COPD exacerbation/hypoxic respiratory failure requiring intubation, broad antimicrobial coverage with steroids/nebs.  Extubated October 1st and weaned down to 5 L OxyMask .  There is some concern for aspiration pneumonia and patient has failed swallowing study.  Patient presents today for EGD with PEG tube placement    Transthoracic echo September 29, 2023   EF 55% with grade 1 diastolic dysfunction   No significant valvular heart disease   No pulmonary hypertension    Left heart catheterization February 2022   Left main 20% ostial  Lad 30-40% proximal  Left circumflex no significant disease  RCA no significant disease      Pre-op Assessment    I have reviewed the Patient Summary Reports.    I have reviewed the NPO Status.   I have reviewed the Medications.     Review of Systems  Anesthesia Hx:   Denies Personal Hx of Anesthesia complications.   Social:  Smoker    Hematology/Oncology:         -- Anemia:   Cardiovascular:  Functional Capacity 2 METS  Coronary Artery Disease:    Pulmonary:  Pulmonary Symptoms:  are currently symptomatic, shortness of breath at rest, respiratory distress, acute, respiratory distress, chronic and dependence on supplemental O2.  Dependence on O2 is 24/7 , at 5 L OxyMask liters.  Chronic Obstructive Pulmonary Disease (COPD): Inhaler use is maintenance inhaler daily and rescue inhaler daily. Oral/Intravenous steroid use is chronic steroid Rx. Current breathing status is acute exacerbation.        Physical Exam  General: Well nourished, Cooperative, Alert and Oriented    Airway:  Mouth Opening: Normal  TM Distance: Normal  Tongue: Normal  Neck ROM: Normal ROM    Dental:  Periodontal  disease    Chest/Lungs:  Tachypnea, Rhonchi  5 L OxyMask 92%  Heart:  Rate: Normal  Rhythm: Regular Rhythm        Anesthesia Plan  Type of Anesthesia, risks & benefits discussed:    Anesthesia Type: Gen Natural Airway  Intra-op Monitoring Plan: Standard ASA Monitors  Induction:  IV  Informed Consent: Informed consent signed with the Patient and all parties understand the risks and agree with anesthesia plan.  All questions answered.   ASA Score: 4  Day of Surgery Review of History & Physical: H&P Update referred to the surgeon/provider.  Anesthesia Plan Notes: Pretreated with nebs   POM mask    Ready For Surgery From Anesthesia Perspective.     .

## 2023-10-11 NOTE — PLAN OF CARE
Gave patient choice list answered questions about placement they are researching will give me an answer tomorrow

## 2023-10-11 NOTE — PROGRESS NOTES
Inpatient Nutrition Assessment    Admit Date: 9/28/2023   Total duration of encounter: 13 days     Nutrition Recommendation/Prescription     Once medically feasible post-op Peg placement, resume Tube feeding as tolerated:  Diabetisource AC goal rate 85 ml/hr with 30 ml/hr water flush to provide  2040 kcal, 100% needs  102 g protein, 100% needs  1994 ml free water, 100% needs  (calculations based on estimated 20 hr/d run time)    Communication of Recommendations: reviewed with nurse    Nutrition Assessment     Malnutrition Assessment/Nutrition-Focused Physical Exam    Malnutrition Context: acute illness or injury (10/02/23 1349)  Malnutrition Level: moderate (10/02/23 1349)  Energy Intake (Malnutrition):  (does not meet criteria) (10/02/23 1349)  Weight Loss (Malnutrition):  (does not meet criteria) (10/02/23 1349)  Subcutaneous Fat (Malnutrition): mild depletion (10/02/23 1349)  Orbital Region (Subcutaneous Fat Loss): mild depletion  Upper Arm Region (Subcutaneous Fat Loss): mild depletion     Muscle Mass (Malnutrition): mild depletion (10/02/23 1349)  [place] Region (Muscle Loss): mild depletion  Clavicle Bone Region (Muscle Loss): mild depletion                             A minimum of two characteristics is recommended for diagnosis of either severe or non-severe malnutrition.    Chart Review    Reason Seen: continuous nutrition monitoring, physician consult for tube feeding, and follow-up    Malnutrition Screening Tool Results   Have you recently lost weight without trying?: No  Have you been eating poorly because of a decreased appetite?: No   MST Score: 0   Diagnosis:  COPD exacerbation w/ associated hypoxic respiratory failure likely 2/2 to atypical PNA  Hematuria  HLD     Relevant Medical History: COPD, CAD, HLD, and pulmonary nodules     Scheduled Medications:   albuterol-ipratropium  3 mL Nebulization Q4H WAKE    albuterol-ipratropium  3 mL Nebulization Once    busPIRone  10 mg Oral BID    cefTRIAXone         cefTRIAXone (ROCEPHIN) IVPB  2 g Intravenous Q24H    enoxparin  40 mg Subcutaneous Q24H (prophylaxis, 1700)    fluticasone furoate-vilanteroL  1 puff Inhalation Daily    guaiFENesin 100 mg/5 ml  200 mg Per NG tube Q6H    melatonin  6 mg Oral Nightly    [START ON 10/12/2023] sertraline  100 mg Oral Daily    tiotropium bromide  2 puff Inhalation Daily    zolpidem  5 mg Oral QHS     Continuous Infusions:   electrolyte-A       Calorie Containing IV Medications: no significant kcals from medications at this time    Recent Labs   Lab 10/05/23  0607 10/08/23  0452 10/09/23  0503 10/10/23  0623    138 137 139   K 3.7 4.2 4.0 4.1   CALCIUM 8.8 9.5 8.9 8.9   PHOS  --  3.3  --   --    MG  --  2.30  --   --    CHLORIDE 106 104 102 102   CO2 26 25 26 27   BUN 30.6* 33.4* 41.6* 34.1*   CREATININE 0.87 0.88 1.22* 1.02   GLUCOSE 119* 130* 124* 119*   BILITOT 0.7  --   --   --    ALKPHOS 60  --   --   --    ALT 19  --   --   --    AST 13  --   --   --    ALBUMIN 2.9* 3.1*  --   --        Dietary Orders (From admission, onward)       Start     Ordered    10/11/23 0001  Diet NPO  Diet effective midnight         10/10/23 1306                Appetite/Oral Intake: NPO/not applicable  Factors Affecting Nutritional Intake: NPO  Food/Spiritism/Cultural Preferences: none reported  Food Allergies: none reported    Wound(s): sacral redness noted    Last Bowel Movement: 10/04/23    Comments    9/29/23 Patient on ventilator, receiving kcals from [facility], consult received for tube feeding, will provide recommendations.    10/2/23 Patient extubated 10/1, remains on tube feeding, will update needs/recommendations; recommend Diabetisource to better meet needs, lower carbohydrate formula due to hyperglycemia, no h/o DM noted. Patient/family reports h/o weight loss back in January, has since gained weight back.    10/6: Pt s/p MBS this morning-+severe oropharyngeal dysphagia and at high risk of aspiration; SLP recommends long term  "alternative means of nutrition. Nsg states, MD will discuss Peg with family. In the meantime, pt continues with NG tube with feeds. Pt tolerating tube feeds without problems. Pt currently on a diabetic formula due to elevated blood sugars at the time. Prednisone has been discontinued. Blood sugars have been 115-150 last few days. Will continue current regimen.     10/11/23: Pt in surgery having Peg placement. Per nsg, pt tolerating TF without problems prior to surgery. Recommend to resume current regimen once able to use Peg.     Anthropometrics    Height: 6' 2" (188 cm) Height Method: Stated  Last Weight: 83.5 kg (184 lb 1.6 oz) (23) Weight Method: Bed Scale  BMI (Calculated): 23.6  BMI Classification: normal (BMI 18.5-24.9)        Ideal Body Weight (IBW), Male: 190 lb     % Ideal Body Weight, Male (lb): 96.89 %                 Usual Body Weight (UBW), k.2 kg-81.6 kg  % Usual Body Weight: 104.34     Usual Weight Provided By: patient and family/caregiver    Wt Readings from Last 5 Encounters:   23 83.5 kg (184 lb 1.6 oz)   23 78.9 kg (174 lb)   23 82.1 kg (181 lb)   23 90.2 kg (198 lb 13.7 oz)   23 85.7 kg (189 lb)     Weight Change(s) Since Admission: 10 lb weight change in same day noted, admission weight likely in error  Wt Readings from Last 1 Encounters:   23 83.5 kg (184 lb 1.6 oz)   23 78.9 kg (174 lb)   Admit Weight: 78.9 kg (174 lb) (23 174)  10/6: no new wts at this time.   10/11: no new wt    Estimated Needs    Weight Used For Calorie Calculations: 83.5 kg (184 lb 1.4 oz)  Energy Calorie Requirements (kcal): 2976-2427, 1.2-1.4 stress factor  Energy Need Method: Velma- Jeor  Weight Used For Protein Calculations: 83.5 kg (184 lb 1.4 oz)  Protein Requirements: 100-126 g, 1.2-1.5 g/kg  Fluid Requirements (mL): 6177-9262, 1 ml/kcal  Total Ve: 17.9 L/m; Temp (24hrs), Av.9 °F (36.6 °C), Min:97.2 °F (36.2 °C), Max:98.8 °F (37.1 " °C)      Enteral Nutrition     Formula: Diabetisource AC  Rate/Volume: 85 ml/hr  Water Flushes: 30 ml/hour  Additives/Modulars: none at this time  Route: nasogastric tube  Method: continuous  Total Nutrition Provided by Tube Feeding, Additives, and Flushes:  Calories Provided  2040 kcal/d, 100% needs   Protein Provided  102 g/d, 100% needs   Fluid Provided  1994 ml/d, 100% needs   Continuous feeding calculations based on estimated 20 hr/d run time unless otherwise stated.    Parenteral Nutrition Patient not receiving parenteral nutrition support at this time.    Evaluation of Received Nutrient Intake    Calories: meeting estimated needs  Protein: meeting estimated needs    Patient Education Not applicable.    Nutrition Diagnosis     PES (1): Inadequate energy intake related to acute illness as evidenced by less than 75% needs met. (improving)  PES (2): Malnutrition related to acute illness as evidenced by mild fat depletion and mild muscle depletion. (continues)    Interventions/Goals     Intervention(s): modified composition of enteral nutrition, modified rate of enteral nutrition, and collaboration with other providers    Goal (1): Meet greater than 75% of nutritional needs by follow-up. (goal progressing)  Goal (2): Maintain weight throughout hospitalization. (goal progressing)    Monitoring & Evaluation     Dietitian will monitor energy intake, enteral nutrition intake, weight, weight change, electrolyte/renal panel, glucose/endocrine profile, and gastrointestinal profile.    Nutrition Risk/Follow-Up: moderate (follow-up in 3-5 days)   Please consult if re-assessment needed sooner.

## 2023-10-11 NOTE — PT/OT/SLP PROGRESS
Occupational Therapy      Patient Name:  Helder Sharp JrSophia   MRN:  [id number]    Patient not seen today secondary to Off the floor for procedure/surgery. Will follow-up tomorrow.    10/11/2023

## 2023-10-11 NOTE — PROGRESS NOTES
[facility] - 5th Floor Med Surg  Pulmonary Critical Care Note    Patient Name: Helder Sharp Jr.  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 Root from pulmonary aspect at our [facility].  Patient is on Daliresp according to their notes for frequent episodes of bronchitis/pneumonitis also has hypoxemic respiratory failure on supplemental O2 and trilogy with sleep.  He was last seen in their pulmonary clinic in August of 2023.  Their notes according to that time had documentation of aspiration; and was undergoing evaluation by palliative care.  There no further documented patient had bilateral pneumonia requiring hospitalization a PEG tube was recommended for aspiration however the patient refused.    Patient presented to [facility] on 09/28/2023 for complaints of shortness of breath and weakness.  Upon arrival the patient received an hour long neb, magnesium and Solu-Medrol in the emergency room.  Patient did require intubation and ICU admit.  Was subsequently extubated however after extubation required use of Vapotherm.  Downgraded from the ICU on 10/04/2023.  GI has been consulted for the consideration of PEG placement he in turn is consulting Pulmonary for surgical clearance.  Cultures from this admit are negative.  He continues to follow with speech therapy however they are currently recommended NPO status.  Palliative care has also been consulted and they are following.    Hospital Course/Significant events:  As above    24 Hour Interval History:  He is currently on 5L oxymask, oxygenating well. He is lying in bed. Nursing had to remove NG tube last night due to agitation. Nursing staff feels it is the ambien causing issues.  GI planning PEG placement with possible ET tube or LMA during procedure as recommended by Dr Dominguez. Sputum culture grew E. Coli, sensitive to Rocephin (day 1) and this was de-escalated from zosyn which he completed 3 days worth.     Past Medical History:   Diagnosis Date    Acute bronchitis     Anxiety disorder, unspecified     COPD (chronic obstructive pulmonary disease)     Coronary artery disease     Hyperlipidemia     Left carotid bruit     Pulmonary nodules     Unspecified cataract        Past Surgical History:   Procedure Laterality Date    APPENDECTOMY      CATARACT EXTRACTION Left 2023    HERNIA REPAIR      PHACOEMULSIFICATION, CATARACT, WITH IOL INSERTION Left 1/3/2023    Procedure: PHACOEMULSIFICATION, CATARACT, WITH IOL INSERTION- OS;  Surgeon: Louis Abdi MD;  Location: [facility];  Service: Ophthalmology;  Laterality: Left;    TONSILLECTOMY         Social History     Socioeconomic History    Marital status:    Tobacco Use    Smoking status: Former     Current packs/day: 0.00     Types: Cigarettes     Quit date:      Years since quittin.7    Smokeless tobacco: Never   Substance and Sexual Activity    Alcohol use: Not Currently    Drug use: Never    Sexual activity: Yes           Current Outpatient Medications   Medication Instructions    albuterol (ACCUNEB) 0.63 mg, Nebulization, Every 6 hours PRN, Rescue    albuterol (PROVENTIL/VENTOLIN HFA) 90 mcg/actuation inhaler 2 puffs, Inhalation, Every 6 hours PRN, Rescue    albuterol-ipratropium (DUO-NEB) 2.5 mg-0.5 mg/3 mL nebulizer solution 3 mLs, Nebulization, Every 6 hours PRN, Rescue    ascorbic acid (vitamin C) (VITAMIN C) 500 mg, Oral, 2 times daily    aspirin (ECOTRIN) 81 mg, Oral    atorvastatin (LIPITOR) 20 mg, Oral    cholecalciferol (vitamin D3) (VITAMIN D3) 10,000 Units, Oral    DALIRESP 500 mcg Tab 0.5 tablets, Oral, Daily    fluticasone propionate (FLONASE) 50 mcg/actuation nasal spray 1 spray, Each Nostril, Daily     fluticasone-umeclidin-vilanter (TRELEGY ELLIPTA) 200-62.5-25 mcg inhaler 1 puff, Oral, Daily    guaiFENesin (MUCINEX) 1,200 mg, Oral, Daily    Lactobacillus rhamnosus GG (CULTURELLE) 15 billion cell capsule Oral    montelukast (SINGULAIR) 10 mg tablet TAKE 1 TABLET BY MOUTH EVERY DAY    predniSONE (DELTASONE) 20 MG tablet Take 3 tablets by mouth for three days, then take 2 tablets by mouth for four days, then 1 tablet for four days, then 1/2 tablet for 4 days.    ranolazine (RANEXA) 500 mg, Oral, 2 times daily    roflumilast (DALIRESP) 500 mcg Tab 0.5 tablets, Oral, Daily    sertraline (ZOLOFT) 100 mg, Oral, Daily       Current Inpatient Medications   albuterol-ipratropium  3 mL Nebulization Q4H WAKE    busPIRone  10 mg Oral BID    cefTRIAXone (ROCEPHIN) IVPB  2 g Intravenous Q24H    enoxparin  40 mg Subcutaneous Q24H (prophylaxis, 1700)    fluticasone furoate-vilanteroL  1 puff Inhalation Daily    guaiFENesin 100 mg/5 ml  200 mg Per NG tube Q6H    melatonin  6 mg Oral Nightly    [START ON 10/12/2023] sertraline  100 mg Oral Daily    tiotropium bromide  2 puff Inhalation Daily    zolpidem  5 mg Oral QHS         Review of Systems   Constitutional: Negative.    HENT: Negative.     Respiratory:  Positive for cough and shortness of breath.    Psychiatric/Behavioral: Negative.            Objective:       Intake/Output Summary (Last 24 hours) at 10/11/2023 0755  Last data filed at 10/10/2023 2100  Gross per 24 hour   Intake 90 ml   Output 450 ml   Net -360 ml           Vital Signs (Most Recent):  Temp: 98.8 °F (37.1 °C) (10/11/23 0749)  Pulse: 78 (10/11/23 0749)  Resp: 17 (10/11/23 0749)  BP: 125/61 (10/11/23 0749)  SpO2: (!) 92 % (10/11/23 0749)  Body mass index is 23.64 kg/m².  Weight: 83.5 kg (184 lb 1.6 oz) Vital Signs (24h Range):  Temp:  [97.5 °F (36.4 °C)-98.8 °F (37.1 °C)] 98.8 °F (37.1 °C)  Pulse:  [78-90] 78  Resp:  [17-20] 17  SpO2:  [91 %-94 %] 92 %  BP: (104-131)/(61-77) 125/61     Physical  "Exam  Constitutional:       Appearance: Normal appearance.   HENT:      Head: Normocephalic and atraumatic.   Cardiovascular:      Rate and Rhythm: Regular rhythm.      Heart sounds: Normal heart sounds.   Pulmonary:      Effort: Pulmonary effort is normal.      Breath sounds: Rhonchi present.   Abdominal:      General: Bowel sounds are normal.      Palpations: Abdomen is soft.   Neurological:      General: No focal deficit present.      Mental Status: He is alert and oriented to person, place, and time.           Lines/Drains/Airways       Peripheral Intravenous Line  Duration                  Peripheral IV - Single Lumen 09/28/23 2104 18 G Anterior;Right Forearm 12 days                    Significant Labs:    Lab Results   Component Value Date    WBC 9.47 10/11/2023    HGB 12.4 (L) 10/11/2023    HCT 39.3 (L) 10/11/2023    MCV 92.5 10/11/2023     10/11/2023     BMP  Lab Results   Component Value Date     10/10/2023    K 4.1 10/10/2023    CHLORIDE 102 10/10/2023    CO2 27 10/10/2023    BUN 34.1 (H) 10/10/2023    CREATININE 1.02 10/10/2023    CALCIUM 8.9 10/10/2023    AGAP 10.0 10/10/2023    EGFRNONAA 55 07/15/2021     ABG  No results for input(s): "PH", "PO2", "PCO2", "HCO3", "POCBASEDEF" in the last 168 hours.      Mechanical Ventilation Support:  Vent Mode: CPAP / PSV (10/01/23 1351)  Ventilator Initiated: Yes (09/28/23 2007)  Set Rate: 20 BPM (10/01/23 1115)  Vt Set: 500 mL (10/01/23 1115)  Pressure Support: 10 cmH20 (10/01/23 1351)  PEEP/CPAP: 5 cmH20 (10/01/23 1351)  Oxygen Concentration (%): 50 (10/07/23 0508)  Peak Airway Pressure: 17 cmH20 (10/01/23 1351)  Total Ve: 17.9 L/m (10/01/23 1351)  F/VT Ratio<105 (RSBI): (!) 55.34 (10/01/23 2747)      Significant Imaging:  No imaging over the past 24 hours    Assessment/Plan:     Assessment  Reported end-stage chronic obstructive pulmonary disease with chronic hypoxemic respiratory failure on trilogy q.h.s. followed by Dr. Root "   Aspiration/oropharyngeal dysphagia  History of hyperlipidemia, coronary artery disease and pulmonary nodules -pulmonary notes from outpatient state the patient is not a candidate for any type of procedure in regards to these pulmonary nodules however they are continuing to follow them    Plan  Continue nebs, mucolytics and inhaled regimen for COPD management  Continue Rocephin (day 2) for E Coli in sputum, end date 10/15  GI planning PEG placement today  Needs aggressive PT/OT/ST   Continue weaning o2 as tolerated  Wife very upset with ongoing nighttime agitation and delirium. May need to change night time medication to try seroquel or something different instead of ambien however will defer this to primary team.     MYCHAL Michael  Pulmonary Critical Care Medicine  [facility] - 5th Floor Med Surg  DOS: 10/11/2023

## 2023-10-12 NOTE — PT/OT/SLP PROGRESS
[facility]  Speech Language Pathology Department  Dysphagia Therapy Progress Note    Patient Name:  Helder Sharp Jr.   MRN:  [id number]    Recommendations:     General recommendations:  dysphagia therapy  Diet texture/consistency recommendations:  NPO  Medications: NPO  Discharge recommendations:  nursing facility, skilled     Subjective     Patient awake, alert, and cooperative.    Pain/Comfort:  0/10    Spiritual/Cultural/Anabaptism Beliefs/Practices that affect care: no    Respiratory Status: nasal cannula    Objective:     Oral Musculature  Dentition: own teeth  Secretion Management: adequate  Facial Movement: WFL    Therapeutic Activities:  Pt completed laryngeal elevation exercises x50 with minimal-moderate cues.      Assessment:     Pt continues to present with oropharyngeal dysphagia and remains unsafe for PO intake.    Goals:     Multidisciplinary Problems       SLP Goals          Problem: SLP    Goal Priority Disciplines Outcome   SLP Goal     SLP    Description: LTG: To tolerate least restrictive diet without clinical signs/sx of aspiration. (Continue)  ST. Tongue base/laryngeal excursion exercise (continue)  2. Tolerate thermal stimulation x10 with 100% swallow response with less than a 2 second delay. (Continue)  3. Puree solids without clinical signs/sx of aspiration.                      Patient Education:     Patient and family were provided with verbal education regarding SLP POC.  Understanding was verbalized, however additional teaching warranted.    Plan:     Will continue to follow and tx as appropriate.    SLP Follow-Up:  Yes   Patient to be seen:  daily   Plan of Care expires:  10/13/23  Plan of Care reviewed with:  patient, spouse       Time Tracking:     SLP Treatment Date:   10/12/23  Speech Start Time:  1548  Speech Stop Time:  1603     Speech Total Time (min):  15 min    Billable minutes:  Treatment of Swallow Dysfunction, 15 minutes       10/12/2023

## 2023-10-12 NOTE — PROGRESS NOTES
"Gastroenterology Progress Note    Subjective/Interval History:  TFs not yet started yet.  Awaiting TFs from dietary.  No issues with medications per PEG.  Denies abdominal pain.     Vital Signs:  BP (!) 101/58   Pulse 88   Temp 97.4 °F (36.3 °C) (Oral)   Resp 16   Ht 6' 2" (1.88 m)   Wt 83.5 kg (184 lb 1.6 oz)   SpO2 (!) 88%   BMI 23.64 kg/m²   Body mass index is 23.64 kg/m².    Physical Exam:  Physical Exam  Constitutional:       General: He is not in acute distress.     Appearance: He is ill-appearing.      Comments: OOB in chair.  Wife at side   HENT:      Head: Normocephalic and atraumatic.   Eyes:      General: No scleral icterus.     Extraocular Movements: Extraocular movements intact.   Cardiovascular:      Rate and Rhythm: Normal rate and regular rhythm.   Pulmonary:      Comments: 4L NC in place with increased WOB  Abdominal:      General: Bowel sounds are normal. There is no distension.      Palpations: Abdomen is soft. There is no mass.      Tenderness: There is no abdominal tenderness. There is no guarding or rebound.      Comments: PEG in place in epigastrium c/d/I with external bumper at 4cm and abdominal binder in place.  Some discomfort when moving the tube about   Musculoskeletal:      Right lower leg: No edema.      Left lower leg: No edema.   Skin:     General: Skin is warm and dry.      Coloration: Skin is not jaundiced.   Neurological:      Mental Status: He is alert and oriented to person, place, and time.   Psychiatric:         Mood and Affect: Mood normal.         Behavior: Behavior normal.         Labs:  Recent Results (from the past 48 hour(s))   Protime-INR    Collection Time: 10/11/23  6:15 AM   Result Value Ref Range    PT 14.2 12.5 - 14.5 seconds    INR 1.1 <=1.3   CBC with Differential    Collection Time: 10/11/23  6:15 AM   Result Value Ref Range    WBC 9.47 4.50 - 11.50 x10(3)/mcL    RBC 4.25 (L) 4.70 - 6.10 x10(6)/mcL    Hgb 12.4 (L) 14.0 - 18.0 g/dL    Hct 39.3 (L) 42.0 - " 52.0 %    MCV 92.5 80.0 - 94.0 fL    MCH 29.2 27.0 - 31.0 pg    MCHC 31.6 (L) 33.0 - 36.0 g/dL    RDW 14.6 11.5 - 17.0 %    Platelet 263 130 - 400 x10(3)/mcL    MPV 9.4 7.4 - 10.4 fL    Neut % 69.1 %    Lymph % 20.5 %    Mono % 8.6 %    Eos % 0.7 %    Basophil % 0.3 %    Lymph # 1.94 0.6 - 4.6 x10(3)/mcL    Neut # 6.54 2.1 - 9.2 x10(3)/mcL    Mono # 0.81 0.1 - 1.3 x10(3)/mcL    Eos # 0.07 0 - 0.9 x10(3)/mcL    Baso # 0.03 <=0.2 x10(3)/mcL    IG# 0.08 (H) 0 - 0.04 x10(3)/mcL    IG% 0.8 %    NRBC% 0.0 %   POCT glucose    Collection Time: 10/11/23  7:57 PM   Result Value Ref Range    POCT Glucose 96 70 - 110 mg/dL   POCT glucose    Collection Time: 10/12/23  5:42 AM   Result Value Ref Range    POCT Glucose 96 70 - 110 mg/dL       Imaging:  X-Ray Chest 1 View    Result Date: 10/8/2023  EXAMINATION: XR CHEST 1 VIEW CLINICAL HISTORY: f/u, leukocytosis; TECHNIQUE: One view COMPARISON: October 5, 2023.. FINDINGS: Cardiopericardial silhouette is within normal limits.  There are new patchy opacities involving bilateral lower lung zones.  Upper lung lobes are remarkable for emphysematous bullous changes.  There is no overt pulmonary edema.  No significant fluid within the pleural space.  No pneumothorax.     Bilateral lower lung lobes new patchy opacities suggest atelectasis and/or associated early infiltrates. Electronically signed by: Ronny Hamilton Date:    10/08/2023 Time:    09:14    Fl Modified Barium Swallow Speech    Result Date: 10/6/2023  See procedure notes from Speech Pathologist. This procedure was auto-finalized.    X-Ray Chest 1 View    Result Date: 10/5/2023  EXAMINATION: XR CHEST 1 VIEW CPT 17354 CLINICAL HISTORY: f/u; COMPARISON: September 30, 2023 FINDINGS: Examination reveals mediastinal silhouette to be within normal limits cardiac silhouette is not enlarged improved aeration identified in the right infrahilar region and at the region of the right cardiophrenic angle. Persistent confluent airspace opacities  identified in the left infrahilar region and left base including increased left retrocardiac density and silhouetting of the left hemidiaphragm which might be related to an infiltrate/atelectasis. Endotracheal tube has been removed     Improved aeration in the right infrahilar region and right cardiophrenic angle region. Persistent confluent opacities at the left base including the left retrocardiac region which might reflect an infiltrate/atelectasis. Interval removal of endotracheal tube Electronically signed by: Marc Andrews Date:    10/05/2023 Time:    10:47    XR Gastric tube check, non-radiologist performed    Result Date: 10/3/2023  EXAMINATION: XR GASTRIC TUBE CHECK, NON-RADIOLOGIST PERFORMED CLINICAL HISTORY: Ng tube placement; COMPARISON: October 2, 2023 FINDINGS: Examination reveals a nasogastric tube with the tip in the fundus of the stomach     Nasogastric tube as above Electronically signed by: Marc Andrews Date:    10/03/2023 Time:    08:19    Fl Modified Barium Swallow Speech    Result Date: 10/2/2023  See procedure notes from Speech Pathologist. This procedure was auto-finalized.    X-Ray Chest 1 View    Result Date: 10/2/2023  EXAMINATION XR CHEST 1 VIEW CLINICAL HISTORY post intubation ; TECHNIQUE A total of 1 frontal image(s) of the chest. COMPARISON 28 September 2023, 18:21 FINDINGS Lines/tubes/devices: Interval placement of endotracheal tube, terminating approximately 6 cm superior to the alycia. Esophagogastric tube also now visualized, extending inferior to the level the diaphragm; side port noted at the level of the GE junction. The cardiac silhouette and central vascular structures are unchanged.  The trachea is midline. No new or worsening consolidation is identified. There is no enlarging pleural effusion or convincing pneumothorax. Regional osseous structures and extrathoracic soft tissues are similar. IMPRESSION Interval tracheal and esophageal intubation, as above. Please  note this exam was initially acquired immediately following intubation on 28 September 2023.  However, the study could not be completed and sent to PACS for interpretation due to technical issues with the order.  The exam was interpreted as soon is the issues were resolved morning of 2 October 2023. Electronically signed by: Morales Goldberg Date:    10/02/2023 Time:    11:55    XR Gastric tube check, non-radiologist performed    Result Date: 10/2/2023  EXAMINATION: XR GASTRIC TUBE CHECK, NON-RADIOLOGIST PERFORMED CLINICAL HISTORY: NG tube placement; TECHNIQUE: Single view of the chest. COMPARISON: None FINDINGS: NG tube projects over the left upper quadrant.     As above. Electronically signed by: Hugh De Leon Date:    10/02/2023 Time:    07:55    X-Ray Chest 1 View    Result Date: 9/30/2023  EXAMINATION: XR CHEST 1 VIEW CLINICAL HISTORY: intubated; TECHNIQUE: Single view of the chest COMPARISON: 09/28/2023 FINDINGS: ET tube terminates just below the clavicular heads.  Prominent interstitial markings of bilateral lower lung zones grossly unchanged.     As above. Electronically signed by: Hugh De Leon Date:    09/30/2023 Time:    11:54    US Retroperitoneal Complete    Result Date: 9/29/2023  EXAMINATION: US RETROPERITONEAL COMPLETE CLINICAL HISTORY: hematuria; COMPARISON: 12/17/2018 FINDINGS: The right kidney measures 11.4 x 4.8 x 5.2 cm.  There are no focal lesions noted.  There is no hydronephrosis.  There is flow in the proximal IVC.  Aorta is not visualized. The left kidney is poorly demonstrated.  It measures 9.1 x 5.5 x 4.9 cm there are no focal lesions noted there is no hydronephrosis.  The urinary bladder is collapsed with a Licea catheter.     Limited study, no abnormalities are demonstrated. Electronically signed by: Aron Barger MD Date:    09/29/2023 Time:    13:51    Echo    Result Date: 9/29/2023    Left Ventricle: The left ventricle is normal in size. Normal wall thickness. Normal wall motion. There  is normal systolic function with a visually estimated ejection fraction of 55 - 60%. Grade I diastolic dysfunction.   Right Ventricle: Normal right ventricular cavity size. Systolic function is normal.   Mitral Valve: There is no stenosis. The mean pressure gradient across the mitral valve is 2 mmHg at a heart rate of  bpm.   Pulmonary Artery: No pulmonary hypertension.     CTA Chest Non-Coronary (PE Studies)    Result Date: 9/29/2023  Technique:CT Scan of the chest was performed with intravenous contrast with direct axial images as well as sagittal and coronal reconstruction images pulmonary embolus protocol. Dosage Information:Automated Exposure Control was utilized.   Comparison: January 5, 2023 . Clinical History:Pe suspected. Findings: Lines and Tubes:Endotracheal tube is present with its tip projecting 4 cm above the alycia. A nasogastric catheter is seen with its tip in the body of the stomach. Neck:The thyroid gland appear unremarkable. Mediastinum:Multiple similar enlarged mediastinal lymph nodes are seen. The largest is in subcarinal space and measures 1.7 cm in least dimension. Heart:The heart size is within normal limits. Coronary artery calcification is seen. Aorta:No aortic dissection or aneurysm is seen. Moderate aortic calcification is seen in the thoracic aorta. Pulmonary Arteries:No filling defects are seen in the pulmonary arteries to suggest pulmonary embolus. Lungs:Severe emphysematous changes are identified in the lungs. There is consolidation in the posterior basal segments of bilateral lower lobes demonstrating air bronchograms. This may reflect aspiration pneumonia. Pleura:No effusions or pneumothorax are identified. Bony Structures: Spine:Mild spondylolytic changes are seen in the thoracic spine. Abdomen:Both adrenal glands are thickened and may reflect adrenal hyperplasia. The rest of the visualized upper abdominal organs appear unremarkable.     Impression: 1. Multiple enlarged  mediastinal lymph nodes are seen. The largest is in subcarinal space and measures 1.7 cm in least dimension. 2. No filling defects are seen in the pulmonary arteries to suggest pulmonary embolus. 3. Severe emphysematous changes are identified in the lungs. There is consolidation in the posterior basal segments of bilateral lower lobes demonstrating air bronchograms. This may reflect aspiration pneumonia. Correlate clinically as regards further evaluation and followup. 4. Details and other findings as discussed above. No significant discrepancy with overnight report. Electronically signed by: Ronny Hamilton Date:    09/29/2023 Time:    08:09    X-Ray Chest AP Portable    Result Date: 9/28/2023  EXAMINATION: XR CHEST AP PORTABLE CLINICAL HISTORY: Asthma; TECHNIQUE: Single frontal view of the chest was performed. COMPARISON: 08/08/2023 FINDINGS: LINES AND TUBES: EKG/telemetry leads overlie the chest. MEDIASTINUM AND ANDRE: The cardiac silhouette is normal. Launch coronary LUNGS: Emphysema.  Chronic basilar scarring.  No appreciable acute superimposed airspace opacity. PLEURA:No pleural effusion. No pneumothorax. BONES: No acute osseous abnormality.     Chronic emphysema and scarring within the lungs without appreciable acute superimposed airspace opacity. Electronically signed by: Kimberli Cisse Date:    09/28/2023 Time:    18:38         Assessment/Plan:  82 yo M known to Dr. Sunny Mota with a past medical history of hyperlipidemia, CAD, COPD on 2 L home oxygen, pulmonary nodules, and anxiety.  Admitted 9/28 with Acute on chronic hypoxemic respiratory failure requiring intubation now extubated, COPD exacerbation, Bilateral aspiration pneumonia, Sepsis, and Hematuria now resolved.  GI consulted for PEG.     1. Oropharyngeal dysphagia   Failed MBS 10/6/23.   s/p EGD/PEG 10/11/23  - Initiate TFs and advance as tolerated to goal as per set orders.   - PEG site care.   - Keep abdominal binder in place at all times to  prevent dislodgement.    PEG looks good and is without malfunction.  Please call GI if needed.     Vandana Sol PA-C

## 2023-10-12 NOTE — PT/OT/SLP EVAL
Physical Therapy Re-Evaluation    Patient Name:  Helder Sharp Jr.   MRN:  [id number]    Recommendations:     Discharge Recommendations: nursing facility, skilled   Discharge Equipment Recommendations: none   Barriers to discharge: Impaired mobility and Ongoing medical needs    Assessment:     Helder Sharp Jr. is a 83 y.o. male admitted with a medical diagnosis of Moderate malnutrition, COPD exacerbation, atypical PNA hematuria, HLD, required mechanical ventilation now on NC, uses trilogy at home, goal O2 sats >88%, s/p PEG .  He presents with the following impairments/functional limitations: weakness, impaired endurance, impaired self care skills, impaired functional mobility, gait instability, impaired balance. Pt continues to demonstrate generalized weakness and endurance impairment. Recommending SNF.     Rehab Prognosis: Good; patient would benefit from acute skilled PT services to address these deficits and reach maximum level of function.    Recent Surgery: Procedure(s) (LRB):  PEG (N/A) 1 Day Post-Op    Plan:     During this hospitalization, patient to be seen 5 x/week to address the identified rehab impairments via gait training, therapeutic activities, therapeutic exercises and progress toward the following goals:    Plan of Care Expires:  11/03/23    Subjective     Chief Complaint: tired  Patient/Family Comments/goals: to go home  Pain/Comfort:  Pain Rating 1: 0/10    Patients cultural, spiritual, Restorationist conflicts given the current situation: no    Objective:     Communicated with nurse prior to session.  Patient found up in chair with PEG Tube, oxygen, telemetry, pulse ox (continuous)  upon PT entry to room.    General Precautions: Standard, fall, aspiration  Orthopedic Precautions:    Braces: N/A  Respiratory Status: Nasal cannula, flow 4 L/min  SPO2 after ambulation 84%.   SpO2 after seated rest and PLB: 88%, 87HR      Exams:  RLE ROM: WNL  RLE Strength: Deficits: 4-/5 grossly  LLE ROM: WNL  LLE  Strength: Deficits: 4-/5 grossly  Skin integrity: Visible skin intact      Functional Mobility:  Bed Mobility:     Scooting: contact guard assistance  Transfers:     Sit to Stand:  moderate assistance with rolling walker  Gait: 40ft x 2 with RW and CGA-Min A for steadying.       AM-PAC 6 CLICK MOBILITY  Total Score:16       Treatment & Education:    Patient provided with verbal education education regarding PT POC.  Understanding was verbalized.     Patient left up in chair with all lines intact and call button in reach.    GOALS:   Multidisciplinary Problems       Physical Therapy Goals          Problem: Physical Therapy    Goal Priority Disciplines Outcome Goal Variances Interventions   Physical Therapy Goal     PT, PT/OT Ongoing, Progressing     Description: Goals to be met by: 11/3/2023     Patient will increase functional independence with mobility by performin. Supine to sit with [place]  2. Sit to stand transfer with Modified [place]  3. Gait  x 150 feet with Modified [place] using Rolling Walker.                          History:     Past Medical History:   Diagnosis Date    Acute bronchitis     Anxiety disorder, unspecified     COPD (chronic obstructive pulmonary disease)     Coronary artery disease     Hyperlipidemia     Left carotid bruit     Pulmonary nodules     Unspecified cataract        Past Surgical History:   Procedure Laterality Date    APPENDECTOMY      CATARACT EXTRACTION Left 2023    ESOPHAGOGASTRODUODENOSCOPY W/ PEG N/A 10/11/2023    Procedure: PEG;  Surgeon: JULIETTE Duffy MD;  Location: [facility] ENDOSCOPY;  Service: Gastroenterology;  Laterality: N/A;    HERNIA REPAIR      PHACOEMULSIFICATION, CATARACT, WITH IOL INSERTION Left 1/3/2023    Procedure: PHACOEMULSIFICATION, CATARACT, WITH IOL INSERTION- OS;  Surgeon: Louis Abdi MD;  Location: [facility] OR;  Service: Ophthalmology;  Laterality: Left;    TONSILLECTOMY         Time Tracking:     PT Received On:  10/12/23  PT Start Time: 1503     PT Stop Time: 1528  PT Total Time (min): 25 min     Billable Minutes: Re-eval 25      10/12/2023

## 2023-10-12 NOTE — CONSULTS
Received consult for Peg feedings. Followed up with patient yesterday. Nsg stated pt was tolerating tube feedings prior to peg placement. Will resume the same regimen:   Diabetisource AC goal rate 85 ml/hr with 30 ml/hr water flush to provide  2040 kcal, 100% needs  102 g protein, 100% needs  1994 ml free water, 100% needs  (calculations based on estimated 20 hr/d run time)

## 2023-10-12 NOTE — PT/OT/SLP PROGRESS
Occupational Therapy      Patient Name:  Helder Sharp    MRN:  [id number]    Patient currently working with PT at this time. Will follow up as able    10/12/2023

## 2023-10-12 NOTE — ANESTHESIA POSTPROCEDURE EVALUATION
Anesthesia Post Evaluation    Patient: Helder Sharp Jr.    Procedure(s) Performed: Procedure(s) (LRB):  PEG (N/A)    Final Anesthesia Type: general      Patient location during evaluation: GI PACU  Patient participation: Yes- Able to Participate  Level of consciousness: awake and alert  Post-procedure vital signs: reviewed and stable  Pain management: adequate  Airway patency: patent    PONV status at discharge: No PONV  Anesthetic complications: no      Cardiovascular status: blood pressure returned to baseline  Respiratory status: spontaneous ventilation and room air  Hydration status: euvolemic  Follow-up not needed.          Vitals Value Taken Time   /50 10/11/23 1426   Temp 36.7 °C (98.1 °F) 10/11/23 1403   Pulse 74 10/11/23 1426   Resp 18 10/11/23 1426   SpO2 90 % 10/11/23 1426         No case tracking events are documented in the log.      Pain/Skye Score: Pain Rating Prior to Med Admin: 7 (10/11/2023  9:51 PM)  Pain Rating Post Med Admin: 0 (10/11/2023  5:04 PM)  Skye Score: 10 (10/11/2023  2:09 PM)

## 2023-10-13 NOTE — CONSULTS
"Dos 10/16/23  I have evaluated the patient on initial IRF admit. I have been involved in rehab prescreen. I have reviewed records.I have reviewed admit orders.I have discussed case with IM team.  I find the patient appropriate for, in need of and should tolerate an aggressive IRF program with good potential for functional improvement.  I am in agreement with initial Plan of Care  I have been involved in the creation of this initial PM&R consult with Tammi Liriano MD   Chief Complaint  Acute on Chronic Hypoxemic Respiratory Failure; Sepsis; Aspiration Pneumonia 2/2 Dysphagia    Reason for Consultation  Physiatry    History of Present Illness  Admit MD: Moises Terry MD  Consult Physiatry: Eyad Liriano MD  HPI: 83 y.o. WM with a past medical history of hyperlipidemia, CAD, COPD, on 2 L home oxygen, pulmonary nodules, and anxiety presented to [facility] ED on 9/28/2023 for shortness of breath and weakness. Wife also endorsed worsening nonproductive cough and lower extremity swelling.  Wife noticed chocolate" colored urine at approximately 15:30 on 09/28, prompting them to ED. He had had to increase home oxygen to 3 L secondary to increased dyspnea.  Patient received albuterol nebulizer treatment en route with EMS.  He was given an hour long nebulizer treatment, magnesium, and Solu-Medrol upon arrival to ED. Patient remained hypoxic on non-rebreather and  was intubated in ED for airway protection.  Labs revealed WBC 21.13, chloride 109, CO2 19, glucose 190, BNP 22, troponin 0.014, and lactic acid 3.8.  Flu/RSV/COVID were negative.  UA revealed 2+ protein, trace ketones, 3+ occult blood, 1+ bilirubin, and greater than 100 RBCs.  Blood and respiratory cultures were obtained.  Chest x-ray revealed chronic emphysema and scarring within the lungs without appreciable acute superimposed airspace opacity.  Patient was also given LR, IV Zosyn, and IV Azithromycin in ED. Admitted to ICU for close monitoring.  Patient " was started on Solu-Medrol 60 mg BID and DuoNebs q.6 hours.  Urology was consulted for hematuria. Ultrasound retroperitoneal complete revealed no abnormalities. Urology recommended continuing indwelling lang with outpatient follow-up. Patient had previous history of Klebsiella pneumonia which was sensitive to Zosyn.  Azithromycin was discontinued.  CTA chest revealed multiple enlarged mediastinal lymph nodes, no filling defects seen to suggest PE, and severe emphysematous changes identified in the lungs with consolidation in the posterior basal segments of bilateral lower lobes demonstrating air bronchograms which may reflect aspiration pneumonia.  NG tube was placed and patient was started on tube feeds.  Transitioned to prednisone 40 mg daily for 5 day course on 10/01. Patient was extubated on 10/01 and placed on Vapotherm. Speech therapy was consulted. Patient was cleared for downgrade out of ICU on 10/03. Hospital medicine was consulted for transition of care and further medical management. He completed 7 days of Zosyn therapy for possible aspiration pneumonia.  Failed MBS and recommendations made for PEG tube. Palliative was consulted. Patient and family interested in PEG tube.  Gastroenterology following.  Patient is moderate risk for perioperative pulmonary complications and hypercapnia with PEG placement. Pulmonology consulted; follow recommendations. On 10/8 noted to have increased sputum production, leukocytosis noted and slightly increased oxygen needs.  Chest x-ray showed new developing bilateral opacities, collected respiratory culture and started Zosyn again. Requiring Haldol for agitation in-house. Underwent PEG placement on 10/11. Started on TF on 10/12. Zosyn changed to Rocephin which patient will complete 7 days of per Pulmonology recommendations on 10/15. On 10/12, Patient stated he felt better & had no new complaints. On TF, will follow Nutrition recommendation of goal rate for Diabetisource AC  feeding with goal rate of 85cc/hr with 30ml/hr water flush to provide 2040 kcal, 102 g protein, 1994 ml free water. On 10/13, patient on 4L O2 per NC with sats ranging 88-93%. Patient complained of pain to peg tube site, but tolerating feedings with abdominal binder in place. Patient at 75ml/hr with tube feedings so should be at goal by 3pm today. Aspiration precautions in place. No bowel movement noted since 10/4. Patient is AAOx4; confused at times.  Participating with therapy. Functional status includes contact guard assist needed for bed mobility, moderate assist for transfers with RW, walking 40ft with RW at contact guard assist to minimal assist, total assist for lower body dressing and eating, moderate assist for toileting and setup for urinal, and stand by assist for grooming sitting in chair. Patient was evaluated, accepted, and admitted to inpatient rehab to improve functional status. Transferred to [facility] on 10/13 without incident.       10/16: Seen in patient room, seated in recliner with O2 via NC in place and TF running. Reports decent weekend with some therapy. States that his walking needs some work, because he has to take breaks often to catch his breath. Tolerating tube feeds but would really like to get back to a diet. Tells me that he is here because of aspiration pneumonia which is his 3rd time getting. States that his previous bout was in January. After going through this previously, he says it was an easy decision to have the PEG placed. Reports sleep not good last night despite taking medication. Tells me that he has been having headaches, which is outside of his normal. Denies allergy or sinus issues. Free water flushes of 30mL/hr ordered. Noted BUN elevated, H&H stable at 12/39, and low BP. Occult Stools. Reports bowels moving better after initial constipation prior to PEG placement, with most recent being yesterday. Therapy evaluating. VSSAF with noted low BP of 117/55 and SpO2 90% at 0428  this morning. IM following.         Review of Systems  Barriers to Discharge:  Social: Completed college. He has no  history. He is retired. 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 Wife is retired. She drives. She can physically assist the patient after rehab. She states that the patient had been the one to do most of the cooking and grocery shopping prior to January. He got sick in January. She took over the household duties. He was just getting back to taking over some of the cooking and grocery shopping duties when he got sick again. Wife can do the household chores. Children: (5).    Medical:  Acute on chronic hypoxemic respiratory failure 2/2 COPD exacerbation and bilateral aspiration pneumonia, bronchitis, COPD, CAD, hyperlipidemia, left carotid bruit, and pulmonary nodules     Functional:   Prior Level of Fx: Independent ADLs. He used a rollator for mobility. Pt. Was doing some cooking, driving short distances, managing medications, and managing finances. Someone is hired for lawn maintenance. He does not have a medic alert.   Residence: Pt. Lives with his spouse in [place] in a single level home with a small threshold step to enter the residence. He has a tub/shower combination with a shower chair, grab bars, and HHS. He has an elevated toilet with grab bars.   DME: RW, wheelchair, home oxygen, shower chair, rollator, Trilogy, and nebulizer.    Psychiatric:  Has a history of depression on Zoloft. He quit smoking in 2009 and quit drinking a few years before that.    Depression/Anxiety:      sertraline tablet 100 mg qd  Pain:  headaches  acetaminophen oral solution 650 mg q6h PRN pain    Bowels/Bladder: last BM 10/15 per patient     Appetite: NPO. Tolerating TF     Sleep: decreased overnight      melatonin tablet 6 mg qHS  QUEtiapine tablet 25 mg qHS  hydrOXYzine pamoate capsule 50 mg nightly PRN Insomnia      Physical  Exam  General: well-developed, well-nourished, in no acute distress  Eye: EOMI, clear conjunctiva, eyelids normal  HENT: normocephalic, oropharynx and nasal mucosal surfaces moist  Neck: full range of motion, supple  Respiratory: equal chest rise, + SOB, no audible wheeze, 4L O2 NC  Cardiovascular: regular rate and rhythm, no edema  Gastrointestinal: soft, non-tender, non-distended   Musculoskeletal: full range of motion of all extremities/spine with generalized weakness  Integumentary: no rashes or skin lesions present  Neurologic: cranial nerves intact, no signs of peripheral neurological deficit, motor/sensory function intact  *MD performed and documented physical examination         Labs:   Latest Reference Range & Units 10/16/23 05:26   WBC 4.50 - 11.50 x10(3)/mcL 7.83   RBC 4.70 - 6.10 x10(6)/mcL 4.22 (L)   Hemoglobin 14.0 - 18.0 g/dL 12.5 (L)   Hematocrit 42.0 - 52.0 % 39.5 (L)   MCV 80.0 - 94.0 fL 93.6   MCH 27.0 - 31.0 pg 29.6   MCHC 33.0 - 36.0 g/dL 31.6 (L)   RDW 11.5 - 17.0 % 14.5   Platelet Count 130 - 400 x10(3)/mcL 279   MPV 7.4 - 10.4 fL 8.7   Neut % % 60.8   LYMPH % % 28.1   Mono % % 7.2   Eosinophil % % 2.4   Basophil % % 0.6   Immature Granulocytes % 0.9   Neut # 2.1 - 9.2 x10(3)/mcL 4.76   Lymph # 0.6 - 4.6 x10(3)/mcL 2.20   Mono # 0.1 - 1.3 x10(3)/mcL 0.56   Eos # 0 - 0.9 x10(3)/mcL 0.19   Baso # <=0.2 x10(3)/mcL 0.05   Immature Grans (Abs) 0 - 0.04 x10(3)/mcL 0.07 (H)   nRBC % 0.0   Sodium 136 - 145 mmol/L 142   Potassium 3.5 - 5.1 mmol/L 4.7   Chloride 98 - 107 mmol/L 104   CO2 23 - 31 mmol/L 27   BUN 8.4 - 25.7 mg/dL 33.9 (H)   Creatinine 0.73 - 1.18 mg/dL 0.90   eGFR mls/min/1.73/m2 >60   Glucose 82 - 115 mg/dL 117 (H)   Calcium 8.8 - 10.0 mg/dL 10.0   Phosphorus Level 2.3 - 4.7 mg/dL 2.8   Magnesium  1.60 - 2.60 mg/dL 2.30   ALP 40 - 150 unit/L 77   PROTEIN TOTAL 5.8 - 7.6 gm/dL 7.1   Albumin 3.4 - 4.8 g/dL 3.2 (L)   Albumin/Globulin Ratio 1.1 - 2.0 ratio 0.8 (L)   Prealbumin 16.0 - 42.0  mg/dL 18.6   BILIRUBIN TOTAL <=1.5 mg/dL 0.4   AST 5 - 34 unit/L 21   ALT 0 - 55 unit/L 24   Globulin, Total 2.4 - 3.5 gm/dL 3.9 (H)   (L): Data is abnormally low  (H): Data is abnormally high   Latest Reference Range & Units 10/14/23 05:39   Iron 65 - 175 ug/dL 31 (L)   TIBC 250 - 450 ug/dL 211 (L)   Iron Binding Capacity Unsaturated 69 - 240 ug/dL 180   Transferrin mg/dL 177   Ferritin 21.81 - 274.66 ng/mL 207.02   Iron Saturation 20 - 50 % 15 (L)   (L): Data is abnormally low          Diagnostics:  XR CHEST AP PORTABLE  Impression:  Chronic emphysema and scarring within the lungs without appreciable acute superimposed airspace opacity.  Date:                                            09/28/2023  Time:                                           18:38    CTA Chest Non-Coronary (PE Studies)  Impression:  1. Multiple enlarged mediastinal lymph nodes are seen. The largest is in subcarinal space and measures 1.7 cm in least dimension.  2. No filling defects are seen in the pulmonary arteries to suggest pulmonary embolus.  3. Severe emphysematous changes are identified in the lungs. There is consolidation in the posterior basal segments of bilateral lower lobes demonstrating air bronchograms. This may reflect aspiration pneumonia. Correlate clinically as regards further evaluation and followup.  4. Details and other findings as discussed above.  Date:                                            09/29/2023  Time:                                           08:09    US RETROPERITONEAL COMPLETE  FINDINGS:  The right kidney measures 11.4 x 4.8 x 5.2 cm.  There are no focal lesions noted.  There is no hydronephrosis.  There is flow in the proximal IVC.  Aorta is not visualized.  The left kidney is poorly demonstrated.  It measures 9.1 x 5.5 x 4.9 cm there are no focal lesions noted there is no hydronephrosis.  The urinary bladder is collapsed with a Licea catheter.  Impression:  Limited study, no abnormalities are  demonstrated.  Date:                                            09/29/2023  Time:                                           13:51    XR GASTRIC TUBE CHECK, NON-RADIOLOGIST PERFORMED  FINDINGS:  NG tube projects over the left upper quadrant.  Date:                                            10/02/2023  Time:                                           07:55    XR CHEST 1 VIEW  Impression:  Bilateral lower lung lobes new patchy opacities suggest atelectasis and/or associated early infiltrates.  Date:                                            10/08/2023  Time:                                           09:14      Assessment/Plan  Hospital   COPD exacerbation   Dysphagia   Acute hypoxemic respiratory failure   Sepsis   Aspiration pneumonia   Hematuria     Non-Hospital   Generalized anxiety disorder   Former smoker   Pulmonary nodule   Carotid bruit   Hyperlipidemia   Inguinal hernia   Plantar fasciitis   Polyp of colon   End stage COPD   Anxiety   Chronic respiratory failure with hypoxia   Moderate malnutrition   Unspecified cataract   Coronary artery disease       Wounds: PEG site intact, wounds-left pinky toe, right great toe, right heel  s/p PEG placement 10/11  Precautions: aspiration, fall risk  Bracing: RW/Rollator  Swallowing: NPO. TF. Dietician/ST following  Function: Tolerating therapy. Continue PT/OT  VTE Prophylaxis:   enoxaparin injection 40 mg q24hr  Code Status: FULL CODE   Discharge: Lives with his spouse in [place] in a single level home with a small threshold step to enter the residence. Completed college. He has no  history. He is retired. 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 Wife is retired. She drives. She can physically assist the patient after rehab. She states that the patient had been the one to do most of the cooking and grocery shopping prior to January. He got sick in January. She took over the household  duties. He was just getting back to taking over some of the cooking and grocery shopping duties when he got sick again. Wife can do the household chores. Children: (5).  Date pending.               Vashti Edwards NP, conducted additional independent physical examination and assisted with medical documentation.

## 2023-10-13 NOTE — PROGRESS NOTES
"[facility] Hospital Medicine Progress Note        Chief Complaint: Inpatient Follow-up for shortness of breath and weakness      HPI:   Mr Aron Wiley is a 83 y.o. male with a past medical history of hyperlipidemia, CAD, COPD, on 2 L home oxygen, pulmonary nodules, and anxiety presented to [facility] on 9/28/2023 for shortness of breath and weakness. Wife also endorsed worsening nonproductive cough and lower extremity swelling.  Wife noticed chocolate" colored urine at approximately 15:30 on 09/28, prompting them to ED. Patient also had to increase home oxygen to 3 L secondary to increased dyspnea.  Patient received albuterol nebulizer treatment en route with EMS.  Patient was given an hour long nebulizer treatment, magnesium, and Solu-Medrol upon arrival to ED. Patient remained hypoxic on non-rebreather and  was intubated in ED for airway protection.  Labs revealed WBC 21.13, chloride 109, CO2 19, glucose 190, BNP 22, troponin 0.014, and lactic acid 3.8.  Flu/RSV/COVID were negative.  UA revealed 2+ protein, trace ketones, 3+ occult blood, 1+ bilirubin, and greater than 100 RBCs.  Blood and respiratory cultures were obtained.  Chest x-ray revealed chronic emphysema and scarring within the lungs without appreciable acute superimposed airspace opacity.  Patient was given LR, IV Zosyn, and IV Azithromycin in ED.  Patient was admitted to ICU for close monitoring.  Patient was started on Solu-Medrol 60 mg BID and DuoNebs q.6 hours.  Urology was consulted for hematuria. Ultrasound retroperitoneal complete revealed no abnormalities. Urology recommended continuing indwelling lang with outpatient follow-up. Patient had previous history of Klebsiella pneumonia which was sensitive to Zosyn.  Azithromycin was discontinued.  CTA chest revealed multiple enlarged mediastinal lymph nodes, no filling defects seen to suggest PE, and severe emphysematous changes identified in the lungs with consolidation in " the posterior basal segments of bilateral lower lobes demonstrating air bronchograms which may reflect aspiration pneumonia.  NG tube was placed and patient was started on tube feeds.  Patient was transitioned to prednisone 40 mg daily for 5 day course on 10/01. Patient was extubated on 10/01 and placed on Vapotherm. Speech therapy was consulted. Patient was cleared for downgrade out of ICU on 10/03/2023. Hospital medicine was consulted for transition of care and further medical management.     Has completed 7 days of Zosyn therapy for possible aspiration pneumonia.  Failed MBS, recommendations made for PEG tube, Palliative was consulted, patient and family interested in PEG tube.  Gastroenterology following.  Patient is moderate risk for perioperative pulmonary complications and hypercapnia with PEG placement. Pulmonology consulted; follow recommendations. On 10/8 noted to have increased sputum production, leukocytosis noted and slightly increased oxygen needs.  Chest x-ray showed new developing bilateral opacities, collected respiratory culture and started Zosyn again. Requiring Haldol for agitation in-house. Underwent PEG placement on 10/11. Started on TF on 10/12. Zosyn changed to Rocephin which patient will complete 7 days of per Pulmonology recommendations.    Interval Hx:   Today, Mr Sharp stated he felt better & had no new complaints. On TF today, will follow Nutrition recommendations. PT/OT on-board; recommending SNF placement. CM consulted for placement.    Objective/physical exam:  General: alert male lying comfortably in bed, in no acute distress  HENT: oral and oropharyngeal mucosa moist, pink, with no erythema or exudates, no ear pain or discharge  Neck: normal neck movement, no lymph nodes or swellings, no JVD or carotid bruit  Respiratory: clear breathing sounds bilaterally, no crackles, rales, ronchi or wheezes  Cardiovascular: clear S1 and S2, no murmurs, rubs or gallops  Peripheral Vascular: no  lesions, ulcers or erosions, normal peripheral pulses and no pedal edema  Gastrointestinal: LUQ PEG in place; soft, non-tender, non-distended abdomen, no guarding, rigidity or rebound tenderness, normal bowel sounds  Integumentary: normal skin color, no rashes or lesions  Neuro: AAO x 3; motor strength 5/5 in B/L UEs & LEs; sensation intact to gross and fine touch B/L; CN II-XII grossly intact    VITAL SIGNS: 24 HRS MIN & MAX LAST   Temp  Min: 97.4 °F (36.3 °C)  Max: 98.5 °F (36.9 °C) 98 °F (36.7 °C)   BP  Min: 101/58  Max: 138/69 132/69   Pulse  Min: 75  Max: 88  87   Resp  Min: 16  Max: 20 18   SpO2  Min: 86 %  Max: 96 % 96 %     I have reviewed the following labs:  Recent Labs   Lab 10/09/23  0503 10/10/23  0623 10/11/23  0615   WBC 17.86* 14.98* 9.47   RBC 4.18* 4.08* 4.25*   HGB 12.5* 12.1* 12.4*   HCT 38.5* 37.3* 39.3*   MCV 92.1 91.4 92.5   MCH 29.9 29.7 29.2   MCHC 32.5* 32.4* 31.6*   RDW 14.9 14.8 14.6    227 263   MPV 9.8 9.8 9.4       Recent Labs   Lab 10/08/23  0452 10/09/23  0503 10/10/23  0623    137 139   K 4.2 4.0 4.1   CO2 25 26 27   BUN 33.4* 41.6* 34.1*   CREATININE 0.88 1.22* 1.02   CALCIUM 9.5 8.9 8.9   MG 2.30  --   --    ALBUMIN 3.1*  --   --        Assessment/Plan:  Acute on Chronic Hypoxemic Respiratory Failure s/p Intubated/Extubated  Chronic Hypoxic Respiratory Failure 2 L NC at home 2/2 COPD  COPD exacerbation  Bilateral Aspiration Pneumonia   Sepsis 2/2 Above   Leukocytosis  Elevated creatinine  Dysphagia  Hematuria-resolved  Agitation  HLD  CAD  Pulmonary Nodules    Plan   Patient continues to be admitted for close monitoring  Reports feeling better & having no new complaints  Underwent PEG tube placement on 10/11; started on TF on 10/12  Nutrition on-board; follow recommendations  Continued on IV Rocephin; will complete 7 days  Pulmonology on-board; follow recommendations  Palliative Care following  Renal indices improving  Creatinine has improved with IVF  Patient  continued on DuoNebs q.4 hours, buspirone 10 mg b.i.d., fluticasone/vilanterol 200/25 mcg, guaifenesin 200 mg q.6 hours, melatonin 6 mg nightly, Seroquel 25 mg nightly, sertraline 100 mg daily, tiotropium daily, zolpidem 5 mg nightly    VTE prophylaxis:  Lovenox      Anticipated discharge and Disposition:   To be decided after PEG, nursing facility      All diagnosis and differential diagnosis have been reviewed; assessment and plan has been documented; I have personally reviewed the labs and test results that are presently available; I have reviewed the patients medication list; I have reviewed the consulting providers response and recommendations. I have reviewed or attempted to review medical records based upon their availability    All of the patient's questions have been  addressed and answered. Patient's is agreeable to the above stated plan. I will continue to monitor closely and make adjustments to medical management as needed.  _____________________________________________________________________    Radiology:  I have personally reviewed the following imaging and agree with the radiologist.     X-Ray Chest 1 View  Narrative: EXAMINATION:  XR CHEST 1 VIEW    CLINICAL HISTORY:  f/u, leukocytosis;    TECHNIQUE:  One view    COMPARISON:  October 5, 2023..    FINDINGS:  Cardiopericardial silhouette is within normal limits.  There are new patchy opacities involving bilateral lower lung zones.  Upper lung lobes are remarkable for emphysematous bullous changes.  There is no overt pulmonary edema.  No significant fluid within the pleural space.  No pneumothorax.  Impression: Bilateral lower lung lobes new patchy opacities suggest atelectasis and/or associated early infiltrates.    Electronically signed by: Ronny Hamilton  Date:    10/08/2023  Time:    09:14      Joseph Cummings MD   10/12/2023

## 2023-10-13 NOTE — NURSING
Report given to Concetta Douglas at Rehab facility. I was requested to keep the IV in per Concetta due to still receiving antibiotics.

## 2023-10-13 NOTE — NURSING
Artificial Intelligence Notification  [facility]  [address]  [place] [address]  Phone: [phone]     This documentation was triggered by an Artificial Intelligence Notification.     Admit Date: 2023   LOS: 15  Code Status: Full Code  : 1940  Age: 83 y.o.  Weight:   Wt Readings from Last 1 Encounters:   23 83.5 kg (184 lb 1.6 oz)        Sex: male  Bed: 534/[facility]  MRN: [id number]  Attending Physician: Juliane Tucker MD     Date of Alert: 10/13/2023  Time AI Alert Received: 0800             Vitals:    10/13/23 0754   BP:    Pulse: 84   Resp: (!) 24   Temp:        Artificial Intelligence alert discussed with Provider:     Name: ***   Date/Time of Provider Notification: ***      Patient Condition: pt w/ known hx of copd, lung nodules, pulm following, respiratory status at baseline, home o2

## 2023-10-13 NOTE — PROGRESS NOTES
[facility] - 5th Floor Med Surg  Pulmonary Critical Care Note    Patient Name: Helder Sharp Jr.  MRN: [id number]  Admission Date: 9/28/2023  Hospital Length of Stay: 15 days  Code Status: Full Code  Attending Provider: Juliane Tucker MD  Primary Care Provider: Kimo Love MD     Subjective:     HPI:   This is a 83-year-old patient with reported end-stage chronic obstructive pulmonary disease followed by Dr. Root from pulmonary aspect at our [facility].  Patient is on Daliresp according to their notes for frequent episodes of bronchitis/pneumonitis also has hypoxemic respiratory failure on supplemental O2 and trilogy with sleep.  He was last seen in their pulmonary clinic in August of 2023.  Their notes according to that time had documentation of aspiration; and was undergoing evaluation by palliative care.  There no further documented patient had bilateral pneumonia requiring hospitalization a PEG tube was recommended for aspiration however the patient refused.    Patient presented to [facility] on 09/28/2023 for complaints of shortness of breath and weakness.  Upon arrival the patient received an hour long neb, magnesium and Solu-Medrol in the emergency room.  Patient did require intubation and ICU admit.  Was subsequently extubated however after extubation required use of Vapotherm.  Downgraded from the ICU on 10/04/2023.  GI has been consulted for the consideration of PEG placement he in turn is consulting Pulmonary for surgical clearance.  Cultures from this admit are negative.  He continues to follow with speech therapy however they are currently recommended NPO status.  Palliative care has also been consulted and they are following.    Hospital Course/Significant events:  As above    24 Hour Interval History:  He is currently on 5L nasal cannula, oxygenating well. Sitting up in the chair. Awaiting rehab placement. Tolerating tube feeds.        14 point ROS reviewed and negative unless  documented in the history of present illness.         Past Medical History:   Diagnosis Date    Acute bronchitis     Anxiety disorder, unspecified     COPD (chronic obstructive pulmonary disease)     Coronary artery disease     Hyperlipidemia     Left carotid bruit     Pulmonary nodules     Unspecified cataract      Past Surgical History:   Procedure Laterality Date    APPENDECTOMY      CATARACT EXTRACTION Left 2023    ESOPHAGOGASTRODUODENOSCOPY W/ PEG N/A 10/11/2023    Procedure: PEG;  Surgeon: JULIETTE Duffy MD;  Location: [facility] ENDOSCOPY;  Service: Gastroenterology;  Laterality: N/A;    HERNIA REPAIR      PHACOEMULSIFICATION, CATARACT, WITH IOL INSERTION Left 1/3/2023    Procedure: PHACOEMULSIFICATION, CATARACT, WITH IOL INSERTION- OS;  Surgeon: Louis Abdi MD;  Location: [facility] OR;  Service: Ophthalmology;  Laterality: Left;    TONSILLECTOMY       Social History     Socioeconomic History    Marital status:    Tobacco Use    Smoking status: Former     Current packs/day: 0.00     Types: Cigarettes     Quit date:      Years since quittin.7    Smokeless tobacco: Never   Substance and Sexual Activity    Alcohol use: Not Currently    Drug use: Never    Sexual activity: Yes       Current Outpatient Medications   Medication Instructions    albuterol (ACCUNEB) 0.63 mg, Nebulization, Every 6 hours PRN, Rescue    albuterol (PROVENTIL/VENTOLIN HFA) 90 mcg/actuation inhaler 2 puffs, Inhalation, Every 6 hours PRN, Rescue    albuterol-ipratropium (DUO-NEB) 2.5 mg-0.5 mg/3 mL nebulizer solution 3 mLs, Nebulization, Every 6 hours PRN, Rescue    ascorbic acid (vitamin C) (VITAMIN C) 500 mg, Oral, 2 times daily    aspirin (ECOTRIN) 81 mg, Oral    atorvastatin (LIPITOR) 20 mg, Oral    cholecalciferol (vitamin D3) (VITAMIN D3) 10,000 Units, Oral    DALIRESP 500 mcg Tab 0.5 tablets, Oral, Daily    fluticasone propionate (FLONASE) 50 mcg/actuation nasal spray 1 spray, Each Nostril, Daily     fluticasone-umeclidin-vilanter (TRELEGY ELLIPTA) 200-62.5-25 mcg inhaler 1 puff, Oral, Daily    guaiFENesin (MUCINEX) 1,200 mg, Oral, Daily    Lactobacillus rhamnosus GG (CULTURELLE) 15 billion cell capsule Oral    montelukast (SINGULAIR) 10 mg tablet TAKE 1 TABLET BY MOUTH EVERY DAY    predniSONE (DELTASONE) 20 MG tablet Take 3 tablets by mouth for three days, then take 2 tablets by mouth for four days, then 1 tablet for four days, then 1/2 tablet for 4 days.    ranolazine (RANEXA) 500 mg, Oral, 2 times daily    roflumilast (DALIRESP) 500 mcg Tab 0.5 tablets, Oral, Daily    sertraline (ZOLOFT) 100 mg, Oral, Daily       Current Inpatient Medications   albuterol-ipratropium  3 mL Nebulization Q4H WAKE    albuterol-ipratropium  3 mL Nebulization Once    busPIRone  10 mg Oral BID    cefTRIAXone (ROCEPHIN) IVPB  2 g Intravenous Q24H    enoxparin  40 mg Subcutaneous Q24H (prophylaxis, 1700)    fluticasone furoate-vilanteroL  1 puff Inhalation Daily    guaiFENesin 100 mg/5 ml  200 mg Per NG tube Q6H    melatonin  6 mg Oral Nightly    QUEtiapine  25 mg Oral QHS    sertraline  100 mg Oral Daily    tiotropium bromide  2 puff Inhalation Daily    zolpidem  5 mg Oral QHS       Objective:       Intake/Output Summary (Last 24 hours) at 10/13/2023 0846  Last data filed at 10/13/2023 0623  Gross per 24 hour   Intake 97.56 ml   Output 350 ml   Net -252.44 ml     Vital Signs (Most Recent):  Temp: 97.2 °F (36.2 °C) (10/13/23 0825)  Pulse: 79 (10/13/23 0825)  Resp: (!) 24 (10/13/23 0754)  BP: 116/65 (10/13/23 0825)  SpO2: (!) 93 % (10/13/23 0825)  Body mass index is 23.64 kg/m².  Weight: 83.5 kg (184 lb 1.6 oz) Vital Signs (24h Range):  Temp:  [97.2 °F (36.2 °C)-98.3 °F (36.8 °C)] 97.2 °F (36.2 °C)  Pulse:  [75-88] 79  Resp:  [16-24] 24  SpO2:  [88 %-97 %] 93 %  BP: (101-135)/(58-69) 116/65       Physical exam:  Gen- A/O, NAD  HENT- ATNC, MMM  CV- RRR  Resp- faint bibasilar inspiratory crackles, normal work of breathing; oxygen  saturations 86-88% on 3L NC  Abd- binder in place, soft, nontender   MSK- WWP, no LE edema  Neuro- A/O, KARISHMA, no gross deficits  Psych- appropriate mood and affect       Lines/Drains/Airways       Peripheral Intravenous Line  Duration                  Peripheral IV - Single Lumen 10/12/23 1330 20 G Anterior;Proximal;Right Forearm <1 day                  Significant Labs:  Lab Results   Component Value Date    WBC 6.86 10/13/2023    HGB 12.7 (L) 10/13/2023    HCT 39.8 (L) 10/13/2023    MCV 92.8 10/13/2023     10/13/2023     BMP  Lab Results   Component Value Date     10/13/2023    K 4.5 10/13/2023    CHLORIDE 107 10/13/2023    CO2 26 10/13/2023    BUN 32.3 (H) 10/13/2023    CREATININE 0.90 10/13/2023    CALCIUM 9.1 10/13/2023    AGAP 10.0 10/10/2023    EGFRNONAA 55 07/15/2021       Assessment/Plan:     Assessment  Reported end-stage chronic obstructive pulmonary disease with chronic hypoxemic respiratory failure on trilogy q.h.s. followed by Dr. Root   Aspiration/oropharyngeal dysphagia s/p PEG placement 10/11  Pulmonary nodules followed by outside pulmonologist     Plan  Continue nebs, mucolytics and inhaled regimen for COPD management  Continue Rocephin for E Coli sputum growth, end date 10/15  Continue aspiration precautions   Continue aggressive PT/OT/ST   Continue weaning O2 as tolerated for goal saturations >88%          MYCHAL Michael  Pulmonary Disease and Critical Care Medicine  [facility]

## 2023-10-13 NOTE — PLAN OF CARE
10/13/23 1531   Final Note   Assessment Type Final Discharge Note   Anticipated Discharge Disposition Rehab   Post-Acute Status   Post-Acute Authorization Placement   Post-Acute Placement Status Set-up Complete/Auth obtained   Discharge Delays None known at this time     Patient has been accepted to [facility]. Report has been called. Transportation is set up with the wheelchair transportation van at 4pm.

## 2023-10-13 NOTE — PT/OT/SLP PROGRESS
Occupational Therapy   Treatment    Name: Helder Sharp Jr.  MRN: [id number]  Admitting Diagnosis:  Moderate malnutrition  2 Days Post-Op    Recommendations:     Discharge Recommendations: nursing facility, skilled  Discharge Equipment Recommendations:   (TBD pending progress)  Barriers to discharge:       Assessment:     Helder Sharp Jr. is a 83 y.o. male with a medical diagnosis of Moderate malnutrition.  Performance deficits affecting function are impaired endurance, weakness, impaired self care skills, impaired functional mobility, impaired cardiopulmonary response to activity, gait instability.     Rehab Prognosis:  Fair; patient would benefit from acute skilled OT services to address these deficits and reach maximum level of function.       Plan:     Patient to be seen 3 x/week to address the above listed problems via self-care/home management, therapeutic activities, therapeutic exercises  Plan of Care Expires: 11/01/23  Plan of Care Reviewed with: patient    Subjective     Pain/Comfort:       Objective:     Communicated with: RN prior to session.  Patient found up in chair with   upon OT entry to room.    General Precautions: Standard, fall    Orthopedic Precautions:N/A  Braces: N/A     Occupational Performance:     Functional Mobility/Transfers:  Patient completed Sit <> Stand Transfer with stand by assistance  with  rolling walker   Functional Mobility: no LOB while mobilzing from chair to in room sink o2 96% during ADL activity patient desatting to 91%    Activities of Daily Living:  Grooming: independence standing at sink brushing teeth    Patient Education:  Patient provided with verbal education education regarding OT role/goals/POC.  Understanding was verbalized.      Patient left HOB elevated with all lines intact and call button in reach    GOALS:   Multidisciplinary Problems       Occupational Therapy Goals          Problem: Occupational Therapy    Goal Priority Disciplines Outcome Interventions    Occupational Therapy Goal     OT, PT/OT Ongoing, Progressing    Description: Goals to be met by: 11/1/23     Patient will increase functional independence with ADLs by performing:    UE Dressing with Modified [place].  LE Dressing with Modified [place] and Assistive Devices as needed.  Grooming while standing at sink with Modified [place].  Toileting from [facility] with Modified [place] for hygiene and clothing management. Progress to toilet as appropriate.  Toilet transfer to [facility] with Modified [place]. Progress to toilet as appropriate.                         Time Tracking:     OT Date of Treatment: 10/13/23  OT Start Time: 1018  OT Stop Time: 1046  OT Total Time (min): 28 min    Billable Minutes:Self Care/Home Management 2    OT/SHELLEY: SHELLEY     Number of SHELLEY visits since last OT visit: 2    10/13/2023

## 2023-10-13 NOTE — CONSULTS
[facility] - Rehab Inpatient Services  Inpatient Rehab Prescreen    PATIENT INFORMATION     Assessment date/time: 10/13/23 1044    Name: Helder Sharp Jr. Phone: [phone]   [address] [facility] [address] [place] [address] SSN:    YOB: 1940 Age: 83 y.o. Gender: male   Race: White   Marital Status:    Advance Directives: Full code     COVERAGE INFORMATION     Patient Medicare #:  [id number]    Primary Insurance Type:  MEDICARE/MEDICARE PART A effective 9/1/05 & B effective 1/1/20/  Secondary Insurance Type: PH[employer]ANS MUTUAL/PH[employer]ICI[employer] M[employer] INS CO /    Policy #:   Policy #:  [id number]   Insurance contact name/number:  Insurance contact name/number:    Authorization #:  Authorization #:    Verified Coverage: Insurance Carrier   Pending Coverage:    Prescription Coverage:  Insurance details/comments:      PHYSICIAN/REFERRAL INFORMATION     Primary Care Physician: Kimo Love MD Attending Physician: Moises Terry MD   Consulting physician/specialist:  Referring Physician:    Referring facility:  Referring contact name/phone:    Physician details/comments:      CONTACT INFORMATION   Extended Emergency Contact Information  Primary Emergency Contact: Maryjo Sharp  Mobile Phone: [phone]  Relation: Spouse    PRIOR LIVING SITUATION    Patient lives with wife in a single story home with no steps to enter     Bedroom location/setup: single story   Bathroom location/setup: tub/shower combo with shower chair and grab bars, and elevated commode with grab bars   Equipment at home: rolling walker, oxygen, shower chair, wheelchair   Prior device use:  rolling walker, oxygen, shower chair     PRIOR LEVEL OF FUNCTION     Did a helper need to assist with the following activities prior to the current illness, exacerbation, or injury?   Self-care:  No, independent    Indoor mobility:  No, modified independent    Stairs: unknown   Functional Cognition: No, independent  "  Comments: Patient was modified independent for mobility and ADLs with use of rolling walker    Caregivers providing assistance:Maryjo Sharp- wife- [phone]   Pre-hospital home care service: yes Name of Agency: [facility]   Home/personal responsibilities: personal Adls, some cooking, driving short distances, managing medications, and managing finances   Pre-hospital vocational category: Retired for age   Pre-hospital vocational effort: Retired for age   Occupation/profession:  Return to work/school plan:    Educational history:    Hobbies/leisure activities:  Resources used prior to admission:    Available resources:  Resource information comments:      REHABILITATION DIAGNOSIS     History of present illness: Mr Aron Wiley is a 83 y.o. male with a past medical history of hyperlipidemia, CAD, COPD, on 2 L home oxygen, pulmonary nodules, and anxiety presented to [facility] on 9/28/2023 for shortness of breath and weakness. Wife also endorsed worsening nonproductive cough and lower extremity swelling.  Wife noticed chocolate" colored urine at approximately 15:30 on 09/28, prompting them to ED. Patient also had to increase home oxygen to 3 L secondary to increased dyspnea.  Patient received albuterol nebulizer treatment en route with EMS.  Patient was given an hour long nebulizer treatment, magnesium, and Solu-Medrol upon arrival to ED. Patient remained hypoxic on non-rebreather and  was intubated in ED for airway protection.  Labs revealed WBC 21.13, chloride 109, CO2 19, glucose 190, BNP 22, troponin 0.014, and lactic acid 3.8.  Flu/RSV/COVID were negative.  UA revealed 2+ protein, trace ketones, 3+ occult blood, 1+ bilirubin, and greater than 100 RBCs.  Blood and respiratory cultures were obtained.  Chest x-ray revealed chronic emphysema and scarring within the lungs without appreciable acute superimposed airspace opacity.  Patient was given LR, IV Zosyn, and IV Azithromycin in ED.  Patient was admitted " to ICU for close monitoring.  Patient was started on Solu-Medrol 60 mg BID and DuoNebs q.6 hours.  Urology was consulted for hematuria. Ultrasound retroperitoneal complete revealed no abnormalities. Urology recommended continuing indwelling lang with outpatient follow-up. Patient had previous history of Klebsiella pneumonia which was sensitive to Zosyn.  Azithromycin was discontinued.  CTA chest revealed multiple enlarged mediastinal lymph nodes, no filling defects seen to suggest PE, and severe emphysematous changes identified in the lungs with consolidation in the posterior basal segments of bilateral lower lobes demonstrating air bronchograms which may reflect aspiration pneumonia.  NG tube was placed and patient was started on tube feeds.  Patient was transitioned to prednisone 40 mg daily for 5 day course on 10/01. Patient was extubated on 10/01 and placed on Vapotherm. Speech therapy was consulted. Patient was cleared for downgrade out of ICU on 10/03/2023. Hospital medicine was consulted for transition of care and further medical management. Patent has completed 7 days of Zosyn therapy for possible aspiration pneumonia.  Failed MBS, recommendations made for PEG tube, Palliative was consulted, patient and family interested in PEG tube.  Gastroenterology following.  Patient is moderate risk for perioperative pulmonary complications and hypercapnia with PEG placement. Pulmonology consulted; follow recommendations. On 10/8 noted to have increased sputum production, leukocytosis noted and slightly increased oxygen needs.  Chest x-ray showed new developing bilateral opacities, collected respiratory culture and started Zosyn again. Requiring Haldol for agitation in-house. Underwent PEG placement on 10/11. Started on TF on 10/12. Zosyn changed to Rocephin which patient will complete 7 days of per Pulmonology recommendations on 10/15. On 10/12, Patient stated he felt better & had no new complaints. On TF today, will  follow Nutrition recommendation of goal rate for Diabetisource AC feeding with goal rate of 85cc/hr with 30ml/hr water flush to provide 2040 kcal, 102 g protein, 1994 ml free water. On 10/13, patient on 4L O2 per NC with sats ranging 88-93%. Patient complained of pain to peg tube site, but tolerating feedings with abdominal binder in place. Patient at 75ml/hr with tube feedings so should be at goal by 3pm today. Aspiration precautions in place. No bowel movement noted since 10/4/23.   Prior to hospitalization, patient was living with wife in a single story home with no steps to enter, has tub/shower combo with shower chair and grab bars, and an elevated commode with grab bars. Patient was modified independent for mobility and all ADLs with use of rolling walker, cooking at times, driving short distances at times, managing medications, and managing finances. Currently, patient is AAOx4; confused at times, contact guard assist for bed mobility, moderate assist for transfers with RW, walking 40ft with RW at contact guard assist to minimal assist, total assist for lower body dressing and eating, moderate assist for toileting and setup for urinal, and stand by assist for grooming sitting in chair. Pt. Requires acute inpatient rehab admission with 24-hour nursing and active physician oversight to monitor and manage acute medical comorbid conditions, labs, pain, and functional deficits.  Patient/family will also require teaching and integration of improving functional skills into daily living.  He will also require an individualized, interdisciplinary approach to his care, receiving PT, OT, ST services 3 hours per day, 5 days per week.    Required care cannot be provided at a lower level of care. Patient is anticipated to require approximately 10-12 days LOS with expected discharge home with spouse with HH    Impairment group (IGC):   Chronic Obstructive Pulmonary Disease 10.1 Etiologic diagnosis/description: End stage  COPD with acute on chronic hypoxemic respiratory failure   Date of onset:  9/28/23 Date of surgery: NA   Allergies: Flexeril [cyclobenzaprine]   Comorbid condition: Anemia, Anxiety, COPD, Coronary artery disease, Dysphagia, Malnutrition, and Pneumonia, oropharyngeal dysphagia, aspiration pneumonia, pulmonary nodules, acute bronchitis, hyperlipidemia, left carotid bruit, constipation   Medical/functional conditions requiring inpatient rehabilitation: This patient requires medical management/24-hour nursing of complex   comorbidities ( End stage COPD with acute on chronic hypoxemic respiratory failure, Anemia, Anxiety, COPD, Coronary artery disease, Dysphagia, Malnutrition, and Pneumonia, oropharyngeal dysphagia, aspiration pneumonia, pulmonary nodules, acute bronchitis, hyperlipidemia, left carotid bruit, constipation ), labs, medications (see medications list), pain, sleep hygiene, anticoagulation, nutrition, hydration, neurological, pulmonary, cardiac status, and preventive healthcare.      This patient requires intense therapy and an integrated,   interdisciplinary approach to address safety, impaired mobility,   impaired ADL's (dressing, toileting, grooming, showering), dysphagia,   impaired cognition, judgment, and memory, communication,   pulmonary insufficiency, bowel/bladder problems,   preventive healthcare, medication management, integration of   improving functional skills into daily living, and home caregiver   support and training.     Risk for medical/clinical complications: This patient is at risk for the following complications: DVT/PE, pneumonia,   malnutrition, neurological decline, respiratory insufficiency,   worsening activity intolerance, complications from anticoagulation,   complications from feeding tube, skin breakdown, inadequate   sleep, infection, and constipation.     SPECIAL REHABILITATION NEEDS     IV: 20 G right forearm Hearing impaired, Preferred Language: english, and Special  equipment: tube feedings        Peripheral IV - Single Lumen 10/12/23 1330 20 G Anterior;Proximal;Right Forearm (Active)   Site Assessment Dry;Clean;Intact;No redness;No swelling 10/13/23 0604   Extremity Assessment Distal to IV No abnormal discoloration;No redness;No swelling;No warmth 10/12/23 140   Line Status Blood return noted;Flushed;Infusing 10/12/23 140   Dressing Status Clean;Intact;Dry 10/12/23 140   Dressing Intervention First dressing 10/12/23 140          PRECAUTIONS     Aspiration precautions: dysphagia with PEG feedigns, Safety/fall precautions: High fall risk, and Skin: altered skin integrity to sacral spine     PAST MEDICAL, SOCIAL, FAMILY HISTORY     Pertinent past medical history:   Past Medical History:   Diagnosis Date    Acute bronchitis     Anxiety disorder, unspecified     COPD (chronic obstructive pulmonary disease)     Coronary artery disease     Hyperlipidemia     Left carotid bruit     Pulmonary nodules     Unspecified cataract       Has the patient had two or more falls in the past year or any fall with injury in the past year: Yes    Has the patient had major surgery during the 100 days prior to admission: No   Family Medical History:   Family History   Problem Relation Age of Onset    Asthma Mother     Cancer Father     No Known Problems Sister     No Known Problems Brother       Substance use history:   Social History     Substance and Sexual Activity   Alcohol Use Not Currently     Social History     Tobacco Use   Smoking Status Former    Current packs/day: 0.00    Types: Cigarettes    Quit date:     Years since quittin.7   Smokeless Tobacco Never     Social History     Substance and Sexual Activity   Drug Use Never      VITALS   BP:  116/65     Temp: 97.2 °F (36.2 °C)     HR: 81     Resp: 18     SpO2: (!) 93 %     Height: No data recorded   Weight: No data recorded   BMI: No data recorded        MED/LABS/DIAGNOSITICS   No current facility-administered medications for  this encounter.     No current outpatient medications on file.     Facility-Administered Medications Ordered in Other Encounters   Medication Dose Route Frequency Provider Last Rate Last Admin    acetaminophen oral solution 650 mg  650 mg Per NG tube Q4H PRN Joshua Best MD   650 mg at 10/12/23 1746    albuterol-ipratropium 2.5 mg-0.5 mg/3 mL nebulizer solution 3 mL  3 mL Nebulization Q4H WAKE Joshua Best MD   3 mL at 10/13/23 0754    albuterol-ipratropium 2.5 mg-0.5 mg/3 mL nebulizer solution 3 mL  3 mL Nebulization Q6H PRN Joshua Best MD   3 mL at 10/09/23 0759    albuterol-ipratropium 2.5 mg-0.5 mg/3 mL nebulizer solution 3 mL  3 mL Nebulization Q1H PRN Andrés Tierney Jr., MD        albuterol-ipratropium 2.5 mg-0.5 mg/3 mL nebulizer solution 3 mL  3 mL Nebulization Once Andrés Tierney Jr., MD        busPIRone tablet 10 mg  10 mg Oral BID Joshua Best MD   10 mg at 10/08/23 0915    cefTRIAXone (ROCEPHIN) 2 g in dextrose 5 % in water (D5W) 100 mL IVPB (MB+)  2 g Intravenous Q24H Juliane Tucker MD   Stopped at 10/12/23 1350    diphenhydrAMINE injection 25 mg  25 mg Intravenous Nightly PRN Juliane Tucker MD        electrolyte-A infusion   Intravenous Continuous Andrés Tierney Jr., MD        enoxaparin injection 40 mg  40 mg Subcutaneous Q24H (prophylaxis, 1700) Katy Cotton FNP   40 mg at 10/12/23 1746    fluticasone furoate-vilanteroL 200-25 mcg/dose diskus inhaler 1 puff  1 puff Inhalation Daily Joshua Best MD   1 puff at 10/13/23 0932    guaiFENesin 100 mg/5 ml syrup 200 mg  200 mg Per NG tube Q6H VANESSA Bauman MD   200 mg at 10/13/23 0533    hydrALAZINE injection 10 mg  10 mg Intravenous Q4H PRN Jessica Kahn MD        melatonin tablet 6 mg  6 mg Oral Nightly Joshua Best MD   6 mg at 10/12/23 2102    morphine injection 2 mg  2 mg Intravenous Q4H PRN Joshua Best MD        ondansetron injection 4 mg  4 mg Intravenous Daily PRN Andrés Tierney Jr., MD        QUEtiapine tablet 25 mg  25  mg Oral QHS Juliane Tucker MD        sertraline tablet 100 mg  100 mg Oral Daily Juliane Tucker MD   100 mg at 10/13/23 0932    sodium chloride 0.9% flush 10 mL  10 mL Intravenous PRN Janes Xiao MD        tiotropium bromide 2.5 mcg/actuation inhaler 2 puff  2 puff Inhalation Daily Joshua Best MD   2 puff at 10/13/23 0932    zolpidem tablet 5 mg  5 mg Oral QHS Joshua Best MD   5 mg at 10/10/23 2037      Pertinent lab results:   Lab Results   Component Value Date    WBC 6.86 10/13/2023    HGB 12.7 (L) 10/13/2023    HCT 39.8 (L) 10/13/2023     10/13/2023     10/13/2023    K 4.5 10/13/2023    BUN 32.3 (H) 10/13/2023    CO2 26 10/13/2023      Pertinent diagnostic studies:    Additional labs and diagnostic studies required prior to admission:      QI: GG Care Tool     QI: GG Care Tool  Therapy Evaluation   Swallowing/  Nutritional Status   Diet/Feeding/  Swallowing Total assist/ NPO/ Peg feedings    Eating       Oral Hygiene   Grooming : stand by assistance sitting in chair brushing teeth with suction attachment   Toileting Hygiene       Shower/Bathe   Bathing    Upper Body Dressing   Dressing Upper    Lower Body Dressing   Dressing Lower total assistance donning brief in sidelying      Putting On/Taking Off Footwear       Toilet Transfer   Toileting Moderate assist toileting, setup with urinal    Bladder Continence Status   Bladder     Bowel Continence Status   Bowel     Roll Left and Right   Bed Mobility Scooting: contact guard assistance   Sit to Lying       Lying to Sitting on Side of Bed       Sit to Stand       Chair/Bed-to- Chair   Transfers   Sit to Stand:  moderate assistance with rolling walker   Car Transfer       Walk 10 Feet   Equipment      Walk 50 Feet with Two Turns   Balance    Walk 150 Feet   Endurance    Walk 10 Feet on Uneven Surfaces   Gait 40ft x 2 with RW and CGA-Min A for steadying.    Picking up an Object       Wheel 50 Feet with Two Turns   Wheelchair    Wheel 150 Feet        1 Step (Curb)   Stairs    4 Steps       12 Steps       Expression of Ideas and Wants   Communication Independent    Understanding  Verbal and Non-Verbal Content       Cognitive Patterns   Cognition Independent with confusion at times       Safety Precautions Aspiration precautions      Lower Extremity      Strength RLE Strength: Deficits: 4-/5 grossly  LLE Strength: Deficits: 4-/5 grossly      ROM RLE ROM: WNL  LLE ROM: WNL      Upper Extremity      Strength       ROM      Care Score Value Definitions  6: Independent. Frannie provides no assistance with tasks. A device may or may not have been used.  5: Set-up or clean-up assistance. Frannie sets up or cleans up, but does not assist with tasks. Frannie may have assisted prior to or following the activity.  4: Supervision or touching assistance. Frannie provides verbal cues or touching/steadying or contact guard assistance. Assistance may be provided throughout the activity or intermittently.  3: Partial/moderate assistance. Frannie does less than half the effort. Frannie lifts, holds, or supports trunk or limbs, but provides less than half the effort.  2: Substantial/maximal assistance. Frannie does more than half the effort. Frannie lifts or holds trunk or limbs, and provides more than half the effort.  1: Dependent. Frannie does all of the effort, or the assistance of two or more helpers is required for the patient to complete the activity.  Care Score Activity Not Attempted Value Definitions  7: Patient refused.  9: Not applicable. Not attempted and the patient did not perform this activity prior to the current illness, exacerbation, or injury.  10: Not attempted due to environmental limitations (e.g., lack of equipment, weather constraints).  88: Not attempted due to medical condition or safety concerns.    DISCHARGE GOALS/ANTICPATED INTERVENTIONS/SERVICES     Expected Level of Improvement for Safe Discharge: Patient/caregivers anticipate discharge home at or near  baseline level of functioning and/or decreased burden of care.   Patient/Family/Caregiver Goals: Patient desires to increase current level of functioning to modified independence for mobility and all ADLs so that he is able to discharge home safely with wife at prior level of functioning.    Required Treatments/Services: Rehabilitation Nursing, Respiratory Therapy, Dietitian/nutrition, Social , and Case Management   Required Therapy Therapy Type Min/Day Days/Week Duration of Therapy   Physical Therapy 60 5 10-12 days    Occupational Therapy 60 5 10-12 days    Speech and Language Pathology 60 5 10-12 days    Prosthetic/Orthotics      Recreational Therapy      Anticipated Services upon Discharge:  home health   Additional rehabilitation needs: Dietary needs: tube feedings   Expected Discharge Destination:  home with wife    Barriers to Discharge: None   Discharge Support: Patient has a caregiver available, Discharge plan has been verified with patient's caregiver, and Caregiver is in agreement with the discharge plan   Patient/Family/Caregiver Orientation: Patient/family/caregiver oriented and agreeable to inpatient rehabilitation plan   Estimated Length of Stay: 10-12 days    Projected Admission Date: 10/13/23   Medical Prognosis: fair   Physicians Review and Admission Determination: I have discussed patient with Nurse Liaison. I have reviewed the prescreen. Patient is in need of, appropriate for and should tolerate and benefit from an aggressive IRF stay

## 2023-10-14 NOTE — PLAN OF CARE
Problem: SLP  Goal: SLP Goal  Description:  LTG: To tolerate least restrictive diet without clinical signs/sx of aspiration.   ST. Tongue base/laryngeal excursion exercise   2. Tolerate thermal stimulation x10 with 100% swallow response with less than a 2 second delay.   3. Puree solids without clinical signs/sx of aspiration.  Outcome: Ongoing, Progressing

## 2023-10-14 NOTE — PT/OT/SLP EVAL
Physical Therapy Rehab Evaluation    Patient Name:  Helder Sharp Jr.   MRN:  [id number]    Recommendations:     Discharge Recommendations:  home with home health   Discharge Equipment Recommendations:     Barriers to discharge: None    Assessment:     Helder Sharp Jr. is a 83 y.o. male admitted with a medical diagnosis of COPD exacerbation.  He presents with the following impairments/functional limitations:    decreased endurance and strength.    Rehab Diagnosis: s/p PEG with Acute on chronic hypoxemic respiratory failure 2/2 COPD exacerbation and bilateral aspiration pneumonia    Recent Surgery: * No surgery found *      General Precautions: Standard, aspiration fall    Orthopedic Precautions:       Braces:      Rehab Prognosis: Fair; patient would benefit from acute skilled PT services to address these deficits and reach maximum level of function.      History:     Past Medical History:   Diagnosis Date    Acute bronchitis     Anxiety disorder, unspecified     COPD (chronic obstructive pulmonary disease)     Coronary artery disease     Hyperlipidemia     Left carotid bruit     Pulmonary nodules     Unspecified cataract        Past Surgical History:   Procedure Laterality Date    APPENDECTOMY      CATARACT EXTRACTION Left 01/03/2023    ESOPHAGOGASTRODUODENOSCOPY W/ PEG N/A 10/11/2023    Procedure: PEG;  Surgeon: JULIETTE Duffy MD;  Location: [facility] ENDOSCOPY;  Service: Gastroenterology;  Laterality: N/A;    HERNIA REPAIR      PHACOEMULSIFICATION, CATARACT, WITH IOL INSERTION Left 1/3/2023    Procedure: PHACOEMULSIFICATION, CATARACT, WITH IOL INSERTION- OS;  Surgeon: Louis Abdi MD;  Location: [facility] OR;  Service: Ophthalmology;  Laterality: Left;    TONSILLECTOMY         Subjective     Chief Complaint: difficulty breathing  Patient/Family Comments/goals: to be able to walk with his rollator again    Patients cultural, spiritual, Evangelical conflicts given the current situation:         Living  Environment  People in Home: spouse  Number of Stairs, Main Entrance: none  Number of Stairs, Within Home, Primary: none  Equipment Currently Used at Home: rollator, shower chair, raised toilet, grab bar    Prior Level of Function  Ambulation Skills: needs device  Stairs: unable to perform  Transfer Skills: independent  ADL Skills: needs device    Equipment used at home: rollator, shower chair, raised toilet, grab bar.  DME owned (not currently used): rolling walker and shower chair.      Upon discharge, patient will have assistance from wife.    Objective:     Communicated with NSG prior to session.  Patient found up in chair with PEG Tube  upon PT entry to room.    Vitals   Vitals at Rest  BP     HR 76   O2 Sat (!) 88 %   Pain Pain Rating 1: 0/10     Vitals With Activity  BP     HR 76   O2 Sat (!) 88 %   Pain Pain Rating 1: 0/10     Respiratory Status: Nasal cannula, flow 4 L/min    Functional Mobility  Gait: Pt ambulates x 6 trials with distances of 10-45 feet at each trial with rest breaks between each and O2 sat checks with RW with Partial/moderate assistance .     GGs   Admit Current   Status Goal   Functional Area: Care Score: Care Score: Care Score:   Roll Left and Right 3 3 Independent   Sit to Lying 3 3 Independent   Lying to Sitting on Side of Bed 3 3 Independent   Sit to Stand 3 3 Independent   Chair/Bed-to-Chair Transfer 3 3 Independent   Car Transfer 88 88     Walk 10 Feet 3 3 Independent   Walk 50 Feet with Two Turns 88 88     Walk 150 Feet 88 88     Walk 10 Feet Uneven Surface 88 88     1 Step (Curb) 88 88     4 Steps 88 88     12 Steps 88 88     Picking Up Object 88 88     Wheel 50 Feet with Two Turns 3 3 Independent   Wheel 150 Feet 2 2 Independent     Activity Tolerance: Poor    Patient left up in chair with call button in reach, chair alarm on, and NSG notified.    Education Provided: roles and goals of PT/PTA, transfer training, bed mob, gait training, balance training, safety awareness,  assistive device, and wheelchair management    Expected compliance: Moderate compliance    GOALS:   Multidisciplinary Problems       Physical Therapy Goals          Problem: Physical Therapy    Goal Priority Disciplines Outcome Goal Variances Interventions   Physical Therapy Goal     PT, PT/OT Ongoing, Progressing     Description: Bed Mobility:  Roll left and right independently.  Sit to supine transfer independently.  Supine to sit transfer independently.    Transfers:  Sit to stand transfer with supervision/touching assist using RW.  Bed to chair transfer with supervision/touching assist Stand Step using RW.    Mobility:  Ambulate 100 feet with supervision/touching assist using RW.  Push w/c 100 feet with supervision/touching assist.                       Plan:     During this hospitalization, patient to be seen daily to address the identified rehab impairments via gait training, therapeutic activities, therapeutic exercises, neuromuscular re-education, wheelchair management/training and progress toward the following goals:    Plan of Care Expires:  10/20/23  PT Next Visit Date:    Plan of Care reviewed with: patient, spouse      Additional Infomation:   Pt with increased fatigue after each walking trial. Pt's O2 taken at each trial with nurse nearby and frequent reports. Pt walked with 4 L on a nasal cannula and multiple times dropped to as low as 75% with a seated rest break where O2 would increase to 92%. Nurse assisted with adjusting pulse oximeter and found that pt's R thumb gets the most accurate reading with proper wavelengths. Pt requested to walk back to his room due to fatigue. Unable to assess car, picking up an object, stairs/ramp all due to safety with O2 and fatigue. Pt requires long rest breaks with O2 assessed during to ensure proper recovery.      Time Tracking:     Therapy Time   PT Received On: 10/14/23  PT Start Time: 1030  PT Stop Time: 1130  PT Total Time (min): 60 min  PT Individual:  60  Missed Time: 30 Minutes  Time Missed due to: Patient fatigue    Billable Minutes: Evaluation 30 and Gait Training 30    10/14/2023

## 2023-10-14 NOTE — H&P
[facility] ([facility])  Rehab Admission H&P    Patient Name: Helder Sharp Jr.  MRN: [id number]  Age: 83 y.o. Sex: male  : 1940  Hospital Length of Stay: 1 days  Date of Service: 10/14/2023   Chief Complaint:  Acute on chronic hypoxemic respiratory failure 2/2 COPD exacerbation and bilateral aspiration pneumonia    Subjective:   History of Present Illness   83-year-old WM presented to [facility] on  with complaints of continued shortness of breath and weakness.  PMH significant for bronchitis, COPD, CAD, hyperlipidemia, left carotid bruit, and pulmonary nodules.  Workup significant for COPD exacerbation with respiratory failure requiring intubation.  Hematuria appreciated.  Suspected aspiration pneumonia.  Initiated Zosyn.  CT chest significant for multiple enlarged mediastinal lymph nodes, no filling defects seen suggest PE, severe emphysema ptosis changes identified in the lungs with consolidation in the posterior basal segments of bilateral lower lobes demonstrating air bronchograms which may reflect aspiration pneumonia.  Tolerated extubation on 10/01 and initiated Vapotherm.  Downgraded to Medicine Unit on 10/03.  Plans for trilogy at home.  Continued with oropharyngeal dysphagia.  Tolerated PEG tube placement on 10/7.  Repeat sputum culture grew E coli.  Initiated Rocephin. Tolerated transfer to [facility] inpatient rehab unit on 10/13 without incident.    Tolerated transfer.  Laying in bed comfortably.  Nursing reports good sleep and tolerating tube feedings via PEG tube.  Last BM 10/12.  Labs reviewed.  H&H stable, iron levels mildly low, BUN trending up slightly, albumin 3, pre-albumin 16, otherwise unremarkable.  Most recent labs and imaging reviewed and documented below.    Past Medical History: Acute on chronic hypoxemic respiratory failure 2/2 COPD exacerbation and bilateral aspiration pneumonia, bronchitis, COPD, CAD, hyperlipidemia, left carotid bruit, and pulmonary  nodules  Procedure history:  Appendectomy, left cataract extraction, hernia repair, tonsillectomy  Family History:  Mother:   at unknown age +asthma, father:  at unknown age +cancer  Social History:  Completed college.  Worked in the  business.  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  .  Wife is retired.  Wife drives and can physically assist after rehab.  Five children.  Does not have medical alert.  (-) TOB.  Former smoker, quit in     (-) ETOH.   (-) Illicit drug use.   Prior level of function: Independent in ADLs.  Uses a Rollator for mobility.  Does some cooking, drives short distances, manages medications, manages finances.  Hire someone for lawn maintenance.  Residence:  Lives with spouse in [place] in a single-story home with a small threshold step to enter the residence.  Has a tub/shower combo.  Shower chair, grab bars, and HHS.  Has an elevated toilet with grab bars.  DME:  Rolling walker, wheelchair, home oxygen, shower chair, Rollator, trilogy, nebulizer  Anticipated discharge destination:  Home with home health    Review of patient's allergies indicates:   Allergen Reactions    Flexeril [cyclobenzaprine]         Current Facility-Administered Medications:     albuterol-ipratropium 2.5 mg-0.5 mg/3 mL nebulizer solution 3 mL, 3 mL, Nebulization, Q4H WAKE, Jamee Cheung FNP, 3 mL at 10/14/23 0740    benzonatate capsule 100 mg, 100 mg, Oral, TID PRN, Jamee Cheung, ALICEP    busPIRone tablet 10 mg, 10 mg, Oral, BID, Jamee Cheung FNP    cefTRIAXone (ROCEPHIN) 2 g in dextrose 5 % in water (D5W) 100 mL IVPB (MB+), 2 g, Intravenous, Q24H, Jamee Cheung FNP    enoxaparin injection 40 mg, 40 mg, Subcutaneous, Q24H (prophylaxis, 1700), Jamee Cheung FNP, 40 mg at 10/13/23 1801    famotidine tablet 20 mg, 20 mg, Oral, BID PRN, Jamee Cheung, FNP    fluticasone furoate-vilanteroL 200-25 mcg/dose diskus inhaler 1 puff, 1  "puff, Inhalation, Daily, Jamee Cheung, ALICEP    guaiFENesin 100 mg/5 ml syrup 200 mg, 200 mg, Per NG tube, Q6H, Jamee Cheung FNP, 200 mg at 10/14/23 0534    hydrALAZINE injection 10 mg, 10 mg, Intravenous, Q6H PRN, Jamee Cheung, FNP    hydrOXYzine pamoate capsule 50 mg, 50 mg, Oral, Nightly PRN, Jamee Cheung, ALICEP    labetalol 20 mg/4 mL (5 mg/mL) IV syring, 10 mg, Intravenous, Q4H PRN, Jamee Cheung FNP    melatonin tablet 6 mg, 6 mg, Oral, Nightly, Jamee Cheung FNP    metoprolol injection 10 mg, 10 mg, Intravenous, Q2H PRN, Jamee Cheung, ALICEP    nitroGLYCERIN SL tablet 0.4 mg, 0.4 mg, Sublingual, Q5 Min PRN, Jamee Cheung FNP    ondansetron disintegrating tablet 4 mg, 4 mg, Oral, Q6H PRN, Jamee Cheung, ALICEP    QUEtiapine tablet 25 mg, 25 mg, Oral, QHS, Jamee Cheung, ALICEP    sertraline tablet 100 mg, 100 mg, Oral, Daily, Jamee Cheung FNP, 100 mg at 10/14/23 0851    zolpidem tablet 5 mg, 5 mg, Oral, QHS, Jamee Cheung, ALICEP     Review of Systems   Complete 12-point review of symptoms negative except for what's mentioned in HPI     Objective:     BP (!) 105/55   Pulse 74   Temp 97.5 °F (36.4 °C) (Oral)   Resp 18   Ht 6' 2" (1.88 m)   Wt 75.7 kg (166 lb 14.2 oz)   SpO2 (!) 93%   BMI 21.43 kg/m²       Physical Exam  Vitals reviewed.   Eyes:      Pupils: Pupils are equal, round, and reactive to light.   Cardiovascular:      Rate and Rhythm: Normal rate and regular rhythm.      Heart sounds: Normal heart sounds.   Pulmonary:      Effort: Pulmonary effort is normal.      Breath sounds: Normal breath sounds.      Comments: Diminished breath sounds with nasal cannula  Abdominal:      General: Bowel sounds are normal.      Comments: Peg tube in place with clean applied dressings   Musculoskeletal:         General: Normal range of motion.      Comments: Muscle wasting and diffuse atrophy   Skin:     General: Skin is warm.   Neurological:      General: No focal " deficit present.      Mental Status: He is alert and oriented to person, place, and time.      Motor: Weakness present.   Psychiatric:         Mood and Affect: Mood normal.     *MD performed and documented physical examination       Lines/Drains/Airways       Drain  Duration                  Gastrostomy/Enterostomy 10/11/23 Percutaneous endoscopic gastrostomy (PEG) LUQ feeding 3 days              Peripheral Intravenous Line  Duration                  Peripheral IV - Single Lumen 10/12/23 1330 20 G Anterior;Proximal;Right Forearm 1 day                    Labs  Admission on 10/13/2023   Component Date Value Ref Range Status    Sodium Level 10/14/2023 142  136 - 145 mmol/L Final    Potassium Level 10/14/2023 4.1  3.5 - 5.1 mmol/L Final    Chloride 10/14/2023 105  98 - 107 mmol/L Final    Carbon Dioxide 10/14/2023 25  23 - 31 mmol/L Final    Glucose Level 10/14/2023 133 (H)  82 - 115 mg/dL Final    Blood Urea Nitrogen 10/14/2023 38.2 (H)  8.4 - 25.7 mg/dL Final    Creatinine 10/14/2023 0.84  0.73 - 1.18 mg/dL Final    Calcium Level Total 10/14/2023 9.7  8.8 - 10.0 mg/dL Final    Protein Total 10/14/2023 6.8  5.8 - 7.6 gm/dL Final    Albumin Level 10/14/2023 3.0 (L)  3.4 - 4.8 g/dL Final    Globulin 10/14/2023 3.8 (H)  2.4 - 3.5 gm/dL Final    Albumin/Globulin Ratio 10/14/2023 0.8 (L)  1.1 - 2.0 ratio Final    Bilirubin Total 10/14/2023 0.5  <=1.5 mg/dL Final    Alkaline Phosphatase 10/14/2023 74  40 - 150 unit/L Final    Alanine Aminotransferase 10/14/2023 16  0 - 55 unit/L Final    Aspartate Aminotransferase 10/14/2023 15  5 - 34 unit/L Final    eGFR 10/14/2023 >60  mls/min/1.73/m2 Final    Prealbumin 10/14/2023 16.0  16.0 - 42.0 mg/dL Final    Ferritin Level 10/14/2023 207.02  21.81 - 274.66 ng/mL Final    Iron Binding Capacity Unsaturated 10/14/2023 180  69 - 240 ug/dL Final    Iron Level 10/14/2023 31 (L)  65 - 175 ug/dL Final    Transferrin 10/14/2023 177  mg/dL Final    Iron Binding Capacity Total 10/14/2023 211  (L)  250 - 450 ug/dL Final    Iron Saturation 10/14/2023 15 (L)  20 - 50 % Final    Magnesium Level 10/14/2023 2.20  1.60 - 2.60 mg/dL Final    Phosphorus Level 10/14/2023 2.9  2.3 - 4.7 mg/dL Final    WBC 10/14/2023 9.19  4.50 - 11.50 x10(3)/mcL Final    RBC 10/14/2023 4.06 (L)  4.70 - 6.10 x10(6)/mcL Final    Hgb 10/14/2023 12.1 (L)  14.0 - 18.0 g/dL Final    Hct 10/14/2023 38.0 (L)  42.0 - 52.0 % Final    MCV 10/14/2023 93.6  80.0 - 94.0 fL Final    MCH 10/14/2023 29.8  27.0 - 31.0 pg Final    MCHC 10/14/2023 31.8 (L)  33.0 - 36.0 g/dL Final    RDW 10/14/2023 14.2  11.5 - 17.0 % Final    Platelet 10/14/2023 289  130 - 400 x10(3)/mcL Final    MPV 10/14/2023 8.8  7.4 - 10.4 fL Final    Neut % 10/14/2023 69.2  % Final    Lymph % 10/14/2023 19.3  % Final    Mono % 10/14/2023 9.7  % Final    Eos % 10/14/2023 0.9  % Final    Basophil % 10/14/2023 0.4  % Final    Lymph # 10/14/2023 1.77  0.6 - 4.6 x10(3)/mcL Final    Neut # 10/14/2023 6.36  2.1 - 9.2 x10(3)/mcL Final    Mono # 10/14/2023 0.89  0.1 - 1.3 x10(3)/mcL Final    Eos # 10/14/2023 0.08  0 - 0.9 x10(3)/mcL Final    Baso # 10/14/2023 0.04  <=0.2 x10(3)/mcL Final    IG# 10/14/2023 0.05 (H)  0 - 0.04 x10(3)/mcL Final    IG% 10/14/2023 0.5  % Final    NRBC% 10/14/2023 0.0  % Final       Radiology  Chest x-ray 10/8: Bilateral lower lung lobes with new patchy opacities suggest atelectasis and/or associated early infiltrates.  Assessment/Plan:     83 y.o. WM admitted on 10/13/2023    Acute on chronic hypoxemic respiratory failure   - 2/2 COPD exacerbation and bilateral aspiration pneumonia  - continue     DuoNeb every 4 hours while awake     Fluticasone-vilanterol 1 puff daily     Guaifenesin 200 mg every 6 hours     Rocephin 2 g daily (end date 10/15)  - follow-up with pulmonology outpatient  - discharge with trilogy    End-stage COPD   - tolerating nasal cannula O2, on home O2  - on trilogy at night at home  - continue     DuoNeb every 4 hours while  awake     Fluticasone-vilanterol 1 puff daily     Guaifenesin 200 mg every 6 hours     Rocephin 2 g daily (end date 10/15)  - follow-up with pulmonology outpatient    Bilateral aspiration pneumonia  - tolerating nasal cannula O2, on home O2  - continue     DuoNeb every 4 hours while awake     Fluticasone-vilanterol 1 puff daily     Guaifenesin 200 mg every 6 hours     Rocephin 2 g daily (end date 10/15)  - follow-up with pulmonology outpatient    Oropharyngeal dysphagia  - s/p peg tube placement  - continues to tolerate tube feedings  - speech therapy to follow    Iron deficiency anemia  - asymptomatic  - H/H stable   - no evidence of active bleeds  - iron levels low  - initiate  Ferrous sulfate 325 mg daily (initiated 10/14)  - will closely monitor and transfuse if needed     Insomnia  - stable  - continue   Seroquel 25 mg at night   Melatonin 6 mg at night    Major depressive disorder  - stable  - continue   Zoloft 100 mg daily   BuSpar 10 mg b.i.d.    Generalized anxiety disorder  - stable  - continue   Zoloft 100 mg daily   BuSpar 10 mg b.i.d.    MEDICAL PLAN:  - Admit to [facility]  - Medical reconciliation completed and included in the electronic health record  - Draw admit labs including CBC, CMP, and Prealbumin  - Maintain weightbearing status as ordered  - Monitor postoperative surgical incision changing dressing daily  - Teach skin protection and wound prevention techniques  - Initiate fall precaution  - Initiate bowel training program  - Initiate bladder training as indicated  - Pain management  - IS q2 hours while awake    THERAPEUTIC PLAN:  - Physical therapy 60-90 minutes 5 to week to increase functional mobility, maintain weightbearing precautions, increase lower extremity restrengthening, increase overall activity tolerance, teach balance and safety measures as well as fall precautions, make equipment and orthotic recommendations, be involved in family  training and discharge planning, monitor pain levels and vital signs during therapy adjusting treatment accordingly  - Occupational therapy 60-90 minutes 5 times a week to increase functional independence with activities of daily living, maintain weightbearing precautions, teach use of adaptive equipment, increase upper extremity restrengthening, increase overall activity tolerance, teach balance and safety measures as well as fall precautions, make equipment and orthotic recommendations, be involved in family training and discharge planning, monitor pain levels and vital signs during therapy adjusting treatment accordingly  - Speech and language pathology will do an in-depth evaluation of patient's cognition, phonation, and swallowing. They will initiate therapy 30- 60 minutes 5 times a week as indicated  - Recreational therapy will incorporate all patient's functional abilities  into recreational activities that will require visual, spatial, and perceptual abilities; gross and fine motor coordination skills; the cognition to follow multistep commands; dynamic and static balance and reaching abilities. They will also incorporate animal assisted therapy into their weekly program  - Rehabilitation nursing will act as patient family physician liaison. They will integrate patient's functional abilities, after discussions with therapist, into everyday activities with the CNA's,  LPN's and RNs that will include but are not limited to dressing, bathing, feeding, grooming, toileting, transfers, hygiene etc. They will administer medications watching for wanted as well as unwanted effects. They will initiate DVT/VTE prophylaxis as ordered. Will monitor vital signs, lab values, and pain levels, making appropriate physician notification. They will monitor skin integrity including postop incision, making daily dressing changes. They will teach skin protection and wound prevention techniques. They will initiate bowel training  They will initiate bladder training as indicated. They will initiate fall precautions  - Rehabilitation counseling/case management will act as patient and family liaison. They will set up family conferences, family training, aid in discharge planning, and aid in the procurement of assistive devices  - Patient will have 24 hour availability to physiatric care  - Patient will have nutritional services involved, monitoring oral input and visceral protein stores. They will make dietary and supplement recommendations and follow-up as necessary  - Patient will have weekly interdisciplinary team conferences  - Patient will have initial family conference and follow up conferences as indicated  - Patient will have family training prior to discharge     Estimated length of stay - 14-17 days  Anticipated discharge destination - Home with   Medical status - stabilizing postoperatively; will need to monitor pain levels, postoperative skin integrity, watch for evidence of deep vein thrombosis, monitor postoperative H&H, monitor vital signs closely.  Medical prognosis - Good for post-op healing and functional improvement  Previous functional Status:  Independent in ADLs.  Uses a Rollator for mobility.  Does some cooking, drives short distances, manages medications, manages finances.  Hire someone for lawn maintenance.  Present Functional Status:  Min to mod assist    AB therapy  Rocephin 2 g daily (end date 10/15)  Zosyn 9/30-10/7    VTE Prophylaxis:  Lovenox 40 mg daily  COVID-19 vaccination status:  Vaccinated x4    POA:  Yes-Maryjo  Living will:  No  Contacts:  Maryjo Sharp (spouse) [phone]      Huma Moore (daughter) [phone]    CODE STATUS: Full  Internal Medicine (attending): Moises Terry MD  Physiatry (consulting):  Eyad Liriano MD    OUTPATIENT PROVIDERS  PCP:  Kimo Love MD  Urology:  Ben Guajardo MD   Pulmonology: Richardson Root MD  Cardiology:  Larry Gomes MD    DISPOSITION: Condition  stable. Tolerated transfer.  Recent labs and imaging reviewed.  Rehab admission orders initiated.  Med reconciliation completed.  Consult physiatry for rehab and pain management.  PT/OT/RT/ST to eval and treat.  Labs reviewed.  H&H stable.  Iron levels low.  Initiate ferrous sulfate 325 mg daily.  BUN trending up slightly.  Continues to tolerate tube feedings via PEG tube.  Registered dietitian following.  Albumin 3, pre-albumin 16.  Sleep hygiene and bowel management at goal.    PHYSICIAN ATTESTATION: I have been involved in the patient's rehabilitation prescreening process and I have now completed the post admission physician evaluation. Patient is in need of, appropriate for, and should tolerate the above outlined inpatient rehabilitation plan. I believe this is necessary to reach maximal postoperative healing and maximal functional abilities. I do not believe this would be possible in a timely fashion in a less intensive setting      Jamee Cheung NP conducted independent physical examination and assisted with medical documentation.    Total time spent on this encounter including chart review and direct MD + NP 1-on-1 patient interaction: 104 minutes   Over 50% of this time was spent in counseling and coordination of care

## 2023-10-14 NOTE — PT/OT/SLP EVAL
[facility] - Rehabilitation Unit  Speech Language Pathology Department  Clinical Swallow Evaluation    Patient Name:  Helder Sharp Jr.   MRN:  [id number]    Recommendations:     General recommendations:  dysphagia therapy  Diet texture/consistency recommendations:  NPO  Medications: via PEG tube  Precautions: Standard, aspiration    History:     Helder Sharp Jr. is a/n 83 y.o. male male was admitted on 9/28 with a COPD exacerbation with hypoxic respiratory failure secondary to atypical PNA requiring intubation for mechanical ventilation.  Pt extubated on 10/1. Pt has hx of SILENT aspiration. MBS completed 10/2 and again on 10/6 with NPO diet recs.     Past Medical History:   Diagnosis Date    Acute bronchitis     Anxiety disorder, unspecified     COPD (chronic obstructive pulmonary disease)     Coronary artery disease     Hyperlipidemia     Left carotid bruit     Pulmonary nodules     Unspecified cataract      Past Surgical History:   Procedure Laterality Date    APPENDECTOMY      CATARACT EXTRACTION Left 01/03/2023    ESOPHAGOGASTRODUODENOSCOPY W/ PEG N/A 10/11/2023    Procedure: PEG;  Surgeon: JULIETTE Duffy MD;  Location: [facility] ENDOSCOPY;  Service: Gastroenterology;  Laterality: N/A;    HERNIA REPAIR      PHACOEMULSIFICATION, CATARACT, WITH IOL INSERTION Left 1/3/2023    Procedure: PHACOEMULSIFICATION, CATARACT, WITH IOL INSERTION- OS;  Surgeon: Louis Abdi MD;  Location: [facility] OR;  Service: Ophthalmology;  Laterality: Left;    TONSILLECTOMY         Home diet texture/consistency: Regular and thin liquids  Current method of nutrition: Tube feeding (via PEG)      Imaging   Results for orders placed during the hospital encounter of 09/28/23    X-Ray Chest 1 View    Narrative  EXAMINATION:  XR CHEST 1 VIEW    CLINICAL HISTORY:  f/u, leukocytosis;    TECHNIQUE:  One view    COMPARISON:  October 5, 2023..    FINDINGS:  Cardiopericardial silhouette is within normal  limits.  There are new patchy opacities involving bilateral lower lung zones.  Upper lung lobes are remarkable for emphysematous bullous changes.  There is no overt pulmonary edema.  No significant fluid within the pleural space.  No pneumothorax.    Impression  Bilateral lower lung lobes new patchy opacities suggest atelectasis and/or associated early infiltrates.      Electronically signed by: Ronny Hamilton  Date:    10/08/2023  Time:    09:14    No results found for this or any previous visit.    No results found for this or any previous visit.    Subjective     Patient  inappropriate during session, touching SLP's arm, making sexual comments, and sticking out his tongue [facility] . Nurse notified with verbal understanding expressed.    Patient goals: to eat     Spiritual/Cultural/Mormonism Beliefs/Practices that affect care: no  Pain/Comfort: Pain Rating 1: 0/10    Respiratory status: nasal cannula    Objective:     Oral Musculature  Dentition: own teeth  Secretion Management: adequate  Mucosal Quality: good  Facial Movement: WFL  Buccal Strength & Mobility: WFL  Mandibular Strength & Mobility: WFL  Oral Labial Strength & Mobility: WFL  Volitional Cough: Able to clear secretions    Consistency Fed By Oral Symptoms Pharyngeal Symptoms   Ice chips SLP None None   Moderately thick liquid by spoon SLP None None   Puree SLP None None     Assessment:   Patient exhibits mod - severe oropharyngeal phase dysphagia (see recent MBS on 10/6).     Patient with audible congestion and productive cough with mucus expectoration. Due to hx of silent aspiration, continue with NPO at this time.     Goals:     Multidisciplinary Problems       SLP Goals          Problem: SLP    Goal Priority Disciplines Outcome   SLP Goal     SLP Ongoing, Progressing   Description:  LTG: To tolerate least restrictive diet without clinical signs/sx of aspiration.   ST. Tongue base/laryngeal excursion exercise   2. Tolerate thermal  stimulation x10 with 100% swallow response with less than a 2 second delay.   3. Puree solids without clinical signs/sx of aspiration.                     Patient Education:     Patient provided with verbal education regarding SLP POC.  Understanding was verbalized, however additional teaching warranted.    Plan:     SLP Follow-Up:  Yes   Patient to be seen:  5 x/week   Plan of Care expires:  10/28/23  Plan of Care reviewed with:  patient      Time Tracking:     SLP Treatment Date:   10/14/23  Speech Start Time:  0820  Speech Stop Time:  0850     Speech Total Time (min):  30 min    Billable minutes:  Swallow and Oral Function Evaluation, 30 minutes     10/14/2023

## 2023-10-14 NOTE — PLAN OF CARE
Problem: Occupational Therapy  Goal: Occupational Therapy Goal  Description: ADLs: Goals to be achieved by Re-Evaluation.   Pt to perform grooming tasks with independence.   Pt to perform feeding tasks with independence. - Goal to be worked on only when patient is medically cleared to eat. Patient is NPO at time of evaluation.    Pt to perform UB dressing with independence.   Pt to perform LB dressing with Supervision/CGA with RW & AE as needed.    Pt to perform putting on/off footwear task with Min A with AE as needed.   Pt to perform toileting with Min A.  Pt to perform bathing with Min A.     Functional Transfers:  Pt to perform toilet transfers with Supervision/CGA with RW & BSC over toilet.  Pt to perform a tub transfer with Supervision/CGA with RW & TTB.      IADLs:  Pt to perform age-appropriate IADL's with Supervision/CGA.    Balance, Strengthening, Endurance, Balance:  Pt to demonstrate consistent adherence to breathing control and energy conservation techniques as educated by OT.   Outcome: Ongoing, Progressing

## 2023-10-14 NOTE — PLAN OF CARE
Problem: Physical Therapy  Goal: Physical Therapy Goal  Description: Bed Mobility:  Roll left and right independently.  Sit to supine transfer independently.  Supine to sit transfer independently.    Transfers:  Sit to stand transfer with supervision/touching assist using RW.  Bed to chair transfer with supervision/touching assist Stand Step using RW.    Mobility:  Ambulate 100 feet with supervision/touching assist using RW.  Push w/c 100 feet with supervision/touching assist.  Outcome: Ongoing, Progressing

## 2023-10-14 NOTE — PT/OT/SLP EVAL
Occupational Therapy Inpatient Rehab Evaluation    Name: Helder Sharp Jr.  MRN: [id number]    Recommendations:     Discharge Recommendations:   (OT to continue to assess pending patient's progress.)   Discharge Equipment Recommendations:  (OT to continue to assess pending patient's progress.)   Barriers to discharge:  Patient's level of function at this time.     Assessment:  Helder Sharp Jr. is a 83 y.o. male admitted with a medical diagnosis of COPD exacerbation. He presents with the following impairments/functional limitations: weakness, impaired endurance, impaired self care skills, impaired functional mobility, gait instability, impaired balance, pain.    General Precautions: Standard, aspiration, NPO, Fall    Orthopedic Precautions:N/A     Braces:  (abdominal binder over PEG site)    Rehab Prognosis: Good; patient would benefit from acute skilled OT services to address these deficits and reach maximum level of function.      History:     Past Medical History:   Diagnosis Date    Acute bronchitis     Anxiety disorder, unspecified     COPD (chronic obstructive pulmonary disease)     Coronary artery disease     Hyperlipidemia     Left carotid bruit     Pulmonary nodules     Unspecified cataract        Past Surgical History:   Procedure Laterality Date    APPENDECTOMY      CATARACT EXTRACTION Left 01/03/2023    ESOPHAGOGASTRODUODENOSCOPY W/ PEG N/A 10/11/2023    Procedure: PEG;  Surgeon: JULIETTE Duffy MD;  [facility]: [facility] ENDOSCOPY;  Service: Gastroenterology;  Laterality: N/A;    HERNIA REPAIR      PHACOEMULSIFICATION, CATARACT, WITH IOL INSERTION Left 1/3/2023    Procedure: PHACOEMULSIFICATION, CATARACT, WITH IOL INSERTION- OS;  Surgeon: Louis Abdi MD;  Location: [facility] OR;  Service: Ophthalmology;  Laterality: Left;    TONSILLECTOMY         Subjective     Orientation: Oriented x4    Patient with command following and safety awareness intact.     Chief Complaint: Pain at PEG  "site.    Patient/Family Comments/goals: "To improve quality of life."     Vitals  Vitals at Rest - HOB elevated   /71   HR 76   O2 Sat (!) 92 %   Pain Pain Rating 1: 4/10  Location - Side 1:  (PEG Site)  Pain Addressed 1: Reposition, Nurse notified     Vitals With Activity  BP    HR    O2 Sat O2 taken once patient sat EOB (86% with improvement to 90% with rest break & pursed lip breathing).   After functional ambulation to bathroom & toilet t/f O2 at 88% with improvement to 90% with rest break. After performance of toileting (BM), O2 at 85-86% with improvement to 88-92% with prolonged rest break and proper breathing. Nsg notified and cleared patient to continue with activity with O2 88% and above. Patient performed remainder of ADL with O2 going to 86% with activity with improvement to 90-92% with frequent prolonged rest breaks with proper breathing. O2 at end of evaluation was 91-92%. Nsg notified.    Pain      Respiratory Status: Nasal cannula, flow 4 L/min    Patients cultural, spiritual, Oriental orthodox conflicts given the current situation: no       Living Environment   Living Environment  People in Home: spouse  Number of Stairs, Main Entrance: none  Number of Stairs, Within Home, Primary: none  Equipment Currently Used at Home: rollator, shower chair, raised toilet, grab bar  Shower Setup: Tub/Shower combo and shower chair, grab bars, and HHS  Patient has an elevated toilet with grab bars.     Prior Level of Function  BADL: Independent and Patient used a rollator for mobility.     IADL: Patient was doing some light household chores and his wife was assisting with IADL's as needed.     Equipment used at home: rollator, oxygen, grab bar, shower chair (elevated toilet with garb bars). Patient also has Trilogy and nebulizer.  DME owned (not currently used): rolling walker and wheelchair.      Upon discharge, patient will have assistance from spouse.    Objective:     Patient found HOB elevated with peripheral " IV, oxygen, PEG Tube (abdominal binder over PEG site)  upon OT entry to room.    Mobility   Patient completed:  Supine to Sit with supervision  Sit to Stand Transfer with minimum assistance with rolling walker  Stand to Sit Transfer with minimum assistance with rolling walker  Toilet Transfer Step Transfer technique with minimum assistance with  rolling walker and BSC over toilet     Functional Mobility   Patient performed functional ambulation with RW to<>from bathroom with CGA.     ADLs     Current Status   Eating 88 - Patient is currently NPO with PEG.    Oral Hygiene 4   Shower, Bathe Self 3 - Min A - Patient would benefit from long-handled sponge.    Upper Body Dressing 4   Lower Body Dressing 3 - Min A - Patient would benefit from AE.    Toileting Hygiene 3 - Mod A - +void urine & BM   Toilet Transfer 3 - Min A   Putting On, Taking Off Footwear 2 - Patient would benefit from AE.      Limiting Factors for ADLs: motor, endurance, balance, weakness, and pain    Patient presented with significant decrease in functional endurance and decrease in O2 saturation with activity and required frequent prolonged rest breaks with performance of pursed lip breathing during ADL evaluation. Patient was educated on proper breathing and performance of energy conservation strategies. Nsg was notified of patient's status.     Exams     ROM:          -       WFL    Hand Dominance: Right    ROM Hand  Left Hand: WFL  Right Hand: WFL    Strength  Overall Strength:          -       WFL - 3+/5    Sensation  Left:          -       WNL  Right:          -       WNL    Coordination:      -       Intact    Tone  Left: WNL  Right: WNL    Visual/Perceptual  Intact and wears glasses    Cognition:   WFL    Additional Treatments       LifeStyle Change and Education     Patient left up in chair with all lines intact, call button in reach, chair alarm on, and Nsg notified.    Education provided: Roles and goals of OT, ADLs, transfer training, bed  mobility, body mechanics, safety precautions, fall prevention, equipment recommendations, and home safety    Multidisciplinary Problems       Occupational Therapy Goals          Problem: Occupational Therapy    Goal Priority Disciplines Outcome Interventions   Occupational Therapy Goal     OT, PT/OT Ongoing, Progressing    Description: ADLs: Goals to be achieved by Re-Evaluation.   Pt to perform grooming tasks with independence.   Pt to perform feeding tasks with independence. - Goal to be worked on only when patient is medically cleared to eat. Patient is NPO at time of evaluation.    Pt to perform UB dressing with independence.   Pt to perform LB dressing with Supervision/CGA with RW & AE as needed.    Pt to perform putting on/off footwear task with Min A with AE as needed.   Pt to perform toileting with Min A.  Pt to perform bathing with Min A.     Functional Transfers:  Pt to perform toilet transfers with Supervision/CGA with RW & BSC over toilet.  Pt to perform a tub transfer with Supervision/CGA with RW & TTB.      IADLs:  Pt to perform age-appropriate IADL's with Supervision/CGA.    Balance, Strengthening, Endurance, Balance:  Pt to demonstrate consistent adherence to breathing control and energy conservation techniques as educated by OT.                        Plan     During this hospitalization, patient to be seen 5 x/week to address the identified rehab impairments via self-care/home management, therapeutic activities, therapeutic exercises and progress toward the following goals:    Plan of Care Expires:  10/20/23    Time Tracking     OT Received On: 10/14/23  Time In 0900     Time Out 1000  Total Time 60 min  Therapy Time: OT Individual: 60  Missed Time:    Missed Time Reason:      Billable Minutes: Evaluation 30 and Self Care/Home Management 30    10/14/2023

## 2023-10-14 NOTE — PLAN OF CARE
Problem: Rehabilitation (IRF) Plan of Care  Goal: Plan of Care Review  Outcome: Ongoing, Progressing  Goal: Patient-Specific Goal (Individualized)  Outcome: Ongoing, Progressing  Goal: Absence of New-Onset Illness or Injury  Outcome: Ongoing, Progressing  Goal: Optimal Comfort and Wellbeing  Outcome: Ongoing, Progressing  Goal: Readiness for Transition of Care  Outcome: Ongoing, Progressing     Problem: Adjustment to Illness (Sepsis/Septic Shock)  Goal: Optimal Coping  Outcome: Ongoing, Progressing     Problem: Bleeding (Sepsis/Septic Shock)  Goal: Absence of Bleeding  Outcome: Ongoing, Progressing     Problem: Glycemic Control Impaired (Sepsis/Septic Shock)  Goal: Blood Glucose Level Within Desired Range  Outcome: Ongoing, Progressing     Problem: Infection Progression (Sepsis/Septic Shock)  Goal: Absence of Infection Signs and Symptoms  Outcome: Ongoing, Progressing     Problem: Nutrition Impaired (Sepsis/Septic Shock)  Goal: Optimal Nutrition Intake  Outcome: Ongoing, Progressing     Problem: Impaired Wound Healing  Goal: Optimal Wound Healing  Outcome: Ongoing, Progressing     Problem: Skin Injury Risk Increased  Goal: Skin Health and Integrity  Outcome: Ongoing, Progressing     Problem: Fall Injury Risk  Goal: Absence of Fall and Fall-Related Injury  Outcome: Ongoing, Progressing

## 2023-10-15 NOTE — PLAN OF CARE
"BP (!) 134/59   Pulse 74   Temp 97.8 °F (36.6 °C)   Resp 18   Ht 6' 2" (1.88 m)   Wt 75.7 kg (166 lb 14.2 oz)   SpO2 (!) 88%   BMI 21.43 kg/m²     Problem: Rehabilitation (IRF) Plan of Care  Goal: Plan of Care Review  Outcome: Ongoing, Progressing  Goal: Patient-Specific Goal (Individualized)  Outcome: Ongoing, Progressing  Goal: Absence of New-Onset Illness or Injury  Outcome: Ongoing, Progressing  Goal: Optimal Comfort and Wellbeing  Outcome: Ongoing, Progressing  Goal: Readiness for Transition of Care  Outcome: Ongoing, Progressing     Problem: Adjustment to Illness (Sepsis/Septic Shock)  Goal: Optimal Coping  Outcome: Ongoing, Progressing     Problem: Bleeding (Sepsis/Septic Shock)  Goal: Absence of Bleeding  Outcome: Ongoing, Progressing     Problem: Glycemic Control Impaired (Sepsis/Septic Shock)  Goal: Blood Glucose Level Within Desired Range  Outcome: Ongoing, Progressing     Problem: Infection Progression (Sepsis/Septic Shock)  Goal: Absence of Infection Signs and Symptoms  Outcome: Ongoing, Progressing     Problem: Nutrition Impaired (Sepsis/Septic Shock)  Goal: Optimal Nutrition Intake  Outcome: Ongoing, Progressing     Problem: Impaired Wound Healing  Goal: Optimal Wound Healing  Outcome: Ongoing, Progressing     Problem: Skin Injury Risk Increased  Goal: Skin Health and Integrity  Outcome: Ongoing, Progressing     Problem: Fall Injury Risk  Goal: Absence of Fall and Fall-Related Injury  Outcome: Ongoing, Progressing     "

## 2023-10-15 NOTE — PT/OT/SLP PROGRESS
"Physical Therapy Inpatient Rehab Treatment    Patient Name:  Helder Sharp Jr.   MRN:  [id number]    Recommendations:     Discharge Recommendations:  home with home health   Discharge Equipment Recommendations:     Barriers to discharge: None    Assessment:     Helder Sharp Jr. is a 83 y.o. male admitted with a medical diagnosis of COPD exacerbation.  He presents with the following impairments/functional limitations:  weakness, impaired endurance, impaired self care skills, impaired functional mobility, gait instability, impaired balance, decreased safety awareness, impaired cardiopulmonary response to activity.    Rehab Diagnosis: s/p PEG with Acute on chronic hypoxemic respiratory failure 2/2 COPD exacerbation and bilateral aspiration pneumonia    Recent Surgery: * No surgery found *      General Precautions: Standard, aspiration, fall     Orthopedic Precautions:      Braces:  (abdominal brace for PEG)    Rehab Prognosis: Fair; patient would benefit from acute skilled PT services to address these deficits and reach maximum level of function.      History:     Past Medical History:   Diagnosis Date    Acute bronchitis     Anxiety disorder, unspecified     COPD (chronic obstructive pulmonary disease)     Coronary artery disease     Hyperlipidemia     Left carotid bruit     Pulmonary nodules     Unspecified cataract        Past Surgical History:   Procedure Laterality Date    APPENDECTOMY      CATARACT EXTRACTION Left 01/03/2023    ESOPHAGOGASTRODUODENOSCOPY W/ PEG N/A 10/11/2023    Procedure: PEG;  Surgeon: JULIETTE Duffy MD;  Location: [facility] ENDOSCOPY;  Service: Gastroenterology;  Laterality: N/A;    HERNIA REPAIR      PHACOEMULSIFICATION, CATARACT, WITH IOL INSERTION Left 1/3/2023    Procedure: PHACOEMULSIFICATION, CATARACT, WITH IOL INSERTION- OS;  Surgeon: Louis Abdi MD;  Location: [facility] OR;  Service: Ophthalmology;  Laterality: Left;    TONSILLECTOMY         Subjective     Chief Complaint: "I " "want to do more activities but my oxygen drops fast."    Respiratory Status: Nasal cannula, flow 4 L/min    Patients cultural, spiritual, Denominational conflicts given the current situation: no      Objective:     Communicated with nurse prior to session.  Patient found supine with oxygen, PEG Tube, peripheral IV  upon PT entry to room.    Pt is Oriented x3 and Alert, Cooperative, and Motivated.    Vitals   Vitals at Rest  BP    HR    O2 Sat 91 at 4 LPM/NC   Pain      Vitals With Activity  BP    HR    O2 Sat 83 at 4 LPM/NC   Pain        Functional Mobility:   Bed Mobility:     Scooting: Partial/moderate assistance   Supine to Sit: Partial/moderate assistance   Sit to Supine: Partial/moderate assistance   Transfers:     Sit to Stand: Partial/moderate assistance  with rolling walker  Bed to Chair: Partial/moderate assistance  with rolling walker  using  Step Transfer     Current   Status  Discharge   Goal   Functional Area: Care Score:    Roll Left and Right 3  With verbal cues on use of bed rails Independent   Sit to Lying 3  With verbal cues on proper sequencing and hand placement Independent   Lying to Sitting on Side of Bed 3  With assistance required on leg placement Independent   Sit to Stand 3  With verbal cues to push up from chair to stand up Independent   Chair/Bed-to-Chair Transfer 3   Independent   Car Transfer 88     Walk 10 Feet 3  Within parallel bars due to presence of IV pole and portable O2 with minimal assistance with cues to keep back straight to allow efficient breathing Independent   Walk 50 Feet with Two Turns 88     Walk 150 Feet 88     Walk 10 Feet Uneven Surface 88     1 Step (Curb) 88     4 Steps 88     12 Steps 88     Picking Up Object 88     Wheel 50 Feet with Two Turns 3  With minimal assistance on brake negotiation and foot rest management Independent   Wheel 150 Feet 88 Independent       Therapeutic Activities and Exercises:  Patient's O2 sats at rest was 91 at 4 LPM/NC. With any activity, " O2 drops to about 81-83 even with O2 supplement consistently at 4 LPM/NC. Activity pacing and deep breathing techniques incorporated to session today. Frequent rest breaks required secondary to sudden drops in O2 sats. Patient tolerated session well but could have done more with O2 sats stable.    Activity Tolerance: Fair    Patient left supine with all lines intact and call button in reach.    Education provided: roles and goals of PT/PTA, transfer training, bed mob, gait training, safety awareness, body mechanics, assistive device, and strengthening exercises    Expected compliance: High compliance    GOALS:   Multidisciplinary Problems       Physical Therapy Goals          Problem: Physical Therapy    Goal Priority Disciplines Outcome Goal Variances Interventions   Physical Therapy Goal     PT, PT/OT Ongoing, Progressing     Description: Bed Mobility:  Roll left and right independently.  Sit to supine transfer independently.  Supine to sit transfer independently.    Transfers:  Sit to stand transfer with supervision/touching assist using RW.  Bed to chair transfer with supervision/touching assist Stand Step using RW.    Mobility:  Ambulate 100 feet with supervision/touching assist using RW.  Push w/c 100 feet with supervision/touching assist.                       Plan:     During this hospitalization, patient to be seen daily to address the identified rehab impairments via gait training, therapeutic activities, therapeutic exercises, neuromuscular re-education, wheelchair management/training and progress toward the following goals:    Plan of Care Expires:  10/20/23  PT Next Visit Date:    Plan of Care reviewed with: patient, spouse    Additional Information:         Time Tracking:     Therapy Time  PT Received On: 10/15/23  PT Start Time: 1300  PT Stop Time: 1400  PT Total Time (min): 60 min   PT Individual: 60  Missed Time:    Time Missed due to:      Billable Minutes: Gait Training 15, Therapeutic Activity  15, and Therapeutic Exercise 30    10/15/2023

## 2023-10-15 NOTE — PROGRESS NOTES
[facility] ([facility])  Rehab Progress Note    Patient Name: Helder Sharp Jr.  MRN: [id number]  Age: 83 y.o. Sex: male  : 1940  Hospital Length of Stay: 2 days  Date of Service: 10/15/2023   Chief Complaint: Acute on chronic hypoxemic respiratory failure 2/2 COPD exacerbation and bilateral aspiration pneumonia    Subjective:     Basic Information  Admit Information: 83-year-old WM presented to [facility] on  with complaints of continued shortness of breath and weakness.  PMH significant for bronchitis, COPD, CAD, hyperlipidemia, left carotid bruit, and pulmonary nodules.  Workup significant for COPD exacerbation with respiratory failure requiring intubation.  Hematuria appreciated.  Suspected aspiration pneumonia.  Initiated Zosyn.  CT chest significant for multiple enlarged mediastinal lymph nodes, no filling defects seen suggest PE, severe emphysema ptosis changes identified in the lungs with consolidation in the posterior basal segments of bilateral lower lobes demonstrating air bronchograms which may reflect aspiration pneumonia.  Tolerated extubation on 10/01 and initiated Vapotherm.  Downgraded to Medicine Unit on 10/03.  Plans for trilogy at home.  Continued with oropharyngeal dysphagia.  Tolerated PEG tube placement on 10/7.  Repeat sputum culture grew E coli.  Initiated Rocephin. Tolerated transfer to [facility] inpatient rehab unit on 10/13 without incident.  Today's Information: No acute events overnight.  Laying in bed comfortably.  Wife at bedside.  Requesting to discontinue BuSpar and Seroquel.  Reported fair sleep.  Discussed importance of Seroquel for improved sleep hygiene.  Vital signs at goal with no recent recorded fevers.  Appetite at goal.  Last BM 10/14.  No labs or imaging today.    Review of patient's allergies indicates:   Allergen Reactions    Flexeril [cyclobenzaprine]         Current Facility-Administered Medications:     acetaminophen oral solution 650  "mg, 650 mg, Oral, Q6H PRN, Jamee Cheung FNP, 650 mg at 10/15/23 1052    albuterol-ipratropium 2.5 mg-0.5 mg/3 mL nebulizer solution 3 mL, 3 mL, Nebulization, Q4H WAKE, Jamee Cheung FNP, 3 mL at 10/15/23 1125    benzonatate capsule 100 mg, 100 mg, Oral, TID PRN, Jamee Cheung FNP    enoxaparin injection 40 mg, 40 mg, Subcutaneous, Q24H (prophylaxis, 1700), Jamee Cheung FNP, 40 mg at 10/14/23 1717    famotidine tablet 20 mg, 20 mg, Oral, BID PRN, Jamee Cheung FNP    ferrous sulfate tablet 1 each, 1 tablet, Oral, Daily, Jamee Cheung FNP, 1 each at 10/14/23 1231    fluticasone furoate-vilanteroL 200-25 mcg/dose diskus inhaler 1 puff, 1 puff, Inhalation, Daily, Jamee Cheung FNP, 1 puff at 10/15/23 0909    guaiFENesin 100 mg/5 ml syrup 200 mg, 200 mg, Per NG tube, Q6H, Jamee Cheung FNP, 200 mg at 10/15/23 0552    hydrALAZINE injection 10 mg, 10 mg, Intravenous, Q6H PRN, Jamee Cheung, FNP    hydrOXYzine pamoate capsule 50 mg, 50 mg, Oral, Nightly PRN, Jamee Cheung FNP    labetalol 20 mg/4 mL (5 mg/mL) IV syring, 10 mg, Intravenous, Q4H PRN, Jamee Cheung, FNP    melatonin tablet 6 mg, 6 mg, Oral, Nightly, Jamee Cheung FNP, 6 mg at 10/14/23 2030    metoprolol injection 10 mg, 10 mg, Intravenous, Q2H PRN, Jamee Cheung, FNP    nitroGLYCERIN SL tablet 0.4 mg, 0.4 mg, Sublingual, Q5 Min PRN, Jamee Cheung FNP    ondansetron disintegrating tablet 4 mg, 4 mg, Oral, Q6H PRN, Jamee Cheung, ALICEP    QUEtiapine tablet 25 mg, 25 mg, Oral, QHS, Jamee Cheung, ALICEP    sertraline tablet 100 mg, 100 mg, Oral, Daily, Jamee Cheung FNP, 100 mg at 10/15/23 0910     Review of Systems   Complete 12-point review of symptoms negative except for what's mentioned in HPI     Objective:     /70   Pulse 71   Temp 97.4 °F (36.3 °C) (Oral)   Resp 20   Ht 6' 2" (1.88 m)   Wt 75.7 kg (166 lb 14.2 oz)   SpO2 (!) 91%   BMI 21.43 kg/m²      Physical " Exam  Vitals reviewed.   Eyes:      Pupils: Pupils are equal, round, and reactive to light.   Cardiovascular:      Rate and Rhythm: Normal rate and regular rhythm.      Heart sounds: Normal heart sounds.   Pulmonary:      Effort: Pulmonary effort is normal.      Breath sounds: Normal breath sounds.      Comments: Diminished breath sounds with nasal cannula  Abdominal:      General: Bowel sounds are normal.      Comments: Peg tube in place with clean applied dressings    Musculoskeletal:         General: Normal range of motion.      Comments: Muscle wasting and diffuse atrophy    Skin:     General: Skin is warm.   Neurological:      General: No focal deficit present.      Mental Status: He is alert and oriented to person, place, and time.   Psychiatric:         Mood and Affect: Mood normal.       Lines/Drains/Airways       Drain  Duration                  Gastrostomy/Enterostomy 10/11/23 Percutaneous endoscopic gastrostomy (PEG) LUQ feeding 4 days              Peripheral Intravenous Line  Duration                  Peripheral IV - Single Lumen 10/12/23 1330 20 G Anterior;Proximal;Right Forearm 2 days                    Labs  Recent Results (from the past 48 hour(s))   Comprehensive Metabolic Panel    Collection Time: 10/14/23  5:39 AM   Result Value Ref Range    Sodium Level 142 136 - 145 mmol/L    Potassium Level 4.1 3.5 - 5.1 mmol/L    Chloride 105 98 - 107 mmol/L    Carbon Dioxide 25 23 - 31 mmol/L    Glucose Level 133 (H) 82 - 115 mg/dL    Blood Urea Nitrogen 38.2 (H) 8.4 - 25.7 mg/dL    Creatinine 0.84 0.73 - 1.18 mg/dL    Calcium Level Total 9.7 8.8 - 10.0 mg/dL    Protein Total 6.8 5.8 - 7.6 gm/dL    Albumin Level 3.0 (L) 3.4 - 4.8 g/dL    Globulin 3.8 (H) 2.4 - 3.5 gm/dL    Albumin/Globulin Ratio 0.8 (L) 1.1 - 2.0 ratio    Bilirubin Total 0.5 <=1.5 mg/dL    Alkaline Phosphatase 74 40 - 150 unit/L    Alanine Aminotransferase 16 0 - 55 unit/L    Aspartate Aminotransferase 15 5 - 34 unit/L    eGFR >60  mls/min/1.73/m2   Prealbumin    Collection Time: 10/14/23  5:39 AM   Result Value Ref Range    Prealbumin 16.0 16.0 - 42.0 mg/dL   Ferritin    Collection Time: 10/14/23  5:39 AM   Result Value Ref Range    Ferritin Level 207.02 21.81 - 274.66 ng/mL   Iron and TIBC    Collection Time: 10/14/23  5:39 AM   Result Value Ref Range    Iron Binding Capacity Unsaturated 180 69 - 240 ug/dL    Iron Level 31 (L) 65 - 175 ug/dL    Transferrin 177 mg/dL    Iron Binding Capacity Total 211 (L) 250 - 450 ug/dL    Iron Saturation 15 (L) 20 - 50 %   Magnesium    Collection Time: 10/14/23  5:39 AM   Result Value Ref Range    Magnesium Level 2.20 1.60 - 2.60 mg/dL   Phosphorus    Collection Time: 10/14/23  5:39 AM   Result Value Ref Range    Phosphorus Level 2.9 2.3 - 4.7 mg/dL   CBC with Differential    Collection Time: 10/14/23  5:39 AM   Result Value Ref Range    WBC 9.19 4.50 - 11.50 x10(3)/mcL    RBC 4.06 (L) 4.70 - 6.10 x10(6)/mcL    Hgb 12.1 (L) 14.0 - 18.0 g/dL    Hct 38.0 (L) 42.0 - 52.0 %    MCV 93.6 80.0 - 94.0 fL    MCH 29.8 27.0 - 31.0 pg    MCHC 31.8 (L) 33.0 - 36.0 g/dL    RDW 14.2 11.5 - 17.0 %    Platelet 289 130 - 400 x10(3)/mcL    MPV 8.8 7.4 - 10.4 fL    Neut % 69.2 %    Lymph % 19.3 %    Mono % 9.7 %    Eos % 0.9 %    Basophil % 0.4 %    Lymph # 1.77 0.6 - 4.6 x10(3)/mcL    Neut # 6.36 2.1 - 9.2 x10(3)/mcL    Mono # 0.89 0.1 - 1.3 x10(3)/mcL    Eos # 0.08 0 - 0.9 x10(3)/mcL    Baso # 0.04 <=0.2 x10(3)/mcL    IG# 0.05 (H) 0 - 0.04 x10(3)/mcL    IG% 0.5 %    NRBC% 0.0 %     Radiology  Chest x-ray 10/8: Bilateral lower lung lobes with new patchy opacities suggest atelectasis and/or associated early infiltrates.  Assessment/Plan:     83 y.o. WM admitted on 10/13/2023     Acute on chronic hypoxemic respiratory failure   - 2/2 COPD exacerbation and bilateral aspiration pneumonia  - continue                 DuoNeb every 4 hours while awake                 Fluticasone-vilanterol 1 puff daily                 Guaifenesin 200  mg every 6 hours                 Rocephin 2 g daily (end date 10/15)  - follow-up with pulmonology outpatient  - discharge with trilogy     End-stage COPD   - tolerating nasal cannula O2, on home O2  - on trilogy at night at home  - continue                 DuoNeb every 4 hours while awake                 Fluticasone-vilanterol 1 puff daily                 Guaifenesin 200 mg every 6 hours                 Rocephin 2 g daily (end date 10/15)  - follow-up with pulmonology outpatient     Bilateral aspiration pneumonia  - tolerating nasal cannula O2, on home O2  - continue                 DuoNeb every 4 hours while awake                 Fluticasone-vilanterol 1 puff daily                 Guaifenesin 200 mg every 6 hours                 Rocephin 2 g daily (end date 10/15)  - follow-up with pulmonology outpatient     Oropharyngeal dysphagia  - s/p peg tube placement  - continues to tolerate tube feedings  - speech therapy to follow     Iron deficiency anemia  - asymptomatic  - H/H stable   - no evidence of active bleeds  - iron levels low  - continue  Ferrous sulfate 325 mg daily (initiated 10/14)  - will closely monitor and transfuse if needed      Insomnia  - stable  - continue                Seroquel 25 mg at night                Melatonin 6 mg at night     Major depressive disorder  - stable  - discontinue BuSpar 10 mg b.i.d. on 10/15 per patient request  - continue                Zoloft 100 mg daily     Generalized anxiety disorder  - stable  - discontinue BuSpar 10 mg b.i.d. on 10/15 per patient request  - continue                Zoloft 100 mg daily     AB therapy  Rocephin 2 g daily (end date 10/15)  Zosyn 9/30-10/7     VTE Prophylaxis:  Lovenox 40 mg daily  COVID-19 vaccination status:  Vaccinated x4     POA:  Yes-Maryjo  Living will:  No  Contacts:  Maryjo Sharp (spouse) [phone]                          Huma Moore (daughter) [phone]     CODE STATUS: Full  Internal Medicine (attending): Moises Carlos  MD Harrison  Physiatry (consulting):  Eyad Liriano MD     OUTPATIENT PROVIDERS  PCP:  Kimo Love MD  Urology:  Ben Guajardo MD   Pulmonology: Richardson Root MD  Cardiology:  Larry Gomes MD     DISPOSITION:  Vital signs at goal.  Discontinue BuSpar at patient request.  Fair sleep hygiene.  Discussed importance of continuing Seroquel at this time.  Appetite and bowel management at goal.  No labs today.  Updated wife at bedside.  Answered all questions.  Agrees with current medical plan of care.  Continues to progress well with therapies.  Monitor closely.  Notify of any acute changes.     Total time spent on this encounter including chart review and direct 1-on-1 patient interaction: 51 minutes   Over 50% of this time was spent in counseling and coordination of care

## 2023-10-16 NOTE — PT/OT/SLP EVAL
Recreational Therapy Evaluation      Date of Treatment: 10/30/23  Start Time: 1100  Stop Time: 1130  Total Time: 30 min  Missed Time    Assessment      Helder Sharp Jr. is a 83 y.o. male admitted with a medical diagnosis of COPD exacerbation.  He presents with the following impairments/functional limitations:  weakness, impaired endurance, impaired functional mobility, gait instability, decreased coordination, decreased upper extremity function, decreased lower extremity function, decreased safety awareness .    Rehab Diagnosis:     Recent Surgery:     General Precautions: Standard, fall, NPO, aspiration     Orthopedic Precautions:N/A     Braces: N/A    Rehab Prognosis: Fair; patient would benefit from acute skilled Recreational Therapy services to address these deficits and reach maximum level of function.      Impairments: Endurance deficits, Mobility deficits, Safety awareness deficits, and Strength deficits  Rehab Potential: Fair, due to: Reduced endurance during therapy   Treatment Recommendations: Continue with Skill TR Service to facilitate functional independence and address impairments/limitations   Treatment Diagnosis: Debility, end stage COPD, hypoxemic respiratory failure, CAD, COPD, home 02, pulmonary nodules, anxiety  Orientation: Oriented x4  Affect/Behavior: Anxious, Impulsive, and Distracted  Safety/Judgement: impaired   Basic Command Following: intact  Spiritual Cultural: no        History     Past Medical History:   Diagnosis Date    Acute bronchitis     Anxiety disorder, unspecified     COPD (chronic obstructive pulmonary disease)     Coronary artery disease     Hyperlipidemia     Left carotid bruit     Pulmonary nodules     Unspecified cataract        Past Surgical History:   Procedure Laterality Date    APPENDECTOMY      CATARACT EXTRACTION Left 01/03/2023    ESOPHAGOGASTRODUODENOSCOPY W/ PEG N/A 10/11/2023    Procedure: PEG;  Surgeon: JULIETTE Duffy MD;  Location: [facility]  "ENDOSCOPY;  Service: Gastroenterology;  Laterality: N/A;    HERNIA REPAIR      PHACOEMULSIFICATION, CATARACT, WITH IOL INSERTION Left 1/3/2023    Procedure: PHACOEMULSIFICATION, CATARACT, WITH IOL INSERTION- OS;  Surgeon: Louis Abdi MD;  Location: [facility];  Service: Ophthalmology;  Laterality: Left;    TONSILLECTOMY         Home Environment     Admit Date: 10/13/23  Living Situation  People in Home: spouse  Lives in: house  Patients Responsibilities: Caregiver to pet, , Financial management, Health and wellness, Leisure/play/hobbies, Volunteer, Social participation, Shopping, Retired  Number of Children: 5  Occupation:Retired: Manager for [employer]    Instrumental Activities of Daily Living     Previous Hand Dominance: Right Current Hand Dominance: Right     Other iADL Information:        Cognitive Skills Building         Cognitive Observation Activity Assist Position Equipment Response            Comment:      Dynamic Activities      Activity Assist Position Equipment Response   Activity 1 Bean bag toos supervision Standing Rolling walker and Bean bags fair   Comment: Sit to stand was supervision as was dynamic standing balance/reaching. Standing tolerance was 3 minutes at a time. UE coordination was supervision as were direction following and problem solving skills.  Verbal cues needed for safety  awareness and to not rush through activity.  Amxious       Fine Motor Activities      Activity Assist Position Equipment Response           Comment:        Goals     Patient Goals  Patient Goal 1: "I wish to have quality of life."    Short Term Goals    Goal  Goal Status   Will increase activity tolerance to supervision Initiated   Will improve dynamic standing balance/reaching to supervision Initiated                 Long Term Goals    Goal Goal Status   Will increase standing tolerance to 5 minutes Initiated   Will improve dynamic standing balance/reaching to setup/I Initiated             "         Plan       Patient to be seen: Daily  Duration: 2 weeks  Treatments planned: Coordination, Energy conservation training, Safety education  Treatment plan/goals established with Patient/Caregiver: Yes

## 2023-10-16 NOTE — PROGRESS NOTES
[facility] ([facility])  Rehab Progress Note    Patient Name: Helder Sharp Jr.  MRN: [id number]  Age: 83 y.o. Sex: male  : 1940  Hospital Length of Stay: 3 days  Date of Service: 10/16/2023   Chief Complaint: Acute on chronic hypoxemic respiratory failure 2/2 COPD exacerbation and bilateral aspiration pneumonia    Subjective:     Basic Information  Admit Information: 83-year-old WM presented to [facility] on  with complaints of continued shortness of breath and weakness.  PMH significant for bronchitis, COPD, CAD, hyperlipidemia, left carotid bruit, and pulmonary nodules.  Workup significant for COPD exacerbation with respiratory failure requiring intubation.  Hematuria appreciated.  Suspected aspiration pneumonia.  Initiated Zosyn.  CT chest significant for multiple enlarged mediastinal lymph nodes, no filling defects seen suggest PE, severe emphysema ptosis changes identified in the lungs with consolidation in the posterior basal segments of bilateral lower lobes demonstrating air bronchograms which may reflect aspiration pneumonia.  Tolerated extubation on 10/01 and initiated Vapotherm.  Downgraded to Medicine Unit on 10/03.  Plans for trilogy at home.  Continued with oropharyngeal dysphagia.  Tolerated PEG tube placement on 10/7.  Repeat sputum culture grew E coli.  Initiated Rocephin. Tolerated transfer to [facility] inpatient rehab unit on 10/13 without incident.  Today's Information: No acute events overnight.  Sitting up in chair.  Vital signs at goal with no recorded fevers.  Reports good sleep and appetite.  Last BM 10/12.  Vital signs at goal with no recorded fevers.  Cbc and CMP unremarkable.  Albumin 3.2/prealbumin 18.6.  No new imaging today.    Review of patient's allergies indicates:   Allergen Reactions    Flexeril [cyclobenzaprine]         Current Facility-Administered Medications:     acetaminophen oral solution 650 mg, 650 mg, Oral, Q6H PRN, Jamee Cheung, MYCHAL,  "650 mg at 10/16/23 0513    albuterol-ipratropium 2.5 mg-0.5 mg/3 mL nebulizer solution 3 mL, 3 mL, Nebulization, Q4H WAKE, Jamee Cheung FNP, 3 mL at 10/16/23 1536    benzonatate capsule 100 mg, 100 mg, Oral, TID PRN, Jamee Cheung, ALICEP    enoxaparin injection 40 mg, 40 mg, Subcutaneous, Q24H (prophylaxis, 1700), Jamee Cheung FNP, 40 mg at 10/15/23 1801    famotidine tablet 20 mg, 20 mg, Oral, BID PRN, Jamee Cheung FNP    ferrous sulfate tablet 1 each, 1 tablet, Oral, Daily, Jamee Cheung FNP, 1 each at 10/16/23 0808    fluticasone furoate-vilanteroL 200-25 mcg/dose diskus inhaler 1 puff, 1 puff, Inhalation, Daily, Jamee Cheung FNP, 1 puff at 10/16/23 0807    guaiFENesin 100 mg/5 ml syrup 200 mg, 200 mg, Per NG tube, Q6H, Jamee Cheung FNP, 200 mg at 10/16/23 1200    hydrALAZINE injection 10 mg, 10 mg, Intravenous, Q6H PRN, Jamee Cheung, FNP    hydrOXYzine pamoate capsule 50 mg, 50 mg, Oral, Nightly PRN, Jamee Cheung FNP    labetalol 20 mg/4 mL (5 mg/mL) IV syring, 10 mg, Intravenous, Q4H PRN, Jamee Cheung FNP    melatonin tablet 6 mg, 6 mg, Oral, Nightly, Jamee Cheung FNP, 6 mg at 10/15/23 2023    metoprolol injection 10 mg, 10 mg, Intravenous, Q2H PRN, Jamee Cheung, ALICEP    nitroGLYCERIN SL tablet 0.4 mg, 0.4 mg, Sublingual, Q5 Min PRN, Jamee Cheung, FNP    ondansetron disintegrating tablet 4 mg, 4 mg, Oral, Q6H PRN, Jamee Cheung, ALICEP    QUEtiapine tablet 25 mg, 25 mg, Oral, QHS, Jamee Cheung FNP, 25 mg at 10/15/23 2023    sertraline tablet 100 mg, 100 mg, Oral, Daily, Jamee Cheung FNP, 100 mg at 10/16/23 0808     Review of Systems   Complete 12-point review of symptoms negative except for what's mentioned in HPI     Objective:     /62   Pulse 76   Temp 97.6 °F (36.4 °C) (Oral)   Resp 20   Ht 6' 2" (1.88 m)   Wt 75.7 kg (166 lb 14.2 oz)   SpO2 (!) 90%   BMI 21.43 kg/m²      Physical Exam  Vitals reviewed.   Eyes:     "  Pupils: Pupils are equal, round, and reactive to light.   Cardiovascular:      Rate and Rhythm: Normal rate and regular rhythm.      Heart sounds: Normal heart sounds.   Pulmonary:      Effort: Pulmonary effort is normal.      Breath sounds: Normal breath sounds.      Comments: Diminished breath sounds with nasal cannula  Abdominal:      General: Bowel sounds are normal.      Comments: Peg tube in place with clean applied dressings    Musculoskeletal:         General: Normal range of motion.      Comments: Muscle wasting and diffuse atrophy    Skin:     General: Skin is warm.   Neurological:      General: No focal deficit present.      Mental Status: He is alert and oriented to person, place, and time.   Psychiatric:         Mood and Affect: Mood normal.     *MD performed and documented physical examination       Lines/Drains/Airways       Drain  Duration                  Gastrostomy/Enterostomy 10/11/23 Percutaneous endoscopic gastrostomy (PEG) LUQ feeding 5 days              Peripheral Intravenous Line  Duration                  Peripheral IV - Single Lumen 10/12/23 1330 20 G Anterior;Proximal;Right Forearm 4 days                    Labs  Recent Results (from the past 48 hour(s))   Comprehensive Metabolic Panel    Collection Time: 10/16/23  5:26 AM   Result Value Ref Range    Sodium Level 142 136 - 145 mmol/L    Potassium Level 4.7 3.5 - 5.1 mmol/L    Chloride 104 98 - 107 mmol/L    Carbon Dioxide 27 23 - 31 mmol/L    Glucose Level 117 (H) 82 - 115 mg/dL    Blood Urea Nitrogen 33.9 (H) 8.4 - 25.7 mg/dL    Creatinine 0.90 0.73 - 1.18 mg/dL    Calcium Level Total 10.0 8.8 - 10.0 mg/dL    Protein Total 7.1 5.8 - 7.6 gm/dL    Albumin Level 3.2 (L) 3.4 - 4.8 g/dL    Globulin 3.9 (H) 2.4 - 3.5 gm/dL    Albumin/Globulin Ratio 0.8 (L) 1.1 - 2.0 ratio    Bilirubin Total 0.4 <=1.5 mg/dL    Alkaline Phosphatase 77 40 - 150 unit/L    Alanine Aminotransferase 24 0 - 55 unit/L    Aspartate Aminotransferase 21 5 - 34 unit/L     eGFR >60 mls/min/1.73/m2   Prealbumin    Collection Time: 10/16/23  5:26 AM   Result Value Ref Range    Prealbumin 18.6 16.0 - 42.0 mg/dL   Magnesium    Collection Time: 10/16/23  5:26 AM   Result Value Ref Range    Magnesium Level 2.30 1.60 - 2.60 mg/dL   Phosphorus    Collection Time: 10/16/23  5:26 AM   Result Value Ref Range    Phosphorus Level 2.8 2.3 - 4.7 mg/dL   CBC with Differential    Collection Time: 10/16/23  5:26 AM   Result Value Ref Range    WBC 7.83 4.50 - 11.50 x10(3)/mcL    RBC 4.22 (L) 4.70 - 6.10 x10(6)/mcL    Hgb 12.5 (L) 14.0 - 18.0 g/dL    Hct 39.5 (L) 42.0 - 52.0 %    MCV 93.6 80.0 - 94.0 fL    MCH 29.6 27.0 - 31.0 pg    MCHC 31.6 (L) 33.0 - 36.0 g/dL    RDW 14.5 11.5 - 17.0 %    Platelet 279 130 - 400 x10(3)/mcL    MPV 8.7 7.4 - 10.4 fL    Neut % 60.8 %    Lymph % 28.1 %    Mono % 7.2 %    Eos % 2.4 %    Basophil % 0.6 %    Lymph # 2.20 0.6 - 4.6 x10(3)/mcL    Neut # 4.76 2.1 - 9.2 x10(3)/mcL    Mono # 0.56 0.1 - 1.3 x10(3)/mcL    Eos # 0.19 0 - 0.9 x10(3)/mcL    Baso # 0.05 <=0.2 x10(3)/mcL    IG# 0.07 (H) 0 - 0.04 x10(3)/mcL    IG% 0.9 %    NRBC% 0.0 %     Radiology  Chest x-ray 10/8: Bilateral lower lung lobes with new patchy opacities suggest atelectasis and/or associated early infiltrates.  Assessment/Plan:     83 y.o. WM admitted on 10/13/2023     Acute on chronic hypoxemic respiratory failure   - 2/2 COPD exacerbation and bilateral aspiration pneumonia  - continue                 DuoNeb every 4 hours while awake                 Fluticasone-vilanterol 1 puff daily                 Guaifenesin 200 mg every 6 hours                 Rocephin 2 g daily (end date 10/15)  - follow-up with pulmonology outpatient  - discharge with trilogy     End-stage COPD   - tolerating nasal cannula O2, on home O2  - on trilogy at night at home  - continue                 DuoNeb every 4 hours while awake                 Fluticasone-vilanterol 1 puff daily                 Guaifenesin 200 mg every 6 hours                  Rocephin 2 g daily (end date 10/15)  - follow-up with pulmonology outpatient     Bilateral aspiration pneumonia  - tolerating nasal cannula O2, on home O2  - continue                 DuoNeb every 4 hours while awake                 Fluticasone-vilanterol 1 puff daily                 Guaifenesin 200 mg every 6 hours                 Rocephin 2 g daily (end date 10/15)  - follow-up with pulmonology outpatient     Oropharyngeal dysphagia  - s/p peg tube placement  - continues to tolerate tube feedings  - speech therapy to follow     Iron deficiency anemia  - asymptomatic  - H/H stable   - no evidence of active bleeds  - iron levels low  - continue  Ferrous sulfate 325 mg daily (initiated 10/14)  - will closely monitor and transfuse if needed      Insomnia  - stable  - continue                Seroquel 25 mg at night                Melatonin 6 mg at night     Major depressive disorder  - stable  - discontinue BuSpar 10 mg b.i.d. on 10/15 per patient request  - continue                Zoloft 100 mg daily     Generalized anxiety disorder  - stable  - discontinue BuSpar 10 mg b.i.d. on 10/15 per patient request  - continue                Zoloft 100 mg daily     AB therapy  Rocephin 2 g daily (end date 10/15)  Zosyn 9/30-10/7     VTE Prophylaxis:  Lovenox 40 mg daily  COVID-19 vaccination status:  Vaccinated x4     POA:  Yes-Maryjo  Living will:  No  Contacts:  Maryjo Sharp (spouse) [phone]                          Huma Moore (daughter) [phone]     CODE STATUS: Full  Internal Medicine (attending): Moises Terry MD  Physiatry (consulting):  Eyad Liriano MD     OUTPATIENT PROVIDERS  PCP:  Kimo Love MD  Urology:  Ben Guajardo MD   Pulmonology: Richardson Root MD  Cardiology:  Larry Gomes MD     DISPOSITION:  Sleep hygiene, bowel maintenance, and appetite at goal.  Last BM 10/12.  Vital signs at goal with no recorded fevers.  Lab work unremarkable.  Albumin 2.2/prealbumin 18.6.  No new  imaging today.  Limited by activity tolerance.  Monitor closely.  Notify of acute changes.     Osmar Sampson NP conducted independent physical examination and assisted with medical documentation.

## 2023-10-16 NOTE — PROGRESS NOTES
"   10/16/23 1030   Rec Therapy Time Calculation   Date of Treatment 10/30/23   Rec Start Time 1100   Rec Stop Time 1130   Rec Total Time (min) 30 min   Time   Treatment time 2 units   Charges   $Therapeutic Exercise 2 units   Precautions   General Precautions fall;NPO;aspiration   Orthopedic Precautions  N/A   Braces N/A   Pain/Comfort   Pain Rating 1 no pain   OTHER   Treatment Diagnosis Debility, end stage COPD, hypoxemic respiratory failure, CAD, COPD, home 02, pulmonary nodules, anxiety   Rehab identified problem list/impairments weakness;impaired endurance;impaired functional mobility;gait instability;decreased coordination;decreased upper extremity function;decreased lower extremity function;decreased safety awareness   Values/Beliefs/Spiritual Care   Spiritual, Cultural Beliefs, Sikh Practices, Values that Affect Care no   Prior Living/ADLs   Admit Date 10/13/23   People in Home spouse   Living Arrangements house   Patient responsibilites: Caregiver to pet;;Financial management;Health and wellness;Leisure/play/hobbies;Volunteer;Social participation;Shopping;Retired   Number of Children 5   Occupation Retired: Manager for [employer]   Previous Hand Domiance Right   Current Hand Dominance Right   Overall Level of Functioning   Activity Tolerance Min A   Dynamic Sitting Balance/Reaching Mod Indep   Dynamic Standing Balance/Reaching Standby Assist   Right UE Coodination/Dexterity Standby Assist   Left UE Coordination/Dexterity Standby Assist   Problem Solving/Sequencing Skills Standby Assist   Memory Recall Standby Assist   R/L Neglect/Inattention Does not occur   Attention Span Standby Assist   Social Interaction Independent   Patient Goals   Patient Goal 1 "I wish to have quality of life."   Recreational Therapy Short Term Goals   Short Term Goal 1 Will increase activity tolerance to supervision   Short Term Goal 1 Progression Initiated   Short Term Goal 2 Will improve dynamic standing " balance/reaching to supervision   Short Term Goal 2 Progression Initiated   Recreational Therapy Long Term Goals   Long Term Goal 1 Will increase standing tolerance to 5 minutes   Long Term Goal 1 Progression Initiated   Long Term Goal 2 Will improve dynamic standing balance/reaching to setup/I   Long Term Goal 2 Progression Initiated   Plan   Patient to be seen Daily   Planned Duration 2 weeks   Treatments Planned Coordination;Energy conservation training;Safety education   Treatment plan/goals estblished with Patient/Caregiver Yes

## 2023-10-16 NOTE — PROGRESS NOTES
[facility] - Rehab Inpatient Services  Wound Care    Patient Name:  Helder Sharp Jr.   MRN:  [id number]  Date: 10/16/2023  Diagnosis: COPD exacerbation    History:     Past Medical History:   Diagnosis Date    Acute bronchitis     Anxiety disorder, unspecified     COPD (chronic obstructive pulmonary disease)     Coronary artery disease     Hyperlipidemia     Left carotid bruit     Pulmonary nodules     Unspecified cataract        Social History     Socioeconomic History    Marital status:    Tobacco Use    Smoking status: Former     Current packs/day: 0.00     Types: Cigarettes     Quit date:      Years since quittin.7    Smokeless tobacco: Never   Substance and Sexual Activity    Alcohol use: Not Currently    Drug use: Never    Sexual activity: Yes     Social Determinants of Health     Transportation Needs: No Transportation Needs (10/16/2023)    PRAPARE - Transportation     Lack of Transportation (Medical): No     Lack of Transportation (Non-Medical): No       Precautions:     Allergies as of 10/13/2023 - Reviewed 10/13/2023   Allergen Reaction Noted    Flexeril [cyclobenzaprine]  2022          10/16/23 1200   Positioning   Body Position 30 degrees   Head of Bed (HOB) Positioning HOB at 30 degrees        Altered Skin Integrity 23 Sacral spine #1 Intact skin with non-blanchable redness of localized area   Date First Assessed/Time First Assessed: 23   Altered Skin Integrity Present on Admission - Did Patient arrive to the hospital with altered skin?: yes  Location: Sacral spine  Wound Number: #1  Is this injury device related?: No  Descriptio...   Description of Altered Skin Integrity Intact skin with non-blanchable redness of localized area   Appearance Red  (deep red)   Wound Length (cm) 3.5 cm   Wound Width (cm) 2 cm   Wound Surface Area (cm^2) 7 cm^2   Off Loading   (turn q 2 / geomat and turn q 2)     Remains with sacral stage 1 pressure wound.  Ordered Geomat and turn q 2 hours.10/16/2023

## 2023-10-16 NOTE — PLAN OF CARE
Helder Sharp Jr. age: 83 *PEG, dysphagia, O2 4L/NC     Dx: Debility due to end stage COPD with acute on chronic hypoxemic respiratory failure. Pt. Has a past medical history of HLD, CAD, COPD on 2L home oxygen, pulmonary nodules, and anxiety. See chart for full and complete medical history*       Hx mental health/substance abuse: Pt. Has a history of depression on Zoloft. He quit smoking in 2009 and quit drinking a few years before that.     Is there evidence of an acute change in mental status from the patients baseline? Yes, confused at times.      Insurance: Medicare Part A&B/[employer] Medicare supplement     Education//Work Hx: Pt. Completed college. He has no  history. He is retired. 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     Pt. Is  to Maryjo Sharp ([phone]). Maryjo is retired. She drives. She can physically assist the patient after rehab. She states that the patient had been the one to do most of the cooking and grocery shopping prior to January. He got sick in January. She took over the household duties. He was just getting back to taking over some of the cooking and grocery shopping duties when he got sick again. Maryjo can do the household chores.     Children: (5) /Friends/Family: 1) Maryjo Sharp, spouse ([phone]) 2) Huma Moore, daughter ([phone])      Power of  (yes-Maryjo)/Living Will (no)     Prior Level of Fx: Independent ADLs. He used a rollator for mobility. Pt. Was doing some cooking, driving short distances, managing medications, and managing finances. Someone is hired for lawn maintenance. He does not have a medic alert.     Residence: Pt. Lives with his spouse in [place] in a single level home with a small threshold step to enter the residence. He has a tub/shower combination with a shower chair, grab bars, and HHS. He has an elevated toilet with grab bars.     DME:  RW, wheelchair, home oxygen, shower chair, rollator, Trilogy, and nebulizer. Pt. uses [facility] for Oxygen and [facility] for DME     HH/OP TX: He has a history of HH with [facility]. He is active with [facility]. He was also going to [facility] outpatient cardiopulmonary center     FOC obtained for [facility] for O2 supplies, [facility] for DME, and [facility] for HH     Pharmacy: [id number] on [address] / (has prescription drug coverage)      MD(s): PCP: Dr. Kimo Love ([phone]); Urologist: Dr. Yuriy Guajardo ([phone]) Follow-up for hematuria workup; Pulmonologist: Dr. Root; Cardiologist: Dr. Gomes

## 2023-10-16 NOTE — PLAN OF CARE
Problem: Fall Injury Risk  Goal: Absence of Fall and Fall-Related Injury  Outcome: Ongoing, Progressing  Intervention: Promote Injury-Free Environment  Flowsheets (Taken 10/16/2023 0040)  Safety Promotion/Fall Prevention:   assistive device/personal item within reach   bed alarm set   Fall Risk signage in place   lighting adjusted   nonskid shoes/socks when out of bed   side rails raised x 3   instructed to call staff for mobility

## 2023-10-16 NOTE — PT/OT/SLP PROGRESS
[facility]  Speech Language Pathology Department  Dysphagia Therapy Progress Note    Patient Name:  Helder Sharp Jr.   MRN:  [id number]  Admitting Diagnosis: hypoxia     Recommendations:     General recommendations:  dysphagia therapy  Diet texture/consistency recommendations:  NPO  Medications: NPO/ PEG   Barriers to safe discharge:  severity of impairment    Subjective     Patient awake and alert.  Pain/Comfort:  0/10  Spiritual/Cultural/Holiness Beliefs/Practices that affect care: no  Respiratory Status: NC 4L    Objective:     Therapeutic Activities:  Pt completed base of tongue and laryngeal x100 with minimal cues.    Therapeutic PO Trials:  Consistency Amount Fed By Oral Symptoms Pharyngeal Symptoms   Puree 4  oz SLP None None     Assessment:     Pt continues to present with oropharyngeal dysphagia and remains unsafe for PO intake.    Goals:     Multidisciplinary Problems       SLP Goals          Problem: SLP    Goal Priority Disciplines Outcome   SLP Goal     SLP Ongoing, Progressing   Description:  LTG: To tolerate least restrictive diet without clinical signs/sx of aspiration.   ST. Tongue base/laryngeal excursion exercise   2. Tolerate thermal stimulation x10 with 100% swallow response with less than a 2 second delay.   3. Puree solids without clinical signs/sx of aspiration.                     Patient Education:     Patient provided with verbal education regarding POC.  Understanding was verbalized.    Plan:     Will continue to follow and tx as appropriate.    SLP Follow-Up:  Yes   Patient to be seen:  5 x/week   Plan of Care expires:  10/28/23  Plan of Care reviewed with:  patient       Time Tracking:     SLP Treatment Date:      Speech Start Time:  1100  Speech Stop Time:  1130     Speech Total Time (min):  30 min    Billable minutes:  Treatment of Swallow Dysfunction, 30 minutes       10/16/2023

## 2023-10-16 NOTE — PLAN OF CARE
Problem: Adjustment to Illness (Sepsis/Septic Shock)  Goal: Optimal Coping  Outcome: Ongoing, Progressing     Problem: Bleeding (Sepsis/Septic Shock)  Goal: Absence of Bleeding  Outcome: Ongoing, Progressing     Problem: Infection Progression (Sepsis/Septic Shock)  Goal: Absence of Infection Signs and Symptoms  Outcome: Ongoing, Progressing     Problem: Nutrition Impaired (Sepsis/Septic Shock)  Goal: Optimal Nutrition Intake  Outcome: Ongoing, Progressing

## 2023-10-16 NOTE — PT/OT/SLP PROGRESS
Occupational Therapy Inpatient Rehab Treatment    Name: Helder Sharp Jr.  MRN: [id number]    Assessment:  Helder Sharp Jr. is a 83 y.o. male admitted with a medical diagnosis of COPD exacerbation.  He presents with the following impairments/functional limitations:  weakness, impaired endurance, impaired self care skills, impaired functional mobility, gait instability.    General Precautions: Standard, fall, aspiration, NPO     Orthopedic Precautions:N/A     Braces: N/A    Rehab Prognosis: Good; patient would benefit from acute skilled OT services to address these deficits and reach maximum level of function.      History:     Past Medical History:   Diagnosis Date    Acute bronchitis     Anxiety disorder, unspecified     COPD (chronic obstructive pulmonary disease)     Coronary artery disease     Hyperlipidemia     Left carotid bruit     Pulmonary nodules     Unspecified cataract        Past Surgical History:   Procedure Laterality Date    APPENDECTOMY      CATARACT EXTRACTION Left 01/03/2023    ESOPHAGOGASTRODUODENOSCOPY W/ PEG N/A 10/11/2023    Procedure: PEG;  Surgeon: JULIETTE Duffy MD;  Location: [facility] ENDOSCOPY;  Service: Gastroenterology;  Laterality: N/A;    HERNIA REPAIR      PHACOEMULSIFICATION, CATARACT, WITH IOL INSERTION Left 1/3/2023    Procedure: PHACOEMULSIFICATION, CATARACT, WITH IOL INSERTION- OS;  Surgeon: Louis Abdi MD;  Location: [facility] OR;  Service: Ophthalmology;  Laterality: Left;    TONSILLECTOMY         Subjective     Orientation: Oriented x4    Chief Complaint: No complaints     Patient/Family Comments/goals: To build endurance     Respiratory Status: Nasal cannula, flow 4 L/min    Patients cultural, spiritual, Yazdanism conflicts given the current situation: no     Objective:     Patient found up in chair with PEG Tube, oxygen, peripheral IV  upon OT entry to room.    Mobility   Patient completed:  Sit to Stand Transfer with stand by assistance with rollator  Stand to  Sit Transfer with stand by assistance with rollator  During sit to stand transfer, wound care nurse, Karina, checked pt's bottom for any pressure wound.     Limiting Factors for ADLs: motor, endurance, limited ROM, balance, and weakness     Therapeutic Exercise  Patient completed exercises in chair in room due to pt receiving feeding change by nursing as requested by patient.   Patient completed UE exercises to increase UE strength and endurance. Pt completed 3x10 reps of shoulder press, bicep curls, front raises, front punches. Pt encouraged to sit away from chair in order to address core strength. Pt tolerated exercises well with multiple rest breaks provided due to poor endurance and energy conservation. Patient encourage to take deep breaths between exercises to increase oxygen intake    Patient left up in chair with all lines intact and call button in reach.     Education provided: Roles and goals of OT, ADLs, transfer training, sequencing, safety precautions, and fall prevention    Multidisciplinary Problems       Occupational Therapy Goals          Problem: Occupational Therapy    Goal Priority Disciplines Outcome Interventions   Occupational Therapy Goal     OT, PT/OT Ongoing, Progressing    Description: ADLs: Goals to be achieved by Re-Evaluation.   Pt to perform grooming tasks with independence.   Pt to perform feeding tasks with independence. - Goal to be worked on only when patient is medically cleared to eat. Patient is NPO at time of evaluation.    Pt to perform UB dressing with independence.   Pt to perform LB dressing with Supervision/CGA with RW & AE as needed.    Pt to perform putting on/off footwear task with Min A with AE as needed. MET  Pt to perform toileting with Min A. MET  Updated: Pt to perform toileting with SBA.   Pt to perform bathing with Min A.     Functional Transfers:  Pt to perform toilet transfers with Supervision/CGA with RW & BSC over toilet. MET  Updated: Pt to perform toilet  transfers with independence by D/C.   Pt to perform a tub transfer with Supervision/CGA with RW & TTB.      IADLs:  Pt to perform age-appropriate IADL's with Supervision/CGA.    Balance, Strengthening, Endurance, Balance:  Pt to demonstrate consistent adherence to breathing control and energy conservation techniques as educated by OT.                      Time Tracking     OT Received On: 10/16/23  Time In 1430     Time Out 1500  Total Time 30 min  Therapy Time: OT Individual: 30  Missed Time:    Missed Time Reason:      Billable Minutes: Therapeutic Exercise 30    10/16/2023

## 2023-10-16 NOTE — PLAN OF CARE
Problem: Occupational Therapy  Goal: Occupational Therapy Goal  Description: ADLs: Goals to be achieved by Re-Evaluation.   Pt to perform grooming tasks with independence.   Pt to perform feeding tasks with independence. - Goal to be worked on only when patient is medically cleared to eat. Patient is NPO at time of evaluation.    Pt to perform UB dressing with independence.   Pt to perform LB dressing with Supervision/CGA with RW & AE as needed.    Pt to perform putting on/off footwear task with Min A with AE as needed. MET  Pt to perform toileting with Min A. MET  Updated: Pt to perform toileting with SBA.   Pt to perform bathing with Min A.     Functional Transfers:  Pt to perform toilet transfers with Supervision/CGA with RW & BSC over toilet. MET  Updated: Pt to perform toilet transfers with independence by D/C.   Pt to perform a tub transfer with Supervision/CGA with RW & TTB.      IADLs:  Pt to perform age-appropriate IADL's with Supervision/CGA.    Balance, Strengthening, Endurance, Balance:  Pt to demonstrate consistent adherence to breathing control and energy conservation techniques as educated by OT.   Outcome: Ongoing, Progressing

## 2023-10-16 NOTE — PLAN OF CARE
Admission PHQ-9 completed (score=14 moderate symptoms).  Pt feels he has little interest/pleasure due to having had COPD since 2009 and not being able to function as he used to, though he continues to remain motivated to do therapy and improve his functionality at this time.      Pt would like both his wife and dtr (local nurse) involved in family training that is planned before discharge.

## 2023-10-16 NOTE — PT/OT/SLP PROGRESS
"Occupational Therapy Inpatient Rehab Treatment    Name: Helder Sharp Jr.  MRN: [id number]    Assessment:  Helder Sharp Jr. is a 83 y.o. male admitted with a medical diagnosis of COPD exacerbation.  He presents with the following impairments/functional limitations:  weakness, impaired endurance, impaired functional mobility, gait instability, impaired balance, decreased ROM.    General Precautions: Standard, fall, NPO, aspiration     Orthopedic Precautions:N/A     Braces:  (abdominal brace for PEG tube)    Rehab Prognosis: Good; patient would benefit from acute skilled OT services to address these deficits and reach maximum level of function.      History:     Past Medical History:   Diagnosis Date    Acute bronchitis     Anxiety disorder, unspecified     COPD (chronic obstructive pulmonary disease)     Coronary artery disease     Hyperlipidemia     Left carotid bruit     Pulmonary nodules     Unspecified cataract        Past Surgical History:   Procedure Laterality Date    APPENDECTOMY      CATARACT EXTRACTION Left 01/03/2023    ESOPHAGOGASTRODUODENOSCOPY W/ PEG N/A 10/11/2023    Procedure: PEG;  Surgeon: JULIETTE Duffy MD;  Location: [facility] ENDOSCOPY;  Service: Gastroenterology;  Laterality: N/A;    HERNIA REPAIR      PHACOEMULSIFICATION, CATARACT, WITH IOL INSERTION Left 1/3/2023    Procedure: PHACOEMULSIFICATION, CATARACT, WITH IOL INSERTION- OS;  Surgeon: Louis Abdi MD;  Location: [facility] OR;  Service: Ophthalmology;  Laterality: Left;    TONSILLECTOMY         Subjective     Orientation: Oriented x4    Chief Complaint: No complaints     Patient/Family Comments/goals: "I need a little break"    Respiratory Status: Nasal cannula, flow 4 L/min    Patients cultural, spiritual, Mormon conflicts given the current situation: no       Objective:     Patient found up in chair with peripheral IV, oxygen, PEG Tube  upon OT entry to room.    Mobility   Patient completed:  Sit to Stand Transfer with " contact guard assistance with rollator  Stand to Sit Transfer with contact guard assistance with rollator  Toilet Transfer Step Transfer technique with contact guard assistance with  rollator    Functional Mobility  Pt performed FM in room with CGA using rollator. No LOB noted     ADLs   Current Status   Toileting Hygiene 4 SPV for safety. +void BM. Patient able to wipe self in seated position. Patient able to manage brief and pants without assistance.    Toilet Transfer 4 CGA for safety   Putting On, Taking Off Footwear 6 Pt donned/doffed socks      Limiting Factors for ADLs: motor, endurance, limited ROM, balance, and weakness     Patient left up in chair with all lines intact and Anisa, RT present.     Education provided: Roles and goals of OT, ADLs, transfer training, sequencing, safety precautions, and fall prevention    Multidisciplinary Problems       Occupational Therapy Goals          Problem: Occupational Therapy    Goal Priority Disciplines Outcome Interventions   Occupational Therapy Goal     OT, PT/OT Ongoing, Progressing    Description: ADLs: Goals to be achieved by Re-Evaluation.   Pt to perform grooming tasks with independence.   Pt to perform feeding tasks with independence. - Goal to be worked on only when patient is medically cleared to eat. Patient is NPO at time of evaluation.    Pt to perform UB dressing with independence.   Pt to perform LB dressing with Supervision/CGA with RW & AE as needed.    Pt to perform putting on/off footwear task with Min A with AE as needed. MET  Pt to perform toileting with Min A. MET  Updated: Pt to perform toileting with SBA.   Pt to perform bathing with Min A.     Functional Transfers:  Pt to perform toilet transfers with Supervision/CGA with RW & BSC over toilet. MET  Updated: Pt to perform toilet transfers with independence by D/C.   Pt to perform a tub transfer with Supervision/CGA with RW & TTB.      IADLs:  Pt to perform age-appropriate IADL's with  Supervision/CGA.    Balance, Strengthening, Endurance, Balance:  Pt to demonstrate consistent adherence to breathing control and energy conservation techniques as educated by OT.                        Time Tracking     OT Received On: 10/16/23  Time In 1000     Time Out 1030  Total Time 30 min  Therapy Time: OT Individual: 30  Missed Time:    Missed Time Reason:      Billable Minutes: Self Care/Home Management 30    10/16/2023

## 2023-10-17 NOTE — PLAN OF CARE
Spoke with wife, Maryjo, at [phone] to update her from team conference and discharge planned for 10/26.  She will discuss family training with their nurse daughter but is not sure if she will attend with her due to her work schedule.  She will call me back by tomorrow with her chosen date/time.    Maryjo also relayed that pt has had hx of an opposite reaction to buspar (combative), so they are leery of pt taking anything for anxiety.  Explained that vistaril will be attempted tonight, as pt is still reporting poor sleep.  Pulmonology has told pt in the past to avoid benadryl as well, due to breathing issues.  Relayed above to RAMÍREZ Mancuso, but she already knew.

## 2023-10-17 NOTE — PROGRESS NOTES
10/17/23 1030   Rec Therapy Time Calculation   Date of Treatment 10/17/23   Rec Start Time 1030   Rec Stop Time 1100   Rec Total Time (min) 30 min   Time   Treatment time 2 units   Charges   $Therapeutic Exercise 2 units   Precautions   General Precautions fall;hearing impaired;NPO   Orthopedic Precautions  N/A   Braces N/A   Pain/Comfort   Pain Rating 1 no pain   Vital Signs   SpO2 (!) 89 %   OTHER   Rehab identified problem list/impairments weakness;impaired endurance;impaired functional mobility;gait instability;decreased lower extremity function;decreased upper extremity function;decreased coordination;decreased safety awareness   Values/Beliefs/Spiritual Care   Spiritual, Cultural Beliefs, Jehovah's witness Practices, Values that Affect Care no   Overall Level of Functioning   Activity Tolerance Min A   Dynamic Sitting Balance/Reaching Mod Indep   Dynamic Standing Balance/Reaching Mod Indep   Right UE Coodination/Dexterity Mod Indep   Left UE Coordination/Dexterity Mod Indep   Problem Solving/Sequencing Skills Mod Indep   Memory Recall Mod Indep   R/L Neglect/Inattention Does not occur   Attention Span Standby Assist   Social Interaction Independent   Recreational Therapy Short Term Goals   Short Term Goal 1 Progression Progressing   Short Term Goal 2 Progression Met   Recreational Therapy Long Term Goals   Long Term Goal 1 Progression Progressing   Long Term Goal 2 Progression Progressing   Plan   Patient to be seen Daily   Planned Duration 1 week   Treatments Planned Energy conservation training;Safety education   Treatment plan/goals estblished with Patient/Caregiver Yes

## 2023-10-17 NOTE — PT/OT/SLP PROGRESS
Physical Therapy Inpatient Rehab Treatment    Patient Name:  Helder Sharp Jr.   MRN:  [id number]    Recommendations:     Discharge Recommendations:      Discharge Equipment Recommendations: home with home health   Barriers to discharge:  endurance deficits    Assessment:     Helder Sharp Jr. is a 83 y.o. male admitted with a medical diagnosis of COPD exacerbation.  He presents with the following impairments/functional limitations:  impaired endurance, weakness, impaired self care skills, impaired functional mobility, gait instability, impaired balance, decreased coordination, decreased lower extremity function, impaired cardiopulmonary response to activity.    Rehab Diagnosis: s/p PEG with Acute on chronic hypoxemic respiratory failure 2/2 COPD exacerbation and bilateral aspiration pneumonia    General Precautions: Standard, aspiration, fall     Orthopedic Precautions:      Braces:  (abdominal brace for PEG)    Rehab Prognosis: Good; patient would benefit from acute skilled PT services to address these deficits and reach maximum level of function.      History:     Past Medical History:   Diagnosis Date    Acute bronchitis     Anxiety disorder, unspecified     COPD (chronic obstructive pulmonary disease)     Coronary artery disease     Hyperlipidemia     Left carotid bruit     Pulmonary nodules     Unspecified cataract        Past Surgical History:   Procedure Laterality Date    APPENDECTOMY      CATARACT EXTRACTION Left 01/03/2023    ESOPHAGOGASTRODUODENOSCOPY W/ PEG N/A 10/11/2023    Procedure: PEG;  Surgeon: JULIETTE Duffy MD;  Location: [facility] ENDOSCOPY;  Service: Gastroenterology;  Laterality: N/A;    HERNIA REPAIR      PHACOEMULSIFICATION, CATARACT, WITH IOL INSERTION Left 1/3/2023    Procedure: PHACOEMULSIFICATION, CATARACT, WITH IOL INSERTION- OS;  Surgeon: Louis Abdi MD;  Location: [facility] OR;  Service: Ophthalmology;  Laterality: Left;    TONSILLECTOMY         Subjective     Patient  "comments: "I'm so tired, I want to go rest."    Respiratory Status: Nasal cannula, flow 4 L/min    Patients cultural, spiritual, Anabaptist conflicts given the current situation: no    Objective:     Communicated with pt prior to session.  Patient found up in chair with chair check, PEG Tube, peripheral IV  upon PT entry to room.    Pt is Oriented x3 and Alert and Cooperative.    Vitals   O2 Sat (!) 90 % (at rest; 80% with activity)   Pain Pain Rating 1: 0/10       Functional Mobility:    Current   Status  Discharge   Goal   Functional Area: Care Score:    Roll Left and Right   Independent   Sit to Lying   Independent   Lying to Sitting on Side of Bed   Independent   Sit to Stand 6  With rollator and without rollator Independent   Chair/Bed-to-Chair Transfer 4  With and without rollator; VC needed for PEG/oxygen line management/awareness Independent   Car Transfer       Walk 10 Feet 6 Independent   Walk 50 Feet with Two Turns 4  Pt amb 75' x2 completions with rollator, extended seated break needed between bouts.     Walk 150 Feet       Walk 10 Feet Uneven Surface       1 Step (Curb)       4 Steps       12 Steps       Picking Up Object       Wheel 50 Feet with Two Turns 4  Pt manually propelled w/c 100' with B UE from PT gym to room, limiting distance. Independent   Wheel 150 Feet   Independent       Therapeutic Activities and Exercises:  Patient educated on role of acute care PT and PT POC, safety while in hospital including calling nurse for mobility, and call light usage  Patient educated about importance of OOB mobility and remaining up in chair most of the day.  Patient educated about pursed lip breathing technique and cued for use with mobility.    Activity Tolerance: Fair    Patient left up in chair with all lines intact, call button in reach, and chair alarm on.    Education provided: roles and goals of PT/PTA, transfer training, gait training, safety awareness, body mechanics, assistive device, and fall " prevention    Expected compliance: Moderate compliance    Plan:     During this hospitalization, patient to be seen daily to address the identified rehab impairments via gait training, therapeutic activities, therapeutic exercises, neuromuscular re-education, wheelchair management/training and progress toward the following goals:    GOALS:   Multidisciplinary Problems       Physical Therapy Goals          Problem: Physical Therapy    Goal Priority Disciplines Outcome Goal Variances Interventions   Physical Therapy Goal     PT, PT/OT Ongoing, Progressing     Description: Bed Mobility:  Roll left and right independently.  Sit to supine transfer independently.  Supine to sit transfer independently.    Transfers:  MET - Sit to stand transfer with supervision/touching assist using RW.  MET - Bed to chair transfer with supervision/touching assist Stand Step using RW.  Sit to stand transfer independently using Rollator vs LRAD.  Bed to chair transfer independently Stand Step using rollator vs LRAD.  Car transfer independently using Rollator vs LRAD.   an object from the ground in standing position independently using Rollator vs LRAD and reacher.    Mobility:  Ambulate 150 feet independently assist using Rollator vs LRAD.  Push w/c 150 feet with supervision/touching assist using B UE.  Ambulate 15 feet on uneven surfaces/ramps independently using Rollator vs LRAD.  Pt ascended/descended a 4 inch curb independently using Rollator vs LRAD.  Ascend/descend 12 stairs with supervision/touching assist using bilateral handrails.                             Plan of Care Expires:  10/20/23  PT Next Visit Date: 10/20/23  Plan of Care reviewed with: patient    Additional Information:         Time Tracking:     Therapy Time  PT Start Time: 1130  PT Stop Time: 1200  PT Total Time (min): 30 min   PT Individual: 30  Missed Time:    Time Missed due to:      Billable Minutes: Gait Training 20 min and Train/Wheelchair Management 10  min    10/17/2023

## 2023-10-17 NOTE — PROGRESS NOTES
Inpatient Nutrition Assessment    Admit Date: 10/13/2023   Total duration of encounter: 4 days     Nutrition Recommendation/Prescription     Continue tube feedings Diabetisource AC goal rate 85 ml/hr with 30 ml/hr water flush to provide:   2040 kcal, 100% needs  102 g protein, 112% needs  1994 ml free water, 100% needs  (calculations based on estimated 20 hr/d run time)    2.  When ready to transition to bolus feeds, recommend Diabetisource AC bolus 480 ml bolus TID +  240 ml bolus HS for total of 1680 ml /d.  Flush with 125 ml every 4 hours for total of 2016 kcal, 101 g Pro, 2120 ml water    3.  Reweigh patient and then weekly wts    4. Monitor glucose    Communication of Recommendations: reviewed with nurse    Nutrition Assessment     Malnutrition Assessment/Nutrition-Focused Physical Exam    Malnutrition Context: acute illness or injury (10/15/23 1057)  Malnutrition Level: moderate (10/15/23 1057)  Energy Intake (Malnutrition): other (see comments) (does not meet criteria) (10/15/23 1057)  Weight Loss (Malnutrition): other (see comments) (Unable to obtain) (10/15/23 1057)  Subcutaneous Fat (Malnutrition): mild depletion (10/15/23 1057)  Orbital Region (Subcutaneous Fat Loss): mild depletion  Upper Arm Region (Subcutaneous Fat Loss): mild depletion     Muscle Mass (Malnutrition): mild depletion (10/15/23 1057)  [place] Region (Muscle Loss): mild depletion  Clavicle Bone Region (Muscle Loss): mild depletion                             A minimum of two characteristics is recommended for diagnosis of either severe or non-severe malnutrition.    Chart Review    Reason Seen: patient/family request    Malnutrition Screening Tool Results   Have you recently lost weight without trying?: No  Have you been eating poorly because of a decreased appetite?: No   MST Score: 0   Diagnosis:  Acute on chronic hypoxemic respiratory failure 2/2 COPD exacerbation and bilateral aspiration pneumonia    Relevant Medical History:  "bronchitis, COPD, CAD, hyperlipidemia, left carotid bruit, and pulmonary nodules, S/P PEG 10/7/23    Nutrition-Related Medications: ferrous sulfate, pepcid prn, zofran prn  Calorie Containing IV Medications: no significant kcals from medications at this time    Nutrition-Related Labs:   10/16: RBC-4.22 L, H/H-12.5/39.5 L, BUN-33.9 H, glu-117 H  10/14:  H/H 12.1 L/ 38 L, Fe 31L, BUN 38.2H, Glu 133 H, Alb 3 L, PAB 16      Diet/PN Order: No diet orders on file  Oral Supplement Order: none  Tube Feeding Order:  Diabetisource AC @ 85 mL/hr  (see below for calculation)  Appetite/Oral Intake: not applicable/NPO  Factors Affecting Nutritional Intake: difficulty/impaired swallowing  Food/Jehovah's witness/Cultural Preferences: unable to obtain  Food Allergies: no known food allergies    Wound(s):   sacral spine redness    Last Bowel Movement: 10/16/23    Comments    10/15:   Pt is a new admit to rehab.  Pt receiving tube feedings at goal rate and tolerating well.  Goal rate meeting 100% of estimated nutritional needs.  Last bowel movement 2 days ago - may consider bowel regimen.  Labs / meds reviewed.  Glucose slightly elevated - agree current lower carb formula.  Noted wt discrepancies from admit - recommend reweigh and trend wts.    10/17: RN reports TF running at goal rate of 85 mL/hr, which is meeting 100% estimated nutritional needs. Pt tolerating TF, last BM 10/16 noted. Per RD note at previous facility, -180 lb with reported weight history of weight loss in January then re-gain of weight back. Admit weight likely error. Rec re-weigh patient and trend weights.      Anthropometrics    Height: 6' 2" (188 cm) Height Method: Stated  Last Weight: 75.7 kg (166 lb 14.2 oz) (10/14/23 8935) Weight Method: Standard Scale  BMI (Calculated): 21.4  BMI Classification: underweight (BMI less than 22 if >65 years of age)        Ideal Body Weight (IBW), Male: 190 lb     % Ideal Body Weight, Male (lb): 96.89 %                 Usual Body " Weight (UBW), k.2 kg  % Usual Body Weight: 94.59     Usual Weight Provided By:  prior RD obtained form family    Wt Readings from Last 5 Encounters:   10/14/23 75.7 kg (166 lb 14.2 oz)   23 83.5 kg (184 lb 1.6 oz)   23 78.9 kg (174 lb)   23 82.1 kg (181 lb)   23 90.2 kg (198 lb 13.7 oz)     Weight Change(s) Since Admission:  Admit Weight: 83.5 kg (184 lb 1.4 oz) (10/13/23 1720)  10/15:  Noted 7.8 kg wt decrease in 24 hours -- likely wt error.  Need to reweigh.    Estimated Needs    Weight Used For Calorie Calculations: 75.7 kg (166 lb 14.2 oz)  Energy Calorie Requirements (kcal): 0888-2910 kcal/d (25-30 kcal/kg)  Energy Need Method: Kcal/kg  Weight Used For Protein Calculations: 75.7 kg (166 lb 14.2 oz)  Protein Requirements: 91 g Pro/d (1.2g/kg)  Fluid Requirements (mL): 9955-1535 ml/d (1ml/kcal)  Temp (24hrs), Av.3 °F (36.8 °C), Min:97.6 °F (36.4 °C), Max:98.9 °F (37.2 °C)       Enteral Nutrition    Formula: Diabetisource AC  Rate/Volume: 85 mL/hr  Water Flushes: 30 mL/hr  Additives/Modulars: none at this time  Route: PEG tube  Method: continuous  Total Nutrition Provided by Tube Feeding, Additives, and Flushes:  Calories Provided  2040 kcal/d, 100% needs   Protein Provided  102 g/d, 112% needs   Fluid Provided  1987 ml/d, 100% needs   Continuous feeding calculations based on estimated 20 hr/d run time unless otherwise stated.    Parenteral Nutrition    Patient not receiving parenteral nutrition support at this time.    Evaluation of Received Nutrient Intake    Calories: meeting estimated needs  Protein: meeting estimated needs    Patient Education    Not applicable.    Nutrition Diagnosis     PES: Malnutrition related to catabolic illness as evidenced by mild muscle and fat wasting. (continues)    Interventions/Goals     Intervention(s): modified composition of enteral nutrition and collaboration with other providers  Goal: Meet greater than 75% of nutritional needs by follow-up.  (goal progressing)    Monitoring & Evaluation     Dietitian will monitor enteral nutrition intake and weight change.  Nutrition Risk/Follow-Up: moderate (follow-up in 3-5 days)   Please consult if re-assessment needed sooner.
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
## 2023-10-17 NOTE — PT/OT/SLP PROGRESS
[facility] - Rehabilitation Unit  Speech Language Pathology Department  Dysphagia Therapy Progress Note    Patient Name:  Helder Sharp Jr.   MRN:  [id number]  Admitting Diagnosis: hypoxia    Recommendations:     General recommendations:  dysphagia therapy  Diet texture/consistency recommendations:  NPO  Medications: NPO, PEG  Barriers to safe discharge:  none    Subjective     Patient awake, alert, and cooperative.  Pain/Comfort:  0/10  Spiritual/Cultural/Yazdanism Beliefs/Practices that affect care: no  Respiratory Status: nasal canula     Objective:     Therapeutic Activities:  Pt completed base of tongue and laryngeal x100 with minimal cues.    Therapeutic PO Trials:  Consistency Amount Fed By Oral Symptoms Pharyngeal Symptoms   Puree 4 oz SLP None Multiple swallows     Assessment:     Pt continues to present with oropharyngeal dysphagia and remains unsafe for PO intake.    Goals:     Multidisciplinary Problems       SLP Goals          Problem: SLP    Goal Priority Disciplines Outcome   SLP Goal     SLP Ongoing, Progressing   Description:  LTG: To tolerate least restrictive diet without clinical signs/sx of aspiration.   ST. Tongue base/laryngeal excursion exercise   2. Tolerate thermal stimulation x10 with 100% swallow response with less than a 2 second delay.   3. Puree solids without clinical signs/sx of aspiration.                     Patient Education:     Patient provided with verbal education regarding POC.  Understanding was verbalized.    Plan:     Will continue to follow and tx as appropriate.    SLP Follow-Up:  Yes   Patient to be seen:  5 x/week   Plan of Care expires:  10/28/23  Plan of Care reviewed with:  patient       Time Tracking:     SLP Treatment Date:   10/17/23  Speech Start Time:  1100  Speech Stop Time:  1130     Speech Total Time (min):  30 min    Billable minutes:  Treatment of Swallow Dysfunction, 30 minutes       10/17/2023

## 2023-10-17 NOTE — PT/OT/SLP PROGRESS
Physical Therapy Inpatient Rehab Treatment    Patient Name:  Helder Sharp Jr.   MRN:  [id number]    Recommendations:     Discharge Recommendations:      Discharge Equipment Recommendations: home with home health   Barriers to discharge: Impaired functional mobility     Assessment:     Helder Sharp Jr. is a 83 y.o. male admitted with a medical diagnosis of COPD exacerbation.  He presents with the following impairments/functional limitations:  impaired endurance, weakness, impaired self care skills, impaired functional mobility, gait instability, impaired balance, decreased coordination, decreased lower extremity function, impaired cardiopulmonary response to activity .    Rehab Diagnosis: s/p PEG with Acute on chronic hypoxemic respiratory failure 2/2 COPD exacerbation and bilateral aspiration pneumonia    General Precautions: Standard, aspiration, fall     Orthopedic Precautions:      Braces:  (abdominal brace for PEG)    Rehab Prognosis: Good; patient would benefit from acute skilled PT services to address these deficits and reach maximum level of function.      History:     Past Medical History:   Diagnosis Date    Acute bronchitis     Anxiety disorder, unspecified     COPD (chronic obstructive pulmonary disease)     Coronary artery disease     Hyperlipidemia     Left carotid bruit     Pulmonary nodules     Unspecified cataract        Past Surgical History:   Procedure Laterality Date    APPENDECTOMY      CATARACT EXTRACTION Left 01/03/2023    ESOPHAGOGASTRODUODENOSCOPY W/ PEG N/A 10/11/2023    Procedure: PEG;  Surgeon: JULIETTE Duffy MD;  Location: [facility] ENDOSCOPY;  Service: Gastroenterology;  Laterality: N/A;    HERNIA REPAIR      PHACOEMULSIFICATION, CATARACT, WITH IOL INSERTION Left 1/3/2023    Procedure: PHACOEMULSIFICATION, CATARACT, WITH IOL INSERTION- OS;  Surgeon: Louis Abdi MD;  Location: [facility] OR;  Service: Ophthalmology;  Laterality: Left;    TONSILLECTOMY         Subjective      Patient comments: n/a    Respiratory Status: Nasal cannula, flow 4 L/min    Patients cultural, spiritual, Episcopal conflicts given the current situation: no    Objective:     Communicated with rn prior to session.  Patient found up in chair with chair check, PEG Tube, peripheral IV  upon PT entry to room.      Functional Mobility:        Current   Status  Discharge   Goal   Functional Area: Care Score:    Roll Left and Right   Independent   Sit to Lying   Independent   Lying to Sitting on Side of Bed   Independent   Sit to Stand 4  W/rw and assistance managing oxygen and pegs tube lines  Independent   Chair/Bed-to-Chair Transfer 4  W/rw Independent   Car Transfer       Walk 10 Feet   Independent   Walk 50 Feet with Two Turns       Walk 150 Feet       Walk 10 Feet Uneven Surface       1 Step (Curb)       4 Steps       12 Steps       Picking Up Object       Wheel 50 Feet with Two Turns   Independent   Wheel 150 Feet   Independent       Therapeutic Activities and Exercises:  Patient educated on role of acute care PT and PT POC and safety while in hospital including calling nurse for mobility  Patient educated about importance of OOB mobility and remaining up in chair most of the day.  Patient educated about pursed lip breathing technique and cued for use with mobility.  Pt preformed recumbent bike 8min w/2min intervals of biking and 1 min of rest.   Pt preformed standing exercises w/Rolator 20x,martches and hip abd. Pt requested to perform seated exercises due to being out of breathe.    Activity Tolerance: Excellent    Patient left up in chair with all lines intact and call button in reach.    Education provided: strengthening exercises    Expected compliance: High compliance    Plan:     During this hospitalization, patient to be seen daily to address the identified rehab impairments via gait training, therapeutic activities, therapeutic exercises, neuromuscular re-education, wheelchair management/training and  progress toward the following goals:    GOALS:   Multidisciplinary Problems       Physical Therapy Goals          Problem: Physical Therapy    Goal Priority Disciplines Outcome Goal Variances Interventions   Physical Therapy Goal     PT, PT/OT Ongoing, Progressing     Description: Bed Mobility:  Roll left and right independently.  Sit to supine transfer independently.  Supine to sit transfer independently.    Transfers:  MET - Sit to stand transfer with supervision/touching assist using RW.  MET - Bed to chair transfer with supervision/touching assist Stand Step using RW.  Sit to stand transfer independently using Rollator vs LRAD.  Bed to chair transfer independently Stand Step using rollator vs LRAD.  Car transfer independently using Rollator vs LRAD.   an object from the ground in standing position independently using Rollator vs LRAD and reacher.    Mobility:  Ambulate 150 feet independently assist using Rollator vs LRAD.  Push w/c 150 feet with supervision/touching assist using B UE.  Ambulate 15 feet on uneven surfaces/ramps independently using Rollator vs LRAD.  Pt ascended/descended a 4 inch curb independently using Rollator vs LRAD.  Ascend/descend 12 stairs with supervision/touching assist using bilateral handrails.                             Plan of Care Expires:  10/20/23  PT Next Visit Date: 10/20/23  Plan of Care reviewed with: patient    Additional Information:         Time Tracking:     Therapy Time  PT Received On: 10/17/23  PT Start Time: 0930  PT Stop Time: 1030  PT Total Time (min): 60 min   PT Individual: 60  Missed Time:    Time Missed due to:      Billable Minutes: Therapeutic Activity 30 and Therapeutic Exercise 30    10/17/2023

## 2023-10-17 NOTE — PT/OT/SLP PROGRESS
Recreational Therapy Treatment    Date of Treatment: 10/17/23  Start Time: 1030  Stop Time: 1100  Total Time: 30 min  Missed Time:    General Precautions: fall, hearing impaired, NPO  Ortho Precautions: N/A  Braces: N/A    Vitals   Vitals at Rest  BP    HR    O2 Sat    Pain      Vitals With Activity  BP    HR    O2 Sat    Pain        Treatment     Cognitive Skills Building   Cognitive Observation Activity Assist Position Equipment Response            Comment:          Dynamic Activities   Activity Assist Position Equipment Response   Activity 1 Washer toss supervision Standing Rolling walker and Metal washers fair   Comment: Sit to stand was supervision/setup as was dynamic standing balance/reaching. Standing tolerance was 1-2 minutes.  Verbal cues needed to make an attempt to increase standing tolerance. UE coordination was setup.  He remains setup with sequencing skills and problem solving skills.  Easily fatigued and needed frequent rest breaks.  Constant verbal cues needed for proper breathing techniques.  Flat affect        Fine Motor Activities   Activity Assist Position Equipment Response           Comment:          Additional Info: Pt progress, plan of care and discharge plans were discussed during staffing at 0930    Goals     Short Term Goals    Goal  Goal Status   Will increase activity tolerance to supervision Progressing   Will improve dynamic standing balance/reaching to supervision Met                 Long Term Goals    Goal Goal Status   Will increase standing tolerance to 5 minutes Progressing   Will improve dynamic standing balance/reaching to setup/I Progressing

## 2023-10-17 NOTE — PROGRESS NOTES
[facility] - Rehab Inpatient Services  Wound Care    Patient Name:  Helder Sharp Jr.   MRN:  [id number]  Date: 10/17/2023  Diagnosis: COPD exacerbation    History:     Past Medical History:   Diagnosis Date    Acute bronchitis     Anxiety disorder, unspecified     COPD (chronic obstructive pulmonary disease)     Coronary artery disease     Hyperlipidemia     Left carotid bruit     Pulmonary nodules     Unspecified cataract        Social History     Socioeconomic History    Marital status:    Tobacco Use    Smoking status: Former     Current packs/day: 0.00     Types: Cigarettes     Quit date:      Years since quittin.8    Smokeless tobacco: Never   Substance and Sexual Activity    Alcohol use: Not Currently    Drug use: Never    Sexual activity: Yes     Social Determinants of Health     Transportation Needs: No Transportation Needs (10/16/2023)    PRAPARE - Transportation     Lack of Transportation (Medical): No     Lack of Transportation (Non-Medical): No       Precautions:     Allergies as of 10/13/2023 - Reviewed 10/13/2023   Allergen Reaction Noted    Flexeril [cyclobenzaprine]  2022       WO Assessment Details/Treatment      10/17/23 1419        Gastrostomy/Enterostomy 10/11/23 Percutaneous endoscopic gastrostomy (PEG) LUQ feeding   Placement Date: 10/11/23   Present Prior to Hospital Arrival?: No  Type: Percutaneous endoscopic gastrostomy (PEG)  Location: LUQ  Indication: feeding   Securement other (see comments)  (ace wrap)   Drainage serosanguineous   Feeding Type continuous   Dressing moist   Site Care sterile 4 x 4 gauze dressing applied  (steristrip with dried drainage)   Dressing Change Due 10/11/23     Peg site: small amount of moist s/s drainage noted. Steristrip noted hoda opening. Moderate  amount of dried s/s drainage noted.  Will order daily cleansing while avoiding steristrip and application of gauze dressing.  Will leave steristrip in place allow to fall  off. 10/17/2023

## 2023-10-17 NOTE — PROGRESS NOTES
[facility] ([facility])  Rehab Progress Note    Patient Name: Helder Sharp Jr.  MRN: [id number]  Age: 83 y.o. Sex: male  : 1940  Hospital Length of Stay: 4 days  Date of Service: 10/17/2023   Chief Complaint: Acute on chronic hypoxemic respiratory failure 2/2 COPD exacerbation and bilateral aspiration pneumonia    Subjective:     Basic Information  Admit Information: 83-year-old WM presented to [facility] on  with complaints of continued shortness of breath and weakness.  PMH significant for bronchitis, COPD, CAD, hyperlipidemia, left carotid bruit, and pulmonary nodules.  Workup significant for COPD exacerbation with respiratory failure requiring intubation.  Hematuria appreciated.  Suspected aspiration pneumonia.  Initiated Zosyn.  CT chest significant for multiple enlarged mediastinal lymph nodes, no filling defects seen suggest PE, severe emphysema ptosis changes identified in the lungs with consolidation in the posterior basal segments of bilateral lower lobes demonstrating air bronchograms which may reflect aspiration pneumonia.  Tolerated extubation on 10/01 and initiated Vapotherm.  Downgraded to Medicine Unit on 10/03.  Plans for trilogy at home.  Continued with oropharyngeal dysphagia.  Tolerated PEG tube placement on 10/7.  Repeat sputum culture grew E coli.  Initiated Rocephin. Tolerated transfer to [facility] inpatient rehab unit on 10/13 without incident.  Today's Information: No acute events overnight.  Sitting in chair comfortably working with therapy.  Reports decreased sleep hygiene and tolerating tube feedings via PEG tube.  Last BM 10/16.  Vital signs at goal with no recent recorded fevers.  No labs or imaging today.    Review of patient's allergies indicates:   Allergen Reactions    Flexeril [cyclobenzaprine]         Current Facility-Administered Medications:     acetaminophen oral solution 650 mg, 650 mg, Oral, Q6H PRN, Jamee Cheung, FNP, 650 mg at 10/16/23  "0513    albuterol-ipratropium 2.5 mg-0.5 mg/3 mL nebulizer solution 3 mL, 3 mL, Nebulization, Q4H WAKE, Jamee Cheung FNP, 3 mL at 10/16/23 1912    benzonatate capsule 100 mg, 100 mg, Oral, TID PRN, Jamee Cheung, ALICEP    enoxaparin injection 40 mg, 40 mg, Subcutaneous, Q24H (prophylaxis, 1700), Jamee Cheung FNP, 40 mg at 10/16/23 1734    famotidine tablet 20 mg, 20 mg, Oral, BID PRN, Jamee Cheung FNP    ferrous sulfate tablet 1 each, 1 tablet, Oral, Daily, Jamee Cheung FNP, 1 each at 10/17/23 0753    fluticasone furoate-vilanteroL 200-25 mcg/dose diskus inhaler 1 puff, 1 puff, Inhalation, Daily, Jamee Cheung FNP, 1 puff at 10/17/23 0754    guaiFENesin 100 mg/5 ml syrup 200 mg, 200 mg, Per NG tube, Q6H, Jamee Cheung FNP, 200 mg at 10/16/23 1720    hydrALAZINE injection 10 mg, 10 mg, Intravenous, Q6H PRN, Jamee Cheung FNP    hydrOXYzine pamoate capsule 50 mg, 50 mg, Oral, Nightly PRN, Jamee Cheung, ALICEP    labetalol 20 mg/4 mL (5 mg/mL) IV syring, 10 mg, Intravenous, Q4H PRN, Jamee Cheung FNP    melatonin tablet 6 mg, 6 mg, Oral, Nightly, Jamee Cheung FNP, 6 mg at 10/16/23 2016    metoprolol injection 10 mg, 10 mg, Intravenous, Q2H PRN, Jamee Cheung FNP    nitroGLYCERIN SL tablet 0.4 mg, 0.4 mg, Sublingual, Q5 Min PRN, Jamee Cheung, FNP    ondansetron disintegrating tablet 4 mg, 4 mg, Oral, Q6H PRN, Jamee Cheung FNP    QUEtiapine tablet 25 mg, 25 mg, Oral, QHS, Jamee Cheung FNP, 25 mg at 10/15/23 2023    sertraline tablet 100 mg, 100 mg, Oral, Daily, Jamee Cheung FNP, 100 mg at 10/17/23 0753     Review of Systems   Complete 12-point review of symptoms negative except for what's mentioned in HPI     Objective:     /63   Pulse 74   Temp 98.9 °F (37.2 °C) (Oral)   Resp 20   Ht 6' 2" (1.88 m)   Wt 75.7 kg (166 lb 14.2 oz)   SpO2 (!) 91%   BMI 21.43 kg/m²      Physical Exam  Vitals reviewed.   Eyes:      Pupils: Pupils " are equal, round, and reactive to light.   Cardiovascular:      Rate and Rhythm: Normal rate and regular rhythm.      Heart sounds: Normal heart sounds.   Pulmonary:      Effort: Pulmonary effort is normal.      Breath sounds: Normal breath sounds.      Comments: Diminished breath sounds with nasal cannula  Abdominal:      General: Bowel sounds are normal.      Comments: Peg tube in place with clean applied dressings    Musculoskeletal:         General: Normal range of motion.      Comments: Muscle wasting and diffuse atrophy    Skin:     General: Skin is warm.   Neurological:      General: No focal deficit present.      Mental Status: He is alert and oriented to person, place, and time.   Psychiatric:         Mood and Affect: Mood normal.     *MD performed and documented physical examination       Lines/Drains/Airways       Drain  Duration                  Gastrostomy/Enterostomy 10/11/23 Percutaneous endoscopic gastrostomy (PEG) LUQ feeding 6 days              Peripheral Intravenous Line  Duration                  Peripheral IV - Single Lumen 10/12/23 1330 20 G Anterior;Proximal;Right Forearm 4 days                    Labs  No results found for this or any previous visit (from the past 24 hour(s)).    Radiology  Chest x-ray 10/8: Bilateral lower lung lobes with new patchy opacities suggest atelectasis and/or associated early infiltrates.  Assessment/Plan:     83 y.o. WM admitted on 10/13/2023     Acute on chronic hypoxemic respiratory failure   - 2/2 COPD exacerbation and bilateral aspiration pneumonia  - continue                 DuoNeb every 4 hours while awake                 Fluticasone-vilanterol 1 puff daily                 Guaifenesin 200 mg every 6 hours                 Rocephin 2 g daily (end date 10/15)  - follow-up with pulmonology outpatient  - discharge with trilogy     End-stage COPD   - tolerating nasal cannula O2, on home O2  - on trilogy at night at home  - continue                 DuoNeb every 4  hours while awake                 Fluticasone-vilanterol 1 puff daily                 Guaifenesin 200 mg every 6 hours                 Rocephin 2 g daily (end date 10/15)  - follow-up with pulmonology outpatient     Bilateral aspiration pneumonia  - tolerating nasal cannula O2, on home O2  - continue                 DuoNeb every 4 hours while awake                 Fluticasone-vilanterol 1 puff daily                 Guaifenesin 200 mg every 6 hours                 Rocephin 2 g daily (end date 10/15)  - follow-up with pulmonology outpatient     Oropharyngeal dysphagia  - s/p peg tube placement  - continues to tolerate tube feedings  - speech therapy to follow     Iron deficiency anemia  - asymptomatic  - H/H stable   - no evidence of active bleeds  - iron levels low  - continue  Ferrous sulfate 325 mg daily (initiated 10/14)  - will closely monitor and transfuse if needed      Insomnia  - stable  - continue                Seroquel 25 mg at night                Melatonin 6 mg at night     Major depressive disorder  - stable  - discontinue BuSpar 10 mg b.i.d. on 10/15 per patient request  - continue                Zoloft 100 mg daily     Generalized anxiety disorder  - stable  - discontinue BuSpar 10 mg b.i.d. on 10/15 per patient request  - continue                Zoloft 100 mg daily     AB therapy  Rocephin 2 g daily (end date 10/15)  Zosyn 9/30-10/7     VTE Prophylaxis:  Lovenox 40 mg daily  COVID-19 vaccination status:  Vaccinated x4     POA:  Yes-Maryjo  Living will:  No  Contacts:  Maryjo Sharp (spouse) [phone]                          Huma Moore (daughter) [phone]     CODE STATUS: Full  Internal Medicine (attending): Moises Terry MD  Physiatry (consulting):  Eyad Liriano MD     OUTPATIENT PROVIDERS  PCP:  Kimo Love MD  Urology:  Ben Guajardo MD   Pulmonology: Richardson Root MD  Cardiology:  Larry Gomes MD     DISPOSITION:  Vital signs at goal.  No labs today.  Discontinue Seroquel  at patient request and initiate Vistaril 50 mg at bedtime 2/2 insomnia.  Bowel management at goal.  Tolerating tube feeds via PEG tube. Continues to progress well with therapies.  Monitor closely.  Notify of any acute changes.  Staffing today documented below.      Staffing 10/17: Continent of bowel and bladder. Tolerating tube feeds via PEG tube. RT: overall supervision, limited with standing tolerance. PT: bed mobility partial mod on eval. Independent with sit to stands overall. Ambulating 90 feet. OT: overall mod to touch assist. Working on energy conservation. ST: NPO, tolerating therapy. MBS later this week. Projected discharge 10/26.    Jamee Cheung NP conducted independent physical examination and assisted with medical documentation.    Total time spent on this encounter including chart review and direct MD + NP 1-on-1 patient interaction: 51 minutes   Over 50% of this time was spent in counseling and coordination of care

## 2023-10-18 NOTE — PLAN OF CARE
Problem: Rehabilitation (IRF) Plan of Care  Goal: Absence of New-Onset Illness or Injury  Outcome: Ongoing, Progressing     Problem: Adjustment to Illness (Sepsis/Septic Shock)  Goal: Optimal Coping  Outcome: Ongoing, Progressing     Problem: Bleeding (Sepsis/Septic Shock)  Goal: Absence of Bleeding  Outcome: Ongoing, Progressing     Problem: Infection Progression (Sepsis/Septic Shock)  Goal: Absence of Infection Signs and Symptoms  Outcome: Ongoing, Progressing     Problem: Nutrition Impaired (Sepsis/Septic Shock)  Goal: Optimal Nutrition Intake  Outcome: Ongoing, Progressing     Problem: Impaired Wound Healing  Goal: Optimal Wound Healing  Outcome: Ongoing, Progressing     Problem: Skin Injury Risk Increased  Goal: Skin Health and Integrity  Outcome: Ongoing, Progressing

## 2023-10-18 NOTE — PT/OT/SLP PROGRESS
[facility]  Speech Language Pathology Department  Dysphagia Therapy Progress Note    Patient Name:  Helder Sharp Jr.   MRN:  [id number]  Admitting Diagnosis: COPD exacerbation    Recommendations:     General recommendations:  Repeat Modified Barium Swallow Study and dysphagia therapy  Diet texture/consistency recommendations:  NPO  Medications: NPO  Barriers to safe discharge:  none    Subjective     Patient awake, alert, and cooperative.    Pain/Comfort:  0/10    Spiritual/Cultural/Moravian Beliefs/Practices that affect care: no    Respiratory Status: nasal cannula    Objective:     Therapeutic Activities:  Pt completed base of tongue retraction exercises x70 with minimal cues.  Pt completed laryngeal strengthening exercises x90 with minimal-moderate cues.    Assessment:     Pt continues to present with oropharyngeal dysphagia and remains unsafe for PO diet.    Goals:     Multidisciplinary Problems       SLP Goals          Problem: SLP    Goal Priority Disciplines Outcome   SLP Goal     SLP Ongoing, Progressing   Description:  LTG: To tolerate least restrictive diet without clinical signs/sx of aspiration.   ST. Tongue base/laryngeal excursion exercise   2. Tolerate thermal stimulation x10 with 100% swallow response with less than a 2 second delay.   3. Puree solids without clinical signs/sx of aspiration.                     Patient Education:     Patient provided with verbal education regarding SLP POC.  Understanding was verbalized, however additional teaching warranted.    Plan:     Will continue to follow and tx as appropriate.    SLP Follow-Up:  Yes   Patient to be seen:  5 x/week   Plan of Care expires:  10/28/23  Plan of Care reviewed with:  patient       Time Tracking:     SLP Treatment Date:   10/18/23  Speech Start Time:  1130  Speech Stop Time:  1200     Speech Total Time (min):  30 min    Billable minutes:  Treatment of Swallow Dysfunction,  30 minutes       10/18/2023

## 2023-10-18 NOTE — PLAN OF CARE
Problem: Fall Injury Risk  Goal: Absence of Fall and Fall-Related Injury  Outcome: Ongoing, Progressing  Intervention: Promote Injury-Free Environment  Flowsheets (Taken 10/18/2023 0050)  Safety Promotion/Fall Prevention:   assistive device/personal item within reach   bed alarm set   Fall Risk signage in place   lighting adjusted   nonskid shoes/socks when out of bed   side rails raised x 3   instructed to call staff for mobility

## 2023-10-18 NOTE — PT/OT/SLP PROGRESS
Physical Therapy Inpatient Rehab Treatment    Patient Name:  Helder Sharp Jr.   MRN:  [id number]    Recommendations:     Discharge Recommendations:      Discharge Equipment Recommendations: home with home health   Barriers to discharge:  endurance deficits    Assessment:     Helder Sharp Jr. is a 83 y.o. male admitted with a medical diagnosis of COPD exacerbation.  He presents with the following impairments/functional limitations:  weakness, impaired endurance, impaired functional mobility, gait instability, decreased lower extremity function, decreased upper extremity function, decreased coordination, decreased safety awareness.    Rehab Diagnosis: s/p PEG with Acute on chronic hypoxemic respiratory failure 2/2 COPD exacerbation and bilateral aspiration pneumonia    General Precautions: Standard, aspiration, fall     Orthopedic Precautions:      Braces:  (abdominal brace for PEG)    Rehab Prognosis: Good; patient would benefit from acute skilled PT services to address these deficits and reach maximum level of function.      History:     Past Medical History:   Diagnosis Date    Acute bronchitis     Anxiety disorder, unspecified     COPD (chronic obstructive pulmonary disease)     Coronary artery disease     Hyperlipidemia     Left carotid bruit     Pulmonary nodules     Unspecified cataract        Past Surgical History:   Procedure Laterality Date    APPENDECTOMY      CATARACT EXTRACTION Left 01/03/2023    ESOPHAGOGASTRODUODENOSCOPY W/ PEG N/A 10/11/2023    Procedure: PEG;  Surgeon: JULIETTE Duffy MD;  Location: [facility] ENDOSCOPY;  Service: Gastroenterology;  Laterality: N/A;    HERNIA REPAIR      PHACOEMULSIFICATION, CATARACT, WITH IOL INSERTION Left 1/3/2023    Procedure: PHACOEMULSIFICATION, CATARACT, WITH IOL INSERTION- OS;  Surgeon: Louis Abdi MD;  Location: [facility] OR;  Service: Ophthalmology;  Laterality: Left;    TONSILLECTOMY         Subjective     Patient comments: Pt requested to do most  "activities sitting if possible d/t difficulty breathing. Pt exhibited a mixed reaction to education on building endurance and benefits of standing vs sitting therex.    Respiratory Status: Nasal cannula, flow 4 L/min    Patients cultural, spiritual, Adventist conflicts given the current situation: no    Objective:     Communicated with pt prior to session.  Patient found up in chair with chair check, PEG Tube, peripheral IV  upon PT entry to room.    Pt is Oriented x3 and Alert and Cooperative.    Vitals   SpO2 at rest: 90%  SpO2 with activity: 74%    Functional Mobility:    Current   Status  Discharge   Goal   Functional Area: Care Score:    Roll Left and Right   Independent   Sit to Lying   Independent   Lying to Sitting on Side of Bed   Independent   Sit to Stand 6  No AD Independent   Chair/Bed-to-Chair Transfer   Independent   Car Transfer       Walk 10 Feet   Independent   Walk 50 Feet with Two Turns       Walk 150 Feet       Walk 10 Feet Uneven Surface       1 Step (Curb)       4 Steps       12 Steps       Picking Up Object       Wheel 50 Feet with Two Turns   Independent   Wheel 150 Feet   Independent       Therapeutic Activities and Exercises:  Patient educated about importance of OOB mobility and remaining up in chair most of the day.  Patient educated about pursed lip breathing technique and cued for use with mobility.    Co-treat with NAZIA Lange for bocce ball activity. Performed in standing with use of B UE to toss bocce balls, no AD, SBA provided for safety. Encouragement needed for pt to participate in standing.     Step ups on 4" box in // bars, x5 while leading with each LE without a break between, x3 sets with an extended seated break between sets to allow for oxygen recovery. Encouragement needed for activity.    3x15 seated hip abduction with green theraband; rest breaks needed between sets.    Activity Tolerance: Poor    Patient left up in chair with  SLP Amarilis present.    Education provided: " roles and goals of PT/PTA, transfer training, balance training, safety awareness, body mechanics, strengthening exercises, and fall prevention    Expected compliance: Low compliance    Plan:     During this hospitalization, patient to be seen daily to address the identified rehab impairments via gait training, therapeutic activities, therapeutic exercises, neuromuscular re-education, wheelchair management/training and progress toward the following goals:    GOALS:   Multidisciplinary Problems       Physical Therapy Goals          Problem: Physical Therapy    Goal Priority Disciplines Outcome Goal Variances Interventions   Physical Therapy Goal     PT, PT/OT Ongoing, Progressing     Description: Bed Mobility:  Roll left and right independently.  Sit to supine transfer independently.  Supine to sit transfer independently.    Transfers:  MET - Sit to stand transfer with supervision/touching assist using RW.  MET - Bed to chair transfer with supervision/touching assist Stand Step using RW.  Sit to stand transfer independently using Rollator vs LRAD.  Bed to chair transfer independently Stand Step using rollator vs LRAD.  Car transfer independently using Rollator vs LRAD.   an object from the ground in standing position independently using Rollator vs LRAD and reacher.    Mobility:  Ambulate 150 feet independently assist using Rollator vs LRAD.  Push w/c 150 feet with supervision/touching assist using B UE.  Ambulate 15 feet on uneven surfaces/ramps independently using Rollator vs LRAD.  Pt ascended/descended a 4 inch curb independently using Rollator vs LRAD.  Ascend/descend 12 stairs with supervision/touching assist using bilateral handrails.                             Plan of Care Expires:  10/20/23  PT Next Visit Date: 10/20/23  Plan of Care reviewed with: patient    Additional Information:     Co-treat with NAZIA Lange [phone].    Time Tracking:     Therapy Time  PT Start Time: 1030  PT Stop Time: 1130  PT  Total Time (min): 60 min   PT Individual: 60  Missed Time:    Time Missed due to:      Billable Minutes: Therapeutic Exercise 60 min    10/18/2023

## 2023-10-18 NOTE — PROGRESS NOTES
10/18/23 1031   Rec Therapy Time Calculation   Date of Treatment 10/18/23   Rec Start Time 1031   Rec Stop Time 1100   Rec Total Time (min) 29 min   Time   Treatment time 2 units   Charges   $Therapeutic Activity 1unit   Precautions   General Precautions fall;hearing impaired;aspiration   Orthopedic Precautions  N/A   Braces N/A   Pain/Comfort   Pain Rating 1 no pain   OTHER   Rehab identified problem list/impairments weakness;impaired endurance;impaired functional mobility;gait instability;decreased lower extremity function;decreased safety awareness   Values/Beliefs/Spiritual Care   Spiritual, Cultural Beliefs, Episcopal Practices, Values that Affect Care no   Overall Level of Functioning   Activity Tolerance Min A   Dynamic Sitting Balance/Reaching Mod Indep   Dynamic Standing Balance/Reaching Standby Assist   Right UE Coodination/Dexterity Mod Indep   Left UE Coordination/Dexterity Mod Indep   Problem Solving/Sequencing Skills Standby Assist   Memory Recall Standby Assist   R/L Neglect/Inattention Does not occur   Attention Span Mod Indep   Social Interaction Independent   Recreational Therapy Short Term Goals   Short Term Goal 1 Progression Met   Short Term Goal 2 Progression Met   Recreational Therapy Long Term Goals   Long Term Goal 1 Progression Progressing   Long Term Goal 2 Progression Progressing   Plan   Patient to be seen Daily   Planned Duration 1 week   Treatments Planned Energy conservation training;Safety education   Treatment plan/goals estblished with Patient/Caregiver Yes

## 2023-10-18 NOTE — PLAN OF CARE
Wife Maryjo left voice message indicating that the family will come for training on Mon, 10/23, at 1300.  Will schedule.

## 2023-10-18 NOTE — PROGRESS NOTES
[facility] ([facility])  Rehab Progress Note    Patient Name: Helder Sharp Jr.  MRN: [id number]  Age: 83 y.o. Sex: male  : 1940  Hospital Length of Stay: 5 days  Date of Service: 10/18/2023   Chief Complaint: Acute on chronic hypoxemic respiratory failure 2/2 COPD exacerbation and bilateral aspiration pneumonia    Subjective:     Basic Information  Admit Information: 83-year-old WM presented to [facility] on  with complaints of continued shortness of breath and weakness.  PMH significant for bronchitis, COPD, CAD, hyperlipidemia, left carotid bruit, and pulmonary nodules.  Workup significant for COPD exacerbation with respiratory failure requiring intubation.  Hematuria appreciated.  Suspected aspiration pneumonia.  Initiated Zosyn.  CT chest significant for multiple enlarged mediastinal lymph nodes, no filling defects seen suggest PE, severe emphysema ptosis changes identified in the lungs with consolidation in the posterior basal segments of bilateral lower lobes demonstrating air bronchograms which may reflect aspiration pneumonia.  Tolerated extubation on 10/01 and initiated Vapotherm.  Downgraded to Medicine Unit on 10/03.  Plans for trilogy at home.  Continued with oropharyngeal dysphagia.  Tolerated PEG tube placement on 10/7.  Repeat sputum culture grew E coli.  Initiated Rocephin. Tolerated transfer to [facility] inpatient rehab unit on 10/13 without incident.  Today's Information: No acute events overnight.  Sitting in chair comfortably working with therapy.  Reports good sleep hygiene and tolerating tube feedings via PEG tube.  Last BM 10/16.  Vital signs at goal with no recent recorded fevers.  Chest x-ray unchanged.  No labs today.    Review of patient's allergies indicates:   Allergen Reactions    Flexeril [cyclobenzaprine]         Current Facility-Administered Medications:     acetaminophen oral solution 650 mg, 650 mg, Oral, Q6H PRN, Jamee Cheung, FNP, 650 mg at  "10/16/23 0513    albuterol-ipratropium 2.5 mg-0.5 mg/3 mL nebulizer solution 3 mL, 3 mL, Nebulization, Q4H WAKE, Jamee Cheung FNP, 3 mL at 10/18/23 0728    benzonatate capsule 100 mg, 100 mg, Oral, TID PRN, Jamee Cheung, FNP    enoxaparin injection 40 mg, 40 mg, Subcutaneous, Q24H (prophylaxis, 1700), Jamee Cheung FNP, 40 mg at 10/17/23 1643    famotidine tablet 20 mg, 20 mg, Oral, BID PRN, Jamee Cheung FNP    ferrous sulfate tablet 1 each, 1 tablet, Oral, Daily, Jamee Cheung FNP, 1 each at 10/18/23 0841    fluticasone furoate-vilanteroL 200-25 mcg/dose diskus inhaler 1 puff, 1 puff, Inhalation, Daily, Jamee Cheung FNP, 1 puff at 10/18/23 0844    guaiFENesin 100 mg/5 ml syrup 200 mg, 200 mg, Per NG tube, Q6H, Jamee Cheung FNP, 200 mg at 10/18/23 1214    hydrALAZINE injection 10 mg, 10 mg, Intravenous, Q6H PRN, Jamee Cheung FNP    hydrOXYzine pamoate capsule 50 mg, 50 mg, Oral, QHS, Jamee Cheung, ALICEP    labetalol 20 mg/4 mL (5 mg/mL) IV syring, 10 mg, Intravenous, Q4H PRN, Jamee Cheung FNP    melatonin tablet 6 mg, 6 mg, Oral, Nightly, Jamee Cheung FNP, 6 mg at 10/17/23 2024    metoprolol injection 10 mg, 10 mg, Intravenous, Q2H PRN, Jamee Cheung FNP    nitroGLYCERIN SL tablet 0.4 mg, 0.4 mg, Sublingual, Q5 Min PRN, Jamee Cheung, FNP    ondansetron disintegrating tablet 4 mg, 4 mg, Oral, Q6H PRN, , Jamee B, FNP    sertraline tablet 100 mg, 100 mg, Oral, Daily, Jamee Cheung, FNP, 100 mg at 10/18/23 0841     Review of Systems   Complete 12-point review of symptoms negative except for what's mentioned in HPI     Objective:     /66   Pulse 80   Temp 98 °F (36.7 °C) (Oral)   Resp 20   Ht 6' 2" (1.88 m)   Wt 75.7 kg (166 lb 14.2 oz)   SpO2 (!) 74% Comment: with activity  BMI 21.43 kg/m²      Physical Exam  Vitals reviewed.   Eyes:      Pupils: Pupils are equal, round, and reactive to light.   Cardiovascular:      Rate and " Rhythm: Normal rate and regular rhythm.      Heart sounds: Normal heart sounds.   Pulmonary:      Effort: Pulmonary effort is normal.      Breath sounds: Normal breath sounds.      Comments: Diminished breath sounds with nasal cannula  Abdominal:      General: Bowel sounds are normal.      Comments: Peg tube in place with clean applied dressings    Musculoskeletal:         General: Normal range of motion.      Comments: Muscle wasting and diffuse atrophy    Skin:     General: Skin is warm.   Neurological:      General: No focal deficit present.      Mental Status: He is alert and oriented to person, place, and time.   Psychiatric:         Mood and Affect: Mood normal.     *MD performed and documented physical examination       Lines/Drains/Airways       Drain  Duration                  Gastrostomy/Enterostomy 10/11/23 Percutaneous endoscopic gastrostomy (PEG) LUQ feeding 7 days              Peripheral Intravenous Line  Duration                  Peripheral IV - Single Lumen 10/12/23 1330 20 G Anterior;Proximal;Right Forearm 5 days                    Labs  No results found for this or any previous visit (from the past 24 hour(s)).    Radiology  Chest x-ray 10/8: Bilateral lower lung lobes with new patchy opacities suggest atelectasis and/or associated early infiltrates.  Assessment/Plan:     83 y.o. WM admitted on 10/13/2023     Acute on chronic hypoxemic respiratory failure   - 2/2 COPD exacerbation and bilateral aspiration pneumonia  - s/p Rocephin 2 g daily (end date 10/15)  - continue                 DuoNeb every 4 hours while awake                 Fluticasone-vilanterol 1 puff daily                 Guaifenesin 200 mg every 6 hours  - follow-up with pulmonology outpatient  - discharge with trilogy     End-stage COPD   - tolerating nasal cannula O2, on home O2  - on trilogy at night at home  - s/p Rocephin 2 g daily (end date 10/15)  - continue                 DuoNeb every 4 hours while awake                  Fluticasone-vilanterol 1 puff daily                 Guaifenesin 200 mg every 6 hours  - follow-up with pulmonology outpatient     Bilateral aspiration pneumonia  - tolerating nasal cannula O2, on home O2  - s/p Rocephin 2 g daily (end date 10/15)  - continue                 DuoNeb every 4 hours while awake                 Fluticasone-vilanterol 1 puff daily                 Guaifenesin 200 mg every 6 hours  - follow-up with pulmonology outpatient     Oropharyngeal dysphagia  - s/p peg tube placement  - continues to tolerate tube feedings  - speech therapy following     Iron deficiency anemia  - asymptomatic  - H/H stable   - no evidence of active bleeds  - iron levels low  - continue  Ferrous sulfate 325 mg daily (initiated 10/14)  - will closely monitor and transfuse if needed      Insomnia  - stable  - discontinue Seroquel 25 mg at bedtime per patient request  - continue                Vistaril 50 mg at bedtime (initiated 10/17)                Melatonin 6 mg at night     Major depressive disorder  - stable  - discontinue BuSpar 10 mg b.i.d. on 10/15 per patient request  - continue                Zoloft 100 mg daily     Generalized anxiety disorder  - stable  - discontinue BuSpar 10 mg b.i.d. on 10/15 per patient request  - continue                Zoloft 100 mg daily     AB therapy  Rocephin 2 g daily (end date 10/15)  Zosyn 9/30-10/7     VTE Prophylaxis:  Lovenox 40 mg daily  COVID-19 vaccination status:  Vaccinated x4     POA:  Yes-Maryjo  Living will:  No  Contacts:  Maryjo Sharp (spouse) [phone]                          Huma Moore (daughter) [phone]     CODE STATUS: Full  Internal Medicine (attending): Moises Terry MD  Physiatry (consulting):  Eyad Liriano MD     OUTPATIENT PROVIDERS  PCP:  Kimo Love MD  Urology:  Ben Guajardo MD   Pulmonology: Richardson Root MD  Cardiology:  Larry Gomes MD     DISPOSITION:  Vital signs at goal.  No labs today.  Chest x-ray with no change.  Bowel  management and sleep hygiene at goal.  Tolerating tube feeds via PEG tube. Continues to progress well with therapies.  Monitor closely.  Notify of any acute changes.  Staffing today documented below.      Staffing 10/17: Continent of bowel and bladder. Tolerating tube feeds via PEG tube. RT: overall supervision, limited with standing tolerance. PT: bed mobility partial mod on eval. Independent with sit to stands overall. Ambulating 90 feet. OT: overall mod to touch assist. Working on energy conservation. ST: NPO, tolerating therapy. MBS later this week. Projected discharge 10/26.    Jamee Cheung NP conducted independent physical examination and assisted with medical documentation.

## 2023-10-18 NOTE — PT/OT/SLP PROGRESS
Recreational Therapy Treatment    Date of Treatment: 10/18/23  Start Time: 1031  Stop Time: 1100  Total Time: 29 min  Missed Time:     General Precautions: fall, hearing impaired, aspiration  Ortho Precautions: N/A  Braces: N/A    Vitals   Vitals at Rest  BP    HR    O2 Sat 90   Pain      Vitals With Activity  BP    HR    O2 Sat 88   Pain        Treatment     Cognitive Skills Building   Cognitive Observation Activity Assist Position Equipment Response            Comment:          Dynamic Activities   Activity Assist Position Equipment Response   Activity 1 Bocce ball modified independence and supervision Standing Bocce balls fair   Comment: Sit to stand was setup/I as was dynamic standing balance/reaching with no assistive device. Standing tolerance was 1 minute at a time. Rest breaks needed.   UE coordination was setup. Comprehension and memory were min. Verbal cues were needed to continue with participation.         Fine Motor Activities   Activity Assist Position Equipment Response           Comment:          Additional Info: This was a co-treat with PT    Goals     Short Term Goals    Goal  Goal Status   Will increase activity tolerance to supervision Met   Will improve dynamic standing balance/reaching to supervision Met                 Long Term Goals    Goal Goal Status   Will increase standing tolerance to 5 minutes Progressing   Will improve dynamic standing balance/reaching to setup/I Progressing

## 2023-10-18 NOTE — PT/OT/SLP PROGRESS
Physical Therapy Inpatient Rehab Treatment    Patient Name:  Helder Sharp Jr.   MRN:  [id number]    Recommendations:     Discharge Recommendations:      Discharge Equipment Recommendations: home with home health   Barriers to discharge: Ongoing medical treatment and Impaired functional mobility     Assessment:     Helder Sharp Jr. is a 83 y.o. male admitted with a medical diagnosis of COPD exacerbation.  He presents with the following impairments/functional limitations:  weakness, impaired endurance, impaired functional mobility, gait instability, decreased lower extremity function, decreased upper extremity function, decreased coordination, decreased safety awareness .    Rehab Diagnosis: s/p PEG with Acute on chronic hypoxemic respiratory failure 2/2 COPD exacerbation and bilateral aspiration pneumonia    General Precautions: Standard, aspiration, fall     Orthopedic Precautions:      Braces:  (abdominal brace for PEG)    Rehab Prognosis: Good; patient would benefit from acute skilled PT services to address these deficits and reach maximum level of function.      History:     Past Medical History:   Diagnosis Date    Acute bronchitis     Anxiety disorder, unspecified     COPD (chronic obstructive pulmonary disease)     Coronary artery disease     Hyperlipidemia     Left carotid bruit     Pulmonary nodules     Unspecified cataract        Past Surgical History:   Procedure Laterality Date    APPENDECTOMY      CATARACT EXTRACTION Left 01/03/2023    ESOPHAGOGASTRODUODENOSCOPY W/ PEG N/A 10/11/2023    Procedure: PEG;  Surgeon: JULIETTE Duffy MD;  [facility] ENDOSCOPY;  Service: Gastroenterology;  Laterality: N/A;    HERNIA REPAIR      PHACOEMULSIFICATION, CATARACT, WITH IOL INSERTION Left 1/3/2023    Procedure: PHACOEMULSIFICATION, CATARACT, WITH IOL INSERTION- OS;  Surgeon: Louis Abdi MD;  Location: [facility] OR;  Service: Ophthalmology;  Laterality: Left;    TONSILLECTOMY         Subjective  "    Patient comments: "my oxygen is lower than normal"    Respiratory Status: Nasal cannula, flow 4 L/min    Patients cultural, spiritual, Yazidism conflicts given the current situation: no    Objective:      Patient found up in chair with chair check, PEG Tube, peripheral IV  upon PT entry to room.        Vitals   Vitals at Rest  BP    HR    O2 Sat 88   Pain      Vitals With Activity  BP     HR     O2 Sat (!) 82 %   Pain         Functional Mobility:        Current   Status  Discharge   Goal   Functional Area: Care Score:    Roll Left and Right   Independent   Sit to Lying   Independent   Lying to Sitting on Side of Bed   Independent   Sit to Stand 6  W/rw  Independent   Chair/Bed-to-Chair Transfer   Independent   Car Transfer       Walk 10 Feet   Independent   Walk 50 Feet with Two Turns       Walk 150 Feet       Walk 10 Feet Uneven Surface       1 Step (Curb)       4 Steps       12 Steps       Picking Up Object       Wheel 50 Feet with Two Turns   Independent   Wheel 150 Feet   Independent       Therapeutic Activities and Exercises:  Patient educated on safety while in hospital including calling nurse for mobility  Patient educated about pursed lip breathing technique and cued for use with mobility.  Pt preformed recumbent bike in 2min intervals for 10min, and 1 min rest rest due to pt OOB.   Activity Tolerance: Good    Patient left up in chair with all lines intact and call button in reach.    Education provided: roles and goals of PT/PTA    Expected compliance: High compliance    Plan:     During this hospitalization, patient to be seen daily to address the identified rehab impairments via gait training, therapeutic activities, therapeutic exercises, neuromuscular re-education, wheelchair management/training and progress toward the following goals:    GOALS:   Multidisciplinary Problems       Physical Therapy Goals          Problem: Physical Therapy    Goal Priority Disciplines Outcome Goal Variances " Interventions   Physical Therapy Goal     PT, PT/OT Ongoing, Progressing     Description: Bed Mobility:  Roll left and right independently.  Sit to supine transfer independently.  Supine to sit transfer independently.    Transfers:  MET - Sit to stand transfer with supervision/touching assist using RW.  MET - Bed to chair transfer with supervision/touching assist Stand Step using RW.  Sit to stand transfer independently using Rollator vs LRAD.  Bed to chair transfer independently Stand Step using rollator vs LRAD.  Car transfer independently using Rollator vs LRAD.   an object from the ground in standing position independently using Rollator vs LRAD and reacher.    Mobility:  Ambulate 150 feet independently assist using Rollator vs LRAD.  Push w/c 150 feet with supervision/touching assist using B UE.  Ambulate 15 feet on uneven surfaces/ramps independently using Rollator vs LRAD.  Pt ascended/descended a 4 inch curb independently using Rollator vs LRAD.  Ascend/descend 12 stairs with supervision/touching assist using bilateral handrails.                             Plan of Care Expires:  10/20/23  PT Next Visit Date: 10/20/23  Plan of Care reviewed with: patient    Additional Information:         Time Tracking:     Therapy Time  PT Received On: 10/18/23  PT Start Time: 1000  PT Stop Time: 1030  PT Total Time (min): 30 min   PT Individual: 30  Missed Time:    Time Missed due to:      Billable Minutes: Therapeutic Activity 30    10/18/2023

## 2023-10-18 NOTE — PT/OT/SLP PROGRESS
[facility] - Rehabilitation Unit  Speech Language Pathology Department  Dysphagia Therapy Progress Note    Patient Name:  Helder Sharp Jr.   MRN:  [id number]  Admitting Diagnosis: COPD exacerbation    Recommendations:     General recommendations:  Repeat Modified Barium Swallow Study and dysphagia therapy  Diet texture/consistency recommendations:  NPO  Medications: NPO  Barriers to safe discharge:  none    Subjective     Patient awake, alert, and cooperative.    Pain/Comfort:  0/10    Spiritual/Cultural/Mu-ism Beliefs/Practices that affect care: no    Respiratory Status: nasal cannula    Objective:     Therapeutic Activities:  Pt completed base of tongue retraction exercises x70 with minimal-moderate cues.  Pt completed laryngeal strengthening exercises x50 with moderate cues.    Assessment:     Pt continues to present with oropharyngeal dysphagia and remains unsafe for PO diet.    Goals:     Multidisciplinary Problems       SLP Goals          Problem: SLP    Goal Priority Disciplines Outcome   SLP Goal     SLP Ongoing, Progressing   Description:  LTG: To tolerate least restrictive diet without clinical signs/sx of aspiration.   ST. Tongue base/laryngeal excursion exercise   2. Tolerate thermal stimulation x10 with 100% swallow response with less than a 2 second delay.   3. Puree solids without clinical signs/sx of aspiration.                     Patient Education:     Patient provided with verbal education regarding SLP POC.  Understanding was verbalized, however additional teaching warranted.    Plan:     Will continue to follow and tx as appropriate.    SLP Follow-Up:  Yes   Patient to be seen:  5 x/week   Plan of Care expires:  10/28/23  Plan of Care reviewed with:  patient       Time Tracking:     SLP Treatment Date:   10/18/23  Speech Start Time:  0930  Speech Stop Time:  1000     Speech Total Time (min):  30 min    Billable minutes:  Treatment of Swallow  Dysfunction, 30 minutes       10/18/2023

## 2023-10-18 NOTE — PT/OT/SLP PROGRESS
"Occupational Therapy Inpatient Rehab Treatment    Name: Helder Sharp Jr.  MRN: [id number]    Assessment:  Helder Sharp Jr. is a 83 y.o. male admitted with a medical diagnosis of COPD exacerbation.  He presents with the following impairments/functional limitations:  weakness, impaired endurance, impaired balance, impaired self care skills, impaired functional mobility, decreased safety awareness, impaired cardiopulmonary response to activity.    General Precautions: Standard, fall, aspiration, NPO     Orthopedic Precautions:N/A     Braces:  (ACE wrap at all times on PEG site)    Rehab Prognosis: Fair; patient would benefit from acute skilled OT services to address these deficits and reach maximum level of function.      History:     Past Medical History:   Diagnosis Date    Acute bronchitis     Anxiety disorder, unspecified     COPD (chronic obstructive pulmonary disease)     Coronary artery disease     Hyperlipidemia     Left carotid bruit     Pulmonary nodules     Unspecified cataract        Past Surgical History:   Procedure Laterality Date    APPENDECTOMY      CATARACT EXTRACTION Left 01/03/2023    ESOPHAGOGASTRODUODENOSCOPY W/ PEG N/A 10/11/2023    Procedure: PEG;  Surgeon: JULIETTE Duffy MD;  Location: [facility] ENDOSCOPY;  Service: Gastroenterology;  Laterality: N/A;    HERNIA REPAIR      PHACOEMULSIFICATION, CATARACT, WITH IOL INSERTION Left 1/3/2023    Procedure: PHACOEMULSIFICATION, CATARACT, WITH IOL INSERTION- OS;  Surgeon: Louis Abdi MD;  Location: [facility] OR;  Service: Ophthalmology;  Laterality: Left;    TONSILLECTOMY         Subjective     Orientation: Oriented x4    Chief Complaint: "My oxygen is lower than normal today"    Patient/Family Comments/goals: "Do what I can with what I got"    Vitals   Vitals at Rest  O2 Sat 85-88%     Vitals With Activity  O2 Sat Pt remained 70-80% range throughout ADL but dropped to 57% on one occasion, able to recover with prolonged rest break and purse " lipped breathing     Respiratory Status: Nasal cannula, flow 4 L/min    Patients cultural, spiritual, Buddhist conflicts given the current situation: yes       Objective:     Patient found up in chair with chair check, PEG Tube  upon OT entry to room.    Mobility   Patient completed:  Sit to Stand Transfer with stand by assistance with rolling walker  Stand to Sit Transfer with touch assist for safety 2* O2 Sat dropping with rollator    ADLs   Current Status   Eating 88   Shower, Bathe Self 5 sponge bath seated at sink in WC. Pt required frequent and prolonged rest breaks and purse lipped breathing   Upper Body Dressing 5   Lower Body Dressing 4 SBA for safety when standing to pull up underwear and pants 2* O2 Sats dropping   Putting On, Taking Off Footwear 5      Limiting Factors for ADLs: endurance, balance, weakness, and safety awareness       Patient left up in chair with  DORA Olmos present.     Education provided: ADLs, transfer training, safety precautions, and home safety    Multidisciplinary Problems       Occupational Therapy Goals          Problem: Occupational Therapy    Goal Priority Disciplines Outcome Interventions   Occupational Therapy Goal     OT, PT/OT Ongoing, Progressing    Description: ADLs: Goals to be achieved by Re-Evaluation.   Pt to perform grooming tasks with independence.   Pt to perform feeding tasks with independence. - Goal to be worked on only when patient is medically cleared to eat. Patient is NPO at time of evaluation.    Pt to perform UB dressing with independence.   Pt to perform LB dressing with Supervision/CGA with RW & AE as needed.    Pt to perform putting on/off footwear task with Min A with AE as needed. MET  Pt to perform toileting with Min A. MET  Updated: Pt to perform toileting with SBA.   Pt to perform bathing with Min A.     Functional Transfers:  Pt to perform toilet transfers with Supervision/CGA with RW & BSC over toilet. MET  Updated: Pt to perform toilet  transfers with independence by D/C.   Pt to perform a tub transfer with Supervision/CGA with RW & TTB.      IADLs:  Pt to perform age-appropriate IADL's with Supervision/CGA.    Balance, Strengthening, Endurance, Balance:  Pt to demonstrate consistent adherence to breathing control and energy conservation techniques as educated by OT.                        Time Tracking     OT Received On: 10/18/23  Time In 0830     Time Out 0930  Total Time 60 min  Therapy Time: OT Individual: 60  Missed Time:    Missed Time Reason:      Billable Minutes: Self Care/Home Management 60    10/18/2023

## 2023-10-19 NOTE — PT/OT/SLP PROGRESS
[facility] - Rehabilitation Unit  Speech Language Pathology Department  Dysphagia Therapy Progress Note    Patient Name:  Helder Sharp Jr.   MRN:  [id number]  Admitting Diagnosis: COPD exacerbation    Recommendations:     General recommendations:  Repeat Modified Barium Swallow Study and dysphagia therapy  Diet texture/consistency recommendations:  NPO  Medications: NPO  Barriers to safe discharge:  none    Subjective     Patient awake, alert, and cooperative.    Pain/Comfort:  0/10    Spiritual/Cultural/Nondenominational Beliefs/Practices that affect care: no    Respiratory Status: nasal cannula    Objective:     Therapeutic Activities:  Pt completed base of tongue retraction exercises x70 with minimal cues.  Pt completed laryngeal strengthening exercises x60 with minimal-moderate cues.  Pt completed andrea maneuver x10 with minimal-moderate cues.    Assessment:     Pt continues to present with oropharyngeal dysphagia and remains unsafe for PO diet.    Goals:     Multidisciplinary Problems       SLP Goals          Problem: SLP    Goal Priority Disciplines Outcome   SLP Goal     SLP Ongoing, Progressing   Description:  LTG: To tolerate least restrictive diet without clinical signs/sx of aspiration.   ST. Tongue base/laryngeal excursion exercise   2. Tolerate thermal stimulation x10 with 100% swallow response with less than a 2 second delay.   3. Puree solids without clinical signs/sx of aspiration.                     Patient Education:     Patient provided with verbal education regarding SLP POC.  Understanding was verbalized, however additional teaching warranted.    Plan:     Will continue to follow and tx as appropriate.    SLP Follow-Up:  Yes   Patient to be seen:  5 x/week   Plan of Care expires:  10/28/23  Plan of Care reviewed with:  patient       Time Tracking:     SLP Treatment Date:   10/19/23  Speech Start Time:  1130  Speech Stop Time:  1200     Speech Total Time (min):   30 min    Billable minutes:  Treatment of Swallow Dysfunction, 30 minutes       10/19/2023

## 2023-10-19 NOTE — PROGRESS NOTES
[facility] ([facility])  Rehab Progress Note    Patient Name: Helder Sharp Jr.  MRN: [id number]  Age: 83 y.o. Sex: male  : 1940  Hospital Length of Stay: 6 days  Date of Service: 10/19/2023   Chief Complaint: Acute on chronic hypoxemic respiratory failure 2/2 COPD exacerbation and bilateral aspiration pneumonia    Subjective:     Basic Information  Admit Information: 83-year-old WM presented to [facility] on  with complaints of continued shortness of breath and weakness.  PMH significant for bronchitis, COPD, CAD, hyperlipidemia, left carotid bruit, and pulmonary nodules.  Workup significant for COPD exacerbation with respiratory failure requiring intubation.  Hematuria appreciated.  Suspected aspiration pneumonia.  Initiated Zosyn.  CT chest significant for multiple enlarged mediastinal lymph nodes, no filling defects seen suggest PE, severe emphysema ptosis changes identified in the lungs with consolidation in the posterior basal segments of bilateral lower lobes demonstrating air bronchograms which may reflect aspiration pneumonia.  Tolerated extubation on 10/01 and initiated Vapotherm.  Downgraded to Medicine Unit on 10/03.  Plans for trilogy at home.  Continued with oropharyngeal dysphagia.  Tolerated PEG tube placement on 10/7.  Repeat sputum culture grew E coli.  Initiated Rocephin. Tolerated transfer to [facility] inpatient rehab unit on 10/13 without incident.  Today's Information: No acute events overnight.  Sitting in chair comfortably working with therapy.  Reports good sleep hygiene and tolerating tube feedings via PEG tube.  Last BM 10/16.  Vital signs at goal with no recent recorded fevers.  No labs or imaging today.    Review of patient's allergies indicates:   Allergen Reactions    Flexeril [cyclobenzaprine]         Current Facility-Administered Medications:     acetaminophen oral solution 650 mg, 650 mg, Oral, Q6H PRN, Jamee Cheung, FNP, 650 mg at 10/18/23  "1638    albuterol-ipratropium 2.5 mg-0.5 mg/3 mL nebulizer solution 3 mL, 3 mL, Nebulization, Q4H WAKE, Jamee Cheung FNP, 3 mL at 10/19/23 0702    benzonatate capsule 100 mg, 100 mg, Oral, TID PRN, Jamee Cheung, ALICEP    enoxaparin injection 40 mg, 40 mg, Subcutaneous, Q24H (prophylaxis, 1700), Jamee Cehung FNP, 40 mg at 10/18/23 1638    famotidine tablet 20 mg, 20 mg, Oral, BID PRN, Jamee Cheung FNP    ferrous sulfate tablet 1 each, 1 tablet, Oral, Daily, Jamee Cheung FNP, 1 each at 10/19/23 0904    fluticasone furoate-vilanteroL 200-25 mcg/dose diskus inhaler 1 puff, 1 puff, Inhalation, Daily, Jamee Cheung FNP, 1 puff at 10/19/23 0904    guaiFENesin 100 mg/5 ml syrup 200 mg, 200 mg, Per NG tube, Q6H, Jamee Cheung FNP, 200 mg at 10/19/23 0600    hydrALAZINE injection 10 mg, 10 mg, Intravenous, Q6H PRN, Jamee Cheung FNP    hydrOXYzine pamoate capsule 50 mg, 50 mg, Oral, QHS, Jamee Cheung, FNP    labetalol 20 mg/4 mL (5 mg/mL) IV syring, 10 mg, Intravenous, Q4H PRN, Jamee Cheung FNP    melatonin tablet 6 mg, 6 mg, Oral, Nightly, Jamee Cheung FNP, 6 mg at 10/18/23 2018    metoprolol injection 10 mg, 10 mg, Intravenous, Q2H PRN, Jamee Cheung FNP    nitroGLYCERIN SL tablet 0.4 mg, 0.4 mg, Sublingual, Q5 Min PRN, Jamee Cheung, FNP    ondansetron disintegrating tablet 4 mg, 4 mg, Oral, Q6H PRN, Jamee Cheung FNP    sertraline tablet 100 mg, 100 mg, Oral, Daily, Jamee Cheung, FNP, 100 mg at 10/19/23 0904     Review of Systems   Complete 12-point review of symptoms negative except for what's mentioned in HPI     Objective:     /66   Pulse 77   Temp 98.3 °F (36.8 °C) (Oral)   Resp 16   Ht 6' 2" (1.88 m)   Wt 75.7 kg (166 lb 14.2 oz)   SpO2 (!) 88%   BMI 21.43 kg/m²      Physical Exam  Vitals reviewed.   Eyes:      Pupils: Pupils are equal, round, and reactive to light.   Cardiovascular:      Rate and Rhythm: Normal rate and " regular rhythm.      Heart sounds: Normal heart sounds.   Pulmonary:      Effort: Pulmonary effort is normal.      Breath sounds: Normal breath sounds.      Comments: Diminished breath sounds with nasal cannula  Abdominal:      General: Bowel sounds are normal.      Comments: Peg tube in place with clean applied dressings    Musculoskeletal:         General: Normal range of motion.      Comments: Muscle wasting and diffuse atrophy    Skin:     General: Skin is warm.   Neurological:      General: No focal deficit present.      Mental Status: He is alert and oriented to person, place, and time.   Psychiatric:         Mood and Affect: Mood normal.     *MD performed and documented physical examination       Lines/Drains/Airways       Drain  Duration                  Gastrostomy/Enterostomy 10/11/23 Percutaneous endoscopic gastrostomy (PEG) LUQ feeding 8 days              Peripheral Intravenous Line  Duration                  Peripheral IV - Single Lumen 10/12/23 1330 20 G Anterior;Proximal;Right Forearm 6 days                    Labs  No results found for this or any previous visit (from the past 24 hour(s)).    Radiology  Chest x-ray 10/8: Bilateral lower lung lobes with new patchy opacities suggest atelectasis and/or associated early infiltrates.  Assessment/Plan:     83 y.o. WM admitted on 10/13/2023     Acute on chronic hypoxemic respiratory failure   - 2/2 COPD exacerbation and bilateral aspiration pneumonia  - s/p Rocephin 2 g daily (end date 10/15)  - continue                 DuoNeb every 4 hours while awake                 Fluticasone-vilanterol 1 puff daily                 Guaifenesin 200 mg every 6 hours  - follow-up with pulmonology outpatient  - discharge with trilogy     End-stage COPD   - tolerating nasal cannula O2, on home O2  - on trilogy at night at home  - s/p Rocephin 2 g daily (end date 10/15)  - continue                 DuoNeb every 4 hours while awake                 Fluticasone-vilanterol 1 puff  daily                 Guaifenesin 200 mg every 6 hours  - follow-up with pulmonology outpatient     Bilateral aspiration pneumonia  - tolerating nasal cannula O2, on home O2  - s/p Rocephin 2 g daily (end date 10/15)  - continue                 DuoNeb every 4 hours while awake                 Fluticasone-vilanterol 1 puff daily                 Guaifenesin 200 mg every 6 hours  - follow-up with pulmonology outpatient     Oropharyngeal dysphagia  - s/p peg tube placement  - continues to tolerate tube feedings  - speech therapy following     Iron deficiency anemia  - asymptomatic  - H/H stable   - no evidence of active bleeds  - iron levels low  - continue  Ferrous sulfate 325 mg daily (initiated 10/14)  - will closely monitor and transfuse if needed      Insomnia  - stable  - discontinue Seroquel 25 mg at bedtime per patient request  - continue                Vistaril 50 mg at bedtime (initiated 10/17)                Melatonin 6 mg at night     Major depressive disorder  - stable  - discontinue BuSpar 10 mg b.i.d. on 10/15 per patient request  - continue                Zoloft 100 mg daily     Generalized anxiety disorder  - stable  - discontinue BuSpar 10 mg b.i.d. on 10/15 per patient request  - continue                Zoloft 100 mg daily     AB therapy  Rocephin 2 g daily (end date 10/15)  Zosyn 9/30-10/7     VTE Prophylaxis:  Lovenox 40 mg daily  COVID-19 vaccination status:  Vaccinated x4     POA:  Yes-Maryjo  Living will:  No  Contacts:  Maryjo Sharp (spouse) [phone]                          Huma Moore (daughter) [phone]     CODE STATUS: Full  Internal Medicine (attending): Moises Terry MD  Physiatry (consulting):  Eyad Liriano MD     OUTPATIENT PROVIDERS  PCP:  Kimo Love MD  Urology:  Ben Guajardo MD   Pulmonology: Richardson Root MD  Cardiology:  Larry Gomes MD     DISPOSITION:  Vital signs at goal.  No labs today.  Bowel management and sleep hygiene at goal.  Tolerating tube feeds  via PEG tube. Continues to progress well with therapies.  Monitor closely.  Notify of any acute changes.  Staffing today documented below.      Staffing 10/17: Continent of bowel and bladder. Tolerating tube feeds via PEG tube. RT: overall supervision, limited with standing tolerance. PT: bed mobility partial mod on eval. Independent with sit to stands overall. Ambulating 90 feet. OT: overall mod to touch assist. Working on energy conservation. ST: NPO, tolerating therapy. MBS later this week. Projected discharge 10/26.    Jamee Cheung NP conducted independent physical examination and assisted with medical documentation.

## 2023-10-19 NOTE — PT/OT/SLP PROGRESS
[facility] - Rehabilitation Unit  Speech Language Pathology Department  Dysphagia Therapy Progress Note    Patient Name:  Helder Sharp Jr.   MRN:  [id number]  Admitting Diagnosis: COPD exacerbation    Recommendations:     General recommendations:  Repeat Modified Barium Swallow Study and dysphagia therapy  Diet texture/consistency recommendations:  NPO  Medications: NPO  Barriers to safe discharge:  none    Subjective     Patient awake, alert, and cooperative.    Pain/Comfort:  0/10    Spiritual/Cultural/Sabianist Beliefs/Practices that affect care: no    Respiratory Status: nasal cannula    Objective:     Therapeutic Activities:  Pt completed base of tongue retraction exercises x70 with minimal-moderate cues.  Pt completed laryngeal strengthening exercises x50 with moderate cues.    Assessment:     Pt continues to present with oropharyngeal dysphagia and remains unsafe for PO diet.    Goals:     Multidisciplinary Problems       SLP Goals          Problem: SLP    Goal Priority Disciplines Outcome   SLP Goal     SLP Ongoing, Progressing   Description:  LTG: To tolerate least restrictive diet without clinical signs/sx of aspiration.   ST. Tongue base/laryngeal excursion exercise   2. Tolerate thermal stimulation x10 with 100% swallow response with less than a 2 second delay.   3. Puree solids without clinical signs/sx of aspiration.                     Patient Education:     Patient provided with verbal education regarding SLP POC.  Understanding was verbalized, however additional teaching warranted.    Plan:     Will continue to follow and tx as appropriate.    SLP Follow-Up:  Yes   Patient to be seen:  5 x/week   Plan of Care expires:  10/28/23  Plan of Care reviewed with:  patient       Time Tracking:     SLP Treatment Date:   10/19/23  Speech Start Time:  0900  Speech Stop Time:  0930     Speech Total Time (min):  30 min    Billable minutes:  Treatment of Swallow  Dysfunction, 30 minutes       10/19/2023

## 2023-10-19 NOTE — PT/OT/SLP PROGRESS
Physical Therapy Inpatient Rehab Treatment    Patient Name:  Helder Sharp Jr.   MRN:  [id number]    Recommendations:     Discharge Recommendations:      Discharge Equipment Recommendations: home with home health   Barriers to discharge: Increased level of assist, Ongoing medical treatment, and Impaired functional mobility     Assessment:     Helder Sharp Jr. is a 83 y.o. male admitted with a medical diagnosis of COPD exacerbation.  He presents with the following impairments/functional limitations:  weakness, impaired endurance, impaired self care skills, impaired functional mobility, gait instability, impaired balance, decreased coordination, decreased upper extremity function, decreased lower extremity function, decreased safety awareness, impaired cardiopulmonary response to activity .    Rehab Diagnosis: s/p PEG with Acute on chronic hypoxemic respiratory failure 2/2 COPD exacerbation and bilateral aspiration pneumonia    General Precautions: Standard, aspiration, fall     Orthopedic Precautions:      Braces:  (abdominal brace for PEG)    Rehab Prognosis: Fair; patient would benefit from acute skilled PT services to address these deficits and reach maximum level of function.      History:     Past Medical History:   Diagnosis Date    Acute bronchitis     Anxiety disorder, unspecified     COPD (chronic obstructive pulmonary disease)     Coronary artery disease     Hyperlipidemia     Left carotid bruit     Pulmonary nodules     Unspecified cataract        Past Surgical History:   Procedure Laterality Date    APPENDECTOMY      CATARACT EXTRACTION Left 01/03/2023    ESOPHAGOGASTRODUODENOSCOPY W/ PEG N/A 10/11/2023    Procedure: PEG;  Surgeon: JULIETTE Duffy MD;  Location: [facility] ENDOSCOPY;  Service: Gastroenterology;  Laterality: N/A;    HERNIA REPAIR      PHACOEMULSIFICATION, CATARACT, WITH IOL INSERTION Left 1/3/2023    Procedure: PHACOEMULSIFICATION, CATARACT, WITH IOL INSERTION- OS;  Surgeon: Louis  MARIA R Abdi MD;  Location: [facility] OR;  Service: Ophthalmology;  Laterality: Left;    TONSILLECTOMY         Subjective       Respiratory Status: Nasal cannula, flow 4 L/min    Patients cultural, spiritual, Sikhism conflicts given the current situation: no    Objective:     Communicated with nurse prior to session.  Patient found  in wheelchair working with OT  with PEG Tube, peripheral IV, oxygen  upon PT entry to room.        Vitals   Vitals at Rest  BP    HR    O2 Sat 89   Pain      Vitals With Activity  BP     HR     O2 Sat (!) 88 %   Pain            Current   Status  Discharge   Goal   Functional Area: Care Score:    Sit to Stand 6  No AD Independent   Chair/Bed-to-Chair Transfer 4  W spvn  Independent   Wheel 50 Feet with Two Turns 4  W spvn Independent       Therapeutic Activities and Exercises:  Pt completed 5 min, 3 min, 3 min, 2 min on bike with 2 min rest breaks between; O2 between 84-88% throughout activity  Pt performed therex 3x10 with 4# including marches, LAQ, and hip abd/add    Activity Tolerance: Good    Patient left up in chair with all lines intact and call button in reach.    Education provided: roles and goals of PT/PTA, transfer training, assistive device, wheelchair management, and strengthening exercises    Expected compliance: High compliance    Plan:     During this hospitalization, patient to be seen daily to address the identified rehab impairments via gait training, therapeutic activities, therapeutic exercises, neuromuscular re-education, wheelchair management/training and progress toward the following goals:    GOALS:   Multidisciplinary Problems       Physical Therapy Goals          Problem: Physical Therapy    Goal Priority Disciplines Outcome Goal Variances Interventions   Physical Therapy Goal     PT, PT/OT Ongoing, Progressing     Description: Bed Mobility:  Roll left and right independently.  Sit to supine transfer independently.  Supine to sit transfer  independently.    Transfers:  MET - Sit to stand transfer with supervision/touching assist using RW.  MET - Bed to chair transfer with supervision/touching assist Stand Step using RW.  Sit to stand transfer independently using Rollator vs LRAD.  Bed to chair transfer independently Stand Step using rollator vs LRAD.  Car transfer independently using Rollator vs LRAD.   an object from the ground in standing position independently using Rollator vs LRAD and reacher.    Mobility:  Ambulate 150 feet independently assist using Rollator vs LRAD.  Push w/c 150 feet with supervision/touching assist using B UE.  Ambulate 15 feet on uneven surfaces/ramps independently using Rollator vs LRAD.  Pt ascended/descended a 4 inch curb independently using Rollator vs LRAD.  Ascend/descend 12 stairs with supervision/touching assist using bilateral handrails.                             Plan of Care Expires:  10/20/23  PT Next Visit Date: 10/20/23  Plan of Care reviewed with: patient    Additional Information:         Time Tracking:     Therapy Time  PT Received On: 10/25/23  PT Start Time: 1030  PT Stop Time: 1130  PT Total Time (min): 60 min   PT Individual: 60  Missed Time:    Time Missed due to:      Billable Minutes: Therapeutic Exercise 60    10/19/2023

## 2023-10-19 NOTE — PROGRESS NOTES
[facility] - Rehab Inpatient Services  Wound Care    Patient Name:  Helder Sharp Jr.   MRN:  [id number]  Date: 10/19/2023  Diagnosis: COPD exacerbation    History:     Past Medical History:   Diagnosis Date    Acute bronchitis     Anxiety disorder, unspecified     COPD (chronic obstructive pulmonary disease)     Coronary artery disease     Hyperlipidemia     Left carotid bruit     Pulmonary nodules     Unspecified cataract        Social History     Socioeconomic History    Marital status:    Tobacco Use    Smoking status: Former     Current packs/day: 0.00     Types: Cigarettes     Quit date:      Years since quittin.8    Smokeless tobacco: Never   Substance and Sexual Activity    Alcohol use: Not Currently    Drug use: Never    Sexual activity: Yes     Social Determinants of Health     Transportation Needs: No Transportation Needs (10/16/2023)    PRAPARE - Transportation     Lack of Transportation (Medical): No     Lack of Transportation (Non-Medical): No       Precautions:     Allergies as of 10/13/2023 - Reviewed 10/13/2023   Allergen Reaction Noted    Flexeril [cyclobenzaprine]  2022       WO Assessment Details/Treatment      10/19/23 1247   Gastrointestinal   Stool Occurrence 0        Gastrostomy/Enterostomy 10/11/23 Percutaneous endoscopic gastrostomy (PEG) LUQ feeding   Placement Date: 10/11/23   Present Prior to Hospital Arrival?: No  Type: Percutaneous endoscopic gastrostomy (PEG)  Location: LUQ  Indication: feeding   Securement   (Ace wrap)   Drainage serosanguineous   Dressing moist;dried drainage   Insertion Site tenderness;redness  (mild erythema/ 0.3 x 1 cm opening with fresh steristrip applied)   Site Care site cleansed w/ soap and water;sterile precut T-slit dressing applied;other (see comments)  (applied new steristrip to area of dehiscence)   Dressing Change Due 10/20/23     Follow up on PEG site: remains tender. While removing gauze dressing the hoda  wound steristrip became dislodged due to dried drainage. Noted a 0.3 x 1 cm opening; wound bed is pink and granulating.  Cleaned  site and applied new steristrip and dry gauze drainage.  Continue POC.  10/19/2023

## 2023-10-19 NOTE — PT/OT/SLP PROGRESS
"Occupational Therapy Inpatient Rehab Treatment    Name: Helder Sharp Jr.  MRN: [id number]    Assessment:  Helder Sharp Jr. is a 83 y.o. male admitted with a medical diagnosis of COPD exacerbation.  He presents with the following impairments/functional limitations:  weakness, impaired endurance, impaired self care skills, impaired functional mobility, gait instability, impaired balance, decreased coordination, decreased upper extremity function, decreased lower extremity function, decreased safety awareness, impaired cardiopulmonary response to activity.    General Precautions: Standard, fall, aspiration, NPO     Orthopedic Precautions:N/A     Braces: N/A    Rehab Prognosis: Good; patient would benefit from acute skilled OT services to address these deficits and reach maximum level of function.      History:     Past Medical History:   Diagnosis Date    Acute bronchitis     Anxiety disorder, unspecified     COPD (chronic obstructive pulmonary disease)     Coronary artery disease     Hyperlipidemia     Left carotid bruit     Pulmonary nodules     Unspecified cataract        Past Surgical History:   Procedure Laterality Date    APPENDECTOMY      CATARACT EXTRACTION Left 01/03/2023    ESOPHAGOGASTRODUODENOSCOPY W/ PEG N/A 10/11/2023    Procedure: PEG;  Surgeon: JULIETTE Duffy MD;  Location: [facility] ENDOSCOPY;  Service: Gastroenterology;  Laterality: N/A;    HERNIA REPAIR      PHACOEMULSIFICATION, CATARACT, WITH IOL INSERTION Left 1/3/2023    Procedure: PHACOEMULSIFICATION, CATARACT, WITH IOL INSERTION- OS;  Surgeon: Louis Abdi MD;  Location: [facility] OR;  Service: Ophthalmology;  Laterality: Left;    TONSILLECTOMY         Subjective     Orientation: Oriented x4    Chief Complaint: No complaints this AM.     Patient/Family Comments/goals: "Get back to Rastafari"    Respiratory Status: Nasal cannula, flow 4 L/min    Patients cultural, spiritual, Buddhism conflicts given the current situation: yes   "     Objective:     Patient found up in chair with PEG Tube, peripheral IV, oxygen  upon OT entry to room.    Mobility   Patient completed:  Sit to Stand Transfer with supervision with rollator  Stand to Sit Transfer with supervision with rollator    Functional Mobility  Pt completed functional mobility via wheelchair from room 402 > OT gym.     Limiting Factors for ADLs: motor, endurance, balance, weakness, and safety awareness     IADLs: Pt practiced drawing water into syringe, clearing air pockets, and pouring water into container to prepare for bolus feedings via PEG. Pt was able to understand directions and education provided on importance of managing feedings in the event he is alone upon D/C.     Therapeutic Activities  Pt participated in PEG kaylee with focus on B pinch and forward reaching. Pt was able to manipulate clips. Pt also demonstrated good in hand translation when drawing from bag.   Pt was educated and given red theraputty to increase pinch strength. Pt pinched 2x10 using pad-pad and tripod pinch. Pt also retrieved 5 beans placed in theraputty.     Patient left up in chair with  EDMAR Marcelo .     Education provided: ADLs, safety precautions, equipment recommendations, and home safety    Multidisciplinary Problems       Occupational Therapy Goals          Problem: Occupational Therapy    Goal Priority Disciplines Outcome Interventions   Occupational Therapy Goal     OT, PT/OT Ongoing, Progressing    Description: ADLs: Goals to be achieved by Re-Evaluation.   Pt to perform grooming tasks with independence.   Pt to perform feeding tasks with independence. - Goal to be worked on only when patient is medically cleared to eat. Patient is NPO at time of evaluation.    Pt to perform UB dressing with independence.   Pt to perform LB dressing with Supervision/CGA with RW & AE as needed.    Pt to perform putting on/off footwear task with Min A with AE as needed. MET  Pt to perform toileting with Min A.  MET  Updated: Pt to perform toileting with SBA.   Pt to perform bathing with Min A.     Functional Transfers:  Pt to perform toilet transfers with Supervision/CGA with RW & BSC over toilet. MET  Updated: Pt to perform toilet transfers with independence by D/C.   Pt to perform a tub transfer with Supervision/CGA with RW & TTB.      IADLs:  Pt to perform age-appropriate IADL's with Supervision/CGA.    Balance, Strengthening, Endurance, Balance:  Pt to demonstrate consistent adherence to breathing control and energy conservation techniques as educated by OT.                        Time Tracking     OT Received On: 10/19/23  Time In 0930     Time Out 1030  Total Time 60 min  Therapy Time: OT Individual: 60  Missed Time:    Missed Time Reason:      Billable Minutes: Self Care/Home Management 30 and Therapeutic Activity 30    10/19/2023

## 2023-10-19 NOTE — PROGRESS NOTES
"Subjective  HPI: 83 y.o. WM with a past medical history of hyperlipidemia, CAD, COPD, on 2 L home oxygen, pulmonary nodules, and anxiety presented to [facility] ED on 9/28/2023 for shortness of breath and weakness. Wife also endorsed worsening nonproductive cough and lower extremity swelling.  Wife noticed chocolate" colored urine at approximately 15:30 on 09/28, prompting them to ED. He had had to increase home oxygen to 3 L secondary to increased dyspnea.  Patient received albuterol nebulizer treatment en route with EMS.  He was given an hour long nebulizer treatment, magnesium, and Solu-Medrol upon arrival to ED. Patient remained hypoxic on non-rebreather and  was intubated in ED for airway protection.  Labs revealed WBC 21.13, chloride 109, CO2 19, glucose 190, BNP 22, troponin 0.014, and lactic acid 3.8.  Flu/RSV/COVID were negative.  UA revealed 2+ protein, trace ketones, 3+ occult blood, 1+ bilirubin, and greater than 100 RBCs.  Blood and respiratory cultures were obtained.  Chest x-ray revealed chronic emphysema and scarring within the lungs without appreciable acute superimposed airspace opacity.  Patient was also given LR, IV Zosyn, and IV Azithromycin in ED. Admitted to ICU for close monitoring.  Patient was started on Solu-Medrol 60 mg BID and DuoNebs q.6 hours.  Urology was consulted for hematuria. Ultrasound retroperitoneal complete revealed no abnormalities. Urology recommended continuing indwelling lang with outpatient follow-up. Patient had previous history of Klebsiella pneumonia which was sensitive to Zosyn.  Azithromycin was discontinued.  CTA chest revealed multiple enlarged mediastinal lymph nodes, no filling defects seen to suggest PE, and severe emphysematous changes identified in the lungs with consolidation in the posterior basal segments of bilateral lower lobes demonstrating air bronchograms which may reflect aspiration pneumonia.  NG tube was placed and patient was started on tube feeds.  " Transitioned to prednisone 40 mg daily for 5 day course on 10/01. Patient was extubated on 10/01 and placed on Vapotherm. Speech therapy was consulted. Patient was cleared for downgrade out of ICU on 10/03. Hospital medicine was consulted for transition of care and further medical management. He completed 7 days of Zosyn therapy for possible aspiration pneumonia.  Failed MBS and recommendations made for PEG tube. Palliative was consulted. Patient and family interested in PEG tube.  Gastroenterology following.  Patient is moderate risk for perioperative pulmonary complications and hypercapnia with PEG placement. Pulmonology consulted; follow recommendations. On 10/8 noted to have increased sputum production, leukocytosis noted and slightly increased oxygen needs.  Chest x-ray showed new developing bilateral opacities, collected respiratory culture and started Zosyn again. Requiring Haldol for agitation in-house. Underwent PEG placement on 10/11. Started on TF on 10/12. Zosyn changed to Rocephin which patient will complete 7 days of per Pulmonology recommendations on 10/15. On 10/12, Patient stated he felt better & had no new complaints. On TF, will follow Nutrition recommendation of goal rate for Diabetisource AC feeding with goal rate of 85cc/hr with 30ml/hr water flush to provide 2040 kcal, 102 g protein, 1994 ml free water. On 10/13, patient on 4L O2 per NC with sats ranging 88-93%. Patient complained of pain to peg tube site, but tolerating feedings with abdominal binder in place. Patient at 75ml/hr with tube feedings so should be at goal by 3pm today. Aspiration precautions in place. No bowel movement noted since 10/4. Patient is AAOx4; confused at times.  Participating with therapy. Functional status includes contact guard assist needed for bed mobility, moderate assist for transfers with RW, walking 40ft with RW at contact guard assist to minimal assist, total assist for lower body dressing and eating,  moderate assist for toileting and setup for urinal, and stand by assist for grooming sitting in chair. Patient was evaluated, accepted, and admitted to inpatient rehab to improve functional status. Transferred to [facility] on 10/13 without incident.       10/19: Seen in patient room with OT, seated in recliner. Working on education to flush PEG tube and managing large syringe with water. Tells me that he had a rough day yesterday with his oxygen  and SOB, but seems to be better this morning. Tolerating therapy with frequent rest breaks. VSSAF with noted /56 and SpO2 88-91%. Drops with exertion, but recovers quickly. IM following.       Review of Systems  Psychiatric:  Has a history of depression on Zoloft. He quit smoking in 2009 and quit drinking a few years before that.    Depression/Anxiety:      sertraline tablet 100 mg qd  Pain:  headaches  acetaminophen oral solution 650 mg q6h PRN pain    Bowels/Bladder: last BM 10/16    Appetite: NPO. Tolerating TF     Sleep: decreased overnight-improving     melatonin tablet 6 mg qHS  QUEtiapine tablet 25 mg qHS  hydrOXYzine pamoate capsule 50 mg nightly PRN Insomnia      Physical Exam  General: well-developed, well-nourished, in no acute distress  Respiratory: equal chest rise, + SOB w/activity, no audible wheeze, 4LO2 NC  Cardiovascular: regular rate and rhythm, no edema  Gastrointestinal: soft, non-tender, non-distended   Musculoskeletal: full range of motion of all extremities/spine with generalized weakness  Integumentary: no rashes or skin lesions present  Neurologic: cranial nerves intact, no signs of peripheral neurological deficit, motor/sensory function intact  *MD performed and documented physical examination                 Diagnostics:  XR CHEST PA AND LATERAL  FINDINGS  Lines/tubes/devices: Esophagogastric tube is no longer visualized.  The cardiac silhouette and central vascular structures are unchanged.  The trachea is midline. Chronic bilateral lung  parenchymal changes are again noted, with no evidence of new or worsening organized consolidation.  Previously described ill-defined patchy airspace opacities at the basilar lung regions are similar to slightly improved.  There is no enlarging pleural effusion or convincing pneumothorax.  Regional osseous structures and extrathoracic soft tissues are similar.  IMPRESSION  1. Similar to slightly improved ill-defined bibasilar infiltrates.  2. No evidence of new or worsening cardiopulmonary process.  3. Additional secondary details discussed above.  Date:                                            10/17/2023  Time:                                           15:32            Assessment/Plan  Hospital   COPD exacerbation   Dysphagia   Acute hypoxemic respiratory failure   Sepsis   Aspiration pneumonia   Hematuria     Non-Hospital   Generalized anxiety disorder   Former smoker   Pulmonary nodule   Carotid bruit   Hyperlipidemia   Inguinal hernia   Plantar fasciitis   Polyp of colon   End stage COPD   Anxiety   Chronic respiratory failure with hypoxia   Moderate malnutrition   Unspecified cataract   Coronary artery disease       Wounds: PEG site intact, wounds-left pinky toe, right great toe, right heel  s/p PEG placement 10/11  Precautions: aspiration, fall risk  Bracing: RW/Rollator  Swallowing: NPO. TF. Dietician/ST following  Function: Tolerating therapy. Continue PT/OT  VTE Prophylaxis:   enoxaparin injection 40 mg q24hr  Code Status: FULL CODE   Discharge: Lives with his spouse in [place] in a single level home with a small threshold step to enter the residence. Completed college. He has no  history. He is retired. 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 Wife is retired. She drives. She can physically assist the patient after rehab. She states that the patient had been the one to do most of the cooking and grocery shopping prior to  January. He got sick in January. She took over the household duties. He was just getting back to taking over some of the cooking and grocery shopping duties when he got sick again. Wife can do the household chores. Children: (5).  Date pending.

## 2023-10-19 NOTE — PLAN OF CARE
Problem: Fall Injury Risk  Goal: Absence of Fall and Fall-Related Injury  Outcome: Ongoing, Progressing  Intervention: Promote Injury-Free Environment  Flowsheets (Taken 10/19/2023 0018)  Safety Promotion/Fall Prevention:   assistive device/personal item within reach   bed alarm set   Fall Risk signage in place   lighting adjusted   nonskid shoes/socks when out of bed   side rails raised x 3   instructed to call staff for mobility

## 2023-10-19 NOTE — PLAN OF CARE
Problem: Rehabilitation (IRF) Plan of Care  Goal: Plan of Care Review  Outcome: Ongoing, Progressing  Flowsheets (Taken 10/19/2023 1538)  Plan of Care Reviewed With: patient  Goal: Absence of New-Onset Illness or Injury  Outcome: Ongoing, Progressing  Intervention: Prevent Fall and Fall Injury  Flowsheets (Taken 10/19/2023 1538)  Safety Promotion/Fall Prevention:   assistive device/personal item within reach   bed alarm set   chair alarm set   diversional activities provided   Fall Risk reviewed with patient/family   Fall Risk signage in place   high risk medications identified   lighting adjusted   medications reviewed   nonskid shoes/socks when out of bed   side rails raised x 2   instructed to call staff for mobility   room near unit station  Intervention: Prevent Skin Injury  Flowsheets (Taken 10/19/2023 1538)  Skin Protection:   adhesive use limited   incontinence pads utilized   transparent dressing maintained   tubing/devices free from skin contact  Intervention: Prevent Infection  Flowsheets (Taken 10/19/2023 1538)  Infection Prevention:   environmental surveillance performed   equipment surfaces disinfected   rest/sleep promoted   hand hygiene promoted   personal protective equipment utilized   single patient room provided  Intervention: Prevent VTE (Venous Thromboembolism)  Flowsheets (Taken 10/19/2023 1538)  VTE Prevention/Management:   ambulation promoted   bleeding precations maintained   bleeding risk assessed   bleeding risk factor(s) identified, provider notified   dorsiflexion/plantar flexion performed   fluids promoted  Goal: Optimal Comfort and Wellbeing  Outcome: Ongoing, Progressing  Intervention: Provide Person-Centered Care  Flowsheets (Taken 10/19/2023 1538)  Trust Relationship/Rapport:   care explained   choices provided   questions answered   questions encouraged   reassurance provided   thoughts/feelings acknowledged  Intervention: Monitor Pain and Promote Comfort  Flowsheets (Taken  10/19/2023 1538)  Pain Management Interventions:   relaxation techniques promoted   quiet environment facilitated   prescribed exercises encouraged   pain management plan reviewed with patient/caregiver   pillow support provided   position adjusted     Problem: Adjustment to Illness (Sepsis/Septic Shock)  Goal: Optimal Coping  Outcome: Ongoing, Progressing  Intervention: Optimize Psychosocial Adjustment to Illness  Flowsheets (Taken 10/19/2023 1538)  Supportive Measures:   active listening utilized   verbalization of feelings encouraged   self-care encouraged   relaxation techniques promoted  Family/Support System Care:   self-care encouraged   support provided     Problem: Bleeding (Sepsis/Septic Shock)  Goal: Absence of Bleeding  Outcome: Ongoing, Progressing  Intervention: Monitor and Manage Bleeding  Flowsheets (Taken 10/19/2023 1538)  Bleeding Precautions: blood pressure closely monitored     Problem: Glycemic Control Impaired (Sepsis/Septic Shock)  Goal: Blood Glucose Level Within Desired Range  Outcome: Ongoing, Progressing  Intervention: Optimize Glycemic Control  Flowsheets (Taken 10/19/2023 1538)  Glycemic Management: blood glucose monitored     Problem: Infection Progression (Sepsis/Septic Shock)  Goal: Absence of Infection Signs and Symptoms  Outcome: Ongoing, Progressing  Intervention: Initiate Sepsis Management  Flowsheets (Taken 10/19/2023 1538)  Infection Prevention:   environmental surveillance performed   equipment surfaces disinfected   rest/sleep promoted   hand hygiene promoted   personal protective equipment utilized   single patient room provided  Infection Management: aseptic technique maintained  Isolation Precautions: protective  Intervention: Promote Recovery  Flowsheets (Taken 10/19/2023 1538)  Sleep/Rest Enhancement:   relaxation techniques promoted   regular sleep/rest pattern promoted     Problem: Nutrition Impaired (Sepsis/Septic Shock)  Goal: Optimal Nutrition Intake  Outcome:  Ongoing, Progressing  Intervention: Promote and Optimize Nutrition Delivery  Flowsheets (Taken 10/19/2023 1538)  Nutrition Support Management: tube feeding continued as ordered     Problem: Impaired Wound Healing  Goal: Optimal Wound Healing  Outcome: Ongoing, Progressing  Intervention: Promote Wound Healing  Flowsheets (Taken 10/19/2023 1538)  Oral Nutrition Promotion:   physical activity promoted   rest periods promoted  Sleep/Rest Enhancement:   relaxation techniques promoted   regular sleep/rest pattern promoted  Pain Management Interventions:   relaxation techniques promoted   quiet environment facilitated   prescribed exercises encouraged   pain management plan reviewed with patient/caregiver   pillow support provided   position adjusted     Problem: Skin Injury Risk Increased  Goal: Skin Health and Integrity  Outcome: Ongoing, Progressing  Intervention: Optimize Skin Protection  Flowsheets (Taken 10/19/2023 1538)  Skin Protection:   adhesive use limited   incontinence pads utilized   transparent dressing maintained   tubing/devices free from skin contact  Intervention: Promote and Optimize Oral Intake  Flowsheets (Taken 10/19/2023 1611)  Oral Nutrition Promotion:   rest periods promoted   physical activity promoted     Problem: Fall Injury Risk  Goal: Absence of Fall and Fall-Related Injury  Outcome: Ongoing, Progressing  Intervention: Identify and Manage Contributors  Flowsheets (Taken 10/19/2023 1611)  Self-Care Promotion:   independence encouraged   BADL personal objects within reach   BADL personal routines maintained  Medication Review/Management:   medications reviewed   high-risk medications identified  Intervention: Promote Injury-Free Environment  Flowsheets (Taken 10/19/2023 1611)  Safety Promotion/Fall Prevention:   assistive device/personal item within reach   bed alarm set   bed alarm refused   commode/urinal/bedpan at bedside   diversional activities provided   Fall Risk reviewed with  patient/family   Fall Risk signage in place   high risk medications identified   in recliner, wheels locked   lighting adjusted   medications reviewed   nonskid shoes/socks when out of bed   side rails raised x 2   room near unit station   instructed to call staff for mobility

## 2023-10-20 NOTE — PT/OT/SLP PROGRESS
[facility]  Speech Language Pathology Department  Dysphagia Therapy Progress Note    Patient Name:  Helder Sharp Jr.   MRN:  [id number]  Admitting Diagnosis: COPD exacerbation    Recommendations:     General recommendations:  Repeat Modified Barium Swallow Study and dysphagia therapy  Diet texture/consistency recommendations:  NPO  Medications: NPO  Barriers to safe discharge:  none    Subjective     Patient awake, alert, and cooperative.    Pain/Comfort:  0/10    Spiritual/Cultural/Mandaen Beliefs/Practices that affect care: no    Respiratory Status: nasal cannula    Objective:     Therapeutic Activities:  Pt completed base of tongue retraction exercises x70 with minimal cues.  Pt completed laryngeal strengthening exercises x90 with minimal cues.    Assessment:     Pt continues to present with oropharyngeal dysphagia and remains unsafe for PO diet.    Goals:     Multidisciplinary Problems       SLP Goals          Problem: SLP    Goal Priority Disciplines Outcome   SLP Goal     SLP Ongoing, Progressing   Description:  LTG: To tolerate least restrictive diet without clinical signs/sx of aspiration.   ST. Tongue base/laryngeal excursion exercise   2. Tolerate thermal stimulation x10 with 100% swallow response with less than a 2 second delay.   3. Puree solids without clinical signs/sx of aspiration.                     Patient Education:     Patient provided with verbal education regarding SLP POC.  Understanding was verbalized, however additional teaching warranted.    Plan:     Will continue to follow and tx as appropriate.    SLP Follow-Up:  Yes   Patient to be seen:  5 x/week   Plan of Care expires:  10/28/23  Plan of Care reviewed with:  patient       Time Tracking:     SLP Treatment Date:   10/20/23  Speech Start Time:  1100  Speech Stop Time:  1130     Speech Total Time (min):  30 min    Billable minutes:  Treatment of Swallow Dysfunction, 30  minutes       10/20/2023

## 2023-10-20 NOTE — PT/OT/SLP PROGRESS
"Occupational Therapy Inpatient Rehab Treatment    Name: Helder Sharp Jr.  MRN: [id number]    Assessment:  Helder Sharp Jr. is a 83 y.o. male admitted with a medical diagnosis of COPD exacerbation.  He presents with the following impairments/functional limitations:  weakness, impaired endurance, impaired self care skills, impaired functional mobility, gait instability, impaired balance, orthopedic precautions, impaired cardiopulmonary response to activity, impaired fine motor, impaired coordination, decreased safety awareness.    General Precautions: Standard, fall, NPO, aspiration     Orthopedic Precautions:N/A     Braces: N/A    Rehab Prognosis: Good; patient would benefit from acute skilled OT services to address these deficits and reach maximum level of function.      History:     Past Medical History:   Diagnosis Date    Acute bronchitis     Anxiety disorder, unspecified     COPD (chronic obstructive pulmonary disease)     Coronary artery disease     Hyperlipidemia     Left carotid bruit     Pulmonary nodules     Unspecified cataract        Past Surgical History:   Procedure Laterality Date    APPENDECTOMY      CATARACT EXTRACTION Left 01/03/2023    ESOPHAGOGASTRODUODENOSCOPY W/ PEG N/A 10/11/2023    Procedure: PEG;  Surgeon: JULIETTE Duffy MD;  Location: [facility] ENDOSCOPY;  Service: Gastroenterology;  Laterality: N/A;    HERNIA REPAIR      PHACOEMULSIFICATION, CATARACT, WITH IOL INSERTION Left 1/3/2023    Procedure: PHACOEMULSIFICATION, CATARACT, WITH IOL INSERTION- OS;  Surgeon: Louis Abdi MD;  Location: [facility] OR;  Service: Ophthalmology;  Laterality: Left;    TONSILLECTOMY         Subjective     Orientation: Oriented x4    Chief Complaint: "I did not sleep well because I had a suppository and it made me go to the bathroom in the middle of the night"    Patient/Family Comments/goals: "I hope to pass my swallow test this afternoon"    Respiratory Status: Nasal cannula, flow 4 L/min    Patients " "cultural, spiritual, Jainism conflicts given the current situation: yes       Objective:     Patient found up in chair with PEG Tube, oxygen  upon OT entry to room.    Functional Mobility  Pt completed functional mobility from room to OT gym via wheelchair.    ADLs: Pt was offered shower for re-eval, Pt refused and said showers wear him out too much. OTS offered sponge bath but patient also refused. Pt stated "I got dressed by myself this morning, I had people get my clothes for me but I did everything else myself"    Limiting Factors for ADLs: endurance, balance, and weakness     Therapeutic Exercise  Pt completed 2 min intervals on UBE with 2 min rest breaks 5x. Pt began recovering well, decreased recovery time to 1 and 1/2 minutes.  Pt completed BUE strengthening exercises with 2 lb free weights in all planes with a timed 1 min break between sets for recovery.     Therapeutic Activities:  Pt completed 2 rounds of connect 4 alternating BUE with focus on hand-eye coordination and fine motor skills. Pt stated that the game was "too complicated" and required assistance to problem solve game.     Patient left up in chair with all lines intact and call button in reach.     Education provided: ADLs, sequencing, and home safety    Multidisciplinary Problems       Occupational Therapy Goals          Problem: Occupational Therapy    Goal Priority Disciplines Outcome Interventions   Occupational Therapy Goal     OT, PT/OT Ongoing, Progressing    Description: ADLs: Goals to be achieved by Re-Evaluation.   Pt to perform grooming tasks with independence.   Pt to perform feeding tasks with independence. - Goal to be worked on only when patient is medically cleared to eat. Patient is NPO at time of evaluation.    Pt to perform UB dressing with independence.   Pt to perform LB dressing with Supervision/CGA with RW & AE as needed.    Pt to perform putting on/off footwear task with Min A with AE as needed. MET  Pt to perform " toileting with Min A. MET  Updated: Pt to perform toileting with SBA.   Pt to perform bathing with Min A.     Functional Transfers:  Pt to perform toilet transfers with Supervision/CGA with RW & BSC over toilet. MET  Updated: Pt to perform toilet transfers with independence by D/C.   Pt to perform a tub transfer with Supervision/CGA with RW & TTB.      IADLs:  Pt to perform age-appropriate IADL's with Supervision/CGA.    Balance, Strengthening, Endurance, Balance:  Pt to demonstrate consistent adherence to breathing control and energy conservation techniques as educated by OT.                        Time Tracking     OT Received On: 10/20/23  Time In 0930     Time Out 1030  Total Time 60 min  Therapy Time: OT Individual: 60  Missed Time:    Missed Time Reason:      Billable Minutes: Therapeutic Activity 15 and Therapeutic Exercise 45    10/20/2023

## 2023-10-20 NOTE — PLAN OF CARE
Problem: Fall Injury Risk  Goal: Absence of Fall and Fall-Related Injury  Outcome: Ongoing, Progressing  Intervention: Promote Injury-Free Environment  Flowsheets (Taken 10/20/2023 0053)  Safety Promotion/Fall Prevention:   assistive device/personal item within reach   bed alarm set   Fall Risk signage in place   lighting adjusted   nonskid shoes/socks when out of bed   side rails raised x 3   instructed to call staff for mobility

## 2023-10-20 NOTE — PT/OT/SLP RE-EVAL
Physical Therapy Rehab Re-Evaluation    Patient Name:  Helder Sharp Jr.   MRN:  [id number]    Recommendations:     Discharge Recommendations:      Discharge Equipment Recommendations:     Barriers to discharge: Ongoing medical treatment    Assessment:     Helder Sharp Jr. is a 83 y.o. male admitted with a medical diagnosis of COPD exacerbation.  He presents with the following impairments/functional limitations:  weakness, impaired endurance, impaired self care skills, impaired functional mobility, gait instability, impaired balance, decreased coordination, decreased upper extremity function, decreased lower extremity function, decreased safety awareness, impaired cardiopulmonary response to activity.    Rehab Diagnosis: s/p PEG with Acute on chronic hypoxemic respiratory failure 2/2 COPD exacerbation and bilateral aspiration pneumonia    General Precautions: Standard, fall, aspiration, NPO     Orthopedic Precautions: N/A     Braces: N/A    Rehab Prognosis: Good; patient would benefit from acute skilled PT services to address these deficits and reach maximum level of function.      History:     Past Medical History:   Diagnosis Date    Acute bronchitis     Anxiety disorder, unspecified     COPD (chronic obstructive pulmonary disease)     Coronary artery disease     Hyperlipidemia     Left carotid bruit     Pulmonary nodules     Unspecified cataract        Past Surgical History:   Procedure Laterality Date    APPENDECTOMY      CATARACT EXTRACTION Left 01/03/2023    ESOPHAGOGASTRODUODENOSCOPY W/ PEG N/A 10/11/2023    Procedure: PEG;  Surgeon: JULIETTE Duffy MD;  Location: [facility];  Service: Gastroenterology;  Laterality: N/A;    HERNIA REPAIR      PHACOEMULSIFICATION, CATARACT, WITH IOL INSERTION Left 1/3/2023    Procedure: PHACOEMULSIFICATION, CATARACT, WITH IOL INSERTION- OS;  Surgeon: Louis Abdi MD;  Location: [facility] OR;  Service: Ophthalmology;  Laterality: Left;    TONSILLECTOMY    "      Subjective     Patient Comments: "I'll do whatever you need"    Patients cultural, spiritual, Methodist conflicts given the current situation: no       Living Environment  People in Home: spouse  Living Arrangements: house  Number of Stairs, Main Entrance: none (small threshold)  Number of Stairs, Within Home, Primary: none  Equipment Currently Used at Home: rollator, oxygen, grab bar, shower chair (elevated toilet with garb bars)    Prior Level of Function  Ambulation Skills: needs device  Stairs: unable to perform  Transfer Skills: independent  ADL Skills: needs device    Objective:     Communicated with pt prior to session.  Patient found up in chair with PEG Tube, peripheral IV, oxygen  upon PT entry to room.    Vital   SpO2 at rest: 90%  SpO2 with activity: 72% (after stair climbing, pt symptomatic). Recovered to 90% after 1-2 minutes.    Typically pt is not symptomatic of low SpO2 with activity, other than SOB. Able to recovery in ~1 minute of seated rest.    Pain Pain Rating 1: 0/10     Respiratory Status: Nasal cannula, flow 4 L/min    Functional Mobility   Admit Current   Status Goal   Functional Area: Care Score: Care Score: Care Score:   Roll Left and Right 3 6 Independent   Sit to Lying 3 6 Independent   Lying to Sitting on Side of Bed 3 6 Independent   Sit to Stand 3 6  With rollator Independent   Chair/Bed-to-Chair Transfer 3 6  With rollator Independent   Car Transfer 88 6  With rollator     Walk 10 Feet 3 6 Independent   Walk 50 Feet with Two Turns 88 6  Pt amb 130' with rollator, distance limited by endurance deficits and SOB     Walk 150 Feet 88 88     Walk 10 Feet Uneven Surface 88 6  Pt amb up/down ramp 15' with rollator     1 Step (Curb) 88 6  Pt amb up/down 4" curb with rollator x2 completions     4 Steps 88 4  Pt amb up/down 4 stairs with B rails. Mod I to ascend, but SBA to descend d/t pt reporting s/s of low SpO2.     12 Steps 88 88     Picking Up Object 88 6  With rollator and " reacher     Wheel 50 Feet with Two Turns 3 5 Independent   Wheel 150 Feet 2 5  Pt manually propelled w/c 150' x2 completions with B UE. Independent     Therapeutic Activities and Exercises:  Patient educated on role of acute care PT and PT POC, safety while in hospital including calling nurse for mobility, and call light usage  Patient educated about importance of OOB mobility and remaining up in chair most of the day.  Patient educated about pursed lip breathing technique and cued for use with mobility.    Activity Tolerance: Fair    Patient left up in chair with call button in reach.    Education Provided: roles and goals of PT/PTA, transfer training, bed mob, gait training, stair training, balance training, safety awareness, body mechanics, assistive device, wheelchair management, and fall prevention    Expected compliance: Moderate compliance      Plan:     During this hospitalization, patient to be seen daily to address the identified rehab impairments via gait training, therapeutic activities, therapeutic exercises, neuromuscular re-education, wheelchair management/training and progress toward the following goals:    GOALS:   Multidisciplinary Problems       Physical Therapy Goals          Problem: Physical Therapy    Goal Priority Disciplines Outcome Goal Variances Interventions   Physical Therapy Goal     PT, PT/OT Ongoing, Progressing     Description: Bed Mobility:  MET - Roll left and right independently.  MET - Sit to supine transfer independently.  MET - Supine to sit transfer independently.    Transfers:  MET - Sit to stand transfer with supervision/touching assist using RW.  MET - Bed to chair transfer with supervision/touching assist Stand Step using RW.  MET - Sit to stand transfer independently using Rollator vs LRAD.  MET - Bed to chair transfer independently Stand Step using rollator vs LRAD.  MET - Car transfer independently using Rollator vs LRAD.  MET -  an object from the ground in  standing position independently using Rollator vs LRAD and reacher.    Mobility:  Ambulate 150 feet independently assist using Rollator vs LRAD.  MET - Push w/c 150 feet with supervision/touching assist using B UE.  MET - Ambulate 15 feet on uneven surfaces/ramps independently using Rollator vs LRAD.  MET - Pt ascended/descended a 4 inch curb independently using Rollator vs LRAD.  Ascend/descend 12 stairs with supervision/touching assist using bilateral handrails.                             Plan of Care Expires:  10/20/23  PT Next Visit Date: 10/26/23  Plan of Care reviewed with: patient      Additional Infomation:           Time Tracking:     Therapy Time   PT Start Time: 0830  PT Stop Time: 0930  PT Total Time (min): 60 min  PT Individual: 60  Missed Time:    Time Missed due to:      Billable Minutes: Re-eval 20 min, Gait Training 25 min, and Therapeutic Activity 15 min    10/20/2023

## 2023-10-20 NOTE — PROGRESS NOTES
"Subjective  HPI: 83 y.o. WM with a past medical history of hyperlipidemia, CAD, COPD, on 2 L home oxygen, pulmonary nodules, and anxiety presented to [facility] ED on 9/28/2023 for shortness of breath and weakness. Wife also endorsed worsening nonproductive cough and lower extremity swelling.  Wife noticed chocolate" colored urine at approximately 15:30 on 09/28, prompting them to ED. He had had to increase home oxygen to 3 L secondary to increased dyspnea.  Patient received albuterol nebulizer treatment en route with EMS.  He was given an hour long nebulizer treatment, magnesium, and Solu-Medrol upon arrival to ED. Patient remained hypoxic on non-rebreather and  was intubated in ED for airway protection.  Labs revealed WBC 21.13, chloride 109, CO2 19, glucose 190, BNP 22, troponin 0.014, and lactic acid 3.8.  Flu/RSV/COVID were negative.  UA revealed 2+ protein, trace ketones, 3+ occult blood, 1+ bilirubin, and greater than 100 RBCs.  Blood and respiratory cultures were obtained.  Chest x-ray revealed chronic emphysema and scarring within the lungs without appreciable acute superimposed airspace opacity.  Patient was also given LR, IV Zosyn, and IV Azithromycin in ED. Admitted to ICU for close monitoring.  Patient was started on Solu-Medrol 60 mg BID and DuoNebs q.6 hours.  Urology was consulted for hematuria. Ultrasound retroperitoneal complete revealed no abnormalities. Urology recommended continuing indwelling lang with outpatient follow-up. Patient had previous history of Klebsiella pneumonia which was sensitive to Zosyn.  Azithromycin was discontinued.  CTA chest revealed multiple enlarged mediastinal lymph nodes, no filling defects seen to suggest PE, and severe emphysematous changes identified in the lungs with consolidation in the posterior basal segments of bilateral lower lobes demonstrating air bronchograms which may reflect aspiration pneumonia.  NG tube was placed and patient was started on tube feeds.  " Transitioned to prednisone 40 mg daily for 5 day course on 10/01. Patient was extubated on 10/01 and placed on Vapotherm. Speech therapy was consulted. Patient was cleared for downgrade out of ICU on 10/03. Hospital medicine was consulted for transition of care and further medical management. He completed 7 days of Zosyn therapy for possible aspiration pneumonia.  Failed MBS and recommendations made for PEG tube. Palliative was consulted. Patient and family interested in PEG tube.  Gastroenterology following.  Patient is moderate risk for perioperative pulmonary complications and hypercapnia with PEG placement. Pulmonology consulted; follow recommendations. On 10/8 noted to have increased sputum production, leukocytosis noted and slightly increased oxygen needs.  Chest x-ray showed new developing bilateral opacities, collected respiratory culture and started Zosyn again. Requiring Haldol for agitation in-house. Underwent PEG placement on 10/11. Started on TF on 10/12. Zosyn changed to Rocephin which patient will complete 7 days of per Pulmonology recommendations on 10/15. On 10/12, Patient stated he felt better & had no new complaints. On TF, will follow Nutrition recommendation of goal rate for Diabetisource AC feeding with goal rate of 85cc/hr with 30ml/hr water flush to provide 2040 kcal, 102 g protein, 1994 ml free water. On 10/13, patient on 4L O2 per NC with sats ranging 88-93%. Patient complained of pain to peg tube site, but tolerating feedings with abdominal binder in place. Patient at 75ml/hr with tube feedings so should be at goal by 3pm today. Aspiration precautions in place. No bowel movement noted since 10/4. Patient is AAOx4; confused at times.  Participating with therapy. Functional status includes contact guard assist needed for bed mobility, moderate assist for transfers with RW, walking 40ft with RW at contact guard assist to minimal assist, total assist for lower body dressing and eating,  moderate assist for toileting and setup for urinal, and stand by assist for grooming sitting in chair. Patient was evaluated, accepted, and admitted to inpatient rehab to improve functional status. Transferred to [facility] on 10/13 without incident.       10/20: Seen with PT during re-evaluation, Robert w/[facility]. Tells me he had kind of a rough night last night 2/2 bowels moving following suppository yesterday. Scheduled MBS this afternoon. Discussed possibility of transitioning to Bolus feeds or TF at night if on po diet during the day. Nearly Modified-Independent with PT with the exception of stairs 2/2 symptomatic SpO2 drop. Recovers quickly. No stairs at home. VSSAF with above mentioned. IM following.         Review of Systems  Psychiatric:  Has a history of depression on Zoloft. He quit smoking in 2009 and quit drinking a few years before that.    Depression/Anxiety:      sertraline tablet 100 mg qd  Pain:  headaches  acetaminophen oral solution 650 mg q6h PRN pain    Bowels/Bladder: last BM 10/20 following suppository on 10/19   Appetite: NPO. Tolerating TF. MBS 10/20     Sleep: decreased overnight-improving. Poor overnight 2/2 BM    melatonin tablet 6 mg qHS  QUEtiapine tablet 25 mg qHS  hydrOXYzine pamoate capsule 50 mg nightly PRN Insomnia      Physical Exam  General: well-developed, well-nourished, in no acute distress  Respiratory: equal chest rise, + SOB w/activity, no audible wheeze, 4LO2 NC  Cardiovascular: regular rate and rhythm, no edema  Gastrointestinal: soft, non-tender, non-distended   Musculoskeletal: full range of motion of all extremities/spine with generalized weakness  Integumentary: no rashes or skin lesions present  Neurologic: cranial nerves intact, no signs of peripheral neurological deficit, motor/sensory function intact                            Assessment/Plan  Hospital   COPD exacerbation   Dysphagia   Acute hypoxemic respiratory failure   Sepsis   Aspiration pneumonia   Hematuria      Non-Hospital   Generalized anxiety disorder   Former smoker   Pulmonary nodule   Carotid bruit   Hyperlipidemia   Inguinal hernia   Plantar fasciitis   Polyp of colon   End stage COPD   Anxiety   Chronic respiratory failure with hypoxia   Moderate malnutrition   Unspecified cataract   Coronary artery disease       Wounds: PEG site intact, wounds-left pinky toe, right great toe, right heel  s/p PEG placement 10/11  Precautions: aspiration, fall risk  Bracing: RW/Rollator  Swallowing: NPO. TF. Dietician/ST following  Function: Tolerating therapy. Continue PT/OT  VTE Prophylaxis:   enoxaparin injection 40 mg q24hr  Code Status: FULL CODE   Discharge: Lives with his spouse in [place] in a single level home with a small threshold step to enter the residence. Completed college. He has no  history. He is retired. 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 Wife is retired. She drives. She can physically assist the patient after rehab. She states that the patient had been the one to do most of the cooking and grocery shopping prior to January. He got sick in January. She took over the household duties. He was just getting back to taking over some of the cooking and grocery shopping duties when he got sick again. Wife can do the household chores. Children: (5).  Date pending.

## 2023-10-20 NOTE — PROGRESS NOTES
Inpatient Nutrition Assessment    Admit Date: 10/13/2023   Total duration of encounter: 7 days     Nutrition Recommendation/Prescription     Change tube feedings Diabetisource AC to bolus 480 ml bolus TID +  240 ml bolus HS for total of 1680 ml /d. Flush with 125 mL every 4 hours:   2016 kcal, 100% needs  101 g protein, 112% needs  2120 ml free water, 100% needs  Monitor tolerance    2.  Reweigh patient and then weekly wts    3.   Monitor glucose    Communication of Recommendations: reviewed with nurse    Nutrition Assessment     Malnutrition Assessment/Nutrition-Focused Physical Exam    Malnutrition Context: acute illness or injury (10/15/23 1057)  Malnutrition Level: moderate (10/15/23 1057)  Energy Intake (Malnutrition): other (see comments) (does not meet criteria) (10/15/23 1057)  Weight Loss (Malnutrition): other (see comments) (Unable to obtain) (10/15/23 1057)  Subcutaneous Fat (Malnutrition): mild depletion (10/15/23 1057)  Orbital Region (Subcutaneous Fat Loss): mild depletion  Upper Arm Region (Subcutaneous Fat Loss): mild depletion     Muscle Mass (Malnutrition): mild depletion (10/15/23 1057)  Orthodoxy Region (Muscle Loss): mild depletion  Clavicle Bone Region (Muscle Loss): mild depletion                             A minimum of two characteristics is recommended for diagnosis of either severe or non-severe malnutrition.    Chart Review    Reason Seen: follow-up    Malnutrition Screening Tool Results   Have you recently lost weight without trying?: No  Have you been eating poorly because of a decreased appetite?: No   MST Score: 0   Diagnosis:  Acute on chronic hypoxemic respiratory failure 2/2 COPD exacerbation and bilateral aspiration pneumonia    Relevant Medical History: bronchitis, COPD, CAD, hyperlipidemia, left carotid bruit, and pulmonary nodules, S/P PEG 10/7/23    Nutrition-Related Medications: ferrous sulfate, miralax  Calorie Containing IV Medications: no significant kcals from medications at  "this time    Nutrition-Related Labs:   10/16: RBC-4.22 L, H/H-12.5/39.5 L, BUN-33.9 H, glu-117 H  10/14:  H/H 12.1 L/ 38 L, Fe 31L, BUN 38.2H, Glu 133 H, Alb 3 L, PAB 16      Diet/PN Order: No diet orders on file NPO  Oral Supplement Order: none  Tube Feeding Order:  Diabetisource AC bolus 480 ml TID + 240 ml bolus HS (see below for calculation)  Appetite/Oral Intake: not applicable/NPO  Factors Affecting Nutritional Intake: difficulty/impaired swallowing  Food/Moravian/Cultural Preferences: unable to obtain  Food Allergies: no known food allergies    Wound(s):   sacral spine redness    Last Bowel Movement: 10/19/23    Comments    10/15:   Pt is a new admit to rehab.  Pt receiving tube feedings at goal rate and tolerating well.  Goal rate meeting 100% of estimated nutritional needs.  Last bowel movement 2 days ago - may consider bowel regimen.  Labs / meds reviewed.  Glucose slightly elevated - agree current lower carb formula.  Noted wt discrepancies from admit - recommend reweigh and trend wts.    10/17: RN reports TF running at goal rate of 85 mL/hr, which is meeting 100% estimated nutritional needs. Pt tolerating TF, last BM 10/16 noted. Per RD note at previous facility, -180 lb with reported weight history of weight loss in January then re-gain of weight back. Admit weight likely error. Rec re-weigh patient and trend weights.    10/20: Pt remains NPO. Will change continuous enteral nutrition to bolus. No reports nausea, vomiting, chewing or swallowing. Pt needs to be reweighed due to discrepancy in weights.     Anthropometrics    Height: 6' 2" (188 cm) Height Method: Stated  Last Weight: 75.7 kg (166 lb 14.2 oz) (10/14/23 8025) Weight Method: Standard Scale  BMI (Calculated): 21.4  BMI Classification: underweight (BMI less than 22 if >65 years of age)        Ideal Body Weight (IBW), Male: 190 lb     % Ideal Body Weight, Male (lb): 96.89 %                 Usual Body Weight (UBW), k.2 kg  % Usual " Body Weight: 94.59     Usual Weight Provided By:  prior RD obtained form family    Wt Readings from Last 5 Encounters:   10/14/23 75.7 kg (166 lb 14.2 oz)   23 83.5 kg (184 lb 1.6 oz)   23 78.9 kg (174 lb)   23 82.1 kg (181 lb)   23 90.2 kg (198 lb 13.7 oz)     Weight Change(s) Since Admission:  Admit Weight: 83.5 kg (184 lb 1.4 oz) (10/13/23 1720)  10/15:  Noted 7.8 kg wt decrease in 24 hours -- likely wt error.  Need to reweigh.    Estimated Needs    Weight Used For Calorie Calculations: 75.7 kg (166 lb 14.2 oz)  Energy Calorie Requirements (kcal): 2156-3430 kcal/d (25-30 kcal/kg)  Energy Need Method: Kcal/kg  Weight Used For Protein Calculations: 75.7 kg (166 lb 14.2 oz)  Protein Requirements: 91 g Pro/d (1.2g/kg)  Fluid Requirements (mL): 7281-1559 ml/d (1ml/kcal)  Temp (24hrs), Av °F (36.7 °C), Min:97.6 °F (36.4 °C), Max:98.4 °F (36.9 °C)       Enteral Nutrition    Formula: Diabetisource AC  Rate/Volume: 480 ml bolus TID and 240 mL bolus HS  Water Flushes: 125 mL/hr every 4 hours  Additives/Modulars: none at this time  Route: PEG tube  Method: bolus  Total Nutrition Provided by Tube Feeding, Additives, and Flushes:  Calories Provided  2016 kcal/d, 100% needs   Protein Provided  101 g/d, 112% needs   Fluid Provided  2120 ml/d, 100% needs   Continuous feeding calculations based on estimated 20 hr/d run time unless otherwise stated.    Parenteral Nutrition    Patient not receiving parenteral nutrition support at this time.    Evaluation of Received Nutrient Intake    Calories: meeting estimated needs  Protein: meeting estimated needs    Patient Education    Not applicable.    Nutrition Diagnosis     PES: Malnutrition related to catabolic illness as evidenced by mild muscle and fat wasting. (continues)    Interventions/Goals     Intervention(s): modified composition of enteral nutrition and collaboration with other providers  Goal: Meet greater than 75% of nutritional needs by follow-up.  (goal progressing)    Monitoring & Evaluation     Dietitian will monitor enteral nutrition intake and weight change.  Nutrition Risk/Follow-Up: moderate (follow-up in 3-5 days)   Please consult if re-assessment needed sooner.

## 2023-10-21 NOTE — PT/OT/SLP PROGRESS
Speech Language Pathology Treatment          Helder Sharp Jr.   MRN: [id number]     Diet recommendations: Solid Diet Level: NPO  Liquid Diet Level: NPO     Therapy Minutes  SLP Treatment Date: 10/21/23  Speech Start Time: 1000  Speech Stop Time: 1030  Speech Total (min): 30 min  SLP Individual: 30    Billable Minutes:  Treatment Swallowing Dysfunction 30 and Total Time 30    General Precautions: Standard, aspiration          Subjective:  Slp services delivered in pt's room per pt's request. Communication with CNA regarding pt's request to remain in room.  Pt looking forward to MBS and verbalized feeling upset that it wasn't completed yesterday. Pt also commented that he wasn't feeling well but would like to participate.    Objective:   Patient alert, appropriate, and cooperative;      Pain/Comfort  Pain Rating 1: 0/10  Pain Rating Post-Intervention 1: 0/10    Heart rate: 89  Bp: 101/63    SLP reviewed recent MBS and instructed pt in the following exercises based on swallow pathophysiology:  Isometric Chin tuck against resistance: x60 second hold x10    Assessment:  Helder Sharp Jr. is a 83 y.o. male with a SLP diagnosis of Dysphagia. Continued SLP services warranted to address swallow function/safety for PO intake of LRD.    Discharge recommendations:       Goals:   Multidisciplinary Problems       SLP Goals          Problem: SLP    Goal Priority Disciplines Outcome   SLP Goal     SLP Ongoing, Progressing   Description:  LTG: To tolerate least restrictive diet without clinical signs/sx of aspiration.   ST. Tongue base/laryngeal excursion exercise   2. Tolerate thermal stimulation x10 with 100% swallow response with less than a 2 second delay.   3. Puree solids without clinical signs/sx of aspiration.                        Plan:   Patient to be seen Therapy Frequency: 5 x/week   Planned Interventions: Dysphagia Therapy  Plan of Care Expires: 10/28/23  Plan of Care reviewed with: patient  SLP Follow-up?:  Yes  SLP - Next Visit Date: 10/16/23        10/21/2023

## 2023-10-21 NOTE — NURSING
Patient said that he feels light headed. Vital signs are WNL. NO nausea, vomiting, abdominal distention or pain noted per patient.   RAMÍREZ Cadena notified. Order noted to increase PEG tube water flushes to 150ml.

## 2023-10-22 NOTE — PT/OT/SLP PROGRESS
"Occupational Therapy Inpatient Rehab Treatment    Name: Helder Sharp Jr.  MRN: [id number]    Assessment:  Helder Sharp Jr. is a 83 y.o. male admitted with a medical diagnosis of COPD exacerbation.  He presents with the following impairments/functional limitations:  weakness, impaired endurance, impaired functional mobility, gait instability.    General Precautions: Standard, fall, NPO, aspiration     Orthopedic Precautions:N/A     Braces: N/A    Rehab Prognosis: Good; patient would benefit from acute skilled OT services to address these deficits and reach maximum level of function.      History:     Past Medical History:   Diagnosis Date    Acute bronchitis     Anxiety disorder, unspecified     COPD (chronic obstructive pulmonary disease)     Coronary artery disease     Hyperlipidemia     Left carotid bruit     Pulmonary nodules     Unspecified cataract        Past Surgical History:   Procedure Laterality Date    APPENDECTOMY      CATARACT EXTRACTION Left 01/03/2023    ESOPHAGOGASTRODUODENOSCOPY W/ PEG N/A 10/11/2023    Procedure: PEG;  Surgeon: JULIETTE Duffy MD;  Location: [facility] ENDOSCOPY;  Service: Gastroenterology;  Laterality: N/A;    HERNIA REPAIR      PHACOEMULSIFICATION, CATARACT, WITH IOL INSERTION Left 1/3/2023    Procedure: PHACOEMULSIFICATION, CATARACT, WITH IOL INSERTION- OS;  Surgeon: Louis Abdi MD;  Location: [facility] OR;  Service: Ophthalmology;  Laterality: Left;    TONSILLECTOMY         Subjective     Orientation: Oriented x4    Chief Complaint: Has been feeling unwell lately     Patient/Family Comments/goals: "I have just been feeling SOB and lightheaded but I am willing to do something"     Respiratory Status: Nasal cannula, flow 4 L/min    Patients cultural, spiritual, Christianity conflicts given the current situation: yes       Objective:     Patient found supine with PEG Tube, oxygen, peripheral IV  upon OT entry to room.    Mobility   Patient completed:  Supine to Sit with " independence  Sit to Stand Transfer with stand by assistance with rolling walker  Stand to Sit Transfer with stand by assistance with rolling walker    Limiting Factors for ADLs: motor, endurance, limited ROM, balance, and weakness     Therapeutic Activities  Patient completed standing card game to build standing tolerance and overall endurance. Patient able to stand for ~4 min before seated rest break due to fatigue    Therapeutic Exercise  Patient completed UE exercises to build UE strength and endurance. Pt completed 3x12 reps bicep curls with 3 lb dumbbell, 3x10 reps front punches. Pt tolerated exercises well. Rest breaks provided after each set due to fatigue and poor activity tolerance.     Patient left sitting edge of bed with all lines intact and call button in reach.     Education provided: Roles and goals of OT, ADLs, transfer training, modified goals, sequencing, and safety precautions    Multidisciplinary Problems       Occupational Therapy Goals          Problem: Occupational Therapy    Goal Priority Disciplines Outcome Interventions   Occupational Therapy Goal     OT, PT/OT Ongoing, Progressing    Description: ADLs: Goals to be achieved by Re-Evaluation.   Pt to perform grooming tasks with independence.   Pt to perform feeding tasks with independence. - Goal to be worked on only when patient is medically cleared to eat. Patient is NPO at time of evaluation.    Pt to perform UB dressing with independence.   Pt to perform LB dressing with Supervision/CGA with RW & AE as needed.    Pt to perform putting on/off footwear task with Min A with AE as needed. MET  Pt to perform toileting with Min A. MET  Updated: Pt to perform toileting with SBA.   Pt to perform bathing with Min A.     Functional Transfers:  Pt to perform toilet transfers with Supervision/CGA with RW & BSC over toilet. MET  Updated: Pt to perform toilet transfers with independence by D/C.   Pt to perform a tub transfer with Supervision/CGA with  RW & TTB.      IADLs:  Pt to perform age-appropriate IADL's with Supervision/CGA.    Balance, Strengthening, Endurance, Balance:  Pt to demonstrate consistent adherence to breathing control and energy conservation techniques as educated by OT.                        Time Tracking     OT Received On: 10/22/23  Time In 1130     Time Out 1200  Total Time 30 min  Therapy Time: OT Individual: 30  Missed Time:    Missed Time Reason:      Billable Minutes: Therapeutic Activity 15 and Therapeutic Exercise 15    10/22/2023

## 2023-10-22 NOTE — PROGRESS NOTES
"Subjective  HPI: 83 y.o. WM with a past medical history of hyperlipidemia, CAD, COPD, on 2 L home oxygen, pulmonary nodules, and anxiety presented to [facility] ED on 9/28/2023 for shortness of breath and weakness. Wife also endorsed worsening nonproductive cough and lower extremity swelling.  Wife noticed chocolate" colored urine at approximately 15:30 on 09/28, prompting them to ED. He had had to increase home oxygen to 3 L secondary to increased dyspnea.  Patient received albuterol nebulizer treatment en route with EMS.  He was given an hour long nebulizer treatment, magnesium, and Solu-Medrol upon arrival to ED. Patient remained hypoxic on non-rebreather and  was intubated in ED for airway protection.  Labs revealed WBC 21.13, chloride 109, CO2 19, glucose 190, BNP 22, troponin 0.014, and lactic acid 3.8.  Flu/RSV/COVID were negative.  UA revealed 2+ protein, trace ketones, 3+ occult blood, 1+ bilirubin, and greater than 100 RBCs.  Blood and respiratory cultures were obtained.  Chest x-ray revealed chronic emphysema and scarring within the lungs without appreciable acute superimposed airspace opacity.  Patient was also given LR, IV Zosyn, and IV Azithromycin in ED. Admitted to ICU for close monitoring.  Patient was started on Solu-Medrol 60 mg BID and DuoNebs q.6 hours.  Urology was consulted for hematuria. Ultrasound retroperitoneal complete revealed no abnormalities. Urology recommended continuing indwelling lang with outpatient follow-up. Patient had previous history of Klebsiella pneumonia which was sensitive to Zosyn.  Azithromycin was discontinued.  CTA chest revealed multiple enlarged mediastinal lymph nodes, no filling defects seen to suggest PE, and severe emphysematous changes identified in the lungs with consolidation in the posterior basal segments of bilateral lower lobes demonstrating air bronchograms which may reflect aspiration pneumonia.  NG tube was placed and patient was started on tube feeds.  " Transitioned to prednisone 40 mg daily for 5 day course on 10/01. Patient was extubated on 10/01 and placed on Vapotherm. Speech therapy was consulted. Patient was cleared for downgrade out of ICU on 10/03. Hospital medicine was consulted for transition of care and further medical management. He completed 7 days of Zosyn therapy for possible aspiration pneumonia.  Failed MBS and recommendations made for PEG tube. Palliative was consulted. Patient and family interested in PEG tube.  Gastroenterology following.  Patient is moderate risk for perioperative pulmonary complications and hypercapnia with PEG placement. Pulmonology consulted; follow recommendations. On 10/8 noted to have increased sputum production, leukocytosis noted and slightly increased oxygen needs.  Chest x-ray showed new developing bilateral opacities, collected respiratory culture and started Zosyn again. Requiring Haldol for agitation in-house. Underwent PEG placement on 10/11. Started on TF on 10/12. Zosyn changed to Rocephin which patient will complete 7 days of per Pulmonology recommendations on 10/15. On 10/12, Patient stated he felt better & had no new complaints. On TF, will follow Nutrition recommendation of goal rate for Diabetisource AC feeding with goal rate of 85cc/hr with 30ml/hr water flush to provide 2040 kcal, 102 g protein, 1994 ml free water. On 10/13, patient on 4L O2 per NC with sats ranging 88-93%. Patient complained of pain to peg tube site, but tolerating feedings with abdominal binder in place. Patient at 75ml/hr with tube feedings so should be at goal by 3pm today. Aspiration precautions in place. No bowel movement noted since 10/4. Patient is AAOx4; confused at times.  Participating with therapy. Functional status includes contact guard assist needed for bed mobility, moderate assist for transfers with RW, walking 40ft with RW at contact guard assist to minimal assist, total assist for lower body dressing and eating,  moderate assist for toileting and setup for urinal, and stand by assist for grooming sitting in chair. Patient was evaluated, accepted, and admitted to inpatient rehab to improve functional status. Transferred to [facility] on 10/13 without incident.       10/21: Seen in patient room with wife, lying in bed. O2 via NC in place. States that he is feeling better today. He did have a little light-headed feeling earlier this morning, but not like yesterday. Discussed CXR which appears to be slightly improved. Continuous and Bolus feedings are same total volume, but he feels as if that is the cause of his dizziness and SOB. Increased free water flushes yesterday. Reports having slept from about 1000 to 0430 this morning. No current complaints. Awaiting to attend [employer]. VSSAF with noted /54 at 0500. HR 81. SpO2 96% on 4L O2.         Review of Systems  Psychiatric:  Has a history of depression on Zoloft. He quit smoking in 2009 and quit drinking a few years before that.    Depression/Anxiety:      sertraline tablet 100 mg qd  Pain:  headaches-improved  acetaminophen oral solution 650 mg q6h PRN pain    Bowels/Bladder: last BM 10/20 following suppository on 10/19   Appetite: NPO. Tolerating TF. MBS 10/23    Sleep: decreased overnight-improving.     melatonin tablet 6 mg qHS  QUEtiapine tablet 25 mg qHS  hydrOXYzine pamoate capsule 50 mg nightly PRN Insomnia      Physical Exam  General: well-developed, well-nourished, in no acute distress  Respiratory: equal chest rise, + SOB w/activity, no audible wheeze, 4LO2 NC  Cardiovascular: regular rate and rhythm, no edema  Gastrointestinal: soft, non-tender, non-distended   Musculoskeletal: full range of motion of all extremities/spine with generalized weakness  Integumentary: no rashes or skin lesions present  Neurologic: cranial nerves intact, no signs of peripheral neurological deficit, motor/sensory function intact                Diagnostics:  XR CHEST 1  VIEW  Impression:  Lower lung reticular opacities are stable to slightly improved since 10/17/2023.  Date:                                            10/21/2023  Time:                                           16:39          Assessment/Plan  Hospital   COPD exacerbation   Dysphagia   Acute hypoxemic respiratory failure   Sepsis   Aspiration pneumonia   Hematuria     Non-Hospital   Generalized anxiety disorder   Former smoker   Pulmonary nodule   Carotid bruit   Hyperlipidemia   Inguinal hernia   Plantar fasciitis   Polyp of colon   End stage COPD   Anxiety   Chronic respiratory failure with hypoxia   Moderate malnutrition   Unspecified cataract   Coronary artery disease       Wounds: PEG site intact, wounds-left pinky toe, right great toe, right heel  s/p PEG placement 10/11  Precautions: aspiration, fall risk  Bracing: RW/Rollator  Swallowing: NPO. TF. Dietician/ST following  Function: Tolerating therapy. Continue PT/OT  VTE Prophylaxis:   enoxaparin injection 40 mg q24hr  Code Status: FULL CODE   Discharge: Lives with his spouse in [place] in a single level home with a small threshold step to enter the residence. Completed college. He has no  history. He is retired. 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 Wife is retired. She drives. She can physically assist the patient after rehab. She states that the patient had been the one to do most of the cooking and grocery shopping prior to January. He got sick in January. She took over the household duties. He was just getting back to taking over some of the cooking and grocery shopping duties when he got sick again. Wife can do the household chores. Children: (5).  Date pending.

## 2023-10-23 NOTE — PT/OT/SLP RE-EVAL
"Occupational Therapy Inpatient Rehab Re-Evaluation    Name: Helder Sharp Jr.  MRN: [id number]    Recommendations:     Discharge Recommendations:  Low Intensity Therapy   Discharge Equipment Recommendations: shower chair, bedside commode   Barriers to discharge: Inaccessible home and decreased activity tolerance, anxiety about ability to breathe    Assessment:  Helder Sharp Jr. is a 83 y.o. male admitted with a medical diagnosis of COPD exacerbation.  He presents with the following impairments/functional limitations:  weakness, impaired endurance, impaired self care skills, impaired functional mobility, gait instability, impaired balance, decreased safety awareness, impaired cardiopulmonary response to activity.    General Precautions: Standard, fall, aspiration, NPO     Orthopedic Precautions:N/A     Braces: N/A    Rehab Prognosis: Good; patient would benefit from acute skilled OT services to address these deficits and reach maximum level of function.      History:     Past Medical History:   Diagnosis Date    Acute bronchitis     Anxiety disorder, unspecified     COPD (chronic obstructive pulmonary disease)     Coronary artery disease     Hyperlipidemia     Left carotid bruit     Pulmonary nodules     Unspecified cataract        Past Surgical History:   Procedure Laterality Date    APPENDECTOMY      CATARACT EXTRACTION Left 01/03/2023    ESOPHAGOGASTRODUODENOSCOPY W/ PEG N/A 10/11/2023    Procedure: PEG;  Surgeon: JULIETTE Duffy MD;  [facility]: [facility] ENDOSCOPY;  Service: Gastroenterology;  Laterality: N/A;    HERNIA REPAIR      PHACOEMULSIFICATION, CATARACT, WITH IOL INSERTION Left 1/3/2023    Procedure: PHACOEMULSIFICATION, CATARACT, WITH IOL INSERTION- OS;  Surgeon: Louis Abdi MD;  Location: [facility] OR;  Service: Ophthalmology;  Laterality: Left;    TONSILLECTOMY         Subjective     Orientation: Oriented x4    Chief Complaint: "I was disappointed that my swallow study did not happen on " "Friday"    Patient/Family Comments/goals: "Go home"    Respiratory Status: Nasal cannula, flow 4 L/min    Patients cultural, spiritual, Advent conflicts given the current situation: yes       Living Environment   Living Environment  People in Home: spouse  Living Arrangements: house  Number of Stairs, Main Entrance: none  Number of Stairs, Within Home, Primary: none  Equipment Currently Used at Home: rollator, shower chair, raised toilet, grab bar  Shower Setup: Tub/Shower combo    Prior Level of Function  BADL: Independent    IADL: Independent    Equipment used at home: rollator, oxygen, grab bar, shower chair (elevated toilet with grab bars).  DME owned : rolling walker.      Upon discharge, patient will have assistance from wife/family.    Objective:     Patient found up in chair with oxygen  upon OT entry to room.    Mobility   Patient completed:  Sit to Stand Transfer with independence with rollator  Stand to Sit Transfer with independence with rollator  Toilet Transfer Step Transfer technique with supervision with  drop arm commode and rollator  Tub Transfer Step Transfer technique with touch assist with rolling walker Pt was able to step over tub ledge using grab bars and sit in standard shower chair. No LOB noted, wife stated that this is how Pt will get in and out of the tub at home.    Functional Mobility   Pt completed functional mobility from  > ADL bathroom to complete family training via RW and no LOB noted.     ADLs: Pt was offered a shower for re-evaluation. Pt refused shower but was accepting of sponge bath. Pt completed sponge bath for UB and washed hair; Pt stated, "I did my bottom already this morning". Discussed in family training that we can schedule a shower in the afternoon before D/C. Pt's daughter says that Pt stated he is uncomfortable showering with two women. OTS and OT offered to give as much privacy as possible. Pt said "okay".   Current Status   Eating 88   Oral Hygiene 6 "   Shower, Bathe Self 4 SPV for safety   Upper Body Dressing 6   Lower Body Dressing 6   Toileting Hygiene 6   Toilet Transfer 4 per report    Putting On, Taking Off Footwear 6     Limiting Factors for ADLs: motor, endurance, weakness, and safety awareness, anxiety about breathing and activity tolerance    Additional Treatments   Pt's wife and daughter present for family training. Pt performed toilet t/f and tub  t/f for FT. Pt practiced stepping over tub ledge using grab bars as that is what he has at home; Pt was able to clear ledge. Pt's wife voiced concern about transfers, educated on use of gait belt and fall prevention/home safety. Pt and family was educated on options to use walker in bathroom 2* rollator not fitting through doorway at home. Pt practiced side stepping with walker in order to fit through narrow spaces. All questions and concerns answered.     LifeStyle Change and Education     Patient left up in chair with  wife and daughter present.    Education provided: ADLs, transfer training, assistive device, wheelchair precautions, safety precautions, fall prevention, equipment recommendations, and home safety    Multidisciplinary Problems       Occupational Therapy Goals          Problem: Occupational Therapy    Goal Priority Disciplines Outcome Interventions   Occupational Therapy Goal     OT, PT/OT Ongoing, Progressing    Description: ADLs: Goals to be achieved by Re-Evaluation.   Pt to perform grooming tasks with independence. MET  Pt to perform feeding tasks with independence. - Goal to be worked on only when patient is medically cleared to eat. Patient is NPO at time of evaluation.   Pt to perform UB dressing with independence. MET, Independent   Pt to perform LB dressing with Supervision/CGA with RW & AE as needed. MET, Independent   Pt to perform putting on/off footwear task with Min A with AE as needed. MET, Independent   Pt to perform toileting with Min A. MET  Updated: Pt to perform toileting  with SBA.  MET  Pt to perform bathing with Min A. MET, SPV    Functional Transfers:  Pt to perform toilet transfers with Supervision/CGA with RW & BSC over toilet. MET  Updated: Pt to perform toilet transfers with independence by D/C.   Pt to perform a tub transfer with Supervision/CGA with RW & TTB.      IADLs:  Pt to perform age-appropriate IADL's with Supervision/CGA.    Balance, Strengthening, Endurance, Balance:  Pt to demonstrate consistent adherence to breathing control and energy conservation techniques as educated by OT.                        Plan     During this hospitalization, patient to be seen 6 x/week to address the identified rehab impairments via self-care/home management, therapeutic activities, therapeutic exercises and progress toward the following goals:    Plan of Care Expires:  10/26/23    Time Tracking     OT Received On: 10/23/23  Time In  (08:30 & 14:00)     Time Out  (09:30 & 14:30)  Total Time    Therapy Time: OT Individual: 90  Missed Time:    Missed Time Reason:      Billable Minutes: Re-eval 15 and Self Care/Home Management 75    10/23/2023

## 2023-10-23 NOTE — PT/OT/SLP PROGRESS
[facility]  Speech Language Pathology Department  Dysphagia Therapy Progress Note    Patient Name:  Helder Sharp Jr.   MRN:  [id number]  Admitting Diagnosis: COPD exacerbation    Recommendations:     General recommendations:  Repeat Modified Barium Swallow Study and dysphagia therapy  Diet texture/consistency recommendations:  NPO  Medications: NPO  Barriers to safe discharge:  none    Subjective     Patient awake, alert, and cooperative.    Pain/Comfort:  0/10    Spiritual/Cultural/Advent Beliefs/Practices that affect care: no    Respiratory Status: nasal cannula    Objective:     Therapeutic Activities:  Pt completed base of tongue retraction exercises x70 with minimal cues.  Pt completed laryngeal strengthening exercises x90 with minimal cues.    Assessment:     Pt continues to present with oropharyngeal dysphagia and remains unsafe for PO diet.    Goals:     Multidisciplinary Problems       SLP Goals          Problem: SLP    Goal Priority Disciplines Outcome   SLP Goal     SLP Ongoing, Progressing   Description:  LTG: To tolerate least restrictive diet without clinical signs/sx of aspiration.   ST. Tongue base/laryngeal excursion exercise   2. Tolerate thermal stimulation x10 with 100% swallow response with less than a 2 second delay.   3. Puree solids without clinical signs/sx of aspiration.                     Patient Education:     Patient provided with verbal education regarding SLP POC.  Understanding was verbalized, however additional teaching warranted.    Plan:     Will continue to follow and tx as appropriate.    SLP Follow-Up:  Yes   Patient to be seen:  5 x/week   Plan of Care expires:  10/28/23  Plan of Care reviewed with:  patient       Time Tracking:     SLP Treatment Date:   10/23/23  Speech Start Time:  1300  Speech Stop Time:  1330     Speech Total Time (min):  30 min    Billable minutes:  Treatment of Swallow Dysfunction, 30  minutes       10/23/2023

## 2023-10-23 NOTE — PROGRESS NOTES
[facility] ([facility])  Rehab Progress Note    Patient Name: Helder Sharp Jr.  MRN: [id number]  Age: 83 y.o. Sex: male  : 1940  Hospital Length of Stay: 10 days  Date of Service: 10/23/2023   Chief Complaint: Acute on chronic hypoxemic respiratory failure 2/2 COPD exacerbation and bilateral aspiration pneumonia    Subjective:     Basic Information  Admit Information: 83-year-old WM presented to [facility] on  with complaints of continued shortness of breath and weakness.  PMH significant for bronchitis, COPD, CAD, hyperlipidemia, left carotid bruit, and pulmonary nodules.  Workup significant for COPD exacerbation with respiratory failure requiring intubation.  Hematuria appreciated.  Suspected aspiration pneumonia.  Initiated Zosyn.  CT chest significant for multiple enlarged mediastinal lymph nodes, no filling defects seen suggest PE, severe emphysema ptosis changes identified in the lungs with consolidation in the posterior basal segments of bilateral lower lobes demonstrating air bronchograms which may reflect aspiration pneumonia.  Tolerated extubation on 10/01 and initiated Vapotherm.  Downgraded to Medicine Unit on 10/03.  Plans for trilogy at home.  Continued with oropharyngeal dysphagia.  Tolerated PEG tube placement on 10/7.  Repeat sputum culture grew E coli.  Initiated Rocephin. Tolerated transfer to [facility] inpatient rehab unit on 10/13 without incident.  Today's Information: No acute events overnight.  Sitting up in chair tolerating 4 L nasal cannula.  Reports poor sleep overnight.  Tolerating tube feedings.  Last BM 10/21.  Vital signs at goal with no recorded fevers.  H&H 11.6/37-trending down.  CMP unremarkable.  No new imaging today.    Review of patient's allergies indicates:   Allergen Reactions    Flexeril [cyclobenzaprine]         Current Facility-Administered Medications:     acetaminophen oral solution 650 mg, 650 mg, Oral, Q6H PRN, Jamee Cheung FNP,  "650 mg at 10/21/23 1519    albuterol-ipratropium 2.5 mg-0.5 mg/3 mL nebulizer solution 3 mL, 3 mL, Nebulization, Q4H WAKE, Jamee Cheung FNP, 3 mL at 10/22/23 1949    benzonatate capsule 100 mg, 100 mg, Oral, TID PRN, Jamee Cheung, FNP    bisacodyL suppository 10 mg, 10 mg, Rectal, Daily PRN, Jamee Cheung FNP, 10 mg at 10/19/23 2055    enoxaparin injection 40 mg, 40 mg, Subcutaneous, Q24H (prophylaxis, 1700), Jamee Cheung FNP, 40 mg at 10/22/23 1717    famotidine tablet 20 mg, 20 mg, Oral, BID PRN, Jamee Cheung FNP    ferrous sulfate tablet 1 each, 1 tablet, Oral, Daily, Jamee Cheung FNP, 1 each at 10/22/23 0822    fluticasone furoate-vilanteroL 200-25 mcg/dose diskus inhaler 1 puff, 1 puff, Inhalation, Daily, Jamee Cheung FNP, 1 puff at 10/22/23 0800    guaiFENesin 100 mg/5 ml syrup 200 mg, 200 mg, Per NG tube, Q6H, Jamee Cheung FNP, 200 mg at 10/22/23 1717    hydrALAZINE injection 10 mg, 10 mg, Intravenous, Q6H PRN, Jamee Cheung FNP    labetalol 20 mg/4 mL (5 mg/mL) IV syring, 10 mg, Intravenous, Q4H PRN, Jamee Cheung, FNP    melatonin tablet 6 mg, 6 mg, Oral, Nightly, Jamee Cheung FNP, 6 mg at 10/22/23 2058    metoprolol injection 10 mg, 10 mg, Intravenous, Q2H PRN, Jamee Cheung, FNP    nitroGLYCERIN SL tablet 0.4 mg, 0.4 mg, Sublingual, Q5 Min PRN, Jamee Cheung, ALICEP    ondansetron disintegrating tablet 4 mg, 4 mg, Oral, Q6H PRN, Jamee Cheung FNP    polyethylene glycol packet 17 g, 17 g, Per G Tube, Daily, Jamee Cheung FNP, 17 g at 10/22/23 0822    sertraline tablet 100 mg, 100 mg, Oral, Daily, Jamee Cheung FNP, 100 mg at 10/22/23 0822     Review of Systems   Complete 12-point review of symptoms negative except for what's mentioned in HPI     Objective:     /61   Pulse 79   Temp 98 °F (36.7 °C) (Oral)   Resp 20   Ht 6' 2" (1.88 m)   Wt 76.2 kg (167 lb 15.9 oz)   SpO2 97%   BMI 21.57 kg/m²      Physical " Exam  Vitals reviewed.   Eyes:      Pupils: Pupils are equal, round, and reactive to light.   Cardiovascular:      Rate and Rhythm: Normal rate and regular rhythm.      Heart sounds: Normal heart sounds.   Pulmonary:      Effort: Pulmonary effort is normal.      Breath sounds: Normal breath sounds.      Comments: Diminished breath sounds with nasal cannula 4 L  Abdominal:      General: Bowel sounds are normal.      Comments: Peg tube in place with clean applied dressings    Musculoskeletal:         General: Normal range of motion.      Comments: Muscle wasting and diffuse atrophy    Skin:     General: Skin is warm.   Neurological:      General: No focal deficit present.      Mental Status: He is alert and oriented to person, place, and time.   Psychiatric:         Mood and Affect: Mood normal.     *MD performed and documented physical examination       Lines/Drains/Airways       Drain  Duration                  Gastrostomy/Enterostomy 10/11/23 Percutaneous endoscopic gastrostomy (PEG) LUQ feeding 12 days                    Labs  Recent Results (from the past 24 hour(s))   Comprehensive Metabolic Panel    Collection Time: 10/23/23  5:32 AM   Result Value Ref Range    Sodium Level 142 136 - 145 mmol/L    Potassium Level 4.3 3.5 - 5.1 mmol/L    Chloride 104 98 - 107 mmol/L    Carbon Dioxide 28 23 - 31 mmol/L    Glucose Level 103 82 - 115 mg/dL    Blood Urea Nitrogen 33.4 (H) 8.4 - 25.7 mg/dL    Creatinine 0.91 0.73 - 1.18 mg/dL    Calcium Level Total 9.3 8.8 - 10.0 mg/dL    Protein Total 6.6 5.8 - 7.6 gm/dL    Albumin Level 3.2 (L) 3.4 - 4.8 g/dL    Globulin 3.4 2.4 - 3.5 gm/dL    Albumin/Globulin Ratio 0.9 (L) 1.1 - 2.0 ratio    Bilirubin Total 0.6 <=1.5 mg/dL    Alkaline Phosphatase 64 40 - 150 unit/L    Alanine Aminotransferase 22 0 - 55 unit/L    Aspartate Aminotransferase 15 5 - 34 unit/L    eGFR >60 mls/min/1.73/m2   Prealbumin    Collection Time: 10/23/23  5:32 AM   Result Value Ref Range    Prealbumin 19.1 16.0  - 42.0 mg/dL   Magnesium    Collection Time: 10/23/23  5:32 AM   Result Value Ref Range    Magnesium Level 2.40 1.60 - 2.60 mg/dL   Phosphorus    Collection Time: 10/23/23  5:32 AM   Result Value Ref Range    Phosphorus Level 2.8 2.3 - 4.7 mg/dL   CBC with Differential    Collection Time: 10/23/23  5:32 AM   Result Value Ref Range    WBC 7.52 4.50 - 11.50 x10(3)/mcL    RBC 3.91 (L) 4.70 - 6.10 x10(6)/mcL    Hgb 11.6 (L) 14.0 - 18.0 g/dL    Hct 37.0 (L) 42.0 - 52.0 %    MCV 94.6 (H) 80.0 - 94.0 fL    MCH 29.7 27.0 - 31.0 pg    MCHC 31.4 (L) 33.0 - 36.0 g/dL    RDW 15.6 11.5 - 17.0 %    Platelet 219 130 - 400 x10(3)/mcL    MPV 8.7 7.4 - 10.4 fL    Neut % 55.9 %    Lymph % 30.9 %    Mono % 9.4 %    Eos % 2.3 %    Basophil % 0.7 %    Lymph # 2.32 0.6 - 4.6 x10(3)/mcL    Neut # 4.21 2.1 - 9.2 x10(3)/mcL    Mono # 0.71 0.1 - 1.3 x10(3)/mcL    Eos # 0.17 0 - 0.9 x10(3)/mcL    Baso # 0.05 <=0.2 x10(3)/mcL    IG# 0.06 (H) 0 - 0.04 x10(3)/mcL    IG% 0.8 %    NRBC% 0.0 %     Radiology  Chest x-ray 10/21/2023, IMPRESSION: Lower lung reticular opacities are stable to slightly improved since 10/17/2023.     Assessment/Plan:     83 y.o. WM admitted on 10/13/2023     Acute on chronic hypoxemic respiratory failure   - 2/2 COPD exacerbation and bilateral aspiration pneumonia  - s/p Rocephin 2 g daily (end date 10/15)  - continue                 DuoNeb every 4 hours while awake                 Fluticasone-vilanterol 1 puff daily                 Guaifenesin 200 mg every 6 hours  - follow-up with pulmonology outpatient  - discharge with trilogy     End-stage COPD   - tolerating nasal cannula O2, on home O2  - on trilogy at night at home  - s/p Rocephin 2 g daily (end date 10/15)  - continue                 DuoNeb every 4 hours while awake                 Fluticasone-vilanterol 1 puff daily                 Guaifenesin 200 mg every 6 hours  - follow-up with pulmonology outpatient     Bilateral aspiration pneumonia  - tolerating nasal  cannula O2, on home O2  - s/p Rocephin 2 g daily (end date 10/15)  - continue                 DuoNeb every 4 hours while awake                 Fluticasone-vilanterol 1 puff daily                 Guaifenesin 200 mg every 6 hours  - follow-up with pulmonology outpatient     Oropharyngeal dysphagia  - s/p peg tube placement  - continues to tolerate tube feedings  - speech therapy following     Iron deficiency anemia  - asymptomatic  - H/H stable   - no evidence of active bleeds  - iron levels low  - continue  Ferrous sulfate 325 mg daily (initiated 10/14)  - will closely monitor and transfuse if needed      Insomnia  - stable  - discontinue Seroquel 25 mg at bedtime per patient request   - discontinue Vistaril 50 mg at bedtime per patient request on 10/20  - continue                Melatonin 6 mg at night     Major depressive disorder  - stable  - continue                Zoloft 100 mg daily     Generalized anxiety disorder  - stable  - discontinue BuSpar 10 mg b.i.d. on 10/15 per patient request  - continue                Zoloft 100 mg daily    Constipation  - stable   - continue  Miralax 17 gm daily    Insomnia  - current  - initiate   Vistaril 50 mg at bedtime (initiated 10/23)     AB therapy  Rocephin 2 g daily (end date 10/15)  Zosyn 9/30-10/7     VTE Prophylaxis:  Lovenox 40 mg daily  COVID-19 vaccination status:  Vaccinated x4     POA:  Yes-Maryjo  Living will:  No  Contacts:  Maryjo Sharp (spouse) [phone]                          Huma Moore (daughter) [phone]     CODE STATUS: Full  Internal Medicine (attending): Moises Terry MD  Physiatry (consulting):  Eyad Liriano MD     OUTPATIENT PROVIDERS  PCP:  Kimo Love MD  Urology:  Ben Guajardo MD   Pulmonology: Richardson Root MD  Cardiology:  Larry Gomes MD     DISPOSITION:  Sleep hygiene fair.  Tolerating tube feedings.  Last BM 10/21.  Vital signs at goal with no recorded fevers.  H&H 11.6/37-trending down.  Labs otherwise unremarkable.   Tolerating 4 L nasal cannula.  No new imaging today.  MBS to be completed at some point today.  Initiate Vistaril 50 mg at bedtime.  Continue aggressive mobilization as tolerated.  Monitor closely.  Notify of acute changes.  All medications thoroughly reviewed.    Staffing 10/17/2023: Continent of bowel and bladder. Tolerating tube feeds via PEG tube. RT: overall supervision, limited with standing tolerance. PT: bed mobility partial mod on eval. Independent with sit to stands overall. Ambulating 90 feet. OT: overall mod to touch assist. Working on energy conservation. ST: NPO, tolerating therapy. MBS later this week. Projected discharge 10/26.    Osmar Sampson NP conducted independent physical examination and assisted with medical documentation.

## 2023-10-23 NOTE — PLAN OF CARE
Met with pt/wife/dtr during family training.  Discussed OP tx options for discharge.  They all agree with [facility] contracted OP txs at [facility].  FOC on chart.    Reminded family to bring portable oxygen for discharge.

## 2023-10-23 NOTE — PROGRESS NOTES
"Dos 10/23/23  Patient seen and evaluated in OT today  Continues to participate and make progress toward goals  Working on ADL's and IADL's  Discussed with patient and OT  Reviewed chart and discussed with nursing, Dr Terry's primary medicine team, as well as Tammi Edwards, my NP  Agree with present POC   Subjective  HPI: 83 y.o. WM with a past medical history of hyperlipidemia, CAD, COPD, on 2 L home oxygen, pulmonary nodules, and anxiety presented to [facility] ED on 9/28/2023 for shortness of breath and weakness. Wife also endorsed worsening nonproductive cough and lower extremity swelling.  Wife noticed chocolate" colored urine at approximately 15:30 on 09/28, prompting them to ED. He had had to increase home oxygen to 3 L secondary to increased dyspnea.  Patient received albuterol nebulizer treatment en route with EMS.  He was given an hour long nebulizer treatment, magnesium, and Solu-Medrol upon arrival to ED. Patient remained hypoxic on non-rebreather and  was intubated in ED for airway protection.  Labs revealed WBC 21.13, chloride 109, CO2 19, glucose 190, BNP 22, troponin 0.014, and lactic acid 3.8.  Flu/RSV/COVID were negative.  UA revealed 2+ protein, trace ketones, 3+ occult blood, 1+ bilirubin, and greater than 100 RBCs.  Blood and respiratory cultures were obtained.  Chest x-ray revealed chronic emphysema and scarring within the lungs without appreciable acute superimposed airspace opacity.  Patient was also given LR, IV Zosyn, and IV Azithromycin in ED. Admitted to ICU for close monitoring.  Patient was started on Solu-Medrol 60 mg BID and DuoNebs q.6 hours.  Urology was consulted for hematuria. Ultrasound retroperitoneal complete revealed no abnormalities. Urology recommended continuing indwelling lang with outpatient follow-up. Patient had previous history of Klebsiella pneumonia which was sensitive to Zosyn.  Azithromycin was discontinued.  CTA chest revealed multiple enlarged mediastinal lymph nodes, " no filling defects seen to suggest PE, and severe emphysematous changes identified in the lungs with consolidation in the posterior basal segments of bilateral lower lobes demonstrating air bronchograms which may reflect aspiration pneumonia.  NG tube was placed and patient was started on tube feeds.  Transitioned to prednisone 40 mg daily for 5 day course on 10/01. Patient was extubated on 10/01 and placed on Vapotherm. Speech therapy was consulted. Patient was cleared for downgrade out of ICU on 10/03. Hospital medicine was consulted for transition of care and further medical management. He completed 7 days of Zosyn therapy for possible aspiration pneumonia.  Failed MBS and recommendations made for PEG tube. Palliative was consulted. Patient and family interested in PEG tube.  Gastroenterology following.  Patient is moderate risk for perioperative pulmonary complications and hypercapnia with PEG placement. Pulmonology consulted; follow recommendations. On 10/8 noted to have increased sputum production, leukocytosis noted and slightly increased oxygen needs.  Chest x-ray showed new developing bilateral opacities, collected respiratory culture and started Zosyn again. Requiring Haldol for agitation in-house. Underwent PEG placement on 10/11. Started on TF on 10/12. Zosyn changed to Rocephin which patient will complete 7 days of per Pulmonology recommendations on 10/15. On 10/12, Patient stated he felt better & had no new complaints. On TF, will follow Nutrition recommendation of goal rate for Diabetisource AC feeding with goal rate of 85cc/hr with 30ml/hr water flush to provide 2040 kcal, 102 g protein, 1994 ml free water. On 10/13, patient on 4L O2 per NC with sats ranging 88-93%. Patient complained of pain to peg tube site, but tolerating feedings with abdominal binder in place. Patient at 75ml/hr with tube feedings so should be at goal by 3pm today. Aspiration precautions in place. No bowel movement noted since  10/4. Patient is AAOx4; confused at times.  Participating with therapy. Functional status includes contact guard assist needed for bed mobility, moderate assist for transfers with RW, walking 40ft with RW at contact guard assist to minimal assist, total assist for lower body dressing and eating, moderate assist for toileting and setup for urinal, and stand by assist for grooming sitting in chair. Patient was evaluated, accepted, and admitted to inpatient rehab to improve functional status. Transferred to [facility] on 10/13 without incident.       10/23: Seen in patient room, lying crooked in bed. Reports fair sleep. ADL scheduled for this morning. Patient states that he can't shower because it takes too much out of him. Appears to be feeling a bit down. Tells me about his end of life COPD diagnosis and havig the talk with his kids about dying because of it. He just doesn't know how long he has. MBS scheduled this morning and family training this afternoon. Continue to take rest breaks and work on energy conservation during therapy. VSSAF.           Review of Systems  Psychiatric:  Has a history of depression on Zoloft. He quit smoking in 2009 and quit drinking a few years before that.    Depression/Anxiety:      sertraline tablet 100 mg qd  Pain:  headaches-improved  acetaminophen oral solution 650 mg q6h PRN pain    Bowels/Bladder: last BM 10/20 following suppository on 10/19   Appetite: NPO. Tolerating TF. [facility] 10/23-trials of meds crushed in Puree    Sleep: decreased overnight-improving.     melatonin tablet 6 mg qHS  QUEtiapine tablet 25 mg qHS  hydrOXYzine pamoate capsule 50 mg nightly PRN Insomnia      Physical Exam  General: well-developed, well-nourished, in no acute distress  Respiratory: equal chest rise, + SOB w/activity, no audible wheeze, 4LO2 NC  Cardiovascular: regular rate and rhythm, no edema  Gastrointestinal: soft, non-tender, non-distended   Musculoskeletal: full range of motion of all  extremities/spine with generalized weakness  Integumentary: no rashes or skin lesions present  Neurologic: cranial nerves intact, no signs of peripheral neurological deficit, motor/sensory function intact  *MD performed and documented physical examination                Labs:   Latest Reference Range & Units 10/23/23 05:32   WBC 4.50 - 11.50 x10(3)/mcL 7.52   RBC 4.70 - 6.10 x10(6)/mcL 3.91 (L)   Hemoglobin 14.0 - 18.0 g/dL 11.6 (L)   Hematocrit 42.0 - 52.0 % 37.0 (L)   MCV 80.0 - 94.0 fL 94.6 (H)   MCH 27.0 - 31.0 pg 29.7   MCHC 33.0 - 36.0 g/dL 31.4 (L)   RDW 11.5 - 17.0 % 15.6   Platelet Count 130 - 400 x10(3)/mcL 219   MPV 7.4 - 10.4 fL 8.7   Neut % % 55.9   LYMPH % % 30.9   Mono % % 9.4   Eosinophil % % 2.3   Basophil % % 0.7   Immature Granulocytes % 0.8   Neut # 2.1 - 9.2 x10(3)/mcL 4.21   Lymph # 0.6 - 4.6 x10(3)/mcL 2.32   Mono # 0.1 - 1.3 x10(3)/mcL 0.71   Eos # 0 - 0.9 x10(3)/mcL 0.17   Baso # <=0.2 x10(3)/mcL 0.05   Immature Grans (Abs) 0 - 0.04 x10(3)/mcL 0.06 (H)   nRBC % 0.0   Sodium 136 - 145 mmol/L 142   Potassium 3.5 - 5.1 mmol/L 4.3   Chloride 98 - 107 mmol/L 104   CO2 23 - 31 mmol/L 28   BUN 8.4 - 25.7 mg/dL 33.4 (H)   Creatinine 0.73 - 1.18 mg/dL 0.91   eGFR mls/min/1.73/m2 >60   Glucose 82 - 115 mg/dL 103   Calcium 8.8 - 10.0 mg/dL 9.3   Phosphorus Level 2.3 - 4.7 mg/dL 2.8   Magnesium  1.60 - 2.60 mg/dL 2.40   ALP 40 - 150 unit/L 64   PROTEIN TOTAL 5.8 - 7.6 gm/dL 6.6   Albumin 3.4 - 4.8 g/dL 3.2 (L)   Albumin/Globulin Ratio 1.1 - 2.0 ratio 0.9 (L)   Prealbumin 16.0 - 42.0 mg/dL 19.1   BILIRUBIN TOTAL <=1.5 mg/dL 0.6   AST 5 - 34 unit/L 15   ALT 0 - 55 unit/L 22   Globulin, Total 2.4 - 3.5 gm/dL 3.4   (L): Data is abnormally low  (H): Data is abnormally high                    Diagnostics:  XR CHEST 1 VIEW  Impression:  Lower lung reticular opacities are stable to slightly improved since 10/17/2023.  Date:                                            10/21/2023  Time:                                            16:39          Assessment/Plan  Hospital   COPD exacerbation   Dysphagia   Acute hypoxemic respiratory failure   Sepsis   Aspiration pneumonia   Hematuria     Non-Hospital   Generalized anxiety disorder   Former smoker   Pulmonary nodule   Carotid bruit   Hyperlipidemia   Inguinal hernia   Plantar fasciitis   Polyp of colon   End stage COPD   Anxiety   Chronic respiratory failure with hypoxia   Moderate malnutrition   Unspecified cataract   Coronary artery disease       Wounds: PEG site intact, wounds-left pinky toe, right great toe, right heel  s/p PEG placement 10/11  Precautions: aspiration, fall risk  Bracing: RW/Rollator  Swallowing: NPO. TF. Dietician/ST following. MBS 10/23-trials of meds crushed in Puree   Function: Tolerating therapy. Continue PT/OT  VTE Prophylaxis:   enoxaparin injection 40 mg q24hr  Code Status: FULL CODE   Discharge: Lives with his spouse in [place] in a single level home with a small threshold step to enter the residence. Completed college. He has no  history. He is retired. 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 Wife is retired. She drives. She can physically assist the patient after rehab. She states that the patient had been the one to do most of the cooking and grocery shopping prior to January. He got sick in January. She took over the household duties. He was just getting back to taking over some of the cooking and grocery shopping duties when he got sick again. Wife can do the household chores. Children: (5).  Date 10/26 Thursday.                     Vashti Edwards NP, conducted additional independent physical examination and assisted with medical documentation.

## 2023-10-23 NOTE — PROCEDURES
"[facility] - Rehabilitation Unit  Speech Language Pathology Department  Modified Barium Swallow (MBS) Study    Patient Name:  Helder Sharp Jr.   MRN:  [id number]    Recommendations:     General recommendations:  dysphagia therapy  Repeat MBS study: 1-2 weeks or as clinically indicated  Diet texture/consistency recommendations:  NPO  Medications:  crushed in puree or via PEG tube  General precautions: Standard, aspiration    History/Reason for Referral:     Past Medical History:   Diagnosis Date    Acute bronchitis     Anxiety disorder, unspecified     COPD (chronic obstructive pulmonary disease)     Coronary artery disease     Hyperlipidemia     Left carotid bruit     Pulmonary nodules     Unspecified cataract      Past Surgical History:   Procedure Laterality Date    APPENDECTOMY      CATARACT EXTRACTION Left 01/03/2023    ESOPHAGOGASTRODUODENOSCOPY W/ PEG N/A 10/11/2023    Procedure: PEG;  Surgeon: JULIETTE Duffy MD;  Location: [facility] ENDOSCOPY;  Service: Gastroenterology;  Laterality: N/A;    HERNIA REPAIR      PHACOEMULSIFICATION, CATARACT, WITH IOL INSERTION Left 1/3/2023    Procedure: PHACOEMULSIFICATION, CATARACT, WITH IOL INSERTION- OS;  Surgeon: Louis Abdi MD;  Location: [facility] OR;  Service: Ophthalmology;  Laterality: Left;    TONSILLECTOMY       A MBS Study was repeated to assess the efficiency of his swallow function, rule out aspiration and make recommendations regarding safe dietary consistencies, effective compensatory strategies, and safe eating environment.     Home diet texture/consistency: Regular and thin liquids  Current Method of Nutrition: Tube feeding (PEG tube)    Patient complaint: "I want coffee"    Subjective:     Patient awake, alert, and cooperative.    Spiritual/Cultural/Buddhism Beliefs/Practices that affect care: no  Pain/Comfort:  0/10    Respiratory Status: nasal cannula  Restraints/positioning devices: none    Fluoroscopic Results: "     Oral Musculature  Dentition: own teeth  Secretion Management: adequate  Mucosal Quality: good  Facial Movement: WFL  Buccal Strength & Mobility: WFL  Mandibular Strength & Mobility: WFL    Positioning: seated in wheelchair  Visualization  Patient was seen in the lateral view    Oral Phase:   Adequate lip closure  Loss of bolus control      Pharyngeal Phase:   Swallow delay with spill to the valleculae/pyriform sinus  Reduced base of tongue retraction  Reduced hyolaryngeal excursion  Reduced airway protection  Laryngeal penetration      Consistency Laryngeal Penetration Aspiration Residue   Mildly thick liquid by spoon During the swallow  Did NOT clear None Mild   Moderately thick liquid by spoon None None Mild-moderate   Moderately thick liquid by cup During the swallow None Moderate   Puree None None Mild-moderate     Cervical Esophageal Phase:   UES appeared to accommodate all bolus types without stasis or retrograde movement visualized    Compensatory Strategies:  Head turn and chin tuck strategies was NOT effective for clearing pharyngeal residue    Assessment:     Pt exhibited moderate-severe oropharyngeal dysphagia characterized by the findings noted above.  Laryngeal penetration of mildly and moderately thick liquids were noted.  Both swallow safety and efficiency are impaired.     Patient appears to be at high risk for aspiration related pneumonia when considering severity of dysphagia, reduced airway closure, and reduced mobility.  Prognosis for behavioral swallow rehabilitation is fair.    Goals:     Multidisciplinary Problems       SLP Goals          Problem: SLP    Goal Priority Disciplines Outcome   SLP Goal     SLP Ongoing, Progressing   Description:  LTG: To tolerate least restrictive diet without clinical signs/sx of aspiration.   ST. Tongue base/laryngeal excursion exercise   2. Tolerate thermal stimulation x10 with 100% swallow response with less than a 2 second delay.   3. Puree solids  without clinical signs/sx of aspiration.                     Patient Education:     Patient provided with verbal education regarding MBS results, diet recommendations, and SLP POC.  Understanding was verbalized, however additional teaching warranted.    Plan:     SLP Follow-Up:  Yes    Patient to be seen:  5 x/week   Plan of Care expires:  10/28/23  Plan of Care reviewed with:  patient     Time Tracking:     SLP Treatment Date:   10/23/23  Speech Start Time:  1220  Speech Stop Time:  1250     Speech Total Time (min):  30 min    Billable minutes:   Motion Fluoroscopic Evaluation, Video Recording, 30 minutes     10/23/2023

## 2023-10-23 NOTE — PROGRESS NOTES
Inpatient Nutrition Assessment    Admit Date: 10/13/2023   Total duration of encounter: 10 days     Nutrition Recommendation/Prescription     Continue enteral feedings of Diabetisource AC bolus 480 ml bolus TID +  240 ml bolus HS for total of 1680 ml /d. Flush with 125 mL every 4 hours:   2016 kcal, 100% needs  101 g protein, 112% needs  2120 ml free water, 100% needs  Monitor tolerance    2.  Continue weekly weights    3.   Monitor glucose    Communication of Recommendations: reviewed with nurse    Nutrition Assessment     Malnutrition Assessment/Nutrition-Focused Physical Exam    Malnutrition Context: acute illness or injury (10/15/23 1057)  Malnutrition Level: moderate (10/15/23 1057)  Energy Intake (Malnutrition): other (see comments) (does not meet criteria) (10/15/23 1057)  Weight Loss (Malnutrition): other (see comments) (Unable to obtain) (10/15/23 1057)  Subcutaneous Fat (Malnutrition): mild depletion (10/15/23 1057)  Orbital Region (Subcutaneous Fat Loss): mild depletion  Upper Arm Region (Subcutaneous Fat Loss): mild depletion     Muscle Mass (Malnutrition): mild depletion (10/15/23 1057)  Jew Region (Muscle Loss): mild depletion  Clavicle Bone Region (Muscle Loss): mild depletion                             A minimum of two characteristics is recommended for diagnosis of either severe or non-severe malnutrition.    Chart Review    Reason Seen: follow-up    Malnutrition Screening Tool Results   Have you recently lost weight without trying?: No  Have you been eating poorly because of a decreased appetite?: No   MST Score: 0   Diagnosis:  Acute on chronic hypoxemic respiratory failure 2/2 COPD exacerbation and bilateral aspiration pneumonia    Relevant Medical History: bronchitis, COPD, CAD, hyperlipidemia, left carotid bruit, and pulmonary nodules, S/P PEG 10/7/23    Nutrition-Related Medications: ferrous sulfate, miralax  Calorie Containing IV Medications: no significant kcals from medications at this  "time    Nutrition-Related Labs:   10/23: H/H 11.6(L)/37(L) BUN 33.4(H) Alb 3.2(L)  10/16: RBC-4.22 L, H/H-12.5/39.5 L, BUN-33.9 H, glu-117 H  10/14:  H/H 12.1 L/ 38 L, Fe 31L, BUN 38.2H, Glu 133 H, Alb 3 L, PAB 16        Diet/PN Order: No diet orders on file NPO  Oral Supplement Order: none  Tube Feeding Order:  Diabetisource AC bolus 480 ml TID + 240 ml bolus HS (see below for calculation)  Appetite/Oral Intake: not applicable/NPO  Factors Affecting Nutritional Intake: difficulty/impaired swallowing  Food/Confucianism/Cultural Preferences: unable to obtain  Food Allergies: no known food allergies    Wound(s):   sacral spine redness    Last Bowel Movement: 10/19/23    Comments    10/15:   Pt is a new admit to rehab.  Pt receiving tube feedings at goal rate and tolerating well.  Goal rate meeting 100% of estimated nutritional needs.  Last bowel movement 2 days ago - may consider bowel regimen.  Labs / meds reviewed.  Glucose slightly elevated - agree current lower carb formula.  Noted wt discrepancies from admit - recommend reweigh and trend wts.    10/17: RN reports TF running at goal rate of 85 mL/hr, which is meeting 100% estimated nutritional needs. Pt tolerating TF, last BM 10/16 noted. Per RD note at previous facility, -180 lb with reported weight history of weight loss in January then re-gain of weight back. Admit weight likely error. Rec re-weigh patient and trend weights.    10/20: Pt remains NPO. Will change continuous enteral nutrition to bolus. No reports nausea, vomiting, chewing or swallowing. Pt needs to be reweighed due to discrepancy in weights.     10/23: Pt tolerating enteral nutrition of bolus feedings. No N/V/C/D. Gained 1# in 7 days. MBS is scheduled for today.     Anthropometrics    Height: 6' 2" (188 cm) Height Method: Stated  Last Weight: 76.2 kg (167 lb 15.9 oz) (10/21/23 0507) Weight Method: Standard Scale  BMI (Calculated): 21.6  BMI Classification: underweight (BMI less than 22 if >65 " years of age)        Ideal Body Weight (IBW), Male: 190 lb     % Ideal Body Weight, Male (lb): 96.89 %                 Usual Body Weight (UBW), k.2 kg  % Usual Body Weight: 94.59     Usual Weight Provided By:  prior RD obtained form family    Wt Readings from Last 5 Encounters:   10/21/23 76.2 kg (167 lb 15.9 oz)   23 83.5 kg (184 lb 1.6 oz)   23 78.9 kg (174 lb)   23 82.1 kg (181 lb)   23 90.2 kg (198 lb 13.7 oz)     Weight Change(s) Since Admission:  Admit Weight: 83.5 kg (184 lb 1.4 oz) (10/13/23 1720)  10/15:  Noted 7.8 kg wt decrease in 24 hours -- likely wt error.  Need to reweigh.  10/21: 167# Need to obtain accurate weight.    Estimated Needs    Weight Used For Calorie Calculations: 75.7 kg (166 lb 14.2 oz)  Energy Calorie Requirements (kcal): 1094-1271 kcal/d (25-30 kcal/kg)  Energy Need Method: Kcal/kg  Weight Used For Protein Calculations: 75.7 kg (166 lb 14.2 oz)  Protein Requirements: 91 g Pro/d (1.2g/kg)  Fluid Requirements (mL): 4034-9648 ml/d (1ml/kcal)  Temp (24hrs), Av °F (36.7 °C), Min:98 °F (36.7 °C), Max:98 °F (36.7 °C)       Enteral Nutrition    Formula: Diabetisource AC  Rate/Volume: 480 ml bolus TID and 240 mL bolus HS  Water Flushes: 125 mL/hr every 4 hours  Additives/Modulars: none at this time  Route: PEG tube  Method: bolus  Total Nutrition Provided by Tube Feeding, Additives, and Flushes:  Calories Provided  2016 kcal/d, 100% needs   Protein Provided  101 g/d, 112% needs   Fluid Provided  2120 ml/d, 100% needs   Continuous feeding calculations based on estimated 20 hr/d run time unless otherwise stated.    Parenteral Nutrition    Patient not receiving parenteral nutrition support at this time.    Evaluation of Received Nutrient Intake    Calories: meeting estimated needs  Protein: meeting estimated needs    Patient Education    Not applicable.    Nutrition Diagnosis     PES: Malnutrition related to catabolic illness as evidenced by mild muscle and fat  wasting. (continues)    Interventions/Goals     Intervention(s): modified composition of enteral nutrition and collaboration with other providers  Goal: Meet greater than 75% of nutritional needs by follow-up. (goal progressing)    Monitoring & Evaluation     Dietitian will monitor enteral nutrition intake and weight change.  Nutrition Risk/Follow-Up: moderate (follow-up in 3-5 days)   Please consult if re-assessment needed sooner.

## 2023-10-23 NOTE — PT/OT/SLP PROGRESS
Physical Therapy Inpatient Rehab Treatment    Patient Name:  Helder Sharp Jr.   MRN:  [id number]    Recommendations:     Discharge Recommendations:      Discharge Equipment Recommendations: home with home health   Barriers to discharge: Impaired functional mobility     Assessment:     Helder Sharp Jr. is a 83 y.o. male admitted with a medical diagnosis of COPD exacerbation.  He presents with the following impairments/functional limitations:  weakness, impaired endurance, impaired self care skills, impaired functional mobility, gait instability, impaired balance, decreased safety awareness, impaired cardiopulmonary response to activity . Preformed family training with wife and daughter, explained how pt functional mobility improved and answered all questions pt and family had.     Rehab Diagnosis: s/p PEG with Acute on chronic hypoxemic respiratory failure 2/2 COPD exacerbation and bilateral aspiration pneumonia    General Precautions: Standard, aspiration, fall     Orthopedic Precautions:      Braces:  (abdominal brace for PEG)    Rehab Prognosis: Good; patient would benefit from acute skilled PT services to address these deficits and reach maximum level of function.      History:     Past Medical History:   Diagnosis Date    Acute bronchitis     Anxiety disorder, unspecified     COPD (chronic obstructive pulmonary disease)     Coronary artery disease     Hyperlipidemia     Left carotid bruit     Pulmonary nodules     Unspecified cataract        Past Surgical History:   Procedure Laterality Date    APPENDECTOMY      CATARACT EXTRACTION Left 01/03/2023    ESOPHAGOGASTRODUODENOSCOPY W/ PEG N/A 10/11/2023    Procedure: PEG;  Surgeon: JULIETTE Duffy MD;  Location: [facility] ENDOSCOPY;  Service: Gastroenterology;  Laterality: N/A;    HERNIA REPAIR      PHACOEMULSIFICATION, CATARACT, WITH IOL INSERTION Left 1/3/2023    Procedure: PHACOEMULSIFICATION, CATARACT, WITH IOL INSERTION- OS;  Surgeon: Louis Abdi,  MD;  Location: [facility] OR;  Service: Ophthalmology;  Laterality: Left;    TONSILLECTOMY         Subjective     Patient comments: n/a    Respiratory Status: Nasal cannula, flow 4 L/min    Patients cultural, spiritual, Mu-ism conflicts given the current situation: no    Objective:     Patient found up in chair with oxygen  upon PT entry to room.          Functional Mobility:        Current   Status  Discharge   Goal   Functional Area: Care Score:    Roll Left and Right   Independent   Sit to Lying   Independent   Lying to Sitting on Side of Bed   Independent   Sit to Stand 6  With Rolator  Independent   Chair/Bed-to-Chair Transfer 6 Independent   Car Transfer 6  W/Rolator      Walk 10 Feet 6 Independent   Walk 50 Feet with Two Turns 6  Pt amb 50ft w/Rolator.       Walk 150 Feet       Walk 10 Feet Uneven Surface       1 Step (Curb)       4 Steps       12 Steps       Picking Up Object       Wheel 50 Feet with Two Turns   Independent   Wheel 150 Feet   Independent         Explained to pt and family about importance of using AD. Educated family on picking up any rugs that might be a tripping hazard.     Activity Tolerance: Good    Patient left up in chair with all lines intact and call button in reach.    Education provided: transfer training and gait training    Expected compliance: High compliance    Plan:     During this hospitalization, patient to be seen daily to address the identified rehab impairments via gait training, therapeutic activities, therapeutic exercises, neuromuscular re-education, wheelchair management/training and progress toward the following goals:    GOALS:   Multidisciplinary Problems       Physical Therapy Goals          Problem: Physical Therapy    Goal Priority Disciplines Outcome Goal Variances Interventions   Physical Therapy Goal     PT, PT/OT Ongoing, Progressing     Description: Bed Mobility:  MET - Roll left and right independently.  MET - Sit to supine transfer independently.  MET -  Supine to sit transfer independently.    Transfers:  MET - Sit to stand transfer with supervision/touching assist using RW.  MET - Bed to chair transfer with supervision/touching assist Stand Step using RW.  MET - Sit to stand transfer independently using Rollator vs LRAD.  MET - Bed to chair transfer independently Stand Step using rollator vs LRAD.  MET - Car transfer independently using Rollator vs LRAD.  MET -  an object from the ground in standing position independently using Rollator vs LRAD and reacher.    Mobility:  Ambulate 150 feet independently assist using Rollator vs LRAD.  MET - Push w/c 150 feet with supervision/touching assist using B UE.  MET - Ambulate 15 feet on uneven surfaces/ramps independently using Rollator vs LRAD.  MET - Pt ascended/descended a 4 inch curb independently using Rollator vs LRAD.  Ascend/descend 12 stairs with supervision/touching assist using bilateral handrails.                             Plan of Care Expires:  10/20/23  PT Next Visit Date: 10/26/23  Plan of Care reviewed with: patient    Additional Information:         Time Tracking:     Therapy Time  PT Received On: 10/23/23  PT Start Time: 1330  PT Stop Time: 1400  PT Total Time (min): 30 min   PT Individual: 30  Missed Time:    Time Missed due to:      Billable Minutes: Therapeutic Activity 30    10/23/2023

## 2023-10-23 NOTE — PT/OT/SLP PROGRESS
[facility] - Rehabilitation Unit  Speech Language Pathology Department  Therapy Progress Note    Patient Name:  Helder Sharp Jr.   MRN:  [id number]  Admitting Diagnosis: COPD exacerbation    Recommendations:     General recommendations:  dysphagia therapy  Communication strategies:  none    Subjective     Patient awake, alert, and cooperative.    Pain/Comfort:  0/10  Spiritual/Cultural/Mormon Beliefs/Practices that affect care: no  Respiratory Status: nasal cannula    Objective:     SLP completed family training with pt, wife, and daughter regarding SLPPOC, current functional deficits and continued SLP intervention warranted at home. SLP reviewed MBS results, diet recommendations, and home exercise program. SLP providing handouts on laryngeal elevation exercises and tongue base retraction exercises. All questions and comments were addressed and comprehension was demonstrated by all parties.      Assessment:     Pt continues to present with oropharyngeal dysphagia and remains unsafe for PO diet.    Goals:     Multidisciplinary Problems       SLP Goals          Problem: SLP    Goal Priority Disciplines Outcome   SLP Goal     SLP Ongoing, Progressing   Description:  LTG: To tolerate least restrictive diet without clinical signs/sx of aspiration.   ST. Tongue base/laryngeal excursion exercise   2. Tolerate thermal stimulation x10 with 100% swallow response with less than a 2 second delay.   3. Puree solids without clinical signs/sx of aspiration.  4. Moderately thick liquids without clinical s/sx of aspiration.  5. Ice chips with no clinical s/sx of aspiration.                      Patient Education:     Patient and family were provided with verbal education and handouts regarding SLP POC.  Understanding was verbalized, however additional teaching warranted.    Plan:     Will continue to follow and tx as appropriate.    SLP Follow-Up:  Yes   Patient to be seen:  5 x/week   Plan  of Care expires:  10/28/23  Plan of Care reviewed with:  patient     Time Tracking:     SLP Treatment Date:   10/23/23  Speech Start Time:  1300  Speech Stop Time:  1330     Speech Total Time (min):  30 min    Billable minutes:  Self Care/Home Management, 30 minutes     10/23/2023

## 2023-10-23 NOTE — PLAN OF CARE
Problem: Occupational Therapy  Goal: Occupational Therapy Goal  Description: ADLs: Goals to be achieved by Re-Evaluation.   Pt to perform grooming tasks with independence. MET  Pt to perform feeding tasks with independence. - Goal to be worked on only when patient is medically cleared to eat. Patient is NPO at time of evaluation.   Pt to perform UB dressing with independence. MET, Independent   Pt to perform LB dressing with Supervision/CGA with RW & AE as needed. MET, Independent   Pt to perform putting on/off footwear task with Min A with AE as needed. MET, Independent   Pt to perform toileting with Min A. MET  Updated: Pt to perform toileting with SBA.  MET  Pt to perform bathing with Min A. MET, SPV    Functional Transfers:  Pt to perform toilet transfers with Supervision/CGA with RW & BSC over toilet. MET  Updated: Pt to perform toilet transfers with independence by D/C.   Pt to perform a tub transfer with Supervision/CGA with RW & TTB.      IADLs:  Pt to perform age-appropriate IADL's with Supervision/CGA.    Balance, Strengthening, Endurance, Balance:  Pt to demonstrate consistent adherence to breathing control and energy conservation techniques as educated by OT.   Outcome: Ongoing, Progressing

## 2023-10-23 NOTE — PROGRESS NOTES
[facility] - Rehab Inpatient Services  Wound Care    Patient Name:  Helder Sharp Jr.   MRN:  [id number]  Date: 10/23/2023  Diagnosis: COPD exacerbation    History:     Past Medical History:   Diagnosis Date    Acute bronchitis     Anxiety disorder, unspecified     COPD (chronic obstructive pulmonary disease)     Coronary artery disease     Hyperlipidemia     Left carotid bruit     Pulmonary nodules     Unspecified cataract        Social History     Socioeconomic History    Marital status:    Tobacco Use    Smoking status: Former     Current packs/day: 0.00     Types: Cigarettes     Quit date:      Years since quittin.8    Smokeless tobacco: Never   Substance and Sexual Activity    Alcohol use: Not Currently    Drug use: Never    Sexual activity: Yes     Social Determinants of Health     Transportation Needs: No Transportation Needs (10/16/2023)    PRAPARE - Transportation     Lack of Transportation (Medical): No     Lack of Transportation (Non-Medical): No       Precautions:     Allergies as of 10/13/2023 - Reviewed 10/13/2023   Allergen Reaction Noted    Flexeril [cyclobenzaprine]  2022       WOC Assessment Details/Treatment      10/23/23 1115        Altered Skin Integrity 23 Sacral spine #1 Intact skin with non-blanchable redness of localized area   Date First Assessed/Time First Assessed: 23   Altered Skin Integrity Present on Admission - Did Patient arrive to the hospital with altered skin?: yes  Location: Sacral spine  Wound Number: #1  Is this injury device related?: No  Descriptio...   Description of Altered Skin Integrity Intact skin with non-blanchable redness of localized area   Appearance Red   Wound Length (cm) 3.5 cm   Wound Width (cm) 2 cm   Wound Depth (cm) 0 cm   Wound Volume (cm^3) 0 cm^3   Wound Surface Area (cm^2) 7 cm^2   Dressing Applied;Foam   Off Loading   (turn q 2 and use wedge/ chair cushion)   Dressing Change Due 10/28/23    Safety Management   Safety Promotion/Fall Prevention assistive device/personal item within reach   Patient Rounds bed in low position   Daily Care   Activity Management Up in chair - L3     Patient c/o tenderness to sacral area. Bordered foam applied to sacral area for q 5 day dressing change.  Continue to  offload with turn and wedge and chair cushion on wheelchair and recliner.  10/23/2023

## 2023-10-24 NOTE — PROGRESS NOTES
"Dos 10/24/23  Seen and evaluated during RT today  Working on visual-spatial activities, following commands, dynamic and static balance and increasing endurance.  Progressing and tolerating  Case discussed with all therapeutic, nursing and medical team members  Agree with present POC   Subjective  HPI: 83 y.o. WM with a past medical history of hyperlipidemia, CAD, COPD, on 2 L home oxygen, pulmonary nodules, and anxiety presented to [facility] ED on 9/28/2023 for shortness of breath and weakness. Wife also endorsed worsening nonproductive cough and lower extremity swelling.  Wife noticed chocolate" colored urine at approximately 15:30 on 09/28, prompting them to ED. He had had to increase home oxygen to 3 L secondary to increased dyspnea.  Patient received albuterol nebulizer treatment en route with EMS.  He was given an hour long nebulizer treatment, magnesium, and Solu-Medrol upon arrival to ED. Patient remained hypoxic on non-rebreather and  was intubated in ED for airway protection.  Labs revealed WBC 21.13, chloride 109, CO2 19, glucose 190, BNP 22, troponin 0.014, and lactic acid 3.8.  Flu/RSV/COVID were negative.  UA revealed 2+ protein, trace ketones, 3+ occult blood, 1+ bilirubin, and greater than 100 RBCs.  Blood and respiratory cultures were obtained.  Chest x-ray revealed chronic emphysema and scarring within the lungs without appreciable acute superimposed airspace opacity.  Patient was also given LR, IV Zosyn, and IV Azithromycin in ED. Admitted to ICU for close monitoring.  Patient was started on Solu-Medrol 60 mg BID and DuoNebs q.6 hours.  Urology was consulted for hematuria. Ultrasound retroperitoneal complete revealed no abnormalities. Urology recommended continuing indwelling lang with outpatient follow-up. Patient had previous history of Klebsiella pneumonia which was sensitive to Zosyn.  Azithromycin was discontinued.  CTA chest revealed multiple enlarged mediastinal lymph nodes, no filling defects " seen to suggest PE, and severe emphysematous changes identified in the lungs with consolidation in the posterior basal segments of bilateral lower lobes demonstrating air bronchograms which may reflect aspiration pneumonia.  NG tube was placed and patient was started on tube feeds.  Transitioned to prednisone 40 mg daily for 5 day course on 10/01. Patient was extubated on 10/01 and placed on Vapotherm. Speech therapy was consulted. Patient was cleared for downgrade out of ICU on 10/03. Hospital medicine was consulted for transition of care and further medical management. He completed 7 days of Zosyn therapy for possible aspiration pneumonia.  Failed MBS and recommendations made for PEG tube. Palliative was consulted. Patient and family interested in PEG tube.  Gastroenterology following.  Patient is moderate risk for perioperative pulmonary complications and hypercapnia with PEG placement. Pulmonology consulted; follow recommendations. On 10/8 noted to have increased sputum production, leukocytosis noted and slightly increased oxygen needs.  Chest x-ray showed new developing bilateral opacities, collected respiratory culture and started Zosyn again. Requiring Haldol for agitation in-house. Underwent PEG placement on 10/11. Started on TF on 10/12. Zosyn changed to Rocephin which patient will complete 7 days of per Pulmonology recommendations on 10/15. On 10/12, Patient stated he felt better & had no new complaints. On TF, will follow Nutrition recommendation of goal rate for Diabetisource AC feeding with goal rate of 85cc/hr with 30ml/hr water flush to provide 2040 kcal, 102 g protein, 1994 ml free water. On 10/13, patient on 4L O2 per NC with sats ranging 88-93%. Patient complained of pain to peg tube site, but tolerating feedings with abdominal binder in place. Patient at 75ml/hr with tube feedings so should be at goal by 3pm today. Aspiration precautions in place. No bowel movement noted since 10/4. Patient is  AAOx4; confused at times.  Participating with therapy. Functional status includes contact guard assist needed for bed mobility, moderate assist for transfers with RW, walking 40ft with RW at contact guard assist to minimal assist, total assist for lower body dressing and eating, moderate assist for toileting and setup for urinal, and stand by assist for grooming sitting in chair. Patient was evaluated, accepted, and admitted to inpatient rehab to improve functional status. Transferred to [facility] on 10/13 without incident.       10/24: Seen in patient room, seated EOB with PT. Reports coughing up green sputum since starting crushed meds in pudding yesterday. PT states that he has been coughing through the whole session. Patient believes the crushed medications are irritating his throat causing him to cough. Lungs CTAB. Discussed with ST and DC trial of meds in pudding. Continue PO trials with ST. He states that he slept better last night. Believes that he was so anxious to have the MBS that was keeping him up and since it was over with he rested. Progressing with therapy without current complaint. VSSAF with noted drops of SpO2 down to 71% at the lowest during activity. Recovers rather quickly. IM following.           Review of Systems  Psychiatric:  Has a history of depression on Zoloft. He quit smoking in 2009 and quit drinking a few years before that.    Depression/Anxiety:      sertraline tablet 100 mg qd  Pain:  headaches-improved  acetaminophen oral solution 650 mg q6h PRN pain    Bowels/Bladder: last BM 10/20 following suppository on 10/19   Appetite: NPO. Tolerating TF. MBS 10/23-trials of meds crushed in Puree    Sleep: decreased overnight-improving.     melatonin tablet 6 mg qHS  QUEtiapine tablet 25 mg qHS  hydrOXYzine pamoate capsule 50 mg nightly PRN Insomnia          Physical Exam  General: well-developed, well-nourished, in no acute distress  Respiratory: equal chest rise, + SOB w/activity, no audible  wheeze, 4LO2 NC, cough-green sputum  Cardiovascular: regular rate and rhythm, no edema  Gastrointestinal: soft, non-tender, non-distended   Musculoskeletal: full range of motion of all extremities/spine with generalized weakness  Integumentary: no rashes or skin lesions present  Neurologic: cranial nerves intact, no signs of peripheral neurological deficit, motor/sensory function intact  *MD performed and documented physical examination                      Assessment/Plan  Hospital   COPD exacerbation   Dysphagia   Acute hypoxemic respiratory failure   Sepsis   Aspiration pneumonia   Hematuria     Non-Hospital   Generalized anxiety disorder   Former smoker   Pulmonary nodule   Carotid bruit   Hyperlipidemia   Inguinal hernia   Plantar fasciitis   Polyp of colon   End stage COPD   Anxiety   Chronic respiratory failure with hypoxia   Moderate malnutrition   Unspecified cataract   Coronary artery disease       Wounds: PEG site intact, wounds-left pinky toe, right great toe, right heel  s/p PEG placement 10/11  Precautions: aspiration, fall risk  Bracing: RW/Rollator  Swallowing: NPO. TF. Dietician/ST following. MBS 10/23-trials of meds crushed in Puree   Function: Tolerating therapy. Continue PT/OT  VTE Prophylaxis:   enoxaparin injection 40 mg q24hr  Code Status: FULL CODE   Discharge: Lives with his spouse in [place] in a single level home with a small threshold step to enter the residence. Completed college. He has no  history. He is retired. 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 Wife is retired. She drives. She can physically assist the patient after rehab. She states that the patient had been the one to do most of the cooking and grocery shopping prior to January. He got sick in January. She took over the household duties. He was just getting back to taking over some of the cooking and grocery shopping duties when he got sick  again. Wife can do the household chores. Children: (5).  Date 10/26 Thursday.                     Vashti Edwards NP, conducted additional independent physical examination and assisted with medical documentation.

## 2023-10-24 NOTE — PT/OT/SLP PROGRESS
[facility] - Rehabilitation Unit  Speech Language Pathology Department  Dysphagia Therapy Progress Note    Patient Name:  Helder Sharp Jr.   MRN:  [id number]  Admitting Diagnosis: COPD exacerbation    Recommendations:     General recommendations:  dysphagia therapy  Diet texture/consistency recommendations:  NPO  Medications: NPO  Barriers to safe discharge:  none    Subjective     Patient awake, alert, and cooperative.    Pain/Comfort:  0/10    Spiritual/Cultural/Shinto Beliefs/Practices that affect care: no    Respiratory Status: nasal cannula    Objective:     Therapeutic Activities:  Pt completed base of tongue retraction exercises x70 with minimal cues.  Pt completed laryngeal strengthening exercises x90 with minimal cues.    Assessment:     Pt continues to present with oropharyngeal dysphagia and remains unsafe for PO diet.    Goals:     Multidisciplinary Problems       SLP Goals          Problem: SLP    Goal Priority Disciplines Outcome   SLP Goal     SLP Ongoing, Progressing   Description:  LTG: To tolerate least restrictive diet without clinical signs/sx of aspiration.   ST. Tongue base/laryngeal excursion exercise   2. Tolerate thermal stimulation x10 with 100% swallow response with less than a 2 second delay.   3. Puree solids without clinical signs/sx of aspiration.  4. Moderately thick liquids without clinical s/sx of aspiration.  5. Ice chips with no clinical s/sx of aspiration.                      Patient Education:     Patient provided with verbal education regarding SLP POC.  Understanding was verbalized, however additional teaching warranted.    Plan:     Will continue to follow and tx as appropriate.    SLP Follow-Up:  Yes   Patient to be seen:  5 x/week   Plan of Care expires:  10/28/23  Plan of Care reviewed with:  patient       Time Tracking:     SLP Treatment Date:   10/24/23  Speech Start Time:  1100  Speech Stop Time:  1130     Speech Total Time  (min):  30 min    Billable minutes:  Treatment of Swallow Dysfunction, 30 minutes       10/24/2023

## 2023-10-24 NOTE — PT/OT/SLP PROGRESS
[facility] - Rehabilitation Unit  Speech Language Pathology Department  Dysphagia Therapy Progress Note    Patient Name:  Helder Sharp Jr.   MRN:  [id number]  Admitting Diagnosis: COPD exacerbation    Recommendations:     General recommendations:  dysphagia therapy  Diet texture/consistency recommendations:  NPO  Medications: meds crushed in puree or through PEG tube  Barriers to safe discharge:  none    Subjective     Patient awake, alert, and cooperative.    Pain/Comfort:  0/10    Spiritual/Cultural/Rastafari Beliefs/Practices that affect care: no    Respiratory Status: nasal cannula    Objective:     Therapeutic Activities:  Pt completed base of tongue retraction exercises x70 with minimal cues.  Pt completed laryngeal strengthening exercises x90 with minimal cues.    Assessment:     Pt continues to present with oropharyngeal dysphagia and remains unsafe for PO diet.    Goals:     Multidisciplinary Problems       SLP Goals          Problem: SLP    Goal Priority Disciplines Outcome   SLP Goal     SLP Ongoing, Progressing   Description:  LTG: To tolerate least restrictive diet without clinical signs/sx of aspiration.   ST. Tongue base/laryngeal excursion exercise   2. Tolerate thermal stimulation x10 with 100% swallow response with less than a 2 second delay.   3. Puree solids without clinical signs/sx of aspiration.  4. Moderately thick liquids without clinical s/sx of aspiration.  5. Ice chips with no clinical s/sx of aspiration.                      Patient Education:     Patient provided with verbal education regarding SLP POC.  Understanding was verbalized, however additional teaching warranted.    Plan:     Will continue to follow and tx as appropriate.    SLP Follow-Up:  Yes   Patient to be seen:  5 x/week   Plan of Care expires:  10/28/23  Plan of Care reviewed with:  patient       Time Tracking:     SLP Treatment Date:   10/24/23  Speech Start Time:  1330  Speech  Stop Time:  1400     Speech Total Time (min):  30 min    Billable minutes:  Treatment of Swallow Dysfunction, 30 minutes       10/24/2023

## 2023-10-24 NOTE — PT/OT/SLP PROGRESS
"Occupational Therapy Inpatient Rehab Treatment    Name: Helder Sharp Jr.  MRN: [id number]    Assessment:  Helder Sharp Jr. is a 83 y.o. male admitted with a medical diagnosis of COPD exacerbation.  He presents with the following impairments/functional limitations:  weakness, impaired endurance, impaired self care skills, impaired functional mobility, impaired balance, gait instability, impaired cardiopulmonary response to activity.    General Precautions: Standard, fall, NPO, aspiration     Orthopedic Precautions:N/A     Braces: N/A    Rehab Prognosis: Good; patient would benefit from acute skilled OT services to address these deficits and reach maximum level of function.      History:     Past Medical History:   Diagnosis Date    Acute bronchitis     Anxiety disorder, unspecified     COPD (chronic obstructive pulmonary disease)     Coronary artery disease     Hyperlipidemia     Left carotid bruit     Pulmonary nodules     Unspecified cataract        Past Surgical History:   Procedure Laterality Date    APPENDECTOMY      CATARACT EXTRACTION Left 01/03/2023    ESOPHAGOGASTRODUODENOSCOPY W/ PEG N/A 10/11/2023    Procedure: PEG;  Surgeon: JULIETTE Duffy MD;  Location: [facility] ENDOSCOPY;  Service: Gastroenterology;  Laterality: N/A;    HERNIA REPAIR      PHACOEMULSIFICATION, CATARACT, WITH IOL INSERTION Left 1/3/2023    Procedure: PHACOEMULSIFICATION, CATARACT, WITH IOL INSERTION- OS;  Surgeon: Louis Abdi MD;  Location: [facility];  Service: Ophthalmology;  Laterality: Left;    TONSILLECTOMY         Subjective     Orientation: Oriented x4    Chief Complaint: "I tried to swallow a crushed pill this morning and it really hurt my throat and now its sore"    Patient/Family Comments/goals: "Go home"    Vitals    Vitals With Activity  O2 Sat 71% activity, increase to 85-88% after prolonged rest breaks     Respiratory Status: Room air    Patients cultural, spiritual, Alevism conflicts given the current " situation: yes       Objective:     Patient found up in chair with chair check, PEG Tube, oxygen  upon OT entry to room.    Mobility   Patient completed:  Sit to Stand Transfer with independence with rolling walker  Stand to Sit Transfer with independence with rolling walker  Tub Transfer Step Transfer technique with verbal cues/SPV with rolling walker    Functional Mobility  Pt completed in room functional mobility with RW to practice bathroom mobility side stepping as his rollator will not fit in his bathroom at home.    ADLs  Pt completed a dry practice t/f to TTB in order to prepare for ADL tomorrow. Pt reported that he does not like the TTB and will continue his shower chair at home, explained to Pt that this is how he will shower tomorrow during ADL. Pt become very SOB (see above for Sat) and required prolonged rest break before t/f back into W/C to continue session    Limiting Factors for ADLs: balance and weakness, increased anxiety with O2 Sats    Therapeutic Exercise  Pt completed 5 trials of incentive spirometer to assist with SOB and increase strength of respiratory functions.   Pt completed 4x 2 min intervals on UBE with 90 seconds of recovery time in between bouts for overall endurance and BUE AROM.     Therapeutic Activities  Pt completed removal and replacing graded clips to continue working on BUE fine motor strength and functional reaching for continued carryover to ADLs/IADLs. Pt required 2 rest breaks throughout activity.     Patient left up in chair with all lines intact and call button in reach.     Education provided: ADLs, transfer training, and home safety    Multidisciplinary Problems       Occupational Therapy Goals          Problem: Occupational Therapy    Goal Priority Disciplines Outcome Interventions   Occupational Therapy Goal     OT, PT/OT Ongoing, Progressing    Description: ADLs: Goals to be achieved by Re-Evaluation.   Pt to perform grooming tasks with independence. MET  Pt to  perform feeding tasks with independence. - Goal to be worked on only when patient is medically cleared to eat. Patient is NPO at time of evaluation.   Pt to perform UB dressing with independence. MET, Independent   Pt to perform LB dressing with Supervision/CGA with RW & AE as needed. MET, Independent   Pt to perform putting on/off footwear task with Min A with AE as needed. MET, Independent   Pt to perform toileting with Min A. MET  Updated: Pt to perform toileting with SBA.  MET  Pt to perform bathing with Min A. MET, SPV    Functional Transfers:  Pt to perform toilet transfers with Supervision/CGA with RW & BSC over toilet. MET  Updated: Pt to perform toilet transfers with independence by D/C.   Pt to perform a tub transfer with Supervision/CGA with RW & TTB.      IADLs:  Pt to perform age-appropriate IADL's with Supervision/CGA.    Balance, Strengthening, Endurance, Balance:  Pt to demonstrate consistent adherence to breathing control and energy conservation techniques as educated by OT.                        Time Tracking     OT Received On: 10/24/23  Time In 0930     Time Out 1030  Total Time 60 min  Therapy Time: OT Individual: 60  Missed Time:    Missed Time Reason:      Billable Minutes: Therapeutic Activity 30 and Therapeutic Exercise 30    10/24/2023

## 2023-10-24 NOTE — PLAN OF CARE
"Was aware that pt was being followed by [facility], so I called there this a.m. ([phone]) and informed staff of pt's release date of 10/26.  Was told to send AVS upon pt's discharge.    Was later called by Kimberli with above organization saying that she spoke with pt's wife, Maryjo, last week and that Maryjo requested that pt be transitioned to HOSPICE rather than continue with palliative care.  I relayed to Kimberli that this was not mentioned by family to me and that rather, they requested OP PT/OT/ST, which was arranged this a.m.  Kimberli was unaware of this plan.  I relayed that my belief is that perhaps the family does not understand that pt would have to forego his outpatient PT/OT/ST program in order to comply with hospice plan of care.  Kimberli agreed to call wife to explain better.  "

## 2023-10-24 NOTE — PT/OT/SLP PROGRESS
Physical Therapy Inpatient Rehab Treatment    Patient Name:  Helder Sharp Jr.   MRN:  [id number]    Recommendations:     Discharge Recommendations:      Discharge Equipment Recommendations: home with home health   Barriers to discharge: None    Assessment:     Helder Sharp Jr. is a 83 y.o. male admitted with a medical diagnosis of COPD exacerbation.  He presents with the following impairments/functional limitations:  weakness, impaired endurance, impaired self care skills, impaired functional mobility, impaired balance, gait instability, impaired cardiopulmonary response to activity. Significant amount of increased time needed for completion of all tasks d/t fatigue and SOB. Pt exhibited increased SOB today, requiring longer and more frequent rest breaks than normal. Frequent wet sounding coughs noted, SLP Amarilis and NP Tammi notified.    Rehab Diagnosis: s/p PEG with Acute on chronic hypoxemic respiratory failure 2/2 COPD exacerbation and bilateral aspiration pneumonia    General Precautions: Standard, aspiration, fall     Orthopedic Precautions:      Braces:  (abdominal brace for PEG)    Rehab Prognosis: Good; patient would benefit from acute skilled PT services to address these deficits and reach maximum level of function.      History:     Past Medical History:   Diagnosis Date    Acute bronchitis     Anxiety disorder, unspecified     COPD (chronic obstructive pulmonary disease)     Coronary artery disease     Hyperlipidemia     Left carotid bruit     Pulmonary nodules     Unspecified cataract        Past Surgical History:   Procedure Laterality Date    APPENDECTOMY      CATARACT EXTRACTION Left 01/03/2023    ESOPHAGOGASTRODUODENOSCOPY W/ PEG N/A 10/11/2023    Procedure: PEG;  Surgeon: JULIETTE Duffy MD;  Location: [facility] ENDOSCOPY;  Service: Gastroenterology;  Laterality: N/A;    HERNIA REPAIR      PHACOEMULSIFICATION, CATARACT, WITH IOL INSERTION Left 1/3/2023    Procedure: PHACOEMULSIFICATION,  "CATARACT, WITH IOL INSERTION- OS;  Surgeon: Louis Abdi MD;  Location: [facility] OR;  Service: Ophthalmology;  Laterality: Left;    TONSILLECTOMY         Subjective     Patient comments: "I'm ready to go home."    Respiratory Status: Nasal cannula, flow 4 L/min    Patients cultural, spiritual, Spiritism conflicts given the current situation: no    Objective:     Communicated with pt prior to session.  Patient found up in chair with chair check, PEG Tube, oxygen  upon PT entry to room.    Pt is Oriented x3 and Alert and Cooperative.    Vitals   Vitals at Rest  O2 Sat 88%   Pain 0/10     Vitals With Activity  O2 Sat (!) 78 % (with activity)   Pain Pain Rating 1: 0/10       Functional Mobility:    Current   Status  Discharge   Goal   Functional Area: Care Score:    Roll Left and Right 6 Independent   Sit to Lying 6 Independent   Lying to Sitting on Side of Bed 6 Independent   Sit to Stand 6  With rollator Independent   Chair/Bed-to-Chair Transfer 6  With rollator Independent   Car Transfer       Walk 10 Feet 6 Independent   Walk 50 Feet with Two Turns 6  Pt amb 75' + 25' + 50' with rollator     Walk 150 Feet       Walk 10 Feet Uneven Surface       1 Step (Curb) 6  Up/down 4" curb with rollator     4 Steps       12 Steps       Picking Up Object       Wheel 50 Feet with Two Turns   Independent   Wheel 150 Feet   Independent       Therapeutic Activities and Exercises:  Patient educated on role of acute care PT and PT POC, safety while in hospital including calling nurse for mobility, and call light usage  Patient educated about importance of OOB mobility and remaining up in chair most of the day.  Patient educated about pursed lip breathing technique and cued for use with mobility.    Lower body dressing + donning footwear: completed modified independently with increased time.    Activity Tolerance: Poor    Patient left up in chair with call button in reach and chair alarm on.    Education provided: roles and goals of " PT/PTA, transfer training, bed mob, gait training, safety awareness, body mechanics, assistive device, and fall prevention    Expected compliance: Moderate compliance    Plan:     During this hospitalization, patient to be seen daily to address the identified rehab impairments via gait training, therapeutic activities, therapeutic exercises, neuromuscular re-education, wheelchair management/training and progress toward the following goals:    GOALS:   Multidisciplinary Problems       Physical Therapy Goals          Problem: Physical Therapy    Goal Priority Disciplines Outcome Goal Variances Interventions   Physical Therapy Goal     PT, PT/OT Ongoing, Progressing     Description: Bed Mobility:  MET - Roll left and right independently.  MET - Sit to supine transfer independently.  MET - Supine to sit transfer independently.    Transfers:  MET - Sit to stand transfer with supervision/touching assist using RW.  MET - Bed to chair transfer with supervision/touching assist Stand Step using RW.  MET - Sit to stand transfer independently using Rollator vs LRAD.  MET - Bed to chair transfer independently Stand Step using rollator vs LRAD.  MET - Car transfer independently using Rollator vs LRAD.  MET -  an object from the ground in standing position independently using Rollator vs LRAD and reacher.    Mobility:  Ambulate 150 feet independently assist using Rollator vs LRAD.  MET - Push w/c 150 feet with supervision/touching assist using B UE.  MET - Ambulate 15 feet on uneven surfaces/ramps independently using Rollator vs LRAD.  MET - Pt ascended/descended a 4 inch curb independently using Rollator vs LRAD.  Ascend/descend 12 stairs with supervision/touching assist using bilateral handrails.                             Plan of Care Expires:  10/20/23  PT Next Visit Date: 10/26/23  Plan of Care reviewed with: patient    Additional Information:         Time Tracking:     Therapy Time  PT Start Time: 0830  PT Stop Time:  0930  PT Total Time (min): 60 min   PT Individual: 60  Missed Time:    Time Missed due to:      Billable Minutes: Gait Training 30 min and Therapeutic Activity 30 min    10/24/2023

## 2023-10-24 NOTE — PT/OT/SLP PROGRESS
Recreational Therapy Treatment    Date of Treatment: 10/24/23  Start Time: 1130  Stop Time: 1200  Total Time: 30 min  Missed Time:    General Precautions: fall, NPO, aspiration  Ortho Precautions: N/A  Braces: N/A    Vitals   Vitals at Rest  BP    HR    O2 Sat    Pain      Vitals With Activity  BP    HR    O2 Sat    Pain        Treatment     Cognitive Skills Building                    Comment:          Dynamic Activities   Activity Assist Position Equipment Response   Activity 1 Washer toss independence and modified independence Standing Rolling walker and Metal washers good   Comment: Sit to stand was setup/I as was dynamic standing balance/reaching.  Standing tolerance was 4 minutes. UE coordination was I. Problem solving skills were setup.  He was more animated and attentive to task this AM       Fine Motor Activities   Activity Assist Position Equipment Response           Comment:          Additional Info: Pt progress, plan of care and discharge plans were discussed during staffing at 0930    Goals     Short Term Goals    Goal  Goal Status   Will increase activity tolerance to supervision Met   Will improve dynamic standing balance/reaching to supervision Met                 Long Term Goals    Goal Goal Status   Will increase standing tolerance to 5 minutes Progressing   Will improve dynamic standing balance/reaching to setup/I Met

## 2023-10-24 NOTE — PROGRESS NOTES
10/24/23 1130   Rec Therapy Time Calculation   Date of Treatment 10/24/23   Rec Start Time 1130   Rec Stop Time 1200   Rec Total Time (min) 30 min   Time   Treatment time 2 units   Charges   $Therapeutic Exercise 2 units   Precautions   General Precautions fall;NPO;aspiration   Orthopedic Precautions  N/A   Braces N/A   Pain/Comfort   Pain Rating 1 no pain   OTHER   Rehab identified problem list/impairments weakness;impaired endurance;impaired functional mobility;gait instability;decreased coordination;decreased upper extremity function;decreased lower extremity function;decreased safety awareness   Values/Beliefs/Spiritual Care   Spiritual, Cultural Beliefs, Tenriism Practices, Values that Affect Care no   Overall Level of Functioning   Activity Tolerance Standby Assist   Dynamic Sitting Balance/Reaching Independent   Dynamic Standing Balance/Reaching Mod Indep   Right UE Coodination/Dexterity Independent   Left UE Coordination/Dexterity Independent   Problem Solving/Sequencing Skills Independent   Memory Recall Independent   R/L Neglect/Inattention Does not occur   Attention Span Independent   Social Interaction Independent   Recreational Therapy Short Term Goals   Short Term Goal 1 Progression Met   Short Term Goal 2 Progression Met   Recreational Therapy Long Term Goals   Long Term Goal 1 Progression Progressing   Long Term Goal 2 Progression Met   Plan   Patient to be seen Daily   Planned Duration Other (Comment)  (2 days)   Treatments Planned Energy conservation training;Safety education   Treatment plan/goals estblished with Patient/Caregiver Yes

## 2023-10-24 NOTE — PROGRESS NOTES
[facility]  Rehab Progress Note    Patient Name: Helder Sharp Jr.  MRN: [id number]  Age: 83 y.o. Sex: male  : 1940  Hospital Length of Stay: 11 days  Date of Service: 10/24/2023   Chief Complaint: Acute on chronic hypoxemic respiratory failure 2/2 COPD exacerbation and bilateral aspiration pneumonia    Subjective:     Basic Information  Admit Information: 83-year-old WM presented to [facility] on  with complaints of continued shortness of breath and weakness.  PMH significant for bronchitis, COPD, CAD, hyperlipidemia, left carotid bruit, and pulmonary nodules.  Workup significant for COPD exacerbation with respiratory failure requiring intubation.  Hematuria appreciated.  Suspected aspiration pneumonia.  Initiated Zosyn.  CT chest significant for multiple enlarged mediastinal lymph nodes, no filling defects seen suggest PE, severe emphysema ptosis changes identified in the lungs with consolidation in the posterior basal segments of bilateral lower lobes demonstrating air bronchograms which may reflect aspiration pneumonia.  Tolerated extubation on 10/01 and initiated Vapotherm.  Downgraded to Medicine Unit on 10/03.  Plans for trilogy at home.  Continued with oropharyngeal dysphagia.  Tolerated PEG tube placement on 10/7.  Repeat sputum culture grew E coli.  Initiated Rocephin. Tolerated transfer to [facility] inpatient rehab unit on 10/13 without incident.  Today's Information: No acute events overnight.  Sitting up on side of bed.  About to work with therapy.  Reports good sleep.  Tolerating bolus tube feedings.  Last BM 10/20.  Vital signs at goal with no recorded fevers.  No new labs or imaging today.    Review of patient's allergies indicates:   Allergen Reactions    Flexeril [cyclobenzaprine]         Current Facility-Administered Medications:     acetaminophen oral solution 650 mg, 650 mg, Oral, Q6H PRN, Jamee Cheung, FNP, 650 mg at 10/23/23 4669     "albuterol-ipratropium 2.5 mg-0.5 mg/3 mL nebulizer solution 3 mL, 3 mL, Nebulization, Q4H WAKE, Jamee Cheung FNP, 3 mL at 10/24/23 0747    benzonatate capsule 100 mg, 100 mg, Oral, TID PRN, Jamee Cheung, FNP    bisacodyL suppository 10 mg, 10 mg, Rectal, Daily PRN, Jamee Cheung FNP, 10 mg at 10/19/23 2055    enoxaparin injection 40 mg, 40 mg, Subcutaneous, Q24H (prophylaxis, 1700), Jamee Cheung FNP, 40 mg at 10/23/23 1607    famotidine tablet 20 mg, 20 mg, Oral, BID PRN, Jamee Cheung FNP    ferrous sulfate tablet 1 each, 1 tablet, Oral, Daily, Jamee Cheung FNP, 1 each at 10/24/23 0758    fluticasone furoate-vilanteroL 200-25 mcg/dose diskus inhaler 1 puff, 1 puff, Inhalation, Daily, Jamee Cheung FNP, 1 puff at 10/24/23 0800    guaiFENesin 100 mg/5 ml syrup 200 mg, 200 mg, Per NG tube, Q6H, Jamee Cheung FNP, 200 mg at 10/24/23 0600    hydrALAZINE injection 10 mg, 10 mg, Intravenous, Q6H PRN, Jamee Cheung FNP    hydrOXYzine pamoate capsule 50 mg, 50 mg, Per G Tube, QHS, Marck Sampson, FNP    labetalol 20 mg/4 mL (5 mg/mL) IV syring, 10 mg, Intravenous, Q4H PRN, Jamee Cheung FNP    melatonin tablet 6 mg, 6 mg, Oral, Nightly, Jamee Cheung FNP, 6 mg at 10/23/23 2035    metoprolol injection 10 mg, 10 mg, Intravenous, Q2H PRN, Jamee Cheung, FNP    nitroGLYCERIN SL tablet 0.4 mg, 0.4 mg, Sublingual, Q5 Min PRN, Jamee Cheung FNP    ondansetron disintegrating tablet 4 mg, 4 mg, Oral, Q6H PRN, Jamee Cheung FNP    polyethylene glycol packet 17 g, 17 g, Per G Tube, Daily, Jamee Cheung FNP, 17 g at 10/24/23 0759    sertraline tablet 100 mg, 100 mg, Oral, Daily, Jamee Cheung FNP, 100 mg at 10/24/23 0759     Review of Systems   Complete 12-point review of symptoms negative except for what's mentioned in HPI     Objective:     /62   Pulse 78   Temp 97.5 °F (36.4 °C) (Oral)   Resp 18   Ht 6' 2" (1.88 m)   Wt 76.2 kg (167 lb " 15.9 oz)   SpO2 (!) 91%   BMI 21.57 kg/m²      Physical Exam  Vitals reviewed.   Eyes:      Pupils: Pupils are equal, round, and reactive to light.   Cardiovascular:      Rate and Rhythm: Normal rate and regular rhythm.      Heart sounds: Normal heart sounds.   Pulmonary:      Effort: Pulmonary effort is normal.      Breath sounds: Normal breath sounds.      Comments: Diminished breath sounds with nasal cannula 4 L  Abdominal:      General: Bowel sounds are normal.      Comments: Peg tube in place with clean applied dressings    Musculoskeletal:         General: Normal range of motion.      Comments: Muscle wasting and diffuse atrophy    Skin:     General: Skin is warm.   Neurological:      General: No focal deficit present.      Mental Status: He is alert and oriented to person, place, and time.   Psychiatric:         Mood and Affect: Mood normal.     *MD performed and documented physical examination       Lines/Drains/Airways       Drain  Duration                  Gastrostomy/Enterostomy 10/11/23 Percutaneous endoscopic gastrostomy (PEG) LUQ feeding 13 days                  Labs  No results found for this or any previous visit (from the past 24 hour(s)).    Radiology  Chest x-ray 10/21/2023, IMPRESSION: Lower lung reticular opacities are stable to slightly improved since 10/17/2023.     Assessment/Plan:     83 y.o. WM admitted on 10/13/2023     Acute on chronic hypoxemic respiratory failure   - 2/2 COPD exacerbation and bilateral aspiration pneumonia  - s/p Rocephin 2 g daily (end date 10/15)  - continue                 DuoNeb every 4 hours while awake                 Fluticasone-vilanterol 1 puff daily                 Guaifenesin 200 mg every 6 hours  - follow-up with pulmonology outpatient  - discharge with trilogy     End-stage COPD   - tolerating nasal cannula O2, on home O2  - on trilogy at night at home  - s/p Rocephin 2 g daily (end date 10/15)  - continue                 DuoNeb every 4 hours while  awake                 Fluticasone-vilanterol 1 puff daily                 Guaifenesin 200 mg every 6 hours  - follow-up with pulmonology outpatient     Bilateral aspiration pneumonia  - tolerating nasal cannula O2, on home O2  - s/p Rocephin 2 g daily (end date 10/15)  - continue                 DuoNeb every 4 hours while awake                 Fluticasone-vilanterol 1 puff daily                 Guaifenesin 200 mg every 6 hours  - follow-up with pulmonology outpatient     Oropharyngeal dysphagia  - s/p peg tube placement  - continues to tolerate tube feedings  - speech therapy following  - MBS completed 10/23, with slight improvement.  Still remains NPO.  Speech therapy working on pureed and moderately thickened trials     Iron deficiency anemia  - asymptomatic  - H/H stable   - no evidence of active bleeds  - iron levels low  - continue  Ferrous sulfate 325 mg daily (initiated 10/14)  - will closely monitor and transfuse if needed      Insomnia  - stable  - discontinue Seroquel 25 mg at bedtime per patient request   - discontinue Vistaril 50 mg at bedtime per patient request on 10/20  - continue                Melatonin 6 mg at night     Major depressive disorder  - stable  - continue                Zoloft 100 mg daily     Generalized anxiety disorder  - stable  - discontinue BuSpar 10 mg b.i.d. on 10/15 per patient request  - continue                Zoloft 100 mg daily    Constipation  - stable   - continue  Miralax 17 gm daily    Insomnia  - current  - continue   Vistaril 50 mg at bedtime (initiated 10/23)     AB therapy  Rocephin 2 g daily 10/8-10/15  Zosyn 9/30-10/7     VTE Prophylaxis:  Lovenox 40 mg daily  COVID-19 vaccination status:  Vaccinated x4     POA:  Yes-Maryjo  Living will:  No  Contacts:  Maryjo Sharp (spouse) [phone]                          Huma Moore (daughter) [phone]     CODE STATUS: Full  Internal Medicine (attending): Moises Terry MD  Physiatry (consulting):  Eyad Liriano  MD     OUTPATIENT PROVIDERS  PCP:  Kimo Love MD  Urology:  Ben Guajardo MD   Pulmonology: Richardson Root MD  Cardiology:  Larry Gomes MD     DISPOSITION:  Sleep hygiene improved.  Tolerating tube feedings.  Last BM 10/20.  Vital signs at goal with no recorded fevers.  No new labs or imaging today.  Still requiring 4 L nasal cannula, but recovers with rest.  Continue aggressive mobilization as tolerated.  Monitor closely.  Notify of acute changes.  Staffing completed today.    Staffing 10/24/2023: Continent of bowel and bladder. Tolerating tube feeds via PEG tube. RT: overall supervision, limited with activity and standing tolerance. TF bolus feeds-tolerating.  Meeting 100% of needs. PT: Independent with sit to stands overall. Ambulating  feet. SBA to CGA with up and down stairs. 02 drops to 72% with stairs.  No stairs at home. OT: overall independent with ADLS.  Limited by anxiety.  Family training went well. ST: MBS with some improvement.  Trial with pureed and moderately thick liquids.  Still NPO, tolerating therapy. MBS later this week. Stage 1 sacral wound since admit.  Stable.  On Jeffrey-mat and turn schedule.  PEG tube site with steri strip.  Continue with steri strip until healed. Projected discharge 10/26.    Osmar Sampson NP conducted independent physical examination and assisted with medical documentation.    Total time spent on this encounter including chart review and direct MD + NP 1-on-1 patient interaction: 53 minutes   Over 50% of this time was spent in counseling and coordination of care

## 2023-10-24 NOTE — PROGRESS NOTES
[facility] Orthopaedics - Rehab Inpatient Services  Wound Care    Patient Name:  Helder Sharp Jr.   MRN:  [id number]  Date: 10/24/2023  Diagnosis: COPD exacerbation    History:     Past Medical History:   Diagnosis Date    Acute bronchitis     Anxiety disorder, unspecified     COPD (chronic obstructive pulmonary disease)     Coronary artery disease     Hyperlipidemia     Left carotid bruit     Pulmonary nodules     Unspecified cataract          Precautions:     Allergies as of 10/13/2023 - Reviewed 10/13/2023   Allergen Reaction Noted    Flexeril [cyclobenzaprine]  07/08/2022       WOC Assessment Details/Treatment      10/24/23 1520        Altered Skin Integrity 09/28/23 2104 Sacral spine #1 Intact skin with non-blanchable redness of localized area   Date First Assessed/Time First Assessed: 09/28/23 2104   Altered Skin Integrity Present on Admission - Did Patient arrive to the hospital with altered skin?: yes  Location: Sacral spine  Wound Number: #1  Is this injury device related?: No  Descriptio...   Description of Altered Skin Integrity Intact skin with non-blanchable redness of localized area   Dressing Appearance Clean;Dry;Intact   Appearance Red   Wound Length (cm) 3.5 cm   Wound Width (cm) 2 cm   Wound Surface Area (cm^2) 7 cm^2   Dressing Foam   Off Loading   (turn q 2 wedge/ Geomat)     Sacral stage 1 pressure wound remains unchanged. Patient is comfortable with Geomat.  Continue to turn q 2 hours and use a wedge.   Patient is able to turn independently, but prefers to sleep on back during the day.  10/24/2023

## 2023-10-24 NOTE — PLAN OF CARE
Sent orders for OP PT/OT/ST to [facility] ([phone]) but was soon after informed that they do not currently have an outpatient ST at their [facility] location.  Spoke with pt's wife re: other options.  FOC done for [facility].  Sent orders to [facility] ([phone]; fax [phone]), as they can provide all 3 therapies.    [facility].

## 2023-10-25 NOTE — PT/OT/SLP PROGRESS
[facility]  Speech Language Pathology Department  Dysphagia Therapy Progress Note    Patient Name:  Helder Sharp Jr.   MRN:  [id number]  Admitting Diagnosis: COPD exacerbation    Recommendations:     General recommendations:  dysphagia therapy  Diet texture/consistency recommendations:  NPO  Medications: meds crushed in puree or through PEG tube  Barriers to safe discharge:  none    Subjective     Patient awake, alert, and cooperative.    Pain/Comfort:  0/10    Spiritual/Cultural/Yazidism Beliefs/Practices that affect care: no    Respiratory Status: nasal cannula    Objective:     Therapeutic Activities:  Pt completed base of tongue retraction exercises x70 with minimal cues.  Pt completed laryngeal strengthening exercises x75 with minimal cues.    Rest breaks provided as needed.     Assessment:     Pt continues to present with oropharyngeal dysphagia and remains unsafe for PO diet.    Goals:     Multidisciplinary Problems       SLP Goals          Problem: SLP    Goal Priority Disciplines Outcome   SLP Goal     SLP Ongoing, Progressing   Description:  LTG: To tolerate least restrictive diet without clinical signs/sx of aspiration.   ST. Tongue base/laryngeal excursion exercise   2. Tolerate thermal stimulation x10 with 100% swallow response with less than a 2 second delay.   3. Puree solids without clinical signs/sx of aspiration.  4. Moderately thick liquids without clinical s/sx of aspiration.  5. Ice chips with no clinical s/sx of aspiration.                      Patient Education:     Patient provided with verbal education regarding SLP POC.  Understanding was verbalized, however additional teaching warranted.    Plan:     Will continue to follow and tx as appropriate.    SLP Follow-Up:  Yes   Patient to be seen:  5 x/week   Plan of Care expires:  10/28/23  Plan of Care reviewed with:  patient       Time Tracking:     SLP Treatment Date:    10/25/23  Speech Start Time:  1000  Speech Stop Time:  1030     Speech Total Time (min):  30 min    Billable minutes:  Treatment of Swallow Dysfunction, 30 minutes       10/25/2023

## 2023-10-25 NOTE — PT/OT/SLP DISCHARGE
Recreational Therapy Discharge      Date of Treatment: 10/25/23  Start Time: 1130  Stop Time: 1200  Total Time: 30 min  Missed Time:    Assessment      Helder Sharp Jr. is a 83 y.o. male admitted with a medical diagnosis of COPD exacerbation.  He presents with the following impairments/functional limitations:  weakness, impaired endurance, impaired functional mobility, gait instability, decreased lower extremity function, decreased safety awareness .    Rehab Diagnosis:     Recent Surgery:     General Precautions: Standard, fall, aspiration, NPO     Orthopedic Precautions:N/A     Braces: N/A    Rehab Prognosis: Good; patient would benefit from acute skilled Recreational Therapy services to address these deficits and reach maximum level of function.      Impairments: Endurance deficits, Mobility deficits, and Strength deficits  Rehab Potential: Good  Treatment Recommendations: Complete discharge plan  Treatment Diagnosis: Debility, end stage COPD, hypoxemic respiratory failure, CAD, COPD, home 02, pulmonary nodules, anxiety  Orientation: Oriented x4  Affect/Behavior: Appropriate and Cooperative  Safety/Judgement: intact   Basic Command Following: intact  Spiritual Cultural: no        History     Past Medical History:   Diagnosis Date    Acute bronchitis     Anxiety disorder, unspecified     COPD (chronic obstructive pulmonary disease)     Coronary artery disease     Hyperlipidemia     Left carotid bruit     Pulmonary nodules     Unspecified cataract        Past Surgical History:   Procedure Laterality Date    APPENDECTOMY      CATARACT EXTRACTION Left 01/03/2023    ESOPHAGOGASTRODUODENOSCOPY W/ PEG N/A 10/11/2023    Procedure: PEG;  Surgeon: JULIETTE Duffy MD;  Location: [facility] ENDOSCOPY;  Service: Gastroenterology;  Laterality: N/A;    HERNIA REPAIR      PHACOEMULSIFICATION, CATARACT, WITH IOL INSERTION Left 1/3/2023    Procedure: PHACOEMULSIFICATION, CATARACT, WITH IOL INSERTION- OS;  Surgeon: Louis  "MARIA R Abdi MD;  Location: [facility] OR;  Service: Ophthalmology;  Laterality: Left;    TONSILLECTOMY         Home Environment     Admit Date: 10/13/23  Living Situation  People in Home: spouse  Lives in: house  Patients Responsibilities: Caregiver to pet, , Financial management, Health and wellness, Leisure/play/hobbies, Volunteer, Social participation, Shopping, Retired  Number of Children: 5  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 Sit to stand and dynamic standing balance/reaching were I. Standing tolerance remains at 3 minutes.  UE coordination and sequencing skills were I.  He was animated and friendly.  He was very social and discussed what he would do when he got home tomorrow.  Talked about seeing his cats.         Fine Motor Activities      Activity Assist Position Equipment Response           Comment:        Goals     Patient Goals  Patient Goal 1: "I wish to have quality of life."    Short Term Goals    Goal  Goal Status   Will increase activity tolerance to supervision Met   Will improve dynamic standing balance/reaching to supervision Met                 Long Term Goals    Goal Goal Status   Will increase standing tolerance to 5 minutes Progressing   Will improve dynamic standing balance/reaching to setup/I Met                     Plan       Patient to be seen: Daily  Duration: Other (Comment) (1 day)  Treatments planned: Energy conservation training  Treatment plan/goals established with Patient/Caregiver: Yes     "

## 2023-10-25 NOTE — DISCHARGE INSTRUCTIONS
If you are interested in reviewing options for advanced directives, please go to the following link, which can also be found on the [facility] web page.  Here, you can find useful information about assigning a Power of , preparing a living will, or completing a [place] ([place] Physician Orders for Scope of Treatment):    [email or web address]

## 2023-10-25 NOTE — PROGRESS NOTES
Nutrition Recommendation for discharge planning:    Continue with current tube feeding diabetisource AC or equivalent (Glucerna 1.2) bolus 480 ml bolus TID +  240 ml bolus HS for total of 1680 ml /d. Flush with 125 mL every 4 hours to provide 2016 kcal, 100% needs, 101 g protein, 112% needs, 2120 ml free water, 100% needs.  If unable to obtain diabetic 1.2 nayla /ml formula, can use diabetic 1.5 nayla/ml formula (Glucerna 1.5).  Recommend 2 (240 ml) cartons TID for a total of 1440 ml/d.  Flush with 240 ml water 5 x per day for total of 1200 ml water.  This will provide 2160 kcal (100% needs), 117 g Pro (129% needs), 2280 ml fluid (100% needs).    Diet as medically appropriate per SLP recs  Weekly wts    Estimated Needs     Weight Used For Calorie Calculations: 75.7 kg (166 lb 14.2 oz)  Energy Calorie Requirements (kcal): 1724-8220 kcal/d (25-30 kcal/kg)  Weight Used For Protein Calculations: 75.7 kg (166 lb 14.2 oz)  Protein Requirements: 91 g Pro/d (1.2g/kg)  Fluid Requirements (mL): 2158-4892 ml/d (1ml/kcal)

## 2023-10-25 NOTE — PLAN OF CARE
Problem: Occupational Therapy  Goal: Occupational Therapy Goal  Description: ADLs: Goals to be achieved by Re-Evaluation.   Pt to perform grooming tasks with independence. MET  Pt to perform feeding tasks with independence. - Goal to be worked on only when patient is medically cleared to eat. Patient is NPO at time of evaluation.   Pt to perform UB dressing with independence. MET, Independent   Pt to perform LB dressing with Supervision/CGA with RW & AE as needed. MET, Independent   Pt to perform putting on/off footwear task with Min A with AE as needed. MET, Independent   Pt to perform toileting with Min A. MET, independent  Updated: Pt to perform toileting with SBA.  MET, independent  Pt to perform bathing with Min A. MET, SPV for safety    Functional Transfers:  Pt to perform toilet transfers with Supervision/CGA with RW & BSC over toilet. MET  Updated: Pt to perform toilet transfers with independence by D/C. MET  Pt to perform a tub transfer with Supervision/CGA with RW & TTB.  MET    IADLs:  Pt to perform age-appropriate IADL's with Supervision/CGA.     Balance, Strengthening, Endurance, Balance:  Pt to demonstrate consistent adherence to breathing control and energy conservation techniques as educated by OT. MET  Outcome: Ongoing, Progressing

## 2023-10-25 NOTE — PROGRESS NOTES
[facility] ([facility])  Rehab Progress Note    Patient Name: Helder Sharp Jr.  MRN: [id number]  Age: 83 y.o. Sex: male  : 1940  Hospital Length of Stay: 12 days  Date of Service: 10/25/2023   Chief Complaint: Acute on chronic hypoxemic respiratory failure 2/2 COPD exacerbation and bilateral aspiration pneumonia    Subjective:     Basic Information  Admit Information: 83-year-old WM presented to [facility] on  with complaints of continued shortness of breath and weakness.  PMH significant for bronchitis, COPD, CAD, hyperlipidemia, left carotid bruit, and pulmonary nodules.  Workup significant for COPD exacerbation with respiratory failure requiring intubation.  Hematuria appreciated.  Suspected aspiration pneumonia.  Initiated Zosyn.  CT chest significant for multiple enlarged mediastinal lymph nodes, no filling defects seen suggest PE, severe emphysema ptosis changes identified in the lungs with consolidation in the posterior basal segments of bilateral lower lobes demonstrating air bronchograms which may reflect aspiration pneumonia.  Tolerated extubation on 10/01 and initiated Vapotherm.  Downgraded to Medicine Unit on 10/03.  Plans for trilogy at home.  Continued with oropharyngeal dysphagia.  Tolerated PEG tube placement on 10/7.  Repeat sputum culture grew E coli.  Initiated Rocephin. Tolerated transfer to [facility] inpatient rehab unit on 10/13 without incident.  Today's Information: No acute events overnight.  Sitting up in chair.  Reports good sleep and appetite.  Last BM 10/20.  Vital signs at goal with no recorded fevers.  No new labs or imaging today.    Review of patient's allergies indicates:   Allergen Reactions    Flexeril [cyclobenzaprine]         Current Facility-Administered Medications:     acetaminophen oral solution 650 mg, 650 mg, Oral, Q6H PRN, Jamee Cheung, MYCHAL, 650 mg at 10/23/23 1607    albuterol-ipratropium 2.5 mg-0.5 mg/3 mL nebulizer solution 3 mL,  "3 mL, Nebulization, Q4H WAKE, Jamee Cheung FNP, 3 mL at 10/25/23 0704    benzonatate capsule 100 mg, 100 mg, Oral, TID PRN, Jamee Cheung FNP    bisacodyL suppository 10 mg, 10 mg, Rectal, Daily PRN, Jamee Cheung FNP, 10 mg at 10/19/23 2055    enoxaparin injection 40 mg, 40 mg, Subcutaneous, Q24H (prophylaxis, 1700), Jamee Cheung FNP, 40 mg at 10/24/23 1708    famotidine tablet 20 mg, 20 mg, Oral, BID PRN, Jamee Cheung FNP    ferrous sulfate tablet 1 each, 1 tablet, Oral, Daily, Jamee Cheung FNP, 1 each at 10/24/23 0758    fluticasone furoate-vilanteroL 200-25 mcg/dose diskus inhaler 1 puff, 1 puff, Inhalation, Daily, Jamee Cheung FNP, 1 puff at 10/24/23 0800    guaiFENesin 100 mg/5 ml syrup 200 mg, 200 mg, Per NG tube, Q6H, Jamee Cheung FNP, 200 mg at 10/25/23 0600    hydrALAZINE injection 10 mg, 10 mg, Intravenous, Q6H PRN, Jamee Cheung, ALICEP    hydrOXYzine pamoate capsule 50 mg, 50 mg, Per G Tube, QHS, Marck Sampson A, FNP    labetalol 20 mg/4 mL (5 mg/mL) IV syring, 10 mg, Intravenous, Q4H PRN, Jamee Cheung FNP    melatonin tablet 6 mg, 6 mg, Oral, Nightly, Jamee Cheung FNP, 6 mg at 10/24/23 2012    metoprolol injection 10 mg, 10 mg, Intravenous, Q2H PRN, Jamee Cheung, FNP    nitroGLYCERIN SL tablet 0.4 mg, 0.4 mg, Sublingual, Q5 Min PRN, Jamee Cheung, ALICEP    ondansetron disintegrating tablet 4 mg, 4 mg, Oral, Q6H PRN, Jamee Cheung FNP    polyethylene glycol packet 17 g, 17 g, Per G Tube, Daily, Jamee Cheung FNP, 17 g at 10/24/23 0759    sertraline tablet 100 mg, 100 mg, Oral, Daily, Jamee Cheung FNP, 100 mg at 10/24/23 0759     Review of Systems   Complete 12-point review of symptoms negative except for what's mentioned in HPI     Objective:     BP (!) 115/58   Pulse 82   Temp 97.8 °F (36.6 °C) (Oral)   Resp 16   Ht 6' 2" (1.88 m)   Wt 76.2 kg (167 lb 15.9 oz)   SpO2 (!) 93%   BMI 21.57 kg/m²      Physical " Exam  Vitals reviewed.   Eyes:      Pupils: Pupils are equal, round, and reactive to light.   Cardiovascular:      Rate and Rhythm: Normal rate and regular rhythm.      Heart sounds: Normal heart sounds.   Pulmonary:      Effort: Pulmonary effort is normal.      Breath sounds: Normal breath sounds.      Comments: Diminished breath sounds with nasal cannula 4 L  Abdominal:      General: Bowel sounds are normal.      Comments: Peg tube in place with clean applied dressings    Musculoskeletal:         General: Normal range of motion.      Comments: Muscle wasting and diffuse atrophy    Skin:     General: Skin is warm.   Neurological:      General: No focal deficit present.      Mental Status: He is alert and oriented to person, place, and time.   Psychiatric:         Mood and Affect: Mood normal.     *MD performed and documented physical examination       Lines/Drains/Airways       Drain  Duration                  Gastrostomy/Enterostomy 10/11/23 Percutaneous endoscopic gastrostomy (PEG) LUQ feeding 14 days                  Labs  No results found for this or any previous visit (from the past 24 hour(s)).    Radiology  Chest x-ray 10/21/2023, IMPRESSION: Lower lung reticular opacities are stable to slightly improved since 10/17/2023.     Assessment/Plan:     83 y.o. WM admitted on 10/13/2023     Acute on chronic hypoxemic respiratory failure   - 2/2 COPD exacerbation and bilateral aspiration pneumonia  - s/p Rocephin 2 g daily (end date 10/15)  - continue                 DuoNeb every 4 hours while awake                 Fluticasone-vilanterol 1 puff daily                 Guaifenesin 200 mg every 6 hours  - follow-up with pulmonology outpatient  - discharge with trilogy     End-stage COPD   - tolerating nasal cannula O2, on home O2  - on trilogy at night at home  - s/p Rocephin 2 g daily (end date 10/15)  - continue                 DuoNeb every 4 hours while awake                 Fluticasone-vilanterol 1 puff daily                  Guaifenesin 200 mg every 6 hours  - follow-up with pulmonology outpatient     Bilateral aspiration pneumonia  - tolerating nasal cannula O2, on home O2  - s/p Rocephin 2 g daily (end date 10/15)  - continue                 DuoNeb every 4 hours while awake                 Fluticasone-vilanterol 1 puff daily                 Guaifenesin 200 mg every 6 hours  - follow-up with pulmonology outpatient     Oropharyngeal dysphagia  - s/p peg tube placement  - continues to tolerate tube feedings  - speech therapy following  - MBS completed 10/23, with slight improvement.  Still remains NPO.  Speech therapy working on pureed and moderately thickened trials     Iron deficiency anemia  - asymptomatic  - H/H stable   - no evidence of active bleeds  - iron levels low  - continue  Ferrous sulfate 325 mg daily (initiated 10/14)  - will closely monitor and transfuse if needed      Insomnia  - stable  - discontinue Seroquel 25 mg at bedtime per patient request   - discontinue Vistaril 50 mg at bedtime per patient request on 10/20  - continue                Melatonin 6 mg at night     Major depressive disorder  - stable  - continue                Zoloft 100 mg daily     Generalized anxiety disorder  - stable  - discontinue BuSpar 10 mg b.i.d. on 10/15 per patient request  - continue                Zoloft 100 mg daily    Constipation  - last BM 10/20  - initiate Dulcolax suppository x1 now  - continue  Miralax 17 gm daily    Insomnia  - current  - continue   Vistaril 50 mg at bedtime (initiated 10/23)     AB therapy  Rocephin 2 g daily 10/8-10/15  Zosyn 9/30-10/7     VTE Prophylaxis:  Lovenox 40 mg daily  COVID-19 vaccination status:  Vaccinated x4     POA:  Yes-Maryjo  Living will:  No  Contacts:  Maryjo Sharp (spouse) [phone]                          Huma Moore (daughter) [phone]     CODE STATUS: Full  Internal Medicine (attending): Moises Terry MD  Physiatry (consulting):  Eyad Liriano MD     OUTPATIENT  PROVIDERS  PCP:  Kimo Love MD  Urology:  Ben Guajardo MD   Pulmonology: Richardson Root MD  Cardiology:  Larry Gomes MD     DISPOSITION:  Sleep hygiene and appetite at goal.  Last BM 5 days ago.  Receiving MiraLax.  Vital signs at goal with no recorded fevers.  No new labs or imaging today.  Initiate Dulcolax suppository x1 now.  Monitor closely.  Notify of acute changes.    Staffing 10/24/2023: Continent of bowel and bladder. Tolerating tube feeds via PEG tube. RT: overall supervision, limited with activity and standing tolerance. TF bolus feeds-tolerating.  Meeting 100% of needs. PT: Independent with sit to stands overall. Ambulating  feet. SBA to CGA with up and down stairs. 02 drops to 72% with stairs.  No stairs at home. OT: overall independent with ADLS.  Limited by anxiety.  Family training went well. ST: MBS with some improvement.  Trial with pureed and moderately thick liquids.  Still NPO, tolerating therapy. MBS later this week. Stage 1 sacral wound since admit.  Stable.  On Jeffrey-mat and turn schedule.  PEG tube site with steri strip.  Continue with steri strip until healed. Projected discharge 10/26.    Osmar Sampson NP conducted independent physical examination and assisted with medical documentation.

## 2023-10-25 NOTE — PT/OT/SLP RE-EVAL
"Occupational Therapy Inpatient Rehab Re-Evaluation    Name: Helder Sharp Jr.  MRN: [id number]    Recommendations:     Discharge Recommendations:  Low Intensity Therapy   Discharge Equipment Recommendations: shower chair, bedside commode   Barriers to discharge: severity of deficits    Assessment:  Helder Sharp Jr. is a 83 y.o. male admitted with a medical diagnosis of COPD exacerbation.  He presents with the following impairments/functional limitations:  weakness, impaired endurance, impaired self care skills, impaired functional mobility, gait instability, impaired cardiopulmonary response to activity, impaired balance.    General Precautions: Standard, fall, aspiration, NPO     Orthopedic Precautions:N/A     Braces: N/A    Rehab Prognosis: Good; patient would benefit from acute skilled OT services to address these deficits and reach maximum level of function.      History:     Past Medical History:   Diagnosis Date    Acute bronchitis     Anxiety disorder, unspecified     COPD (chronic obstructive pulmonary disease)     Coronary artery disease     Hyperlipidemia     Left carotid bruit     Pulmonary nodules     Unspecified cataract        Past Surgical History:   Procedure Laterality Date    APPENDECTOMY      CATARACT EXTRACTION Left 01/03/2023    ESOPHAGOGASTRODUODENOSCOPY W/ PEG N/A 10/11/2023    Procedure: PEG;  Surgeon: JULIETTE Duffy MD;  Location: [facility] ENDOSCOPY;  Service: Gastroenterology;  Laterality: N/A;    HERNIA REPAIR      PHACOEMULSIFICATION, CATARACT, WITH IOL INSERTION Left 1/3/2023    Procedure: PHACOEMULSIFICATION, CATARACT, WITH IOL INSERTION- OS;  Surgeon: Louis Abdi MD;  Location: [facility] OR;  Service: Ophthalmology;  Laterality: Left;    TONSILLECTOMY         Subjective     Orientation: Oriented x4    Chief Complaint: No complaints today    Patient/Family Comments/goals: "ready to be back with my cats"    Respiratory Status: Nasal cannula, flow 4 L/min    Patients cultural, " spiritual, Latter-day conflicts given the current situation: yes       Living Environment   Living Environment  People in Home: spouse  Living Arrangements: house  Number of Stairs, Main Entrance: none  Number of Stairs, Within Home, Primary: none  Equipment Currently Used at Home: rollator, shower chair, raised toilet, grab bar  Shower Setup: Tub/Shower combo, HHS, shower chair    Prior Level of Function  BADL: Independent    IADL: Minimal Assitance    Equipment used at home: rollator, oxygen, grab bar, shower chair (elevated toilet with garb bars).     Upon discharge, patient will have assistance from family.    Objective:     Patient found up in chair with PEG Tube, oxygen  upon OT entry to room.    Mobility   Patient completed:  Sit to Stand Transfer with independence with rolling walker  Stand to Sit Transfer with independence with rolling walker  Tub Transfer Step Transfer technique with supervision with rollator.    Functional Mobility   Pt completed functional mobility in room to complete ADLs via rollator.     ADLs     Current Status   Eating 88   Oral Hygiene 6   Shower, Bathe Self 4 SBA for safety in wet environment   Upper Body Dressing 6   Lower Body Dressing 6   Toileting Hygiene 6 +void large BM and urine   Toilet Transfer 6   Putting On, Taking Off Footwear 6     Limiting Factors for ADLs: endurance, balance, weakness, and safety awareness    Patient left up in chair with call button in reach and chair alarm on.    Education provided: Roles and goals of OT, ADLs, transfer training, assistive device, and home safety    Multidisciplinary Problems       Occupational Therapy Goals          Problem: Occupational Therapy    Goal Priority Disciplines Outcome Interventions   Occupational Therapy Goal     OT, PT/OT Ongoing, Progressing    Description: ADLs: Goals to be achieved by Re-Evaluation.   Pt to perform grooming tasks with independence. MET  Pt to perform feeding tasks with independence. - Goal to be  worked on only when patient is medically cleared to eat. Patient is NPO at time of evaluation.   Pt to perform UB dressing with independence. MET, Independent   Pt to perform LB dressing with Supervision/CGA with RW & AE as needed. MET, Independent   Pt to perform putting on/off footwear task with Min A with AE as needed. MET, Independent   Pt to perform toileting with Min A. MET, independent  Updated: Pt to perform toileting with SBA.  MET, independent  Pt to perform bathing with Min A. MET, SPV for safety    Functional Transfers:  Pt to perform toilet transfers with Supervision/CGA with RW & BSC over toilet. MET  Updated: Pt to perform toilet transfers with independence by D/C. MET  Pt to perform a tub transfer with Supervision/CGA with RW & TTB.  MET    IADLs:  Pt to perform age-appropriate IADL's with Supervision/CGA.     Balance, Strengthening, Endurance, Balance:  Pt to demonstrate consistent adherence to breathing control and energy conservation techniques as educated by OT. MET                       Plan     During this hospitalization, patient to be seen 6 x/week to address the identified rehab impairments via self-care/home management, therapeutic activities, therapeutic exercises and progress toward the following goals:    Plan of Care Expires:  10/26/23    Time Tracking     OT Received On: 10/25/23  Time In 1300     Time Out 1400  Total Time 60 min  Therapy Time: OT Individual: 60  Missed Time:    Missed Time Reason:      Billable Minutes: Re-eval 15 and Self Care/Home Management 45    10/25/2023

## 2023-10-25 NOTE — PT/OT/SLP DISCHARGE
Occupational Therapy Discharge Summary    Helder Sharp Jr.  MRN: [id number]   Principal Problem: COPD exacerbation      Patient Discharged from acute Occupational Therapy on 10/25/23.  Please refer to prior OT note dated 10/25/23 for functional status.    Assessment:      Patient appropriate for care in another setting.    Objective:     GOALS:   Multidisciplinary Problems       Occupational Therapy Goals          Problem: Occupational Therapy    Goal Priority Disciplines Outcome Interventions   Occupational Therapy Goal     OT, PT/OT Ongoing, Progressing    Description: ADLs: Goals to be achieved by Re-Evaluation.   Pt to perform grooming tasks with independence. MET  Pt to perform feeding tasks with independence. - Goal to be worked on only when patient is medically cleared to eat. Patient is NPO at time of evaluation.   Pt to perform UB dressing with independence. MET, Independent   Pt to perform LB dressing with Supervision/CGA with RW & AE as needed. MET, Independent   Pt to perform putting on/off footwear task with Min A with AE as needed. MET, Independent   Pt to perform toileting with Min A. MET, independent  Updated: Pt to perform toileting with SBA.  MET, independent  Pt to perform bathing with Min A. MET, SPV for safety    Functional Transfers:  Pt to perform toilet transfers with Supervision/CGA with RW & BSC over toilet. MET  Updated: Pt to perform toilet transfers with independence by D/C. MET  Pt to perform a tub transfer with Supervision/CGA with RW & TTB.  MET    IADLs:  Pt to perform age-appropriate IADL's with Supervision/CGA.     Balance, Strengthening, Endurance, Balance:  Pt to demonstrate consistent adherence to breathing control and energy conservation techniques as educated by OT. MET                       Care Scores:   Admission Assessment Current Status Discharge  Goal   Functional Area: Care Score:  Care Score:    Eating 88 88 Independent (Goal to be addressed only when patient is  medically cleared to eat. Patient is NPO at this time.)   Oral Hygiene 4 6 Independent   Toileting Hygiene 3 6 Independent   Shower/Bathe Self 3 4 Supervision or touching assistance   Upper Body Dressing 4 6 Independent   Lower Body Dressing 3 6 Independent   Putting On/Taking Off Footwear 2 6 Independent   Toilet Transfer 3 6 Independent     Reasons for Discontinuation of Therapy Services   Transfer to alternate level of care. and Satisfactory goal achievement.      Plan:     Patient Discharged to: Outpatient Therapy Services      10/25/2023

## 2023-10-25 NOTE — PT/OT/SLP PROGRESS
[facility] - Rehabilitation Unit  Speech Language Pathology Department  Dysphagia Therapy Progress Note    Patient Name:  Helder Sharp Jr.   MRN:  [id number]  Admitting Diagnosis: COPD exacerbation    Recommendations:     General recommendations:  dysphagia therapy  Diet texture/consistency recommendations:  NPO  Medications: meds crushed in puree or through PEG tube  Barriers to safe discharge:  none    Subjective     Patient awake, alert, and cooperative.    Pain/Comfort:  0/10    Spiritual/Cultural/Shinto Beliefs/Practices that affect care: no    Respiratory Status: nasal cannula    Objective:     Therapeutic Activities:  Pt completed base of tongue retraction exercises x70 with minimal cues.  Pt completed laryngeal strengthening exercises x75 with minimal cues.  Pt completed andrea maneuver x10 with minimal cues.    Rest breaks provided as needed.     Assessment:     Pt continues to present with oropharyngeal dysphagia and remains unsafe for PO diet.    Goals:     Multidisciplinary Problems       SLP Goals          Problem: SLP    Goal Priority Disciplines Outcome   SLP Goal     SLP Ongoing, Progressing   Description:  LTG: To tolerate least restrictive diet without clinical signs/sx of aspiration.   ST. Tongue base/laryngeal excursion exercise   2. Tolerate thermal stimulation x10 with 100% swallow response with less than a 2 second delay.   3. Puree solids without clinical signs/sx of aspiration.  4. Moderately thick liquids without clinical s/sx of aspiration.  5. Ice chips with no clinical s/sx of aspiration.                      Patient Education:     Patient provided with verbal education regarding SLP POC.  Understanding was verbalized, however additional teaching warranted.    Plan:     Will continue to follow and tx as appropriate.    SLP Follow-Up:  Yes   Patient to be seen:  5 x/week   Plan of Care expires:  10/28/23  Plan of Care reviewed with:  patient        Time Tracking:     SLP Treatment Date:   10/25/23  Speech Start Time:  1100  Speech Stop Time:  1130     Speech Total Time (min):  30 min    Billable minutes:  Treatment of Swallow Dysfunction, 30 minutes       10/25/2023

## 2023-10-25 NOTE — PROGRESS NOTES
10/25/23 1130   Rec Therapy Time Calculation   Date of Treatment 10/25/23   Rec Start Time 1130   Rec Stop Time 1200   Rec Total Time (min) 30 min   Time   Treatment time 2 units   Charges   $Therapeutic Exercise 2 units   Precautions   General Precautions fall;hearing impaired;aspiration;NPO   Orthopedic Precautions  N/A   Braces N/A   Pain/Comfort   Pain Rating 1 no pain   OTHER   Rehab identified problem list/impairments weakness;impaired endurance;impaired functional mobility;gait instability;decreased lower extremity function;decreased safety awareness   Values/Beliefs/Spiritual Care   Spiritual, Cultural Beliefs, Religion Practices, Values that Affect Care no   Overall Level of Functioning   Activity Tolerance Independent   Dynamic Sitting Balance/Reaching Independent   Dynamic Standing Balance/Reaching Independent   Right UE Coodination/Dexterity Independent   Left UE Coordination/Dexterity Independent   Problem Solving/Sequencing Skills Independent   Memory Recall Independent   R/L Neglect/Inattention Does not occur   Attention Span Independent   Social Interaction Independent   Recreational Therapy Short Term Goals   Short Term Goal 1 Progression Met   Short Term Goal 2 Progression Met   Recreational Therapy Long Term Goals   Long Term Goal 1 Progression Progressing   Long Term Goal 2 Progression Met   Plan   Patient to be seen Daily   Planned Duration Other (Comment)  (1 day)   Treatments Planned Energy conservation training   Treatment plan/goals estblished with Patient/Caregiver Yes

## 2023-10-25 NOTE — PLAN OF CARE
Candida with [facility] dept responded that they do not carry Diabetisource but offered Glucerna 1.2 or 1.5 comparable formulas.  Conferred with KERRY KENNY to obtain adjusted orders and sending to Candida now.

## 2023-10-25 NOTE — PROGRESS NOTES
"Subjective  HPI: 83 y.o. WM with a past medical history of hyperlipidemia, CAD, COPD, on 2 L home oxygen, pulmonary nodules, and anxiety presented to [facility] ED on 9/28/2023 for shortness of breath and weakness. Wife also endorsed worsening nonproductive cough and lower extremity swelling.  Wife noticed chocolate" colored urine at approximately 15:30 on 09/28, prompting them to ED. He had had to increase home oxygen to 3 L secondary to increased dyspnea.  Patient received albuterol nebulizer treatment en route with EMS.  He was given an hour long nebulizer treatment, magnesium, and Solu-Medrol upon arrival to ED. Patient remained hypoxic on non-rebreather and  was intubated in ED for airway protection.  Labs revealed WBC 21.13, chloride 109, CO2 19, glucose 190, BNP 22, troponin 0.014, and lactic acid 3.8.  Flu/RSV/COVID were negative.  UA revealed 2+ protein, trace ketones, 3+ occult blood, 1+ bilirubin, and greater than 100 RBCs.  Blood and respiratory cultures were obtained.  Chest x-ray revealed chronic emphysema and scarring within the lungs without appreciable acute superimposed airspace opacity.  Patient was also given LR, IV Zosyn, and IV Azithromycin in ED. Admitted to ICU for close monitoring.  Patient was started on Solu-Medrol 60 mg BID and DuoNebs q.6 hours.  Urology was consulted for hematuria. Ultrasound retroperitoneal complete revealed no abnormalities. Urology recommended continuing indwelling lang with outpatient follow-up. Patient had previous history of Klebsiella pneumonia which was sensitive to Zosyn.  Azithromycin was discontinued.  CTA chest revealed multiple enlarged mediastinal lymph nodes, no filling defects seen to suggest PE, and severe emphysematous changes identified in the lungs with consolidation in the posterior basal segments of bilateral lower lobes demonstrating air bronchograms which may reflect aspiration pneumonia.  NG tube was placed and patient was started on tube feeds.  " Transitioned to prednisone 40 mg daily for 5 day course on 10/01. Patient was extubated on 10/01 and placed on Vapotherm. Speech therapy was consulted. Patient was cleared for downgrade out of ICU on 10/03. Hospital medicine was consulted for transition of care and further medical management. He completed 7 days of Zosyn therapy for possible aspiration pneumonia.  Failed MBS and recommendations made for PEG tube. Palliative was consulted. Patient and family interested in PEG tube.  Gastroenterology following.  Patient is moderate risk for perioperative pulmonary complications and hypercapnia with PEG placement. Pulmonology consulted; follow recommendations. On 10/8 noted to have increased sputum production, leukocytosis noted and slightly increased oxygen needs.  Chest x-ray showed new developing bilateral opacities, collected respiratory culture and started Zosyn again. Requiring Haldol for agitation in-house. Underwent PEG placement on 10/11. Started on TF on 10/12. Zosyn changed to Rocephin which patient will complete 7 days of per Pulmonology recommendations on 10/15. On 10/12, Patient stated he felt better & had no new complaints. On TF, will follow Nutrition recommendation of goal rate for Diabetisource AC feeding with goal rate of 85cc/hr with 30ml/hr water flush to provide 2040 kcal, 102 g protein, 1994 ml free water. On 10/13, patient on 4L O2 per NC with sats ranging 88-93%. Patient complained of pain to peg tube site, but tolerating feedings with abdominal binder in place. Patient at 75ml/hr with tube feedings so should be at goal by 3pm today. Aspiration precautions in place. No bowel movement noted since 10/4. Patient is AAOx4; confused at times.  Participating with therapy. Functional status includes contact guard assist needed for bed mobility, moderate assist for transfers with RW, walking 40ft with RW at contact guard assist to minimal assist, total assist for lower body dressing and eating,  moderate assist for toileting and setup for urinal, and stand by assist for grooming sitting in chair. Patient was evaluated, accepted, and admitted to inpatient rehab to improve functional status. Transferred to [facility] on 10/13 without incident.       10/25: Seen in PT gym, seated in WC with O2 via NC. Reports having worked on floor recovery on mat and stairs where his oxygen likely dropped, but he didn't check. States that he is just taking a seated rest break to let SpO2 come back up. Tells me that his throat feels much better today. No noted coughing. In good spirits. Tolerating therapy without complaint. VSSAF.            Review of Systems  Psychiatric:  Has a history of depression on Zoloft. He quit smoking in 2009 and quit drinking a few years before that.    Depression/Anxiety:      sertraline tablet 100 mg qd  Pain:  headaches-improved  acetaminophen oral solution 650 mg q6h PRN pain    Bowels/Bladder: last BM 10/20 following suppository on 10/19. Order suppository 10/25 after therapy  Appetite: NPO. Tolerating TF. MBS 10/23-trials of meds crushed in Puree    Sleep: decreased overnight-improving.     melatonin tablet 6 mg qHS  QUEtiapine tablet 25 mg qHS  hydrOXYzine pamoate capsule 50 mg nightly PRN Insomnia          Physical Exam  General: well-developed, well-nourished, in no acute distress  Respiratory: equal chest rise, + SOB w/activity, no audible wheeze, 4LO2 NC, cough-green sputum  Cardiovascular: regular rate and rhythm, no edema  Gastrointestinal: soft, non-tender, non-distended   Musculoskeletal: full range of motion of all extremities/spine with generalized weakness  Integumentary: no rashes or skin lesions present  Neurologic: cranial nerves intact, no signs of peripheral neurological deficit, motor/sensory function intact                      Assessment/Plan  Hospital   COPD exacerbation   Dysphagia   Acute hypoxemic respiratory failure   Sepsis   Aspiration pneumonia   Hematuria      Non-Hospital   Generalized anxiety disorder   Former smoker   Pulmonary nodule   Carotid bruit   Hyperlipidemia   Inguinal hernia   Plantar fasciitis   Polyp of colon   End stage COPD   Anxiety   Chronic respiratory failure with hypoxia   Moderate malnutrition   Unspecified cataract   Coronary artery disease       Wounds: PEG site intact, wounds-left pinky toe, right great toe, right heel  s/p PEG placement 10/11  Precautions: aspiration, fall risk  Bracing: RW/Rollator  Swallowing: NPO. TF. Dietician/ST following. MBS 10/23-trials of meds crushed in Puree   Function: Tolerating therapy. Continue PT/OT  VTE Prophylaxis:   enoxaparin injection 40 mg q24hr  Code Status: FULL CODE   Discharge: Lives with his spouse in [place] in a single level home with a small threshold step to enter the residence. Completed college. He has no  history. He is retired. 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 Wife is retired. She drives. She can physically assist the patient after rehab. She states that the patient had been the one to do most of the cooking and grocery shopping prior to January. He got sick in January. She took over the household duties. He was just getting back to taking over some of the cooking and grocery shopping duties when he got sick again. Wife can do the household chores. Children: (5).  Date 10/26 Thursday.

## 2023-10-25 NOTE — PT/OT/SLP PROGRESS
Physical Therapy Inpatient Rehab Treatment    Patient Name:  Helder Sharp Jr.   MRN:  [id number]    Recommendations:     Discharge Recommendations:      Discharge Equipment Recommendations:     Barriers to discharge: Decreased caregiver support    Assessment:     Helder Sharp Jr. is a 83 y.o. male admitted with a medical diagnosis of COPD exacerbation.  He presents with the following impairments/functional limitations:  weakness, impaired endurance, impaired cardiopulmonary response to activity .    Rehab Diagnosis: s/p PEG with Acute on chronic hypoxemic respiratory failure 2/2 COPD exacerbation and bilateral aspiration pneumonia    Recent Surgery: * No surgery found *      General Precautions: Standard, fall     Orthopedic Precautions:N/A     Braces: N/A    Rehab Prognosis: Good; patient would benefit from acute skilled PT services to address these deficits and reach maximum level of function.      History:     Past Medical History:   Diagnosis Date    Acute bronchitis     Anxiety disorder, unspecified     COPD (chronic obstructive pulmonary disease)     Coronary artery disease     Hyperlipidemia     Left carotid bruit     Pulmonary nodules     Unspecified cataract        Past Surgical History:   Procedure Laterality Date    APPENDECTOMY      CATARACT EXTRACTION Left 01/03/2023    ESOPHAGOGASTRODUODENOSCOPY W/ PEG N/A 10/11/2023    Procedure: PEG;  Surgeon: JULIETTE Duffy MD;  Location: [facility] ENDOSCOPY;  Service: Gastroenterology;  Laterality: N/A;    HERNIA REPAIR      PHACOEMULSIFICATION, CATARACT, WITH IOL INSERTION Left 1/3/2023    Procedure: PHACOEMULSIFICATION, CATARACT, WITH IOL INSERTION- OS;  Surgeon: Louis Abdi MD;  [facility]: [facility] OR;  Service: Ophthalmology;  Laterality: Left;    TONSILLECTOMY         Subjective     Chief Complaint: none     Respiratory Status: Nasal cannula, flow 4 L/min    Patients cultural, spiritual, Hoahaoism conflicts given the current situation:  no      Objective:     Communicated with nursing  prior to session.  Patient found up in chair with PEG Tube, oxygen, peripheral IV, Other (comments) (in wc)  upon PT entry to room.    Pt is Oriented x3 and Alert and Cooperative.    Functional Mobility:      Current   Status  Discharge   Goal   Functional Area: Care Score:    Roll Left and Right   Independent   Sit to Lying   Independent   Lying to Sitting on Side of Bed   Independent   Sit to Stand   Independent   Chair/Bed-to-Chair Transfer   Independent   Car Transfer       Walk 10 Feet   Independent   Walk 50 Feet with Two Turns       Walk 150 Feet       Walk 10 Feet Uneven Surface 6  Use of rollator      1 Step (Curb)       4 Steps 6  B rails 4 steps then fatigue      12 Steps 88  Fatigue      Picking Up Object 6  Use of rollator only     Wheel 50 Feet with Two Turns 6  BUE only  Independent   Wheel 150 Feet 6  BUE only 200ft  Independent       Therapeutic Activities and Exercises:  Pt performed BLE HEP given and reviewed for home use 2 sets 15reps with 3# wts BLE with rest breaks     Activity Tolerance: Good    Patient left up in chair with call button in reach, chair alarm on, and pt stacey tx well  .    Education provided: transfer training, stair training, balance training, safety awareness, assistive device, wheelchair management, and strengthening exercises    Expected compliance: High compliance    GOALS:   Multidisciplinary Problems       Physical Therapy Goals          Problem: Physical Therapy    Goal Priority Disciplines Outcome Goal Variances Interventions   Physical Therapy Goal     PT, PT/OT Ongoing, Progressing     Description: Bed Mobility:  MET - Roll left and right independently.  MET - Sit to supine transfer independently.  MET - Supine to sit transfer independently.    Transfers:  MET - Sit to stand transfer with supervision/touching assist using RW.  MET - Bed to chair transfer with supervision/touching assist Stand Step using RW.  MET - Sit  to stand transfer independently using Rollator vs LRAD.  MET - Bed to chair transfer independently Stand Step using rollator vs LRAD.  MET - Car transfer independently using Rollator vs LRAD.  MET -  an object from the ground in standing position independently using Rollator vs LRAD and reacher.    Mobility:  Ambulate 150 feet independently assist using Rollator vs LRAD.  MET - Push w/c 150 feet with supervision/touching assist using B UE.  MET - Ambulate 15 feet on uneven surfaces/ramps independently using Rollator vs LRAD.  MET - Pt ascended/descended a 4 inch curb independently using Rollator vs LRAD.  Ascend/descend 12 stairs with supervision/touching assist using bilateral handrails.                             Plan:     During this hospitalization, patient to be seen daily to address the identified rehab impairments via gait training, therapeutic activities, therapeutic exercises, neuromuscular re-education, wheelchair management/training and progress toward the following goals:    Plan of Care Expires:  10/20/23  PT Next Visit Date: 10/26/23  Plan of Care reviewed with: patient    Additional Information:         Time Tracking:     Therapy Time  PT Received On: 10/25/23  PT Start Time: 0830  PT Stop Time: 0930  PT Total Time (min): 60 min   PT Individual: 60  Missed Time:    Time Missed due to:      Billable Minutes: Therapeutic Activity 30min and Therapeutic Exercise 30min    10/25/2023

## 2023-10-26 NOTE — PT/OT/SLP DISCHARGE
Physical Therapy Discharge Summary    Name: Helder Sharp Jr.  MRN: [id number]   Principal Problem: COPD exacerbation     Patient Discharged from acute Physical Therapy on 10/26/23.     Assessment:     Goals partially met. Patient appropriate for care in another setting.    Objective:     GOALS:   Multidisciplinary Problems       Physical Therapy Goals          Problem: Physical Therapy    Goal Priority Disciplines Outcome Goal Variances Interventions   Physical Therapy Goal     PT, PT/OT Adequate for Care Transition     Description: Bed Mobility:  MET - Roll left and right independently.  MET - Sit to supine transfer independently.  MET - Supine to sit transfer independently.    Transfers:  MET - Sit to stand transfer with supervision/touching assist using RW.  MET - Bed to chair transfer with supervision/touching assist Stand Step using RW.  MET - Sit to stand transfer independently using Rollator vs LRAD.  MET - Bed to chair transfer independently Stand Step using rollator vs LRAD.  MET - Car transfer independently using Rollator vs LRAD.  MET -  an object from the ground in standing position independently using Rollator vs LRAD and reacher.    Mobility:  NOT MET - Ambulate 150 feet independently assist using Rollator vs LRAD.  MET - Push w/c 150 feet with supervision/touching assist using B UE.  MET - Ambulate 15 feet on uneven surfaces/ramps independently using Rollator vs LRAD.  MET - Pt ascended/descended a 4 inch curb independently using Rollator vs LRAD.  NOT MET - Ascend/descend 12 stairs with supervision/touching assist using bilateral handrails.                             Care Scores:   Admission Assessment Current   Status  Discharge   Goal   Functional Area: Care Score:  Care Score:    Roll Left and Right 3 6 Independent   Sit to Lying 3 6 Independent   Lying to Sitting on Side of Bed 3 6 Independent   Sit to Stand 3 6 Independent   Chair/Bed-to-Chair Transfer 3 6 Independent   Car Transfer 88 6      Walk 10 Feet 3 6 Independent   Walk 50 Feet with Two Turns 88 6     Walk 150 Feet 88 88     Walk 10 Feet Uneven Surface 88 6     1 Step (Curb) 88 6     4 Steps 88 6     12 Steps 88 88     Picking Up Object 88 6     Wheel 50 Feet with Two Turns 3 6 Independent   Wheel 150 Feet 2 6 Independent       Reasons for Discontinuation of Therapy Services  Transfer to alternate level of care. and Satisfactory goal achievement.      Plan:     Patient Discharged to: Outpatient Therapy Services.    10/26/2023

## 2023-10-26 NOTE — PT/OT/SLP PROGRESS
Physical Therapy      Patient Name:  Helder Sharp Jr.   MRN:  [id number]    Patient not seen today secondary to Patient unwilling to participate.     Amount of therapy minutes missed:  30 Minutes.    Last visit completed; all questions/concerns addressed as appropriate.    10/26/2023

## 2023-10-26 NOTE — PLAN OF CARE
Discharge PHQ-9 completed (score=6 mild symptoms--improved since admission).  Pt is happy to be finally going home.    Sending final orders to Candida at [facility] for enterals (being delivered to the home this a.m.).    [facility]/ST has been arranged.    Will fax AVS to [facility] once it is completed (fax [phone]).

## 2023-10-26 NOTE — PT/OT/SLP DISCHARGE
Speech Language Pathology Discharge Summary    Helder Sharp Jr.  MRN: [id number]   COPD exacerbation     Date of Last Treatment Session: 10/25/23    Past Medical History:   Diagnosis Date    Acute bronchitis     Anxiety disorder, unspecified     COPD (chronic obstructive pulmonary disease)     Coronary artery disease     Hyperlipidemia     Left carotid bruit     Pulmonary nodules     Unspecified cataract        Status at initiation of therapy: severe oropharyngeal dysphagia    Treatment Area(s):  Swallow    Goals:   Multidisciplinary Problems       SLP Goals          Problem: SLP    Goal Priority Disciplines Outcome   SLP Goal     SLP Ongoing, Progressing   Description:  LTG: To tolerate least restrictive diet without clinical signs/sx of aspiration.   ST. Tongue base/laryngeal excursion exercise   2. Tolerate thermal stimulation x10 with 100% swallow response with less than a 2 second delay.   3. Puree solids without clinical signs/sx of aspiration.  4. Moderately thick liquids without clinical s/sx of aspiration.  5. Ice chips with no clinical s/sx of aspiration.                        Participation in Treatment (at discharge):  Cooperative    Functional Status at time of Discharge:    Cognition: Patient demonstrates no cognitive deficits.    Communication: Patient demonstrates no communication deficits.    Language: Patient demonstrates no language deficits.    Motor Speech: Patient demonstrates no motor speech deficits.    Swallow: Patient demonstrates  moderate-severe  dysphagia.                       Instrumental assessment was completed on 10/23/23                                Swallowing Guidelines: Patient is NPO. Goals set for PO trials of puree and moderately thick liquids (small sips) via cup.     Speaking Valve: Patient was not discharged with speaking valve. Communication is functional at the conversation level without cueing.     Patient is discharged to Home           Recommendations:  Diet:  NPO except meds (crushed in puree or via PEG tube, per pt preference)  Liquids: NPO  Continued speech therapy is warranted at time of discharge

## 2023-10-30 PROBLEM — Z93.1 PRESENCE OF EXTERNALLY REMOVABLE PERCUTANEOUS ENDOSCOPIC GASTROSTOMY (PEG) TUBE: Status: ACTIVE | Noted: 2023-01-01

## 2023-11-06 NOTE — DISCHARGE SUMMARY
"[facility] Medicine Discharge Summary    Admit Date: 9/28/2023  Discharge Date and Time: 10/13/2023  Admitting Physician:  Team  Discharging Physician: Joseph Cummings MD.  Primary Care Physician: Kimo Love MD  Consults: Hospital Medicine and Pulmonary/Intensive care    Discharge Diagnoses:  Acute on Chronic Hypoxemic Respiratory Failure s/p Intubated/Extubated  Chronic Hypoxic Respiratory Failure 2 L NC at home 2/2 COPD  COPD exacerbation  Bilateral Aspiration Pneumonia   Sepsis 2/2 Above   Leukocytosis  Elevated creatinine  Dysphagia  Hematuria-resolved  Agitation  HLD  CAD  Pulmonary Nodules    Hospital Course:   Mr Aron Wiley is a 83 y.o. male with a past medical history of hyperlipidemia, CAD, COPD, on 2 L home oxygen, pulmonary nodules, and anxiety presented to [facility] on 9/28/2023 for shortness of breath and weakness. Wife also endorsed worsening nonproductive cough and lower extremity swelling.  Wife noticed chocolate" colored urine at approximately 15:30 on 09/28, prompting them to ED. Patient also had to increase home oxygen to 3 L secondary to increased dyspnea.  Patient received albuterol nebulizer treatment en route with EMS.  Patient was given an hour long nebulizer treatment, magnesium, and Solu-Medrol upon arrival to ED. Patient remained hypoxic on non-rebreather and  was intubated in ED for airway protection.  Labs revealed WBC 21.13, chloride 109, CO2 19, glucose 190, BNP 22, troponin 0.014, and lactic acid 3.8.  Flu/RSV/COVID were negative.  UA revealed 2+ protein, trace ketones, 3+ occult blood, 1+ bilirubin, and greater than 100 RBCs.  Blood and respiratory cultures were obtained.  Chest x-ray revealed chronic emphysema and scarring within the lungs without appreciable acute superimposed airspace opacity.  Patient was given LR, IV Zosyn, and IV Azithromycin in ED.  Patient was admitted to ICU for close monitoring.  Patient was started on Solu-Medrol " 60 mg BID and DuoNebs q.6 hours.  Urology was consulted for hematuria. Ultrasound retroperitoneal complete revealed no abnormalities. Urology recommended continuing indwelling lang with outpatient follow-up. Patient had previous history of Klebsiella pneumonia which was sensitive to Zosyn.  Azithromycin was discontinued.  CTA chest revealed multiple enlarged mediastinal lymph nodes, no filling defects seen to suggest PE, and severe emphysematous changes identified in the lungs with consolidation in the posterior basal segments of bilateral lower lobes demonstrating air bronchograms which may reflect aspiration pneumonia.  NG tube was placed and patient was started on tube feeds.  Patient was transitioned to prednisone 40 mg daily for 5 day course on 10/01. Patient was extubated on 10/01 and placed on Vapotherm. Speech therapy was consulted. Patient was cleared for downgrade out of ICU on 10/03/2023. Hospital medicine was consulted for transition of care and further medical management. Completed 7 days of Zosyn therapy for possible aspiration pneumonia.  Failed MBS, recommendations made for PEG tube, Palliative was consulted, patient and family interested in PEG tube.  Gastroenterology following.  Patient is moderate risk for perioperative pulmonary complications and hypercapnia with PEG placement. Pulmonology consulted; follow recommendations. On 10/8 noted to have increased sputum production, leukocytosis noted and slightly increased oxygen needs.  Chest x-ray showed new developing bilateral opacities, collected respiratory culture and started Zosyn again. Requiring Haldol for agitation in-house. Underwent PEG placement on 10/11. Started on TF on 10/12. Zosyn changed to Rocephin which patient will complete 7 days of per Pulmonology recommendations. PT/OT on-board; recommending SNF placement. CM consulted for placement.    Pt was seen and examined on the day of discharge. He stated he was doing well and had no new  "complaints. Planned for DC today.    Vitals:  VITAL SIGNS: 24 HRS MIN & MAX LAST   No data recorded 98.2 °F (36.8 °C)   No data recorded 99/63   No data recorded  86   No data recorded 18   No data recorded (!) 89 %       Physical Exam:  General: alert male lying comfortably in bed, in no acute distress  HENT: oral and oropharyngeal mucosa moist, pink, with no erythema or exudates, no ear pain or discharge  Neck: normal neck movement, no lymph nodes or swellings, no JVD or carotid bruit  Respiratory: clear breathing sounds bilaterally, no crackles, rales, ronchi or wheezes  Cardiovascular: clear S1 and S2, no murmurs, rubs or gallops  Peripheral Vascular: no lesions, ulcers or erosions, normal peripheral pulses and no pedal edema  Gastrointestinal: LUQ PEG in place; soft, non-tender, non-distended abdomen, no guarding, rigidity or rebound tenderness, normal bowel sounds  Integumentary: normal skin color, no rashes or lesions  Neuro: AAO x 3; motor strength 5/5 in B/L UEs & LEs; sensation intact to gross and fine touch B/L; CN II-XII grossly intact    Procedures Performed: No admission procedures for hospital encounter.     Significant Diagnostic Studies: See Full reports for all details    No results for input(s): "WBC", "RBC", "HGB", "HCT", "MCV", "MCH", "MCHC", "RDW", "PLT", "MPV", "GRAN", "LYMPH", "MONO", "BASO", "NRBC" in the last 168 hours.    No results for input(s): "NA", "K", "CL", "CO2", "ANIONGAP", "BUN", "CREATININE", "GLU", "CALCIUM", "PH", "MG", "ALBUMIN", "PROT", "ALKPHOS", "ALT", "AST", "BILITOT" in the last 168 hours.     Microbiology Results (last 7 days)       Procedure Component Value Units Date/Time    Respiratory Culture [[id number]]  (Abnormal)  (Susceptibility) Collected: 10/08/23 1639    Order Status: Completed Specimen: Sputum Updated: 10/11/23 1050     Respiratory Culture Moderate Escherichia coli     Comment: with normal respiratory nasra        GRAM STAIN Quality 1+      Few Gram positive " cocci      Rare Gram Negative Rods      Rare Gram Positive Rods             Fl Modified Barium Swallow Speech  See procedure notes from Speech Pathologist.    This procedure was auto-finalized.         Medication List        STOP taking these medications      albuterol 0.63 mg/3 mL Nebu  Commonly known as: ACCUNEB     predniSONE 20 MG tablet  Commonly known as: DELTASONE               Explained in detail to the patient about the discharge plan, medications, and follow-up visits. Pt understands and agrees with the treatment plan  Discharge Disposition: [facility]-Health Care [facility]   Discharged Condition: stable  Diet-    Medications Per DC med rec  Activities as tolerated   Follow-up Information       Yuriy Guajardo MD Follow up.    Specialty: Urology  Why: for hematuria workup  [facility] information:  [facility] [place] [address]  [phone]                           For further questions contact hospitalist office    Discharge time 33 minutes    For worsening symptoms, chest pain, shortness of breath, increased abdominal pain, high grade fever, stroke or stroke like symptoms, immediately go to the nearest Emergency Room or call 911 as soon as possible.      Joseph Caballero M.D.

## 2023-11-13 PROBLEM — J96.11 CHRONIC RESPIRATORY FAILURE WITH HYPOXIA: Status: RESOLVED | Noted: 2018-12-27 | Resolved: 2023-01-01

## (undated) DEVICE — TIP SUCTION YANKAUER

## (undated) DEVICE — KIT SURGICAL COLON .25 1.1OZ

## (undated) DEVICE — SOL IRRI STRL WATER 1000ML

## (undated) DEVICE — KIT CANIST SUCTION 1200CC

## (undated) DEVICE — COLLECTION SPECIMEN NEPTUNE

## (undated) DEVICE — TUBING O2 FEMALE CONN 13FT